# Patient Record
Sex: FEMALE | Race: WHITE | NOT HISPANIC OR LATINO | Employment: OTHER | URBAN - METROPOLITAN AREA
[De-identification: names, ages, dates, MRNs, and addresses within clinical notes are randomized per-mention and may not be internally consistent; named-entity substitution may affect disease eponyms.]

---

## 2017-04-10 ENCOUNTER — ALLSCRIPTS OFFICE VISIT (OUTPATIENT)
Dept: OTHER | Facility: OTHER | Age: 67
End: 2017-04-10

## 2017-04-10 DIAGNOSIS — Z00.00 ENCOUNTER FOR GENERAL ADULT MEDICAL EXAMINATION WITHOUT ABNORMAL FINDINGS: ICD-10-CM

## 2017-04-10 DIAGNOSIS — E66.3 OVERWEIGHT(278.02): ICD-10-CM

## 2017-04-10 DIAGNOSIS — Z12.11 ENCOUNTER FOR SCREENING FOR MALIGNANT NEOPLASM OF COLON: ICD-10-CM

## 2017-04-10 DIAGNOSIS — E55.9 VITAMIN D DEFICIENCY: ICD-10-CM

## 2017-04-10 DIAGNOSIS — E78.5 HYPERLIPIDEMIA: ICD-10-CM

## 2017-04-12 ENCOUNTER — APPOINTMENT (OUTPATIENT)
Dept: LAB | Facility: CLINIC | Age: 67
End: 2017-04-12
Payer: MEDICARE

## 2017-04-12 DIAGNOSIS — Z12.11 ENCOUNTER FOR SCREENING FOR MALIGNANT NEOPLASM OF COLON: ICD-10-CM

## 2017-04-12 DIAGNOSIS — E55.9 VITAMIN D DEFICIENCY: ICD-10-CM

## 2017-04-12 DIAGNOSIS — E66.3 OVERWEIGHT(278.02): ICD-10-CM

## 2017-04-12 DIAGNOSIS — E78.5 HYPERLIPIDEMIA: ICD-10-CM

## 2017-04-12 DIAGNOSIS — Z00.00 ENCOUNTER FOR GENERAL ADULT MEDICAL EXAMINATION WITHOUT ABNORMAL FINDINGS: ICD-10-CM

## 2017-04-12 LAB
25(OH)D3 SERPL-MCNC: 30.5 NG/ML (ref 30–100)
ALBUMIN SERPL BCP-MCNC: 3.7 G/DL (ref 3.5–5)
ALP SERPL-CCNC: 55 U/L (ref 46–116)
ALT SERPL W P-5'-P-CCNC: 23 U/L (ref 12–78)
ANION GAP SERPL CALCULATED.3IONS-SCNC: 6 MMOL/L (ref 4–13)
AST SERPL W P-5'-P-CCNC: 15 U/L (ref 5–45)
BASOPHILS # BLD AUTO: 0.04 THOUSANDS/ΜL (ref 0–0.1)
BASOPHILS NFR BLD AUTO: 1 % (ref 0–1)
BILIRUB SERPL-MCNC: 1.06 MG/DL (ref 0.2–1)
BILIRUB UR QL STRIP: NEGATIVE
BUN SERPL-MCNC: 13 MG/DL (ref 5–25)
CALCIUM SERPL-MCNC: 9 MG/DL (ref 8.3–10.1)
CHLORIDE SERPL-SCNC: 108 MMOL/L (ref 100–108)
CHOLEST SERPL-MCNC: 164 MG/DL (ref 50–200)
CLARITY UR: CLEAR
CO2 SERPL-SCNC: 28 MMOL/L (ref 21–32)
COLOR UR: YELLOW
CREAT SERPL-MCNC: 0.8 MG/DL (ref 0.6–1.3)
EOSINOPHIL # BLD AUTO: 0.09 THOUSAND/ΜL (ref 0–0.61)
EOSINOPHIL NFR BLD AUTO: 3 % (ref 0–6)
ERYTHROCYTE [DISTWIDTH] IN BLOOD BY AUTOMATED COUNT: 11.9 % (ref 11.6–15.1)
GFR SERPL CREATININE-BSD FRML MDRD: >60 ML/MIN/1.73SQ M
GLUCOSE P FAST SERPL-MCNC: 91 MG/DL (ref 65–99)
GLUCOSE UR STRIP-MCNC: NEGATIVE MG/DL
HCT VFR BLD AUTO: 43.4 % (ref 34.8–46.1)
HDLC SERPL-MCNC: 51 MG/DL (ref 40–60)
HGB BLD-MCNC: 14.6 G/DL (ref 11.5–15.4)
HGB UR QL STRIP.AUTO: NEGATIVE
KETONES UR STRIP-MCNC: NEGATIVE MG/DL
LDLC SERPL CALC-MCNC: 89 MG/DL (ref 0–100)
LEUKOCYTE ESTERASE UR QL STRIP: NEGATIVE
LYMPHOCYTES # BLD AUTO: 1.45 THOUSANDS/ΜL (ref 0.6–4.47)
LYMPHOCYTES NFR BLD AUTO: 48 % (ref 14–44)
MCH RBC QN AUTO: 31.1 PG (ref 26.8–34.3)
MCHC RBC AUTO-ENTMCNC: 33.6 G/DL (ref 31.4–37.4)
MCV RBC AUTO: 93 FL (ref 82–98)
MONOCYTES # BLD AUTO: 0.37 THOUSAND/ΜL (ref 0.17–1.22)
MONOCYTES NFR BLD AUTO: 12 % (ref 4–12)
NEUTROPHILS # BLD AUTO: 1.08 THOUSANDS/ΜL (ref 1.85–7.62)
NEUTS SEG NFR BLD AUTO: 36 % (ref 43–75)
NITRITE UR QL STRIP: NEGATIVE
NRBC BLD AUTO-RTO: 0 /100 WBCS
PH UR STRIP.AUTO: 6.5 [PH] (ref 4.5–8)
PLATELET # BLD AUTO: 179 THOUSANDS/UL (ref 149–390)
PMV BLD AUTO: 11 FL (ref 8.9–12.7)
POTASSIUM SERPL-SCNC: 4.6 MMOL/L (ref 3.5–5.3)
PROT SERPL-MCNC: 7.2 G/DL (ref 6.4–8.2)
PROT UR STRIP-MCNC: NEGATIVE MG/DL
RBC # BLD AUTO: 4.69 MILLION/UL (ref 3.81–5.12)
SODIUM SERPL-SCNC: 142 MMOL/L (ref 136–145)
SP GR UR STRIP.AUTO: 1.01 (ref 1–1.03)
TRIGL SERPL-MCNC: 118 MG/DL
UROBILINOGEN UR QL STRIP.AUTO: 0.2 E.U./DL
WBC # BLD AUTO: 3.04 THOUSAND/UL (ref 4.31–10.16)

## 2017-04-12 PROCEDURE — 82306 VITAMIN D 25 HYDROXY: CPT

## 2017-04-12 PROCEDURE — 36415 COLL VENOUS BLD VENIPUNCTURE: CPT

## 2017-04-12 PROCEDURE — 80061 LIPID PANEL: CPT

## 2017-04-12 PROCEDURE — 80053 COMPREHEN METABOLIC PANEL: CPT

## 2017-04-12 PROCEDURE — 81003 URINALYSIS AUTO W/O SCOPE: CPT

## 2017-04-12 PROCEDURE — 85025 COMPLETE CBC W/AUTO DIFF WBC: CPT

## 2017-04-13 ENCOUNTER — APPOINTMENT (OUTPATIENT)
Dept: LAB | Facility: CLINIC | Age: 67
End: 2017-04-13
Payer: MEDICARE

## 2017-04-13 DIAGNOSIS — E55.9 VITAMIN D DEFICIENCY: ICD-10-CM

## 2017-04-13 DIAGNOSIS — E78.5 HYPERLIPIDEMIA: ICD-10-CM

## 2017-04-13 DIAGNOSIS — Z00.00 ENCOUNTER FOR GENERAL ADULT MEDICAL EXAMINATION WITHOUT ABNORMAL FINDINGS: ICD-10-CM

## 2017-04-13 DIAGNOSIS — E66.3 OVERWEIGHT(278.02): ICD-10-CM

## 2017-04-13 DIAGNOSIS — Z12.11 ENCOUNTER FOR SCREENING FOR MALIGNANT NEOPLASM OF COLON: ICD-10-CM

## 2017-04-13 LAB — HEMOCCULT STL QL IA: NEGATIVE

## 2017-04-13 PROCEDURE — G0328 FECAL BLOOD SCRN IMMUNOASSAY: HCPCS

## 2017-05-08 ENCOUNTER — ALLSCRIPTS OFFICE VISIT (OUTPATIENT)
Dept: OTHER | Facility: OTHER | Age: 67
End: 2017-05-08

## 2017-07-06 ENCOUNTER — ALLSCRIPTS OFFICE VISIT (OUTPATIENT)
Dept: OTHER | Facility: OTHER | Age: 67
End: 2017-07-06

## 2017-07-19 ENCOUNTER — ALLSCRIPTS OFFICE VISIT (OUTPATIENT)
Dept: OTHER | Facility: OTHER | Age: 67
End: 2017-07-19

## 2017-08-03 ENCOUNTER — ALLSCRIPTS OFFICE VISIT (OUTPATIENT)
Dept: OTHER | Facility: OTHER | Age: 67
End: 2017-08-03

## 2017-08-31 ENCOUNTER — ALLSCRIPTS OFFICE VISIT (OUTPATIENT)
Dept: OTHER | Facility: OTHER | Age: 67
End: 2017-08-31

## 2017-10-17 ENCOUNTER — ALLSCRIPTS OFFICE VISIT (OUTPATIENT)
Dept: OTHER | Facility: OTHER | Age: 67
End: 2017-10-17

## 2017-10-19 NOTE — PROGRESS NOTES
Assessment   1  Anxiety (300 00) (F41 9)  2  Depression (311) (F32 9)  3  Hair loss (704 00) (L65 9)    Plan   Need for immunization against influenza    · Administered: Fluzone High-Dose 0 5 ML Intramuscular Suspension Prefilled Syringe      1  Anxiety and depression symptoms appear to be stable at the present time she is continue on buspirone 10 mg 3 times a day and escitalopram 20 mg daily  No suicidal ideations are evident on today's visit  I suggested she continue on present medications with the followup review in 3 months  2   Abnormal hair loss previous testing indicated no abnormalities and testosterone level or thyroid level  I suspect that her hair loss or thinning of hair is most likely due to her recent stressful time experiences  I reassured the patient that I think most likely her hair loss will stop in the near future and suggested that she start Rogaine topical solution  Discussion/Summary   Counseling Documentation With Imm: Total time of encounter was 35 minutes1 -- and-- 30 minutes was spent counseling1         1 Amended By: Ramiro Hensley; Oct 18 2017 6:58 PM EST      Chief Complaint   Chief Complaint Free Text Note Form: patient is here for a 7 week follow up  History of Present Illness   HPI: This is a routine follow-up visit for this 70-year-old female patient  She continues to do well on and her symptoms of anxiety and depression appear to be under good control  Her daughter who is an alcoholic who has moved on from inpatient rehab to a residential program with a 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  She is living in [place] and doing well  This was a major source of stress for the patient  She is very happy that her daughter is doing well with her recovery from alcohol addiction  She is showing improved signs of interest in her normal activities sleeping well and continues to go to [facility]   Patient does have some symptoms of mild hair loss which I suspect are related to the stressful period of time that she is just on negotiated  She has no apparent symptoms of suicide tendencies  Review of Systems   Complete-Female:      Constitutional: No fever, no chills, feels well, no tiredness, no recent weight gain or weight loss  Eyes: No complaints of eye pain, no red eyes, no eyesight problems, no discharge, no dry eyes, no itching of eyes  ENT: no complaints of earache, no loss of hearing, no nose bleeds, no nasal discharge, no sore throat, no hoarseness  Cardiovascular: No complaints of slow heart rate, no fast heart rate, no chest pain, no palpitations, no leg claudication, no lower extremity edema  Respiratory: No complaints of shortness of breath, no wheezing, no cough, no SOB on exertion, no orthopnea, no PND  Gastrointestinal: No complaints of abdominal pain, no constipation, no nausea or vomiting, no diarrhea, no bloody stools  Genitourinary: No complaints of dysuria, no incontinence, no pelvic pain, no dysmenorrhea, no vaginal discharge or bleeding  Musculoskeletal: No complaints of arthralgias, no myalgias, no joint swelling or stiffness, no limb pain or swelling  Integumentary: No complaints of skin rash or lesions, no itching, no skin wounds, no breast pain or lump  Neurological: No complaints of headache, no confusion, no convulsions, no numbness, no dizziness or fainting, no tingling, no limb weakness, no difficulty walking  Psychiatric: Not suicidal, no sleep disturbance, no anxiety or depression, no change in personality, no emotional problems  Endocrine: No complaints of proptosis, no hot flashes, no muscle weakness, no deepening of the voice, no feelings of weakness  Hematologic/Lymphatic: No complaints of swollen glands, no swollen glands in the neck, does not bleed easily, does not bruise easily  Active Problems   1  Anxiety (300 00) (F41 9)  2  Anxiety state (300 00) (F41 1)  3   Arthritis (555 90) (M19 90)  4  Depression (311) (F32 9)  5  Hyperbilirubinemia (782 4) (E80 6)  6  Hyperlipidemia, mild (272 4) (E78 5)  7  Leukopenia (288 50) (D72 819)  8  Need for immunization against influenza (V04 81) (Z23)  9  Over weight (278 02) (E66 3)  10  Sleep disturbance (780 50) (G47 9)  11  Vitamin D deficiency (268 9) (E55 9)    Past Medical History   1  History of Fracture of radius, distal, closed (813 42) (S52 509A)  2  History of Hair loss (704 00) (L65 9)  Active Problems And Past Medical History Reviewed: The active problems and past medical history were reviewed and updated today  Surgical History   1  History of Ankle Surgery  Surgical History Reviewed: The surgical history was reviewed and updated today  Family History   Mother   1  Family history of Ovarian cancer (183 0) (C56 9)  Father   2  Family history of Heart disease (429 9) (I51 9)  Brother   3  Family history of Leukemia (208 90) (C95 90)  Family History Reviewed: The family history was reviewed and updated today  Social History    · Never A Smoker  Social History Reviewed: The social history was reviewed and updated today  The social history was reviewed and is unchanged  Current Meds   1  ALPRAZolam 0 25 MG Oral Tablet; take one oill every 6-8 hours as needed for anxiety; Therapy: 50Dew8278 to (Evaluate:86Ixt0333); Last Rx:48Bop0833 Ordered  2  Aspirin EC 81 MG Oral Tablet Delayed Release Recorded  3  BusPIRone HCl - 10 MG Oral Tablet; TAKE 1 TABLET 3 TIMES DAILY; Therapy: 31ZES1283 to (Evaluate:17Oct2017)  Requested for: 52WKV2229; Last Rx:00Evr0924 Ordered  4  Calcium 250 MG Oral Capsule Recorded  5  Escitalopram Oxalate 20 MG Oral Tablet; Take 1 tablet daily  Requested for: 23CJZ3659; Last     MQ:[id number] Ordered  6  2016 [employer] CAPS Recorded  7  Vitamin D (Ergocalciferol) 91819 UNIT Oral Capsule; TAKE 1 CAPSULE Weekly Recorded  Medication List Reviewed:     The medication list was reviewed and updated today  Allergies   1  No Known Drug Allergies    Vitals   Vital Signs    Recorded: 66TCE7498 02:37PM   Temperature 98 5 F   Heart Rate 70   Respiration 14   Systolic 869   Diastolic 74   Height 5 ft 9 in   Weight 189 lb 0 2 oz   BMI Calculated 27 91   BSA Calculated 2 02   O2 Saturation 98     Physical Exam        Constitutional      General appearance: No acute distress, well appearing and well nourished  Eyes      Conjunctiva and lids: No swelling, erythema or discharge  Ears, Nose, Mouth, and Throat      External inspection of ears and nose: Normal        Pulmonary      Respiratory effort: No increased work of breathing or signs of respiratory distress  Auscultation of lungs: Clear to auscultation  Cardiovascular      Auscultation of heart: Normal rate and rhythm, normal S1 and S2, without murmurs  Future Appointments      Date/Time Provider Specialty Site   01/18/2018 09:15 AM BALAJI Tabor  Internal Medicine [facility] INTERNAL MED   04/17/2018 09:00 AM BALAJI Tabor   Internal Medicine [facility]53 MED     Signatures    Electronically signed by : BALAJI Lugo ; Oct 18 2017  6:57PM EST                       (Author)     Electronically signed by : BALAJI Lugo ; Oct 18 2017  6:58PM EST                       (Author)

## 2018-01-10 NOTE — PROGRESS NOTES
Assessment    1  Hair loss (704 00) (L65 9)   2  Arthritis (716 90) (M19 90)   3  Over weight (278 02) (E66 3)   4  Anxiety (300 00) (F41 9)   5  Vitamin D deficiency (268 9) (E55 9)   6  Hyperlipidemia, mild (272 4) (E78 5)    Plan  Hair loss    · (1) IRON; Status:Active; Requested for:08Apr2016;    · (1) T4, FREE; Status:Active; Requested for:08Apr2016;    · (1) TESTOSTERONE; Status:Active; Requested for:08Apr2016;    · (1) TSH; Status:Active; Requested for:08Apr2016; Health Maintenance    · EKG/ECG- POC; Status:Complete;   Done: 08Apr2016 09:30AM  Health Maintenance, Hair loss, Hyperlipidemia, mild, Over weight, Vitamin D deficiency    · (1) CBC/PLT/DIFF; Status:Active; Requested for:08Apr2016;    · (1) COMPREHENSIVE METABOLIC PANEL; Status:Active; Requested for:08Apr2016;    · (1) LIPID PANEL FASTING W DIRECT LDL REFLEX; Status:Active; Requested  for:08Apr2016;    · (1) OCCULT BLOOD, FECAL IMMUNOCHEMICAL TEST; Status:Active; Requested  for:08Apr2016;    · (1) URINALYSIS (will reflex a microscopy if leukocytes, occult blood, protein or nitrites  are not within normal limits); Status:Active; Requested for:08Apr2016;   Vitamin D deficiency    · *(Q) VITAMIN D, 25-HYDROXY, LC/MS/MS; Status:Active; Requested XJ:65EPV1504;     Discussion/Summary   Summary this 79-year-old woman appears to be in good general health  She has lost 3 pounds over the past year and one quarter on purpose  She is now approaching her ideal body mass index  She has some new arthritic changes in her left knee  These changes appear to be early stages of arthritis  I've encouraged her to continue to lose weight and embark on a Glucosamine-chondroitin supplementation  Should her pain worsen we will obtain x-ray imaging of the left knee  Patient has thinning of her eyebrows and hair loss past 6 months  We will obtain free T4 and TSH iron level and testosterone levels on this patient to evaluate      History of anxiety disorder which continues to be well controlled on Lexapro 10 mg daily we will continue the same  Vitamin D deficiency patient is completing a 6 week loading dose of vitamin D at 50,000 units per week he was given a lab slip to follow up in June a vitamin D level  Chief Complaint  This is an annual complete physical and laboratory results review for this 70-year-old woman  History of Present Illness  HPI: This 70-year-old woman presents for her annual physical today as a history of anxiety disorder overweight mild hyperlipidemia and vitamin D deficiency  Today she has 2 new issues  First is increased hair loss over the past several months  Had thinning of her eyebrows and decreased city appear uniformly across the scalp  The second is medial left knee discomfort  Pain is worse when she is ambulating and weightbearing  Review of Systems    Constitutional: No fever, no chills, feels well, no tiredness, no recent weight gain or weight loss  Eyes: No complaints of eye pain, no red eyes, no eyesight problems, no discharge, no dry eyes, no itching of eyes  ENT: no complaints of earache, no loss of hearing, no nose bleeds, no nasal discharge, no sore throat, no hoarseness  Cardiovascular: No complaints of slow heart rate, no fast heart rate, no chest pain, no palpitations, no leg claudication, no lower extremity edema  Respiratory: No complaints of shortness of breath, no wheezing, no cough, no SOB on exertion, no orthopnea, no PND  Gastrointestinal: No complaints of abdominal pain, no constipation, no nausea or vomiting, no diarrhea, no bloody stools  Genitourinary: No complaints of dysuria, no incontinence, no pelvic pain, no dysmenorrhea, no vaginal discharge or bleeding  Musculoskeletal: arthralgias, joint swelling, joint stiffness and Left knee pain medial aspect of the joint mild swelling in the joint as well     Integumentary: No complaints of skin rash or lesions, no itching, no skin wounds, no breast pain or lump  Neurological: No complaints of headache, no confusion, no convulsions, no numbness, no dizziness or fainting, no tingling, no limb weakness, no difficulty walking  Psychiatric: anxiety  Endocrine: Increased hair loss over the past approximate 6 months  Hematologic/Lymphatic: No complaints of swollen glands, no swollen glands in the neck, does not bleed easily, does not bruise easily  Active Problems    1  Fracture of radius, distal, closed (813 42) (S52 509A)   2  Influenza vaccine needed (V04 81) (Z23)    Surgical History    · History of Ankle Surgery    Family History    · Family history of Ovarian cancer (183 0) (C56 9)    · Family history of Heart disease (429 9) (I51 9)    · Family history of Leukemia (208 90) (C95 90)    Social History    · Never A Smoker    Current Meds   1  Aspirin EC 81 MG Oral Tablet Delayed Release Recorded   2  Calcium 250 MG Oral Capsule Recorded   3  Lexapro 10 MG Oral Tablet Recorded   4  Vitamin D (Ergocalciferol) 03096 UNIT Oral Capsule; TAKE 1 CAPSULE Weekly   Recorded    Allergies    1  No Known Drug Allergies    Vitals   Recorded: 08Apr2016 10:16AM Recorded: 08Apr2016 09:25AM   Temperature  97 4 F   Heart Rate  74   Respiration  16   Systolic 891 896   Diastolic 76 84   Height  5 ft 9 in   Weight  177 lb 0 64 oz   BMI Calculated  26 14   BSA Calculated  1 96   O2 Saturation  98     Physical Exam    Constitutional   General appearance: No acute distress, well appearing and well nourished  Head and Face   Head and face: Normal     Eyes   Conjunctiva and lids: No swelling, erythema or discharge  Pupils and irises: Equal, round, reactive to light  Ophthalmoscopic examination: Normal fundi and optic discs  Ears, Nose, Mouth, and Throat   External inspection of ears and nose: Normal     Otoscopic examination: Tympanic membranes translucent with normal light reflex  Canals patent without erythema      Nasal mucosa, septum, and turbinates: Normal without edema or erythema  Lips, teeth, and gums: Normal, good dentition  Oropharynx: Normal with no erythema, edema, exudate or lesions  Neck   Neck: Supple, symmetric, trachea midline, no masses  Thyroid: Normal, no thyromegaly  Pulmonary   Respiratory effort: No increased work of breathing or signs of respiratory distress  Auscultation of lungs: Clear to auscultation  Cardiovascular   Auscultation of heart: Normal rate and rhythm, normal S1 and S2, no murmurs  Carotid pulses: 2+ bilaterally  Abdominal aorta: Normal     Pedal pulses: 2+ bilaterally  Abdomen   Abdomen: Non-tender, no masses  Liver and spleen: No hepatomegaly or splenomegaly  Lymphatic   Palpation of lymph nodes in neck: No lymphadenopathy  Musculoskeletal   Joints, bones, and muscles: Abnormal   tenderness over the medial aspect of the left no significant  Skin   Skin and subcutaneous tissue: Normal without rashes or lesions  Neurologic   Cranial nerves: Cranial nerves II-XII intact  Cortical function: Normal mental status  Reflexes: 2+ and symmetric  Psychiatric   Judgment and insight: Normal     Orientation to person, place, and time: Normal     Recent and remote memory: Intact      Mood and affect: Normal        Results/Data  EKG/ECG- POC 08Apr2016 09:30AM Ilean Route     Test Name Result Flag Reference   EKG/ECG 04/07/2016         Signatures   Electronically signed by : BALAJI Starr ; Apr 8 2016 10:37AM EST                       (Author)

## 2018-01-12 VITALS
WEIGHT: 183.04 LBS | BODY MASS INDEX: 27.11 KG/M2 | TEMPERATURE: 96.9 F | OXYGEN SATURATION: 98 % | HEIGHT: 69 IN | DIASTOLIC BLOOD PRESSURE: 70 MMHG | HEART RATE: 59 BPM | RESPIRATION RATE: 16 BRPM | SYSTOLIC BLOOD PRESSURE: 130 MMHG

## 2018-01-12 VITALS
RESPIRATION RATE: 14 BRPM | HEIGHT: 69 IN | SYSTOLIC BLOOD PRESSURE: 114 MMHG | TEMPERATURE: 97.9 F | WEIGHT: 171.03 LBS | DIASTOLIC BLOOD PRESSURE: 62 MMHG | BODY MASS INDEX: 25.33 KG/M2 | HEART RATE: 77 BPM | OXYGEN SATURATION: 98 %

## 2018-01-12 NOTE — PROGRESS NOTES
Assessment    1  Anxiety (300 00) (F41 9)   2  Over weight (278 02) (E66 3)   3  Sleep disturbance (780 50) (G47 9)   4  Anxiety state (300 00) (F41 1)   5  Depression (311) (F32 9)    Plan  Anxiety    · ALPRAZolam 0 25 MG Oral Tablet; take one oill every 6-8 hours as needed for  anxiety  Colon cancer screening    · (1) URINALYSIS (will reflex a microscopy if leukocytes, occult blood, protein or nitrites  are not within normal limits); Status:Active; Requested for:63Npb0097;   Colon cancer screening, Health Maintenance, Hyperlipidemia, mild, Over weight, Vitamin  D deficiency    · (1) OCCULT BLOOD, FECAL IMMUNOCHEMICAL TEST; Status:Active; Requested  for:87Ibl6164; Health Maintenance    · EKG/ECG- POC; Status:Active - Perform Order; Requested for:88Qqn7384; Health Maintenance, Hyperlipidemia, mild, Over weight, Vitamin D deficiency    · (1) CBC/PLT/DIFF; Status:Active; Requested for:72Ger4368;    · (1) COMPREHENSIVE METABOLIC PANEL; Status:Active; Requested for:19Yot6455;    · (1) LIPID PANEL FASTING W DIRECT LDL REFLEX; Status:Active; Requested  for:19Mrk3882;   Vitamin D deficiency    · (1) VITAMIN D 25-HYDROXY; Status:Active; Requested for:42Yam6784; In view of the patient's recent stress level and her family we have in increased her Lexapro from 10 mg a day to 20 mg a day  I've also prescribe Xanax 0 25 mg to be taken every 6-8 hours as needed for anxiety breakthrough symptoms  I've asked her to have standard set of blood work performed to evaluate her metabolic profile also I'll meet with her in 3-4 weeks to reassess the benefit of increasing her Lexapro and adding the Xanax medication  Discussion/Summary  In summary the patient's physical examination today appears to be relatively stable  Her weight exceeds what his ideal  She has been experiencing increased emotional stresses and mild depression symptoms due to the recent diagnosis of her daughter with alcoholism   Her daughter is presently in rehabilitation  [facility] and also counseling with her   She feels under increased stress levels due to her daughter's condition  We've adjusted her medications try to help with the anxiety and depression symptoms  I'll see her in 3-4 weeks to reassess the effectiveness of this medication change  She'll have comprehensive set of blood work performed prior to that visit for review  Chief Complaint  patient is here for a physical       History of Present Illness  HM, Adult Female: The patient is being seen for a health maintenance evaluation  The last health maintenance visit was 1 year(s) ago  Social History: Household members include spouse  She is   Work status: Retired  The patient has never smoked cigarettes  She reports occasional alcohol use  She has never used illicit drugs  General Health: The patient's health since the last visit is described as good  She has regular dental visits  She denies vision problems  She denies hearing loss  Immunizations status: up to date  Lifestyle:  She consumes a diverse and healthy diet  She has weight concerns  She exercises regularly  She does not use tobacco  She denies alcohol use  She denies drug use  Reproductive health: the patient is postmenopausal    Screening: cancer screening reviewed and current  metabolic screening reviewed and current  risk screening reviewed and current  HPI: This 58-year-old female patient presents today for her annual health maintenance examination  She indicates that she has been under increased levels of stress lately due to family issues  The patient's daughter has been admitted to an alcohol rehabilitation facility  Her daughters 27years of age and lives with a boyfriend  The situation is caused increased levels of stress anxiety and depression and the patient  She has been having a difficult time coping with the stress and Route requests something to help her relax   He also indicates that she feels that she is feeling more depressed than normal  She has been using Benadryl at bedtime to help her fall asleep  She has been going to [employer] and also has been seeing a counselor along with her   Physically the patient has no specific complaints at this time  Review of Systems    Constitutional: No fever, no chills, feels well, no tiredness, no recent weight gain or weight loss  Eyes: No complaints of eye pain, no red eyes, no eyesight problems, no discharge, no dry eyes, no itching of eyes  ENT: no complaints of earache, no loss of hearing, no nose bleeds, no nasal discharge, no sore throat, no hoarseness  Cardiovascular: No complaints of slow heart rate, no fast heart rate, no chest pain, no palpitations, no leg claudication, no lower extremity edema  Respiratory: No complaints of shortness of breath, no wheezing, no cough, no SOB on exertion, no orthopnea, no PND  Gastrointestinal: No complaints of abdominal pain, no constipation, no nausea or vomiting, no diarrhea, no bloody stools  Genitourinary: No complaints of dysuria, no incontinence, no pelvic pain, no dysmenorrhea, no vaginal discharge or bleeding  Musculoskeletal: No complaints of arthralgias, no myalgias, no joint swelling or stiffness, no limb pain or swelling  Integumentary: No complaints of skin rash or lesions, no itching, no skin wounds, no breast pain or lump  Neurological: No complaints of headache, no confusion, no convulsions, no numbness, no dizziness or fainting, no tingling, no limb weakness, no difficulty walking  Psychiatric: anxiety, depression and Sleep disturbance  Endocrine: No complaints of proptosis, no hot flashes, no muscle weakness, no deepening of the voice, no feelings of weakness  Hematologic/Lymphatic: No complaints of swollen glands, no swollen glands in the neck, does not bleed easily, does not bruise easily  Active Problems    1   Anxiety (300 00) (F41 9) 2  Arthritis (716 90) (M19 90)   3  Hyperlipidemia, mild (272 4) (E78 5)   4  Influenza vaccine needed (V04 81) (Z23)   5  Over weight (278 02) (E66 3)   6  Vitamin D deficiency (268 9) (E55 9)    Past Medical History    · History of Fracture of radius, distal, closed (813 42) (S52 509A)   · History of Hair loss (704 00) (L65 9)    Surgical History    · History of Ankle Surgery    Family History  Mother    · Family history of Ovarian cancer (183 0) (C56 9)  Father    · Family history of Heart disease (429 9) (I51 9)  Brother    · Family history of Leukemia (208 90) (C95 90)    Social History    · Never A Smoker    Current Meds   1  Aspirin EC 81 MG Oral Tablet Delayed Release Recorded   2  Calcium 250 MG Oral Capsule Recorded   3  Escitalopram Oxalate 10 MG Oral Tablet; TAKE 1 TABLET DAILY  Requested for:   67DOB1216; Last Rx:[id number] Ordered   4 2016 [facility] [employer] CAPS Recorded   5  Vitamin D (Ergocalciferol) 01284 UNIT Oral Capsule; TAKE 1 CAPSULE Weekly   Recorded    Allergies    1  No Known Drug Allergies    Vitals   Recorded: 43Wsh3994 09:17AM   Temperature 98 2 F   Heart Rate 99   Respiration 16   Systolic 871   Diastolic 74   Height 5 ft 9 in   Weight 179 lb    BMI Calculated 26 43   BSA Calculated 1 97   O2 Saturation 98     Physical Exam    Constitutional   General appearance: No acute distress, well appearing and well nourished  Head and Face   Head and face: Normal     Eyes   Conjunctiva and lids: No swelling, erythema or discharge  Ears, Nose, Mouth, and Throat   External inspection of ears and nose: Normal     Otoscopic examination: Tympanic membranes translucent with normal light reflex  Canals patent without erythema  Nasal mucosa, septum, and turbinates: Normal without edema or erythema  Lips, teeth, and gums: Normal, good dentition  Oropharynx: Normal with no erythema, edema, exudate or lesions  Neck   Neck: Supple, symmetric, trachea midline, no masses      Thyroid: Normal, no thyromegaly  Pulmonary   Respiratory effort: No increased work of breathing or signs of respiratory distress  Percussion of chest: Normal     Auscultation of lungs: Clear to auscultation  Cardiovascular   Auscultation of heart: Normal rate and rhythm, normal S1 and S2, no murmurs  Carotid pulses: 2+ bilaterally  Pedal pulses: 2+ bilaterally  Examination of extremities for edema and/or varicosities: Normal     Abdomen   Abdomen: Non-tender, no masses  Liver and spleen: No hepatomegaly or splenomegaly  Lymphatic   Palpation of lymph nodes in neck: No lymphadenopathy  Musculoskeletal   Gait and station: Normal     Digits and nails: Normal without clubbing or cyanosis  Joints, bones, and muscles: Normal     Range of motion: Normal     Stability: Normal     Muscle strength/tone: Normal     Skin   Skin and subcutaneous tissue: Normal without rashes or lesions  Palpation of skin and subcutaneous tissue: Normal turgor  Neurologic   Cranial nerves: Cranial nerves II-XII intact  Cortical function: Normal mental status  Reflexes: 2+ and symmetric  Coordination: Normal finger to nose and heel to shin  Psychiatric   Judgment and insight: Normal     Orientation to person, place, and time: Normal     Recent and remote memory: Intact  Mood and affect: Abnormal   Anxious and mildly depressed  Future Appointments    Date/Time Provider Specialty Site   05/08/2017 09:00 AM BALAJI Boland  Internal Medicine [facility]   04/17/2018 09:00 AM BALAJI Boland   Internal Medicine [facility]     Signatures   Electronically signed by : BALAJI Vieira ; Apr 10 2017  5:02PM EST                       (Author)

## 2018-01-13 VITALS
DIASTOLIC BLOOD PRESSURE: 74 MMHG | TEMPERATURE: 98.5 F | SYSTOLIC BLOOD PRESSURE: 130 MMHG | BODY MASS INDEX: 27.99 KG/M2 | OXYGEN SATURATION: 98 % | RESPIRATION RATE: 14 BRPM | HEART RATE: 70 BPM | HEIGHT: 69 IN | WEIGHT: 189.01 LBS

## 2018-01-13 VITALS
OXYGEN SATURATION: 98 % | HEIGHT: 69 IN | WEIGHT: 173.38 LBS | BODY MASS INDEX: 25.68 KG/M2 | SYSTOLIC BLOOD PRESSURE: 115 MMHG | DIASTOLIC BLOOD PRESSURE: 78 MMHG | RESPIRATION RATE: 16 BRPM | TEMPERATURE: 98.2 F | HEART RATE: 72 BPM

## 2018-01-13 VITALS
DIASTOLIC BLOOD PRESSURE: 74 MMHG | WEIGHT: 179 LBS | BODY MASS INDEX: 26.51 KG/M2 | OXYGEN SATURATION: 98 % | RESPIRATION RATE: 16 BRPM | HEIGHT: 69 IN | TEMPERATURE: 98.2 F | HEART RATE: 99 BPM | SYSTOLIC BLOOD PRESSURE: 132 MMHG

## 2018-01-14 VITALS
DIASTOLIC BLOOD PRESSURE: 70 MMHG | SYSTOLIC BLOOD PRESSURE: 124 MMHG | WEIGHT: 179.01 LBS | TEMPERATURE: 98.2 F | HEART RATE: 69 BPM | RESPIRATION RATE: 14 BRPM | BODY MASS INDEX: 26.51 KG/M2 | HEIGHT: 69 IN | OXYGEN SATURATION: 98 %

## 2018-01-14 VITALS
WEIGHT: 176.03 LBS | RESPIRATION RATE: 16 BRPM | SYSTOLIC BLOOD PRESSURE: 138 MMHG | TEMPERATURE: 98.9 F | HEART RATE: 71 BPM | HEIGHT: 69 IN | OXYGEN SATURATION: 99 % | DIASTOLIC BLOOD PRESSURE: 76 MMHG | BODY MASS INDEX: 26.07 KG/M2

## 2018-01-18 ENCOUNTER — ALLSCRIPTS OFFICE VISIT (OUTPATIENT)
Dept: OTHER | Facility: OTHER | Age: 68
End: 2018-01-18

## 2018-01-19 NOTE — PROGRESS NOTES
Assessment   1  Anxiety (300 00) (F41 9)   2  Depression (311) (F32 9)   3  Over weight (278 02) (E66 3)    Plan   Colon cancer screening    · (1) URINALYSIS (will reflex a microscopy if leukocytes, occult blood, protein or nitrites are not within    normal limits); Status:Active; Requested for:02Apr2018;   Colon cancer screening, Depression, Hyperbilirubinemia, Hyperlipidemia, mild, Leukopenia, Sleep    disturbance, Vitamin D deficiency    · (1) OCCULT BLOOD, FECAL IMMUNOCHEMICAL TEST; Status:Active; Requested for:02Apr2018;   Depression, Hyperbilirubinemia, Hyperlipidemia, mild, Leukopenia, Sleep disturbance, Vitamin D    deficiency    · (1) CBC/PLT/DIFF; Status:Active; Requested for:02Apr2018;    · (1) COMPREHENSIVE METABOLIC PANEL; Status:Active; Requested for:02Apr2018;    · (1) LIPID PANEL FASTING W DIRECT LDL REFLEX; Status:Active; Requested for:02Apr2018;       1  Anxiety depression disorder seems to be will stabilized at this time recommend that the patient continue on her present medications, buspirone 10 mg 3 times a day and as citalopram 20 mg daily  If the patient experiences any year relapse or increase in anxiety or depression symptoms she should notify me to return for follow-up visit  She does have a complete physical visit scheduled for April and she will have comprehensive set of blood work for that visit  2   Overweight condition which I relate to improving depression symptoms and increased interest in eating  It also may be somewhat of a side effect of the as citalopram medication  I have recommended that the patient decrease her calorie consumption by decreasing portions of everything on her plate by 40% also recommend the initiation of daily exercise and reduction of carbohydrate consumption in general        Discussion/Summary   Counseling Documentation With Imm: total time of encounter was 30 minutes-- and-- 25 minutes was spent counseling        Chief Complaint   Chief Complaint Free Text Note Form: Patient is here today for a 3 month follow up  History of Present Illness   HPI: This is a routine 3 month follow-up visit for this 50-year-old female patient  She returns today for follow-up assessment of her anxiety depression symptoms  The symptoms have been largely linked to her daughter's struggles with alcohol addiction  Fortunately her daughter has been doing well on remain sober  Her daughter lives in [place] but did come home for [place] holiday with the visit went well and the patient intends to visit her daughter in [place] in the next several weeks  She denies any panic attacks she is sleeping well most nights does not have any overwhelming feelings of depression or anxiety at this point  We did discuss her increasing body weight she has gained almost 30 lb over the past 4-5 months  Explained to the patient some of this may be because of the improved treatment of her depression increased interest in eating but I have suggested strongly that she start to decrease her calorie consumption and take and start a regular exercise program       Review of Systems   Complete-Female:      Constitutional: No fever, no chills, feels well, no tiredness, no recent weight gain or weight loss  Eyes: No complaints of eye pain, no red eyes, no eyesight problems, no discharge, no dry eyes, no itching of eyes  ENT: no complaints of earache, no loss of hearing, no nose bleeds, no nasal discharge, no sore throat, no hoarseness  Cardiovascular: No complaints of slow heart rate, no fast heart rate, no chest pain, no palpitations, no leg claudication, no lower extremity edema  Respiratory: No complaints of shortness of breath, no wheezing, no cough, no SOB on exertion, no orthopnea, no PND  Gastrointestinal: No complaints of abdominal pain, no constipation, no nausea or vomiting, no diarrhea, no bloody stools        Genitourinary: No complaints of dysuria, no incontinence, no pelvic pain, no dysmenorrhea, no vaginal discharge or bleeding  Musculoskeletal: No complaints of arthralgias, no myalgias, no joint swelling or stiffness, no limb pain or swelling  Integumentary: No complaints of skin rash or lesions, no itching, no skin wounds, no breast pain or lump  Neurological: No complaints of headache, no confusion, no convulsions, no numbness, no dizziness or fainting, no tingling, no limb weakness, no difficulty walking  Psychiatric: Not suicidal, no sleep disturbance, no anxiety or depression, no change in personality, no emotional problems  Endocrine: No complaints of proptosis, no hot flashes, no muscle weakness, no deepening of the voice, no feelings of weakness  Hematologic/Lymphatic: No complaints of swollen glands, no swollen glands in the neck, does not bleed easily, does not bruise easily  Active Problems   1  Anxiety (300 00) (F41 9)   2  Anxiety state (300 00) (F41 1)   3  Arthritis (716 90) (M19 90)   4  Depression (311) (F32 9)   5  Hair loss (704 00) (L65 9)   6  Hyperbilirubinemia (782 4) (E80 6)   7  Hyperlipidemia, mild (272 4) (E78 5)   8  Leukopenia (288 50) (D72 819)   9  Need for immunization against influenza (V04 81) (Z23)   10  Over weight (278 02) (E66 3)   11  Sleep disturbance (780 50) (G47 9)   12  Vitamin D deficiency (268 9) (E55 9)    Past Medical History   1  History of Fracture of radius, distal, closed (813 42) (S52 509A)   2  History of Hair loss (704 00) (L65 9)  Active Problems And Past Medical History Reviewed: The active problems and past medical history were reviewed and updated today  Surgical History   1  History of Ankle Surgery  Surgical History Reviewed: The surgical history was reviewed and updated today  Family History   Mother    1  Family history of Ovarian cancer (183 0) (C56 9)  Father    2  Family history of Heart disease (429 9) (I51 9)  Brother    3   Family history of Leukemia (208 90) (C95 90)  Family History Reviewed: The family history was reviewed and updated today  Social History    · Never A Smoker  Social History Reviewed: The social history was reviewed and updated today  The social history was reviewed and is unchanged  Current Meds    1  ALPRAZolam 0 25 MG Oral Tablet; take one oill every 6-8 hours as needed for anxiety; Therapy: [id number] to (Evaluate:53Aag2037); Last Rx:[id number] Ordered   2  Aspirin EC 81 MG Oral Tablet Delayed Release Recorded   3  BusPIRone HCl - 10 MG Oral Tablet; TAKE 1 TABLET 3 TIMES DAILY; Therapy: 86YEO4273 to (Evaluate:59Oep8447)  Requested for: 10KEC6687; Last Rx:[id number]     Ordered   4  Calcium 250 MG Oral Capsule Recorded   5  Escitalopram Oxalate 20 MG Oral Tablet; Take 1 tablet daily  Requested for: 27Nov2017; Last     Rx:[id number] Ordered   6 2016 [facility] [employer] CAPS Recorded   7  Vitamin D (Ergocalciferol) 62030 UNIT Oral Capsule; TAKE 1 CAPSULE Weekly Recorded  Medication List Reviewed: The medication list was reviewed and updated today  Allergies   1  No Known Drug Allergies    Vitals   Vital Signs    Recorded: 82AID2914 09:12AM   Temperature 97 1 F   Heart Rate 69   Systolic 083   Diastolic 80   Height 5 ft 9 in   Weight 200 lb    BMI Calculated 29 54   BSA Calculated 2 07   O2 Saturation 99     Physical Exam        Constitutional      General appearance: No acute distress, well appearing and well nourished  Eyes      Conjunctiva and lids: No swelling, erythema or discharge  Ears, Nose, Mouth, and Throat      External inspection of ears and nose: Normal        Pulmonary      Respiratory effort: No increased work of breathing or signs of respiratory distress         Psychiatric      Orientation to person, place, and time: Normal        Mood and affect: Normal           Results/Data   Falls Risk Assessment (Dx Z13 89 Screen for Neurologic Disorder) [id number] 09:16AM User, Ahs      Test Name Result Flag Reference   Falls Risk      No falls in the past year        Future Appointments      Date/Time Provider Specialty Site   04/17/2018 09:00 AM BALAJI Caballero   Internal Medicine [facility] INTERNAL MED     Signatures    Electronically signed by : BALAJI Dick ; Jan 18 2018  9:42AM EST                       (Author)

## 2018-01-23 VITALS
OXYGEN SATURATION: 99 % | HEIGHT: 69 IN | BODY MASS INDEX: 29.62 KG/M2 | WEIGHT: 200 LBS | SYSTOLIC BLOOD PRESSURE: 132 MMHG | TEMPERATURE: 97.1 F | DIASTOLIC BLOOD PRESSURE: 80 MMHG | HEART RATE: 69 BPM

## 2018-01-24 ENCOUNTER — TELEPHONE (OUTPATIENT)
Dept: INTERNAL MEDICINE CLINIC | Facility: CLINIC | Age: 68
End: 2018-01-24

## 2018-01-24 DIAGNOSIS — I80.00 SUPERFICIAL THROMBOPHLEBITIS OF LOWER EXTREMITY, UNSPECIFIED LATERALITY: Primary | ICD-10-CM

## 2018-01-24 RX ORDER — NAPROXEN 500 MG/1
500 TABLET ORAL 2 TIMES DAILY WITH MEALS
Qty: 20 TABLET | Refills: 1 | Status: SHIPPED | OUTPATIENT
Start: 2018-01-24 | End: 2019-02-11 | Stop reason: ALTCHOICE

## 2018-01-24 NOTE — TELEPHONE ENCOUNTER
Pt called earlier to ask for some advice  She self-diagnosed herself for having superficial phlebitis on her thigh  She has had this before although this time it has 5+ spots on her thigh versus 1 or 2  They are hard and red  She has varicose veins where these spots have formed  She has been applying moist heat on these spots, but wants to know if there is anything else she can do  Please call back at [phone], thank you!

## 2018-01-24 NOTE — TELEPHONE ENCOUNTER
Patient called today to report what appears to be superficial thrombophlebitis of her thigh  She has had this before and there are several bumps in her varicose vein along with some localized irritation  I have advised her to minimize ambulation and use warm moist heat for 20-30 minutes 3 times a day in addition we started her on Naprosyn 500 mg twice a day for 10 days

## 2018-03-27 ENCOUNTER — CONSULT (OUTPATIENT)
Dept: VASCULAR SURGERY | Facility: CLINIC | Age: 68
End: 2018-03-27
Payer: MEDICARE

## 2018-03-27 ENCOUNTER — TRANSCRIBE ORDERS (OUTPATIENT)
Dept: ADMINISTRATIVE | Facility: HOSPITAL | Age: 68
End: 2018-03-27

## 2018-03-27 VITALS
SYSTOLIC BLOOD PRESSURE: 140 MMHG | BODY MASS INDEX: 30.21 KG/M2 | WEIGHT: 204 LBS | DIASTOLIC BLOOD PRESSURE: 80 MMHG | TEMPERATURE: 97.8 F | HEIGHT: 69 IN

## 2018-03-27 DIAGNOSIS — I83.90 VARICOSE VEIN OF LEG: ICD-10-CM

## 2018-03-27 DIAGNOSIS — I83.90 ASYMPTOMATIC VARICOSE VEINS: Primary | ICD-10-CM

## 2018-03-27 DIAGNOSIS — I80.01 THROMBOPHLEBITIS OF SUPERFICIAL VEINS OF RIGHT LOWER EXTREMITY: Primary | ICD-10-CM

## 2018-03-27 PROCEDURE — 99203 OFFICE O/P NEW LOW 30 MIN: CPT | Performed by: NURSE PRACTITIONER

## 2018-03-27 RX ORDER — BUSPIRONE HYDROCHLORIDE 10 MG/1
10 TABLET ORAL 3 TIMES DAILY
Refills: 2 | COMMUNITY
Start: 2018-02-01 | End: 2018-05-08 | Stop reason: SDUPTHER

## 2018-03-27 RX ORDER — ESCITALOPRAM OXALATE 20 MG/1
20 TABLET ORAL DAILY
Refills: 1 | COMMUNITY
Start: 2018-02-25 | End: 2018-05-23 | Stop reason: SDUPTHER

## 2018-03-27 NOTE — PATIENT INSTRUCTIONS
Varicose Veins   AMBULATORY CARE:   Varicose veins  are veins that become large, twisted, and swollen  They are common on the back of the calves, knees, and thighs  Varicose veins are caused by valves in your veins that do not work properly  This causes blood to collect and increase pressure in the veins of your legs  The increased pressure causes your veins to stretch, get larger, swell, and twist        Common symptoms include the following: Your symptoms may be worse after you stand or sit for long periods of time  You may have any of the following:  · Blue, purple, or bulging veins in your legs     · Pain, swelling, or muscle cramps in your legs    · Feeling of fatigue or heaviness in your legs    · Cramping in your legs  Seek care immediately if:   · You have a wound that does not heal or is infected  · You have an injury that has broken your skin and caused your varicose veins to bleed  · Your leg is swollen and hard  · You notice that your legs or feet are turning blue or black  · Your leg feels warm, tender, and painful  It may look swollen and red  Contact your healthcare provider if:   · You have pain in your leg that does not go away or gets worse  · You notice sudden large bruising on your legs  · You have a rash on your leg  · Your symptoms keep you from doing your daily activities  · You have questions or concerns about your condition or care  Treatment of varicose veins  aims to decrease symptoms, improve appearance, and prevent further problems  Treatment will depend on which veins are affected and how severe your condition is  You may need procedures to treat or remove your varicose veins  For example, your healthcare provider may inject a solution or use a laser to close the varicose veins  Surgery to remove long veins may also be done  Ask your healthcare provider for more information about procedures used to treat varicose veins    Manage varicose veins:   · Do not sit or stand for long periods of time  This can cause the blood to collect in your legs and make your symptoms worse  Bend or rotate your ankles several times every hour  Walk around for a few minutes every hour to get blood moving in your legs  · Do not cross your legs when you sit  This decreases blood flow to your feet and can make your symptoms worse  · Do not wear tight clothing or shoes  Do not wear high-heeled shoes  Do not wear clothes that are tight around the waist or knees  · Maintain a healthy weight  Being overweight or obese can make your varicose veins worse  Ask your healthcare provider how much you should weigh  Ask him or her to help you create a weight loss plan if you are overweight  · Wear pressure stockings as directed  The stockings are tight and put pressure on your legs  They improve blood flow and help prevent clots  · Elevate your legs  Keep them above the level of your heart for 15 to 30 minutes several times a day  You can also prop the end of your bed up slightly to elevate your legs while you sleep  This will help blood to flow back to your heart  · Get regular exercise  Talk to your healthcare provider about the best exercise plan for you  Exercise can improve blood flow to your legs and feet  Follow up with your healthcare provider as directed:  Write down your questions so you remember to ask them during your visits  © 2017 [employer] Information is for End User's use only and may not be sold, redistributed or otherwise used for commercial purposes  All illustrations and images included in CareNotes® are the copyrighted property of [employer]  or [employer]  The above information is an  only  It is not intended as medical advice for individual conditions or treatments  Talk to your doctor, nurse or pharmacist before following any medical regimen to see if it is safe and effective for you

## 2018-03-27 NOTE — PROGRESS NOTES
Assessment/Plan:  70year old female with venous insufficiency, recurrent superficial phlebitis, history of Left GSV ligation, stripping and stab phlebectomies '09 by Dr Nasir Watkins who presents for evaluation of her veins  1  RLE superficial thrombophlebitis - medial/anterior thigh which is resolving at this time  She's had recurrent superficial thrombophlebitis all below the calf and this is the first occurrence above the knee  She is concerned about recurrent phlebitis  Rx compression given  Will evaluate with reflux study in 2 months and return to the office after study to discuss possible treatment options  Problem List Items Addressed This Visit        Cardiovascular and Mediastinum    Thrombophlebitis of superficial veins of right lower extremity - Primary    Relevant Orders    Compression Stocking    Compression Stocking    Varicose vein of leg    Relevant Orders    Compression Stocking    Compression Stocking                 Patient ID: Chanelle North is a 70 y o  female  Chief Complaint: Pt is here to varicose veins to right leg  Pt states she has had them since [age] years old  Pt states she has had them before pregnancy and after  Pt states veins are painful, cramping,burning, bulging and impairing her mobility  Pt states she has recurrent phlebitis  Pt states she has worn compression stockings and elevates  Pt states she has had surgery back in 8/14/09 by Dr Nasir Watkins  Pt c/o varicose veins interfering with her daily activities when she develops phlebitis Pt has family hx of Vv/ Phlebitis  Pt is taking aspirin daily  HPI  Patient presents to the office for evaluation of her veins  She's had varicose veins since age 26  History of left leg venous ligation/stripping/phlebectomies by Dr Luis F Villagomez in 2009  She's had right thigh/calf varicosities with associated heaviness/occasional throbbing discomfort with multiple episodes of superficial thrombophlebitis   Outside of the phlebitis her venous symptoms are well controlled with compression, leg elevation and rest  She's a retired teacher  She denies history of DVT though was on Coumadin x3 months prophylactically per patient after right ankle tendon repair and bone repair 4 years ago as she was in an aircast and non weightbearing for 3 months  About 2 months ago she developed pain and redness to the right anterior thigh varicose vein  She called Dr Giancarlo Maynard and was instructed on warm compresses and Naprosyn  These symptoms have mostly resolved  She continues to have a palpable cord of the right thigh  In the past her phlebitis have typically been below the knee  She's concerned with the phlebitis extending to the thigh and concerned about additional episodes   The following portions of the patient's history were reviewed and updated as appropriate: allergies, current medications, past family history, past medical history, past social history, past surgical history and problem list     Review of Systems   Constitutional: Positive for diaphoresis  HENT: Positive for rhinorrhea  Eyes: Positive for photophobia and visual disturbance  Respiratory: Negative  Cardiovascular: Negative  Gastrointestinal: Negative  Endocrine: Negative  Genitourinary: Negative  Musculoskeletal: Negative  Leg pain  Leg pain when walking   Allergic/Immunologic: Negative  Neurological: Negative  Hematological: Negative  Psychiatric/Behavioral: The patient is nervous/anxious            Objective:  Vitals:    03/27/18 1429   BP: 140/80   BP Location: Right arm   Patient Position: Sitting   Cuff Size: Adult   Temp: 97 8 °F (36 6 °C)   TempSrc: Tympanic   Weight: 92 5 kg (204 lb)   Height: 5' 9" (1 753 m)       Patient Active Problem List   Diagnosis    Thrombophlebitis of superficial veins of right lower extremity    Varicose vein of leg       Past Surgical History:   Procedure Laterality Date    ANKLE SURGERY         Family History   Problem Relation Age of Onset    Ovarian cancer Mother     Heart disease Father     Leukemia Brother        Social History     Social History    Marital status: /Civil Union     Spouse name: N/A    Number of children: N/A    Years of education: N/A     Occupational History    Not on file  Social History Main Topics    Smoking status: Never Smoker    Smokeless tobacco: Never Used    Alcohol use Not on file    Drug use: Unknown    Sexual activity: Not on file     Other Topics Concern    Not on file     Social History Narrative    No narrative on file       No Known Allergies      Current Outpatient Prescriptions:     aspirin 81 MG tablet, Take by mouth, Disp: , Rfl:     busPIRone (BUSPAR) 10 mg tablet, 10 mg 3 (three) times a day, Disp: , Rfl: 2    Calcium 250 MG CAPS, Take by mouth, Disp: , Rfl:     cholecalciferol (VITAMIN D3) 1,000 units tablet, Take 1 capsule by mouth once a week, Disp: , Rfl:     escitalopram (LEXAPRO) 20 mg tablet, Take 20 mg by mouth daily, Disp: , Rfl: 1    naproxen (NAPROSYN) 500 mg tablet, Take 1 tablet by mouth 2 (two) times a day with meals for 10 days, Disp: 20 tablet, Rfl: 1         Physical Exam   Constitutional: She is oriented to person, place, and time  She appears well-developed and well-nourished  HENT:   Head: Normocephalic  Eyes: EOM are normal    Neck: Normal range of motion  Neck supple  Cardiovascular: Normal heart sounds  Pulses:       Femoral pulses are 2+ on the right side, and 2+ on the left side  Dorsalis pedis pulses are 2+ on the right side, and 2+ on the left side  Posterior tibial pulses are 2+ on the right side, and 2+ on the left side  Palpable cord right medial/anterior thigh to medial knee with overlying hyperpigmentation consistent with phlebitis  Minimal tenderness on palpation  Scatter bilateral lower extremity spider telangiectasias   Pulmonary/Chest: Effort normal and breath sounds normal    Abdominal: Soft  Bowel sounds are normal    Musculoskeletal: Normal range of motion  Neurological: She is alert and oriented to person, place, and time  Skin: Skin is warm  Psychiatric: She has a normal mood and affect

## 2018-03-30 DIAGNOSIS — I80.9 RECURRENT PHLEBITIS OF SUPERFICIAL VEIN: Primary | ICD-10-CM

## 2018-03-30 DIAGNOSIS — I87.2 CHRONIC VENOUS INSUFFICIENCY: ICD-10-CM

## 2018-04-02 DIAGNOSIS — E55.9 VITAMIN D DEFICIENCY: ICD-10-CM

## 2018-04-02 DIAGNOSIS — E80.6 OTHER DISORDERS OF BILIRUBIN METABOLISM: ICD-10-CM

## 2018-04-02 DIAGNOSIS — E78.5 HYPERLIPIDEMIA: ICD-10-CM

## 2018-04-02 DIAGNOSIS — D72.819 DECREASED WHITE BLOOD CELL COUNT: ICD-10-CM

## 2018-04-02 DIAGNOSIS — Z12.11 ENCOUNTER FOR SCREENING FOR MALIGNANT NEOPLASM OF COLON: ICD-10-CM

## 2018-04-02 DIAGNOSIS — F32.9 MAJOR DEPRESSIVE DISORDER, SINGLE EPISODE: ICD-10-CM

## 2018-04-02 DIAGNOSIS — G47.9 SLEEP DISORDER: ICD-10-CM

## 2018-04-17 ENCOUNTER — LAB (OUTPATIENT)
Dept: LAB | Facility: CLINIC | Age: 68
End: 2018-04-17
Payer: MEDICARE

## 2018-04-17 DIAGNOSIS — D72.819 DECREASED WHITE BLOOD CELL COUNT: ICD-10-CM

## 2018-04-17 DIAGNOSIS — E55.9 VITAMIN D DEFICIENCY: ICD-10-CM

## 2018-04-17 DIAGNOSIS — G47.9 SLEEP DISORDER: ICD-10-CM

## 2018-04-17 DIAGNOSIS — Z12.11 ENCOUNTER FOR SCREENING FOR MALIGNANT NEOPLASM OF COLON: ICD-10-CM

## 2018-04-17 DIAGNOSIS — E78.5 HYPERLIPIDEMIA: ICD-10-CM

## 2018-04-17 DIAGNOSIS — E80.6 OTHER DISORDERS OF BILIRUBIN METABOLISM: ICD-10-CM

## 2018-04-17 DIAGNOSIS — F32.9 MAJOR DEPRESSIVE DISORDER, SINGLE EPISODE: ICD-10-CM

## 2018-04-17 LAB — HEMOCCULT STL QL IA: NEGATIVE

## 2018-04-17 PROCEDURE — G0328 FECAL BLOOD SCRN IMMUNOASSAY: HCPCS

## 2018-04-30 DIAGNOSIS — I87.2 VENOUS INSUFFICIENCY: ICD-10-CM

## 2018-04-30 DIAGNOSIS — I80.9 PHLEBITIS AND THROMBOPHLEBITIS OF UNSPECIFIED SITE: Primary | ICD-10-CM

## 2018-05-08 ENCOUNTER — OFFICE VISIT (OUTPATIENT)
Dept: INTERNAL MEDICINE CLINIC | Facility: CLINIC | Age: 68
End: 2018-05-08
Payer: MEDICARE

## 2018-05-08 VITALS
BODY MASS INDEX: 29.62 KG/M2 | RESPIRATION RATE: 16 BRPM | OXYGEN SATURATION: 98 % | WEIGHT: 200 LBS | HEART RATE: 96 BPM | TEMPERATURE: 100.1 F | DIASTOLIC BLOOD PRESSURE: 76 MMHG | SYSTOLIC BLOOD PRESSURE: 140 MMHG | HEIGHT: 69 IN

## 2018-05-08 DIAGNOSIS — E78.5 HYPERLIPIDEMIA, UNSPECIFIED HYPERLIPIDEMIA TYPE: Primary | ICD-10-CM

## 2018-05-08 DIAGNOSIS — I83.90 VARICOSE VEIN OF LEG: ICD-10-CM

## 2018-05-08 DIAGNOSIS — F32.A DEPRESSION, UNSPECIFIED DEPRESSION TYPE: ICD-10-CM

## 2018-05-08 DIAGNOSIS — J01.00 ACUTE NON-RECURRENT MAXILLARY SINUSITIS: ICD-10-CM

## 2018-05-08 DIAGNOSIS — F41.9 ANXIETY: ICD-10-CM

## 2018-05-08 PROBLEM — E80.6 HYPERBILIRUBINEMIA: Status: ACTIVE | Noted: 2017-05-08

## 2018-05-08 PROBLEM — I80.01 THROMBOPHLEBITIS OF SUPERFICIAL VEINS OF RIGHT LOWER EXTREMITY: Status: RESOLVED | Noted: 2018-03-27 | Resolved: 2018-05-08

## 2018-05-08 PROBLEM — G47.9 SLEEP DISTURBANCE: Status: ACTIVE | Noted: 2017-04-10

## 2018-05-08 PROCEDURE — 93000 ELECTROCARDIOGRAM COMPLETE: CPT | Performed by: INTERNAL MEDICINE

## 2018-05-08 PROCEDURE — 99215 OFFICE O/P EST HI 40 MIN: CPT | Performed by: INTERNAL MEDICINE

## 2018-05-08 PROCEDURE — G0439 PPPS, SUBSEQ VISIT: HCPCS | Performed by: INTERNAL MEDICINE

## 2018-05-08 RX ORDER — AZITHROMYCIN 250 MG/1
TABLET, FILM COATED ORAL
Qty: 6 TABLET | Refills: 0 | Status: SHIPPED | OUTPATIENT
Start: 2018-05-08 | End: 2018-05-12

## 2018-05-08 RX ORDER — BUSPIRONE HYDROCHLORIDE 10 MG/1
5 TABLET ORAL 3 TIMES DAILY
Qty: 45 TABLET | Refills: 0 | Status: SHIPPED | OUTPATIENT
Start: 2018-05-08 | End: 2018-07-25 | Stop reason: SDUPTHER

## 2018-05-08 NOTE — ASSESSMENT & PLAN NOTE
Patient has stable depression symptoms present time she continues on Lexapro at 20 mg daily will re-evaluate the medication in 1 month when she returns  She does have a trip out to visit her daughter in [place] which can be somewhat stressful will continue the Lexapro at the present dose through that visit

## 2018-05-08 NOTE — ASSESSMENT & PLAN NOTE
Varicose veins of the legs with no evidence of venous thrombosis recommend support stockings at the patient is going to be on her feet for an extended period

## 2018-05-08 NOTE — ASSESSMENT & PLAN NOTE
Patient is an overweight situation with a body mass index of 29 53 it is advisable for her to continue to work on losing weight by regular exercise and decreasing calorie consumption at mealtimes  Between meal snacks should be eliminated completely

## 2018-05-08 NOTE — ASSESSMENT & PLAN NOTE
Symptoms of sinus congestion especially on left side of the head with a cough headache sore throat and low-grade fever  She occasionally coughs up a greenish yellow mucus  Symptoms have been present for several days will start her on a 5 day Zithromax pack she is encouraged to get extra rest and also maintain good hydration if her symptoms do not improve or in fact worsen she should return for follow-up assessment

## 2018-05-08 NOTE — PROGRESS NOTES
Assessment/Plan:    Varicose vein of leg  Varicose veins of the legs with no evidence of venous thrombosis recommend support stockings at the patient is going to be on her feet for an extended period    Anxiety  Anxiety symptoms appear to be well controlled  She is presently on of buspirone 10 mg 3 times a day she indicates she feels that she may be slightly sluggish over sedated at the present time ir anxiety is well controlled so were going to decrease her buspirone to 5 mg 3 times a day  Will follow up in about a month to see how she is doing on the lower dose of this medication  Will continue the Lexapro 20 mg daily for the present time would consider decreasing that to 10 mg on her next visit if she is doing well  Depression  Patient has stable depression symptoms present time she continues on Lexapro at 20 mg daily will re-evaluate the medication in 1 month when she returns  She does have a trip out to visit her daughter in [place] which can be somewhat stressful will continue the Lexapro at the present dose through that visit  Acute non-recurrent maxillary sinusitis  Symptoms of sinus congestion especially on left side of the head with a cough headache sore throat and low-grade fever  She occasionally coughs up a greenish yellow mucus  Symptoms have been present for several days will start her on a 5 day Zithromax pack she is encouraged to get extra rest and also maintain good hydration if her symptoms do not improve or in fact worsen she should return for follow-up assessment  Over weight  Patient is an overweight situation with a body mass index of 29 53 it is advisable for her to continue to work on losing weight by regular exercise and decreasing calorie consumption at mealtimes  Between meal snacks should be eliminated completely         Diagnoses and all orders for this visit:    Hyperlipidemia, unspecified hyperlipidemia type  -     POCT ECG    Acute non-recurrent maxillary sinusitis  -     azithromycin (ZITHROMAX) 250 mg tablet; Take 2 tablets today then 1 tablet daily x 4 days    Anxiety  -     busPIRone (BUSPAR) 10 mg tablet; Take 0 5 tablets (5 mg total) by mouth 3 (three) times a day        Subjective:      Patient ID: Petros Alvarez is a 70 y o  female  This 77-year-old female patient presents today for an annual physical examination review of blood work and electrocardiogram   She has not had the blood work performed yet but we have provided her with a request for the annual blood work including metabolic profile CBC lipid profile fecal testing and urine testing  She presently has a history of anxiety and depression disorder and is on medication for treatment of the symptoms  She feels that she may be slightly sluggish because of the medication and will discuss this with her and adjust medications prior to leaving today  In addition she has a history of hyperlipidemia overweight condition varicose veins of the legs  The following portions of the patient's history were reviewed and updated as appropriate:   She  has a past medical history of Fracture of radius, distal, closed  She   Patient Active Problem List    Diagnosis Date Noted    Acute non-recurrent maxillary sinusitis 05/08/2018    Varicose vein of leg 03/27/2018    Hyperbilirubinemia 05/08/2017    Depression 04/10/2017    Sleep disturbance 04/10/2017    Anxiety 04/08/2016    Arthritis 04/08/2016    Hyperlipidemia, mild 04/08/2016    Over weight 04/08/2016    Vitamin D deficiency 04/08/2016     She  has a past surgical history that includes Ankle surgery  Her family history includes Heart disease in her father; Leukemia in her brother; Ovarian cancer in her mother  She  reports that she has never smoked  She has never used smokeless tobacco  Her alcohol and drug histories are not on file       Review of Systems   Constitutional: Positive for fatigue     Psychiatric/Behavioral: Positive for dysphoric mood  The patient is nervous/anxious  All other systems reviewed and are negative  Objective:      /76   Pulse 96   Temp 100 1 °F (37 8 °C)   Resp 16   Ht 5' 9" (1 753 m)   Wt 90 7 kg (200 lb)   SpO2 98%   BMI 29 53 kg/m²          Physical Exam   Constitutional: She is oriented to person, place, and time  She appears well-developed and well-nourished  No distress  HENT:   Head: Normocephalic and atraumatic  Right Ear: Tympanic membrane and external ear normal    Left Ear: Tympanic membrane and external ear normal    Nose: Nose normal    Mouth/Throat: Uvula is midline, oropharynx is clear and moist and mucous membranes are normal    Eyes: Conjunctivae are normal  Pupils are equal, round, and reactive to light  Right eye exhibits no discharge  Left eye exhibits no discharge  Neck: No thyromegaly present  Cardiovascular: Normal rate, regular rhythm, normal heart sounds and intact distal pulses  No murmur heard  Pulmonary/Chest: Effort normal and breath sounds normal  No respiratory distress  She has no wheezes  She has no rales  She exhibits no tenderness  Abdominal: Soft  Bowel sounds are normal  She exhibits no distension and no mass  There is no tenderness  There is no rebound and no guarding  Musculoskeletal: Normal range of motion  She exhibits no edema, tenderness or deformity  Lymphadenopathy:     She has no cervical adenopathy  Neurological: She is alert and oriented to person, place, and time  She has normal reflexes  No cranial nerve deficit  Skin: Skin is warm, dry and intact  No rash noted  She is not diaphoretic  No erythema  No pallor  Psychiatric: She has a normal mood and affect   Her speech is normal and behavior is normal  Judgment and thought content normal

## 2018-05-08 NOTE — PROGRESS NOTES
Assessment/Plan:    No problem-specific Assessment & Plan notes found for this encounter  Diagnoses and all orders for this visit:    Hyperlipidemia, unspecified hyperlipidemia type  -     POCT ECG    Acute non-recurrent maxillary sinusitis  -     azithromycin (ZITHROMAX) 250 mg tablet; Take 2 tablets today then 1 tablet daily x 4 days    Anxiety  -     busPIRone (BUSPAR) 10 mg tablet; Take 0 5 tablets (5 mg total) by mouth 3 (three) times a day        Subjective:      Patient ID: Felice Bean is a 70 y o  female  This 58-year-old female patient presents today for annual wellness visit  Since her last visit with us she has been treated for anxiety and depression symptoms related to family issues  Presently she is doing very nicely on her medication  Reviewed today her existing medical conditions as well as it risk factors for future conditions  We have recommended a visit with her gynecologist this year as well as a mammogram as she is due for both of these examinations  Recommend colonoscopy per be performed every 10 years for colon cancer screening as well as fecal testing on an annual basis  Healthy lifestyle of healthy diet regular exercise and weight control were reviewed in detail with the patient today  The following portions of the patient's history were reviewed and updated as appropriate:   She  has a past medical history of Fracture of radius, distal, closed  She   Patient Active Problem List    Diagnosis Date Noted    Varicose vein of leg 03/27/2018    Hyperbilirubinemia 05/08/2017    Depression 04/10/2017    Sleep disturbance 04/10/2017    Anxiety 04/08/2016    Arthritis 04/08/2016    Hyperlipidemia, mild 04/08/2016    Over weight 04/08/2016    Vitamin D deficiency 04/08/2016     She  has a past surgical history that includes Ankle surgery  Her family history includes Heart disease in her father; Leukemia in her brother; Ovarian cancer in her mother    She  reports that she has never smoked  She has never used smokeless tobacco  Her alcohol and drug histories are not on file       Review of Systems      Objective:      /76   Pulse 96   Temp 100 1 °F (37 8 °C)   Resp 16   Ht 5' 9" (1 753 m)   Wt 90 7 kg (200 lb)   SpO2 98%   BMI 29 53 kg/m²          Physical Exam

## 2018-05-23 DIAGNOSIS — F32.A DEPRESSION, UNSPECIFIED DEPRESSION TYPE: Primary | ICD-10-CM

## 2018-05-23 RX ORDER — ESCITALOPRAM OXALATE 20 MG/1
TABLET ORAL
Qty: 90 TABLET | Refills: 1 | Status: SHIPPED | OUTPATIENT
Start: 2018-05-23 | End: 2018-06-08 | Stop reason: SDUPTHER

## 2018-05-30 ENCOUNTER — HOSPITAL ENCOUNTER (OUTPATIENT)
Dept: NON INVASIVE DIAGNOSTICS | Facility: CLINIC | Age: 68
Discharge: HOME | End: 2018-05-30
Payer: MEDICARE

## 2018-05-30 DIAGNOSIS — I83.90 ASYMPTOMATIC VARICOSE VEINS: ICD-10-CM

## 2018-05-30 PROCEDURE — 93971 EXTREMITY STUDY: CPT

## 2018-05-31 PROCEDURE — 93971 EXTREMITY STUDY: CPT | Performed by: SURGERY

## 2018-06-04 ENCOUNTER — OFFICE VISIT (OUTPATIENT)
Dept: VASCULAR SURGERY | Facility: CLINIC | Age: 68
End: 2018-06-04
Payer: MEDICARE

## 2018-06-04 VITALS
SYSTOLIC BLOOD PRESSURE: 120 MMHG | HEART RATE: 74 BPM | BODY MASS INDEX: 29.2 KG/M2 | HEIGHT: 70 IN | WEIGHT: 204 LBS | DIASTOLIC BLOOD PRESSURE: 70 MMHG | RESPIRATION RATE: 16 BRPM | TEMPERATURE: 98.5 F

## 2018-06-04 DIAGNOSIS — I83.90 VARICOSE VEIN OF LEG: ICD-10-CM

## 2018-06-04 DIAGNOSIS — I80.01 THROMBOPHLEBITIS OF SUPERFICIAL VEINS OF RIGHT LOWER EXTREMITY: ICD-10-CM

## 2018-06-04 DIAGNOSIS — I87.2 VENOUS INSUFFICIENCY OF BOTH LOWER EXTREMITIES: Primary | ICD-10-CM

## 2018-06-04 PROBLEM — I80.9 SUPERFICIAL THROMBOPHLEBITIS: Status: ACTIVE | Noted: 2018-06-04

## 2018-06-04 PROCEDURE — 99203 OFFICE O/P NEW LOW 30 MIN: CPT | Performed by: SURGERY

## 2018-06-04 NOTE — PATIENT INSTRUCTIONS
1) SVT (superficial thrombophlebitis)  -you have clot in a superficial vein in the right thigh  -treatment for this if it should happen again is warm compresses, naproxen, time    2) Venous insufficiency  -you have some leaky valves inside your veins in the deep and superficial system  -if your symptoms should worsen (swelling, pain, aching), you do have surgical options to fix this  -you would have to complete a trial first of medical management including compression daily for 3 months, elevation, exercise, healthy weight  -please call and make an appointment if your symptoms worsen

## 2018-06-04 NOTE — PROGRESS NOTES
Assessment/Plan:    Pt is a 69 yo F w/ HLD, MDD, anxiety, arthritis, presents to discuss venous disease  Venous insufficiency of both lower extremities  Varicose vein of leg  -hx of L GSV stripping, stab phlebectomies in '09 by Lavinia gil improved  -reviewed reflux study which shows R CFV/pop reflux as well as significant R GSV reflux from the inguinal through knee, >4s  -although she does have significant, treatable reflux, her symptoms are very mild, with only mild edema and some heaviness at the end of the day  -reviewed conservative management measures including daily compression use (not using these in summer currently), leg elevation (some at night), exercise (is active) and healthy eating  -instructed patient that she should return to care if symptoms worsen or become life-limiting and that she would need to complete 3mo trial of conservative management before being considered for surgical options  -f/u PRN    Thrombophlebitis of superficial veins of right lower extremity  -SVT noted on reflux study in 5/18 but sxs began in 2/18; has had episodes in the past; sxs resolved at this point besides some lumpiness at the site of the vein  -reviewed tx for SVT including warm compresses and NSIADs    Subjective:  " I am here for my results"      Patient ID: Anita Brown is a 70 y o  female  Patient is here to review LEVDR done on 5/30/18  Pt states that Right inner thigh is achy  No rest pain  She elevates at night which seems to help  She states that it started in Feb 2018  She wears compression  HPI:    Pt presents to discuss venous disease  She has a hx of significant LLE symptoms s/p ligation/stripping/stabs by Marny Jeans in '09 with relief of symptoms  Didn't have any significant RLE symptoms until Feb when she has a severe SVT with pain, swelling, ropy vein, pain with ambulation    These symptoms are resolved now other than some lumpiness of the vein on the anterior thigh R   Has minimal edema of legs  Does have some heaviness at the end of the day  This is not limiting  She has had multiple LLE SVTs in past   Denies hx of DVT  She wears compression in the winter but not lately  She elevates at night  She is active and likes to walk and work in the garden  She is overweight  Never smoker  No fam hx of aneurysm        The following portions of the patient's history were reviewed and updated as appropriate: allergies, current medications, past family history, past medical history, past social history, past surgical history and problem list     Review of Systems   Constitutional: Negative  HENT: Negative  Eyes: Positive for photophobia and visual disturbance  Respiratory: Negative  Cardiovascular: Positive for leg swelling (mild)  Gastrointestinal: Negative  Endocrine: Positive for heat intolerance  Genitourinary: Negative  Musculoskeletal: Positive for back pain  Skin: Negative  Negative for wound  Allergic/Immunologic: Negative  Neurological: Negative  Hematological: Negative  Psychiatric/Behavioral: The patient is nervous/anxious  Objective:      /70 (BP Location: Right arm, Patient Position: Sitting)   Pulse 74   Temp 98 5 °F (36 9 °C) (Tympanic)   Resp 16   Ht 5' 10" (1 778 m)   Wt 92 5 kg (204 lb)   BMI 29 27 kg/m²          Physical Exam   Constitutional: She is oriented to person, place, and time  She appears well-developed and well-nourished  HENT:   Head: Normocephalic and atraumatic  Eyes: Conjunctivae are normal    Neck: Normal range of motion  Neck supple  Cardiovascular: Normal rate, regular rhythm and normal heart sounds  No murmur heard  Pulses:       Dorsalis pedis pulses are 2+ on the right side, and 2+ on the left side  Posterior tibial pulses are 2+ on the right side, and 2+ on the left side  No carotid bruits B   Pulmonary/Chest: Effort normal and breath sounds normal    Abdominal: Soft   She exhibits no distension  There is no tenderness  There is no rebound  Musculoskeletal: Normal range of motion  She exhibits edema (mild B ankle edema)  Neurological: She is alert and oriented to person, place, and time  Skin: Skin is warm and dry  R anterior thigh with area of firm/ropy vein and some skin darkening/discoloration; no erythema, nontender; remainder of skin of legs is wnl    R ankle with well healed surgical incision (ankle surgery several years ago)    Diffuse spiders, mostly lower legs; no large varicosities or retics   Psychiatric: She has a normal mood and affect  Her behavior is normal    Nursing note and vitals reviewed  Vitals:    06/04/18 0935   BP: 120/70   BP Location: Right arm   Patient Position: Sitting   Pulse: 74   Resp: 16   Temp: 98 5 °F (36 9 °C)   TempSrc: Tympanic   Weight: 92 5 kg (204 lb)   Height: 5' 10" (1 778 m)       Patient Active Problem List   Diagnosis    Varicose vein of leg    Anxiety    Arthritis    Depression    Hyperbilirubinemia    Hyperlipidemia, mild    Over weight    Sleep disturbance    Vitamin D deficiency    Acute non-recurrent maxillary sinusitis    Venous insufficiency of both lower extremities    Superficial thrombophlebitis       Past Surgical History:   Procedure Laterality Date    ANKLE SURGERY         Family History   Problem Relation Age of Onset    Ovarian cancer Mother     Heart disease Father     Leukemia Brother        Social History     Social History    Marital status: /Civil Union     Spouse name: N/A    Number of children: N/A    Years of education: N/A     Occupational History    Not on file       Social History Main Topics    Smoking status: Never Smoker    Smokeless tobacco: Never Used    Alcohol use No    Drug use: No    Sexual activity: Not on file     Other Topics Concern    Not on file     Social History Narrative    No narrative on file       No Known Allergies      Current Outpatient Prescriptions:     aspirin 81 MG tablet, Take by mouth, Disp: , Rfl:     busPIRone (BUSPAR) 10 mg tablet, Take 0 5 tablets (5 mg total) by mouth 3 (three) times a day, Disp: 45 tablet, Rfl: 0    Calcium 250 MG CAPS, Take by mouth, Disp: , Rfl:     cholecalciferol (VITAMIN D3) 1,000 units tablet, Take 1 capsule by mouth once a week, Disp: , Rfl:     escitalopram (LEXAPRO) 20 mg tablet, TAKE 1 TABLET DAILY, Disp: 90 tablet, Rfl: 1    naproxen (NAPROSYN) 500 mg tablet, Take 1 tablet by mouth 2 (two) times a day with meals for 10 days, Disp: 20 tablet, Rfl: 1

## 2018-06-08 ENCOUNTER — OFFICE VISIT (OUTPATIENT)
Dept: INTERNAL MEDICINE CLINIC | Facility: CLINIC | Age: 68
End: 2018-06-08
Payer: MEDICARE

## 2018-06-08 ENCOUNTER — APPOINTMENT (OUTPATIENT)
Dept: LAB | Facility: CLINIC | Age: 68
End: 2018-06-08
Payer: MEDICARE

## 2018-06-08 VITALS
BODY MASS INDEX: 29.06 KG/M2 | TEMPERATURE: 97.7 F | HEIGHT: 70 IN | DIASTOLIC BLOOD PRESSURE: 78 MMHG | HEART RATE: 64 BPM | SYSTOLIC BLOOD PRESSURE: 138 MMHG | OXYGEN SATURATION: 98 % | WEIGHT: 203 LBS

## 2018-06-08 DIAGNOSIS — E80.6 OTHER DISORDERS OF BILIRUBIN METABOLISM: ICD-10-CM

## 2018-06-08 DIAGNOSIS — E78.5 HYPERLIPIDEMIA: ICD-10-CM

## 2018-06-08 DIAGNOSIS — D72.819 DECREASED WHITE BLOOD CELL COUNT: ICD-10-CM

## 2018-06-08 DIAGNOSIS — E55.9 VITAMIN D DEFICIENCY: ICD-10-CM

## 2018-06-08 DIAGNOSIS — F41.9 ANXIETY: Primary | ICD-10-CM

## 2018-06-08 DIAGNOSIS — F32.A DEPRESSION, UNSPECIFIED DEPRESSION TYPE: ICD-10-CM

## 2018-06-08 DIAGNOSIS — F32.9 MAJOR DEPRESSIVE DISORDER, SINGLE EPISODE: ICD-10-CM

## 2018-06-08 DIAGNOSIS — Z12.11 ENCOUNTER FOR SCREENING FOR MALIGNANT NEOPLASM OF COLON: ICD-10-CM

## 2018-06-08 DIAGNOSIS — G47.9 SLEEP DISORDER: ICD-10-CM

## 2018-06-08 LAB
ALBUMIN SERPL BCP-MCNC: 3.7 G/DL (ref 3.5–5)
ALP SERPL-CCNC: 70 U/L (ref 46–116)
ALT SERPL W P-5'-P-CCNC: 27 U/L (ref 12–78)
ANION GAP SERPL CALCULATED.3IONS-SCNC: 4 MMOL/L (ref 4–13)
AST SERPL W P-5'-P-CCNC: 20 U/L (ref 5–45)
BACTERIA UR QL AUTO: ABNORMAL /HPF
BASOPHILS # BLD AUTO: 0.05 THOUSANDS/ΜL (ref 0–0.1)
BASOPHILS NFR BLD AUTO: 1 % (ref 0–1)
BILIRUB SERPL-MCNC: 1.04 MG/DL (ref 0.2–1)
BILIRUB UR QL STRIP: NEGATIVE
BUN SERPL-MCNC: 11 MG/DL (ref 5–25)
CALCIUM SERPL-MCNC: 8.8 MG/DL (ref 8.3–10.1)
CHLORIDE SERPL-SCNC: 106 MMOL/L (ref 100–108)
CHOLEST SERPL-MCNC: 182 MG/DL (ref 50–200)
CLARITY UR: CLEAR
CO2 SERPL-SCNC: 28 MMOL/L (ref 21–32)
COLOR UR: ABNORMAL
CREAT SERPL-MCNC: 0.87 MG/DL (ref 0.6–1.3)
EOSINOPHIL # BLD AUTO: 0.18 THOUSAND/ΜL (ref 0–0.61)
EOSINOPHIL NFR BLD AUTO: 5 % (ref 0–6)
ERYTHROCYTE [DISTWIDTH] IN BLOOD BY AUTOMATED COUNT: 11.7 % (ref 11.6–15.1)
GFR SERPL CREATININE-BSD FRML MDRD: 68 ML/MIN/1.73SQ M
GLUCOSE P FAST SERPL-MCNC: 98 MG/DL (ref 65–99)
GLUCOSE UR STRIP-MCNC: NEGATIVE MG/DL
HCT VFR BLD AUTO: 44.5 % (ref 34.8–46.1)
HDLC SERPL-MCNC: 43 MG/DL (ref 40–60)
HGB BLD-MCNC: 14.8 G/DL (ref 11.5–15.4)
HGB UR QL STRIP.AUTO: NEGATIVE
HYALINE CASTS #/AREA URNS LPF: ABNORMAL /LPF
IMM GRANULOCYTES # BLD AUTO: 0.01 THOUSAND/UL (ref 0–0.2)
IMM GRANULOCYTES NFR BLD AUTO: 0 % (ref 0–2)
KETONES UR STRIP-MCNC: NEGATIVE MG/DL
LDLC SERPL CALC-MCNC: 106 MG/DL (ref 0–100)
LEUKOCYTE ESTERASE UR QL STRIP: ABNORMAL
LYMPHOCYTES # BLD AUTO: 1.77 THOUSANDS/ΜL (ref 0.6–4.47)
LYMPHOCYTES NFR BLD AUTO: 49 % (ref 14–44)
MCH RBC QN AUTO: 31.4 PG (ref 26.8–34.3)
MCHC RBC AUTO-ENTMCNC: 33.3 G/DL (ref 31.4–37.4)
MCV RBC AUTO: 95 FL (ref 82–98)
MONOCYTES # BLD AUTO: 0.41 THOUSAND/ΜL (ref 0.17–1.22)
MONOCYTES NFR BLD AUTO: 11 % (ref 4–12)
NEUTROPHILS # BLD AUTO: 1.27 THOUSANDS/ΜL (ref 1.85–7.62)
NEUTS SEG NFR BLD AUTO: 34 % (ref 43–75)
NITRITE UR QL STRIP: NEGATIVE
NON-SQ EPI CELLS URNS QL MICRO: ABNORMAL /HPF
NRBC BLD AUTO-RTO: 0 /100 WBCS
PH UR STRIP.AUTO: 6.5 [PH] (ref 4.5–8)
PLATELET # BLD AUTO: 192 THOUSANDS/UL (ref 149–390)
PMV BLD AUTO: 10.5 FL (ref 8.9–12.7)
POTASSIUM SERPL-SCNC: 3.9 MMOL/L (ref 3.5–5.3)
PROT SERPL-MCNC: 7.4 G/DL (ref 6.4–8.2)
PROT UR STRIP-MCNC: NEGATIVE MG/DL
RBC # BLD AUTO: 4.71 MILLION/UL (ref 3.81–5.12)
RBC #/AREA URNS AUTO: ABNORMAL /HPF
SODIUM SERPL-SCNC: 138 MMOL/L (ref 136–145)
SP GR UR STRIP.AUTO: 1.02 (ref 1–1.03)
TRIGL SERPL-MCNC: 166 MG/DL
UROBILINOGEN UR QL STRIP.AUTO: 1 E.U./DL
WBC # BLD AUTO: 3.69 THOUSAND/UL (ref 4.31–10.16)
WBC #/AREA URNS AUTO: ABNORMAL /HPF

## 2018-06-08 PROCEDURE — 85025 COMPLETE CBC W/AUTO DIFF WBC: CPT

## 2018-06-08 PROCEDURE — 80061 LIPID PANEL: CPT

## 2018-06-08 PROCEDURE — 80053 COMPREHEN METABOLIC PANEL: CPT

## 2018-06-08 PROCEDURE — 36415 COLL VENOUS BLD VENIPUNCTURE: CPT

## 2018-06-08 PROCEDURE — 81001 URINALYSIS AUTO W/SCOPE: CPT

## 2018-06-08 PROCEDURE — 99213 OFFICE O/P EST LOW 20 MIN: CPT | Performed by: INTERNAL MEDICINE

## 2018-06-08 RX ORDER — ESCITALOPRAM OXALATE 20 MG/1
10 TABLET ORAL DAILY
Qty: 90 TABLET | Refills: 0 | Status: SHIPPED | OUTPATIENT
Start: 2018-06-08 | End: 2018-11-16 | Stop reason: SDUPTHER

## 2018-06-08 NOTE — PROGRESS NOTES
Assessment/Plan:    Anxiety  Anxiety symptoms remain stable at the present time she is taking 5 mg of buspirone 3 times a day she feels no significant change since the decreased from 10 mg 3 times a day  Continue at 5 mg t i d  reassess in 4 weeks  Depression  Depression symptoms are very stable at the present time the stresses in strains in her life are her at a low level at the present time discussed changing the Lexapro from 20 mg a day down to 10 mg with the patient she is in agreement with this change will have her to start this tomorrow with a follow-up visit in approximately 4 weeks  She knows to call sooner if she has any increase in symptoms on the lower dose of the Lexapro  Diagnoses and all orders for this visit:    Depression, unspecified depression type  -     escitalopram (LEXAPRO) 20 mg tablet; Take 0 5 tablets (10 mg total) by mouth daily        Subjective:      Patient ID: Cassidy Dykes is a 70 y o  female  This pleasant 50-year-old female patient returns today for follow-up assessment  Approximately 1 month ago when she was here for routine visit she indicated she had been doing well with decreased stress and depression symptoms  At that time we decreased her buspirone from 10 mg 3 times a day to 5 mg 3 times a day  She was maintained on her Lexapro at 20 mg daily  She returns today indicating that she feels no significant change on the lower dose of the buspirone and is happy with the reduction in the medication  She continues to have low levels of stress in her life at the present time we discussed the potential to decrease the Lexapro from 20 mg to 10 mg which she is in agreement with  Will have her do that and follow up in 1 month  She knows if she has any increase in symptoms of anxiety or depression to call me for visit sooner          The following portions of the patient's history were reviewed and updated as appropriate:   She  has a past medical history of Fracture of radius, distal, closed  She   Patient Active Problem List    Diagnosis Date Noted    Venous insufficiency of both lower extremities 06/04/2018    Superficial thrombophlebitis 06/04/2018    Acute non-recurrent maxillary sinusitis 05/08/2018    Varicose vein of leg 03/27/2018    Hyperbilirubinemia 05/08/2017    Depression 04/10/2017    Sleep disturbance 04/10/2017    Anxiety 04/08/2016    Arthritis 04/08/2016    Hyperlipidemia, mild 04/08/2016    Over weight 04/08/2016    Vitamin D deficiency 04/08/2016     She  has a past surgical history that includes Ankle surgery  Her family history includes Heart disease in her father; Leukemia in her brother; Ovarian cancer in her mother  She  reports that she has never smoked  She has never used smokeless tobacco  She reports that she does not drink alcohol or use drugs       Review of Systems   All other systems reviewed and are negative  Objective:      /78 (BP Location: Left arm, Patient Position: Sitting, Cuff Size: Standard)   Pulse 64   Temp 97 7 °F (36 5 °C) (Tympanic)   Ht 5' 10" (1 778 m)   Wt 92 1 kg (203 lb)   SpO2 98%   BMI 29 13 kg/m²          Physical Exam   Constitutional: She is oriented to person, place, and time  Vital signs are normal  She appears well-developed and well-nourished  She is cooperative  HENT:   Right Ear: Hearing, tympanic membrane, external ear and ear canal normal    Left Ear: Hearing, tympanic membrane, external ear and ear canal normal    Nose: Nose normal  No mucosal edema  Mouth/Throat: Uvula is midline, oropharynx is clear and moist and mucous membranes are normal    Eyes: Conjunctivae and lids are normal  Pupils are equal, round, and reactive to light  Neck: No JVD present  Carotid bruit is not present  No thyromegaly present  Cardiovascular: Normal rate, regular rhythm, normal heart sounds and intact distal pulses  No murmur heard    Pulmonary/Chest: Effort normal and breath sounds normal  No respiratory distress  Abdominal: Normal appearance  Musculoskeletal: Normal range of motion  She exhibits no edema  Lymphadenopathy:     She has no cervical adenopathy  Neurological: She is alert and oriented to person, place, and time  She has normal reflexes  Skin: Skin is warm, dry and intact  Psychiatric: She has a normal mood and affect  Her speech is normal and behavior is normal  Judgment and thought content normal  Cognition and memory are normal    Vitals reviewed

## 2018-06-08 NOTE — ASSESSMENT & PLAN NOTE
Anxiety symptoms remain stable at the present time she is taking 5 mg of buspirone 3 times a day she feels no significant change since the decreased from 10 mg 3 times a day  Continue at 5 mg t i d  reassess in 4 weeks

## 2018-06-08 NOTE — ASSESSMENT & PLAN NOTE
Depression symptoms are very stable at the present time the stresses in strains in her life are her at a low level at the present time discussed changing the Lexapro from 20 mg a day down to 10 mg with the patient she is in agreement with this change will have her to start this tomorrow with a follow-up visit in approximately 4 weeks  She knows to call sooner if she has any increase in symptoms on the lower dose of the Lexapro

## 2018-07-25 DIAGNOSIS — F41.9 ANXIETY: ICD-10-CM

## 2018-07-25 RX ORDER — BUSPIRONE HYDROCHLORIDE 10 MG/1
TABLET ORAL
Qty: 270 TABLET | Refills: 2 | Status: SHIPPED | OUTPATIENT
Start: 2018-07-25 | End: 2018-08-17 | Stop reason: SDUPTHER

## 2018-08-17 ENCOUNTER — OFFICE VISIT (OUTPATIENT)
Dept: INTERNAL MEDICINE CLINIC | Facility: CLINIC | Age: 68
End: 2018-08-17
Payer: MEDICARE

## 2018-08-17 VITALS
WEIGHT: 206.6 LBS | HEIGHT: 70 IN | SYSTOLIC BLOOD PRESSURE: 138 MMHG | RESPIRATION RATE: 16 BRPM | HEART RATE: 73 BPM | DIASTOLIC BLOOD PRESSURE: 82 MMHG | BODY MASS INDEX: 29.58 KG/M2 | TEMPERATURE: 98.2 F | OXYGEN SATURATION: 98 %

## 2018-08-17 DIAGNOSIS — M19.90 ARTHRITIS: ICD-10-CM

## 2018-08-17 DIAGNOSIS — F41.9 ANXIETY: ICD-10-CM

## 2018-08-17 DIAGNOSIS — E66.3 OVER WEIGHT: ICD-10-CM

## 2018-08-17 DIAGNOSIS — F32.A DEPRESSION, UNSPECIFIED DEPRESSION TYPE: Primary | ICD-10-CM

## 2018-08-17 PROBLEM — J01.00 ACUTE NON-RECURRENT MAXILLARY SINUSITIS: Status: RESOLVED | Noted: 2018-05-08 | Resolved: 2018-08-17

## 2018-08-17 PROBLEM — I80.9 SUPERFICIAL THROMBOPHLEBITIS: Status: RESOLVED | Noted: 2018-06-04 | Resolved: 2018-08-17

## 2018-08-17 PROCEDURE — 99214 OFFICE O/P EST MOD 30 MIN: CPT | Performed by: INTERNAL MEDICINE

## 2018-08-17 RX ORDER — BUSPIRONE HYDROCHLORIDE 5 MG/1
5 TABLET ORAL 2 TIMES DAILY
Qty: 60 TABLET | Refills: 1 | Status: SHIPPED | OUTPATIENT
Start: 2018-08-17 | End: 2019-02-11 | Stop reason: ALTCHOICE

## 2018-08-17 NOTE — ASSESSMENT & PLAN NOTE
Symptoms of degenerative/osteoarthritis symptoms in the knees bilaterally we have recommended conservative care including a glucosamine chondroitin supplementation and tumor ache for its anti inflammatory properties  We will see how she does over the next several weeks if the pain intensifies or fails to improve with our supplements that I would consider x-ray evaluation of the joints  Weight loss would be beneficial to the arthritis process in her knees

## 2018-08-17 NOTE — ASSESSMENT & PLAN NOTE
Overweight condition with a body mass index of 29 64  Clearly the patient has been physically inactive over the past several months which adds to her tendency to gain weight  We recommended trying to start regular exercise in the form of bicycling walking or swimming in addition recommend decrease in total calorie consumption  The benefits of weight loss to her general health were reviewed as well as improvement in the arthritic symptoms that she has in her knees

## 2018-08-17 NOTE — PROGRESS NOTES
Assessment/Plan:    Arthritis  Symptoms of degenerative/osteoarthritis symptoms in the knees bilaterally we have recommended conservative care including a glucosamine chondroitin supplementation and tumor ache for its anti inflammatory properties  We will see how she does over the next several weeks if the pain intensifies or fails to improve with our supplements that I would consider x-ray evaluation of the joints  Weight loss would be beneficial to the arthritis process in her knees  Anxiety  Anxiety remains under excellent control  The patient's life is calm unstable at this point her daughters previous issues have stabilized  This is reduce the amount of stress in the patient's life  I have recommended that we try decreasing the buspirone of from 3 times a day to 2 times a day with an option of decreasing it to once a day if she feels good  I would do a minimum of 1 week it each dose level  Will continue the Lexapro as it has both anxiety and depression benefits  Depression  Previous depression symptoms appear to be stable at this time of patient's life is going well she has very little external stresses in her life  Will continue Lexapro for now as a baseline dose of this medication most likely be beneficial for the patient  No apparent side effects of the medication at this time    Over weight  Overweight condition with a body mass index of 29 64  Clearly the patient has been physically inactive over the past several months which adds to her tendency to gain weight  We recommended trying to start regular exercise in the form of bicycling walking or swimming in addition recommend decrease in total calorie consumption  The benefits of weight loss to her general health were reviewed as well as improvement in the arthritic symptoms that she has in her knees         Diagnoses and all orders for this visit:    Depression, unspecified depression type    Anxiety  -     busPIRone (BUSPAR) 5 mg tablet; Take 1 tablet (5 mg total) by mouth 2 (two) times a day    Over weight    Arthritis        Subjective:      Patient ID: Haja Sanchez is a 70 y o  female  This 54-year-old female patient presents today for follow-up visit  She has a history of anxiety and depression symptoms on her last visit we decreased her Lexapro from 20 milligrams a day to 10 milligrams daily  She has previously decreased her buspirone from 10 milligrams t i d  to 5 milligrams t i d  She reports no significant change in her anxiety and depression symptoms  There underway excellent control with no overt symptoms at this point  We are concerned as is the patient about her gradual increase in weight over the past several months  She does admit to being rather inactive over this period of time  We stressed the importance of regular exercise walking swimming bicycling or all favorable forms of exercise  The patient does have some discomfort in both of her knees and on examination does not appear to be any swelling or increased warmth in the knees  Will start her on a combination of glucosamine chondroitin as well as tumor ache supplementation to see if it helps with what appears to be some mild arthritis in her knees  If symptoms fail to improve or worsen then I would consider x-ray evaluation of the knees  The following portions of the patient's history were reviewed and updated as appropriate:   She  has a past medical history of Fracture of radius, distal, closed  She  has a past surgical history that includes Ankle surgery  Her family history includes Heart disease in her father; Leukemia in her brother; Ovarian cancer in her mother  She  reports that she has never smoked  She has never used smokeless tobacco  She reports that she does not drink alcohol or use drugs    Current Outpatient Prescriptions   Medication Sig Dispense Refill    aspirin 81 MG tablet Take by mouth      busPIRone (BUSPAR) 5 mg tablet Take 1 tablet (5 mg total) by mouth 2 (two) times a day 60 tablet 1    Calcium 250 MG CAPS Take by mouth      cholecalciferol (VITAMIN D3) 1,000 units tablet Take 1 capsule by mouth once a week      escitalopram (LEXAPRO) 20 mg tablet Take 0 5 tablets (10 mg total) by mouth daily 90 tablet 0    naproxen (NAPROSYN) 500 mg tablet Take 1 tablet by mouth 2 (two) times a day with meals for 10 days 20 tablet 1     No current facility-administered medications for this visit       Review of Systems   Constitutional: Positive for unexpected weight change  Musculoskeletal: Positive for arthralgias  Psychiatric/Behavioral: Positive for dysphoric mood  The patient is nervous/anxious  All other systems reviewed and are negative  Objective:      /82   Pulse 73   Temp 98 2 °F (36 8 °C)   Resp 16   Ht 5' 10" (1 778 m)   Wt 93 7 kg (206 lb 9 6 oz)   SpO2 98%   BMI 29 64 kg/m²          Physical Exam   Constitutional: She is oriented to person, place, and time  Vital signs are normal  She appears well-developed and well-nourished  She is cooperative  No distress  HENT:   Head: Normocephalic and atraumatic  Right Ear: Hearing, tympanic membrane, external ear and ear canal normal    Left Ear: Hearing, tympanic membrane, external ear and ear canal normal    Nose: Nose normal  No mucosal edema  Mouth/Throat: Uvula is midline, oropharynx is clear and moist and mucous membranes are normal  No oropharyngeal exudate  Eyes: Conjunctivae and lids are normal  Pupils are equal, round, and reactive to light  Right eye exhibits no discharge  Left eye exhibits no discharge  Neck: No JVD present  Carotid bruit is not present  No thyromegaly present  Cardiovascular: Normal rate, regular rhythm, normal heart sounds and intact distal pulses  No murmur heard  Pulmonary/Chest: Effort normal and breath sounds normal  No respiratory distress  She has no wheezes  She has no rales  She exhibits no tenderness  Abdominal: Soft  Normal appearance and bowel sounds are normal    Musculoskeletal: Normal range of motion  She exhibits no edema  Examination of both knees indicates no evidence of effusion or any increased warmth emanating from the knee joints they appear to be stable with no medial or lateral laxity  Lymphadenopathy:     She has no cervical adenopathy  Neurological: She is alert and oriented to person, place, and time  She has normal reflexes  Skin: Skin is warm, dry and intact  She is not diaphoretic  Psychiatric: She has a normal mood and affect  Her speech is normal and behavior is normal  Judgment and thought content normal  Cognition and memory are normal    Vitals reviewed

## 2018-08-17 NOTE — ASSESSMENT & PLAN NOTE
Previous depression symptoms appear to be stable at this time of patient's life is going well she has very little external stresses in her life  Will continue Lexapro for now as a baseline dose of this medication most likely be beneficial for the patient    No apparent side effects of the medication at this time

## 2018-08-17 NOTE — ASSESSMENT & PLAN NOTE
Anxiety remains under excellent control  The patient's life is calm unstable at this point her daughters previous issues have stabilized  This is reduce the amount of stress in the patient's life  I have recommended that we try decreasing the buspirone of from 3 times a day to 2 times a day with an option of decreasing it to once a day if she feels good  I would do a minimum of 1 week it each dose level  Will continue the Lexapro as it has both anxiety and depression benefits

## 2018-11-12 ENCOUNTER — OFFICE VISIT (OUTPATIENT)
Dept: INTERNAL MEDICINE CLINIC | Facility: CLINIC | Age: 68
End: 2018-11-12
Payer: MEDICARE

## 2018-11-12 VITALS
SYSTOLIC BLOOD PRESSURE: 140 MMHG | BODY MASS INDEX: 29.06 KG/M2 | WEIGHT: 203 LBS | TEMPERATURE: 98.5 F | DIASTOLIC BLOOD PRESSURE: 80 MMHG | HEIGHT: 70 IN | HEART RATE: 72 BPM | OXYGEN SATURATION: 98 %

## 2018-11-12 DIAGNOSIS — J06.9 VIRAL URI WITH COUGH: Primary | ICD-10-CM

## 2018-11-12 PROCEDURE — 99213 OFFICE O/P EST LOW 20 MIN: CPT | Performed by: INTERNAL MEDICINE

## 2018-11-12 RX ORDER — FLUTICASONE PROPIONATE 50 MCG
2 SPRAY, SUSPENSION (ML) NASAL DAILY
Qty: 16 G | Refills: 0 | Status: SHIPPED | OUTPATIENT
Start: 2018-11-12 | End: 2018-12-09 | Stop reason: SDUPTHER

## 2018-11-12 RX ORDER — BROMPHENIRAMINE MALEATE, PSEUDOEPHEDRINE HYDROCHLORIDE, AND DEXTROMETHORPHAN HYDROBROMIDE 2; 30; 10 MG/5ML; MG/5ML; MG/5ML
5 SYRUP ORAL 4 TIMES DAILY PRN
Qty: 120 ML | Refills: 0 | Status: SHIPPED | OUTPATIENT
Start: 2018-11-12 | End: 2018-11-22

## 2018-11-12 NOTE — PROGRESS NOTES
Assessment/Plan:    #Viral URI  -reports symptoms for 2 weeks  -coughing with yellow mucus, stuffy nose, sore throat that is improved  -denies fevers, abdominal discomfort, chest pain, SOB, headache, back pain  -states that daughter prescribed her a zpak which she took with mild relief  -states that symptoms persist  -we will obtain CXR and start on bromfed and flonase  -denies smoking and reports she received flu vaccine already    Addendum 11/16/2018 patient completed chest x-ray which was unremarkable  Patient was informed of findings and states that she will continue with Bromfed and Flonase  No problem-specific Assessment & Plan notes found for this encounter  Diagnoses and all orders for this visit:    Viral URI with cough  -     XR chest pa & lateral; Future  -     fluticasone (FLONASE) 50 mcg/act nasal spray; 2 sprays into each nostril daily for 10 days  -     brompheniramine-pseudoephedrine-DM 30-2-10 MG/5ML syrup;  Take 5 mL by mouth 4 (four) times a day as needed for cough for up to 10 days            Current Outpatient Prescriptions:     aspirin 81 MG tablet, Take by mouth, Disp: , Rfl:     brompheniramine-pseudoephedrine-DM 30-2-10 MG/5ML syrup, Take 5 mL by mouth 4 (four) times a day as needed for cough for up to 10 days, Disp: 120 mL, Rfl: 0    busPIRone (BUSPAR) 5 mg tablet, Take 1 tablet (5 mg total) by mouth 2 (two) times a day, Disp: 60 tablet, Rfl: 1    Calcium 250 MG CAPS, Take by mouth, Disp: , Rfl:     cholecalciferol (VITAMIN D3) 1,000 units tablet, Take 1 capsule by mouth once a week, Disp: , Rfl:     escitalopram (LEXAPRO) 20 mg tablet, Take 0 5 tablets (10 mg total) by mouth daily, Disp: 90 tablet, Rfl: 0    fluticasone (FLONASE) 50 mcg/act nasal spray, 2 sprays into each nostril daily for 10 days, Disp: 16 g, Rfl: 0    naproxen (NAPROSYN) 500 mg tablet, Take 1 tablet by mouth 2 (two) times a day with meals for 10 days, Disp: 20 tablet, Rfl: 1    Subjective:      Patient ID: Aspen Fishman is a 70 y o  female  HPI     Presents as an acute visit  Reports that she has stuffy nose, cough with yellow sputum production for 2 weeks  States that initially she had a sore throat however it has since resolved  Denies any fevers, smoking history, sick contacts at home, recent travel  States she has tried mucinex without much relief  We advised her to stop the mucinex at this time  Due to her symptoms, she was prescribed a zpak by her daughter and completed the 3 day course without improvement  Lung exam was benign today and rhinitis was noted in turbinates  We will obtain CXR at this time  Will start on flonase and bromfed for symptomatic relief  Patient to call back within 1 week if symptoms persist     The following portions of the patient's history were reviewed and updated as appropriate: allergies, current medications, past family history, past medical history, past social history, past surgical history and problem list     Review of Systems   Constitutional: Positive for fatigue  Negative for activity change, appetite change, chills and fever  HENT: Positive for postnasal drip and rhinorrhea  Negative for congestion, ear pain, facial swelling, hearing loss, sinus pain, sinus pressure, sneezing, sore throat, tinnitus and trouble swallowing  Eyes: Negative for photophobia and visual disturbance  Respiratory: Positive for cough  Negative for shortness of breath and wheezing  Cardiovascular: Negative for chest pain and leg swelling  Gastrointestinal: Negative for abdominal distention, abdominal pain, blood in stool, nausea and vomiting  Genitourinary: Negative for difficulty urinating, dysuria and pelvic pain  Musculoskeletal: Negative for arthralgias and joint swelling  Skin: Negative for rash and wound  Neurological: Negative for dizziness, tremors, light-headedness and headaches           Objective:      /80 (BP Location: Left arm, Patient Position: Sitting, Cuff Size: Large)   Pulse 72   Temp 98 5 °F (36 9 °C) (Tympanic)   Ht 5' 10" (1 778 m)   Wt 92 1 kg (203 lb)   SpO2 98%   BMI 29 13 kg/m²          Physical Exam   Constitutional: She is oriented to person, place, and time  She appears well-developed and well-nourished  HENT:   Head: Normocephalic and atraumatic  Right Ear: External ear normal    Left Ear: External ear normal    Nose: Nose normal    Mouth/Throat: Oropharynx is clear and moist    Rhinitis noted   Eyes: Pupils are equal, round, and reactive to light  Conjunctivae and EOM are normal    Neck: Normal range of motion  Neck supple  No JVD present  No thyromegaly present  Cardiovascular: Normal rate, regular rhythm, normal heart sounds and intact distal pulses  No murmur heard  Pulmonary/Chest: Effort normal and breath sounds normal  No stridor  No respiratory distress  She has no wheezes  Abdominal: Soft  Bowel sounds are normal  She exhibits no distension  There is no tenderness  There is no rebound and no guarding  Musculoskeletal: Normal range of motion  She exhibits no edema or tenderness  Lymphadenopathy:     She has no cervical adenopathy  Neurological: She is alert and oriented to person, place, and time  She has normal reflexes  Skin: Skin is warm  No rash noted  No erythema  Vitals reviewed

## 2018-11-13 ENCOUNTER — APPOINTMENT (OUTPATIENT)
Dept: RADIOLOGY | Age: 68
End: 2018-11-13
Payer: MEDICARE

## 2018-11-13 ENCOUNTER — TRANSCRIBE ORDERS (OUTPATIENT)
Dept: ADMINISTRATIVE | Age: 68
End: 2018-11-13

## 2018-11-13 DIAGNOSIS — J06.9 VIRAL URI WITH COUGH: ICD-10-CM

## 2018-11-13 PROCEDURE — 71046 X-RAY EXAM CHEST 2 VIEWS: CPT

## 2018-11-16 DIAGNOSIS — F32.A DEPRESSION, UNSPECIFIED DEPRESSION TYPE: ICD-10-CM

## 2018-11-19 RX ORDER — ESCITALOPRAM OXALATE 20 MG/1
10 TABLET ORAL DAILY
Qty: 90 TABLET | Refills: 0 | Status: SHIPPED | OUTPATIENT
Start: 2018-11-19 | End: 2019-02-11 | Stop reason: SDUPTHER

## 2018-11-19 NOTE — TELEPHONE ENCOUNTER
From: Ligia Tom  Sent: 11/16/2018 5:37 PM EST  Subject: Medication Renewal Request    Matipankaj Vaishali would like a refill of the following medications:     escitalopram (LEXAPRO) 20 mg tablet Adonay Mancilla MD]   Patient Comment: My dose was lowered to 10 mg  I need a 90 day refill for this drug      Preferred pharmacy: [facility] #[id number]

## 2018-12-09 DIAGNOSIS — J06.9 VIRAL URI WITH COUGH: ICD-10-CM

## 2018-12-10 RX ORDER — FLUTICASONE PROPIONATE 50 MCG
2 SPRAY, SUSPENSION (ML) NASAL DAILY
Qty: 1 BOTTLE | Refills: 0 | Status: SHIPPED | OUTPATIENT
Start: 2018-12-10 | End: 2019-02-11 | Stop reason: ALTCHOICE

## 2019-02-11 ENCOUNTER — OFFICE VISIT (OUTPATIENT)
Dept: INTERNAL MEDICINE CLINIC | Facility: CLINIC | Age: 69
End: 2019-02-11
Payer: MEDICARE

## 2019-02-11 VITALS
SYSTOLIC BLOOD PRESSURE: 130 MMHG | HEIGHT: 70 IN | WEIGHT: 202.2 LBS | OXYGEN SATURATION: 98 % | RESPIRATION RATE: 14 BRPM | HEART RATE: 79 BPM | DIASTOLIC BLOOD PRESSURE: 74 MMHG | TEMPERATURE: 97.8 F | BODY MASS INDEX: 28.95 KG/M2

## 2019-02-11 DIAGNOSIS — Z12.11 COLON CANCER SCREENING: ICD-10-CM

## 2019-02-11 DIAGNOSIS — R53.83 OTHER FATIGUE: ICD-10-CM

## 2019-02-11 DIAGNOSIS — F32.A DEPRESSION, UNSPECIFIED DEPRESSION TYPE: ICD-10-CM

## 2019-02-11 DIAGNOSIS — F41.9 ANXIETY: ICD-10-CM

## 2019-02-11 DIAGNOSIS — Z23 ENCOUNTER FOR IMMUNIZATION: ICD-10-CM

## 2019-02-11 DIAGNOSIS — E78.5 HYPERLIPIDEMIA, UNSPECIFIED HYPERLIPIDEMIA TYPE: ICD-10-CM

## 2019-02-11 DIAGNOSIS — E55.9 VITAMIN D DEFICIENCY: ICD-10-CM

## 2019-02-11 DIAGNOSIS — H25.9 AGE-RELATED CATARACT OF BOTH EYES, UNSPECIFIED AGE-RELATED CATARACT TYPE: Primary | ICD-10-CM

## 2019-02-11 PROBLEM — I82.411: Status: ACTIVE | Noted: 2019-02-11

## 2019-02-11 PROBLEM — I82.411: Status: RESOLVED | Noted: 2019-02-11 | Resolved: 2019-02-11

## 2019-02-11 PROCEDURE — 90670 PCV13 VACCINE IM: CPT | Performed by: INTERNAL MEDICINE

## 2019-02-11 PROCEDURE — 99213 OFFICE O/P EST LOW 20 MIN: CPT | Performed by: INTERNAL MEDICINE

## 2019-02-11 PROCEDURE — G0009 ADMIN PNEUMOCOCCAL VACCINE: HCPCS | Performed by: INTERNAL MEDICINE

## 2019-02-11 RX ORDER — INFLUENZA A VIRUSA/MICHIGAN/45/2015 X-275 (H1N1) ANTIGEN (FORMALDEHYDE INACTIVATED), INFLUENZA A VIRUS A/HONG KONG/4801/2014 X-263B (H3N2) ANTIGEN (FORMALDEHYDE INACTIVATED), AND INFLUENZA B VIRUS B/BRISBANE/60/2008 ANTIGEN (FORMALDEHYDE INACTIVATED) 60; 60; 60 UG/.5ML; UG/.5ML; UG/.5ML
INJECTION, SUSPENSION INTRAMUSCULAR
Refills: 0 | COMMUNITY
Start: 2018-11-10 | End: 2019-05-14 | Stop reason: ALTCHOICE

## 2019-02-11 RX ORDER — ESCITALOPRAM OXALATE 10 MG/1
TABLET ORAL
COMMUNITY
End: 2019-02-11 | Stop reason: SDUPTHER

## 2019-02-11 RX ORDER — ESCITALOPRAM OXALATE 10 MG/1
10 TABLET ORAL DAILY
Qty: 90 TABLET | Refills: 3 | Status: SHIPPED | OUTPATIENT
Start: 2019-02-11 | End: 2020-04-04 | Stop reason: SDUPTHER

## 2019-02-11 NOTE — ASSESSMENT & PLAN NOTE
Bilateral cataracts with impaired nighttime vision  The patient is medically stable for the proposed cataract surgery
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
## 2019-02-11 NOTE — ASSESSMENT & PLAN NOTE
Anxiety symptoms remain well controlled on Lexapro 10 mg daily no indication of breakthrough symptoms    Continue 10 mg Lexapro daily

## 2019-05-13 ENCOUNTER — APPOINTMENT (OUTPATIENT)
Dept: LAB | Facility: CLINIC | Age: 69
End: 2019-05-13
Payer: MEDICARE

## 2019-05-13 DIAGNOSIS — E55.9 VITAMIN D DEFICIENCY: ICD-10-CM

## 2019-05-13 DIAGNOSIS — R53.83 OTHER FATIGUE: ICD-10-CM

## 2019-05-13 DIAGNOSIS — E78.5 HYPERLIPIDEMIA, UNSPECIFIED HYPERLIPIDEMIA TYPE: ICD-10-CM

## 2019-05-13 LAB
25(OH)D3 SERPL-MCNC: 26.1 NG/ML (ref 30–100)
ALBUMIN SERPL BCP-MCNC: 3.8 G/DL (ref 3.5–5)
ALP SERPL-CCNC: 75 U/L (ref 46–116)
ALT SERPL W P-5'-P-CCNC: 23 U/L (ref 12–78)
ANION GAP SERPL CALCULATED.3IONS-SCNC: 4 MMOL/L (ref 4–13)
AST SERPL W P-5'-P-CCNC: 19 U/L (ref 5–45)
BASOPHILS # BLD AUTO: 0.05 THOUSANDS/ΜL (ref 0–0.1)
BASOPHILS NFR BLD AUTO: 2 % (ref 0–1)
BILIRUB SERPL-MCNC: 0.96 MG/DL (ref 0.2–1)
BUN SERPL-MCNC: 13 MG/DL (ref 5–25)
CALCIUM SERPL-MCNC: 9.1 MG/DL (ref 8.3–10.1)
CHLORIDE SERPL-SCNC: 108 MMOL/L (ref 100–108)
CHOLEST SERPL-MCNC: 182 MG/DL (ref 50–200)
CO2 SERPL-SCNC: 29 MMOL/L (ref 21–32)
CREAT SERPL-MCNC: 0.81 MG/DL (ref 0.6–1.3)
EOSINOPHIL # BLD AUTO: 0.18 THOUSAND/ΜL (ref 0–0.61)
EOSINOPHIL NFR BLD AUTO: 5 % (ref 0–6)
ERYTHROCYTE [DISTWIDTH] IN BLOOD BY AUTOMATED COUNT: 11.7 % (ref 11.6–15.1)
GFR SERPL CREATININE-BSD FRML MDRD: 74 ML/MIN/1.73SQ M
GLUCOSE P FAST SERPL-MCNC: 99 MG/DL (ref 65–99)
HCT VFR BLD AUTO: 45.4 % (ref 34.8–46.1)
HDLC SERPL-MCNC: 44 MG/DL (ref 40–60)
HGB BLD-MCNC: 14.9 G/DL (ref 11.5–15.4)
IMM GRANULOCYTES # BLD AUTO: 0 THOUSAND/UL (ref 0–0.2)
IMM GRANULOCYTES NFR BLD AUTO: 0 % (ref 0–2)
LDLC SERPL CALC-MCNC: 109 MG/DL (ref 0–100)
LYMPHOCYTES # BLD AUTO: 1.41 THOUSANDS/ΜL (ref 0.6–4.47)
LYMPHOCYTES NFR BLD AUTO: 41 % (ref 14–44)
MCH RBC QN AUTO: 31 PG (ref 26.8–34.3)
MCHC RBC AUTO-ENTMCNC: 32.8 G/DL (ref 31.4–37.4)
MCV RBC AUTO: 94 FL (ref 82–98)
MONOCYTES # BLD AUTO: 0.38 THOUSAND/ΜL (ref 0.17–1.22)
MONOCYTES NFR BLD AUTO: 11 % (ref 4–12)
NEUTROPHILS # BLD AUTO: 1.4 THOUSANDS/ΜL (ref 1.85–7.62)
NEUTS SEG NFR BLD AUTO: 41 % (ref 43–75)
NONHDLC SERPL-MCNC: 138 MG/DL
NRBC BLD AUTO-RTO: 0 /100 WBCS
PLATELET # BLD AUTO: 199 THOUSANDS/UL (ref 149–390)
PMV BLD AUTO: 10.4 FL (ref 8.9–12.7)
POTASSIUM SERPL-SCNC: 4.6 MMOL/L (ref 3.5–5.3)
PROT SERPL-MCNC: 7.8 G/DL (ref 6.4–8.2)
RBC # BLD AUTO: 4.81 MILLION/UL (ref 3.81–5.12)
SODIUM SERPL-SCNC: 141 MMOL/L (ref 136–145)
TRIGL SERPL-MCNC: 145 MG/DL
WBC # BLD AUTO: 3.42 THOUSAND/UL (ref 4.31–10.16)

## 2019-05-13 PROCEDURE — 85025 COMPLETE CBC W/AUTO DIFF WBC: CPT

## 2019-05-13 PROCEDURE — 80053 COMPREHEN METABOLIC PANEL: CPT

## 2019-05-13 PROCEDURE — 36415 COLL VENOUS BLD VENIPUNCTURE: CPT

## 2019-05-13 PROCEDURE — 82306 VITAMIN D 25 HYDROXY: CPT

## 2019-05-13 PROCEDURE — 80061 LIPID PANEL: CPT

## 2019-05-14 ENCOUNTER — OFFICE VISIT (OUTPATIENT)
Dept: INTERNAL MEDICINE CLINIC | Facility: CLINIC | Age: 69
End: 2019-05-14
Payer: MEDICARE

## 2019-05-14 VITALS
BODY MASS INDEX: 28.98 KG/M2 | RESPIRATION RATE: 14 BRPM | SYSTOLIC BLOOD PRESSURE: 122 MMHG | OXYGEN SATURATION: 98 % | DIASTOLIC BLOOD PRESSURE: 74 MMHG | HEART RATE: 77 BPM | TEMPERATURE: 98.2 F | WEIGHT: 202.4 LBS | HEIGHT: 70 IN

## 2019-05-14 DIAGNOSIS — E66.3 OVER WEIGHT: ICD-10-CM

## 2019-05-14 DIAGNOSIS — E55.9 VITAMIN D DEFICIENCY: ICD-10-CM

## 2019-05-14 DIAGNOSIS — I83.93 VARICOSE VEINS OF BOTH LOWER EXTREMITIES, UNSPECIFIED WHETHER COMPLICATED: ICD-10-CM

## 2019-05-14 DIAGNOSIS — F41.9 ANXIETY: ICD-10-CM

## 2019-05-14 DIAGNOSIS — Z00.00 HEALTHCARE MAINTENANCE: Primary | ICD-10-CM

## 2019-05-14 DIAGNOSIS — F32.A DEPRESSION, UNSPECIFIED DEPRESSION TYPE: ICD-10-CM

## 2019-05-14 DIAGNOSIS — N30.00 ACUTE CYSTITIS WITHOUT HEMATURIA: ICD-10-CM

## 2019-05-14 DIAGNOSIS — E78.5 HYPERLIPIDEMIA, MILD: ICD-10-CM

## 2019-05-14 DIAGNOSIS — M19.90 ARTHRITIS: ICD-10-CM

## 2019-05-14 PROBLEM — H25.9 AGE-RELATED CATARACT OF BOTH EYES: Status: RESOLVED | Noted: 2019-02-11 | Resolved: 2019-05-14

## 2019-05-14 PROCEDURE — 99215 OFFICE O/P EST HI 40 MIN: CPT | Performed by: INTERNAL MEDICINE

## 2019-05-14 PROCEDURE — G0439 PPPS, SUBSEQ VISIT: HCPCS | Performed by: INTERNAL MEDICINE

## 2019-05-14 PROCEDURE — 93000 ELECTROCARDIOGRAM COMPLETE: CPT | Performed by: INTERNAL MEDICINE

## 2019-05-14 RX ORDER — KETOROLAC TROMETHAMINE 5 MG/ML
SOLUTION OPHTHALMIC
Refills: 1 | COMMUNITY
Start: 2019-02-13 | End: 2019-05-14 | Stop reason: ALTCHOICE

## 2019-05-14 RX ORDER — GENTAMICIN SULFATE 3 MG/ML
SOLUTION/ DROPS OPHTHALMIC
Refills: 1 | COMMUNITY
Start: 2019-03-12 | End: 2019-05-14 | Stop reason: ALTCHOICE

## 2019-05-14 RX ORDER — PREDNISOLONE ACETATE 10 MG/ML
SUSPENSION/ DROPS OPHTHALMIC
Refills: 1 | COMMUNITY
Start: 2019-03-12 | End: 2019-05-14 | Stop reason: ALTCHOICE

## 2019-07-11 ENCOUNTER — TELEPHONE (OUTPATIENT)
Dept: INTERNAL MEDICINE CLINIC | Facility: CLINIC | Age: 69
End: 2019-07-11

## 2019-07-11 ENCOUNTER — APPOINTMENT (OUTPATIENT)
Dept: LAB | Age: 69
End: 2019-07-11
Payer: MEDICARE

## 2019-07-11 DIAGNOSIS — B34.9 VIRAL INFECTION: ICD-10-CM

## 2019-07-11 DIAGNOSIS — B34.9 VIRAL INFECTION: Primary | ICD-10-CM

## 2019-07-11 PROCEDURE — 86663 EPSTEIN-BARR ANTIBODY: CPT

## 2019-07-11 PROCEDURE — 86665 EPSTEIN-BARR CAPSID VCA: CPT

## 2019-07-11 PROCEDURE — 86664 EPSTEIN-BARR NUCLEAR ANTIGEN: CPT

## 2019-07-11 PROCEDURE — 36415 COLL VENOUS BLD VENIPUNCTURE: CPT

## 2019-07-11 NOTE — TELEPHONE ENCOUNTER
The patient know that I placed an order in the [facility] laboratory system to have the blood testing done she does not need any fasting prior to the blood test thank you

## 2019-07-11 NOTE — TELEPHONE ENCOUNTER
Patient called with concerned being exposed to mono from her   We discussed having a blood test to see if she may have it   Patient has agreed to the blood test     Will call the patient when order is placed

## 2019-07-15 LAB
EBV EA IGG SER-ACNC: 12.7 U/ML (ref 0–8.9)
EBV NA IGG SER IA-ACNC: >600 U/ML (ref 0–17.9)
EBV PATRN SPEC IB-IMP: ABNORMAL
EBV VCA IGG SER IA-ACNC: 159 U/ML (ref 0–17.9)
EBV VCA IGM SER IA-ACNC: <36 U/ML (ref 0–35.9)

## 2019-07-16 ENCOUNTER — TELEPHONE (OUTPATIENT)
Dept: INTERNAL MEDICINE CLINIC | Facility: CLINIC | Age: 69
End: 2019-07-16

## 2019-07-16 NOTE — TELEPHONE ENCOUNTER
Call  returned to the patient I do not see evidence of an active infection in her case she has been exposed to this virus in the past but at present time is not infected

## 2019-10-07 ENCOUNTER — APPOINTMENT (OUTPATIENT)
Dept: RADIOLOGY | Age: 69
End: 2019-10-07
Payer: MEDICARE

## 2019-10-07 ENCOUNTER — APPOINTMENT (OUTPATIENT)
Dept: LAB | Age: 69
End: 2019-10-07
Payer: MEDICARE

## 2019-10-07 DIAGNOSIS — Z12.11 COLON CANCER SCREENING: ICD-10-CM

## 2019-10-07 DIAGNOSIS — M19.90 ARTHRITIS: ICD-10-CM

## 2019-10-07 LAB
BACTERIA UR QL AUTO: ABNORMAL /HPF
BILIRUB UR QL STRIP: NEGATIVE
CLARITY UR: CLEAR
COLOR UR: YELLOW
GLUCOSE UR STRIP-MCNC: NEGATIVE MG/DL
HEMOCCULT STL QL IA: NEGATIVE
HGB UR QL STRIP.AUTO: NEGATIVE
HYALINE CASTS #/AREA URNS LPF: ABNORMAL /LPF
KETONES UR STRIP-MCNC: NEGATIVE MG/DL
LEUKOCYTE ESTERASE UR QL STRIP: ABNORMAL
NITRITE UR QL STRIP: NEGATIVE
NON-SQ EPI CELLS URNS QL MICRO: ABNORMAL /HPF
PH UR STRIP.AUTO: 6 [PH]
PROT UR STRIP-MCNC: NEGATIVE MG/DL
RBC #/AREA URNS AUTO: ABNORMAL /HPF
SP GR UR STRIP.AUTO: 1.01 (ref 1–1.03)
UROBILINOGEN UR QL STRIP.AUTO: 0.2 E.U./DL
WBC #/AREA URNS AUTO: ABNORMAL /HPF

## 2019-10-07 PROCEDURE — G0328 FECAL BLOOD SCRN IMMUNOASSAY: HCPCS

## 2019-10-07 PROCEDURE — 73562 X-RAY EXAM OF KNEE 3: CPT

## 2019-10-07 PROCEDURE — 81001 URINALYSIS AUTO W/SCOPE: CPT | Performed by: INTERNAL MEDICINE

## 2019-11-11 ENCOUNTER — TELEPHONE (OUTPATIENT)
Dept: INTERNAL MEDICINE CLINIC | Facility: CLINIC | Age: 69
End: 2019-11-11

## 2019-11-11 ENCOUNTER — OFFICE VISIT (OUTPATIENT)
Dept: INTERNAL MEDICINE CLINIC | Facility: CLINIC | Age: 69
End: 2019-11-11
Payer: MEDICARE

## 2019-11-11 VITALS
OXYGEN SATURATION: 98 % | WEIGHT: 205.6 LBS | RESPIRATION RATE: 16 BRPM | BODY MASS INDEX: 29.43 KG/M2 | TEMPERATURE: 98.5 F | DIASTOLIC BLOOD PRESSURE: 88 MMHG | HEART RATE: 72 BPM | HEIGHT: 70 IN | SYSTOLIC BLOOD PRESSURE: 144 MMHG

## 2019-11-11 DIAGNOSIS — H10.33 ACUTE BACTERIAL CONJUNCTIVITIS OF BOTH EYES: Primary | ICD-10-CM

## 2019-11-11 PROCEDURE — 99213 OFFICE O/P EST LOW 20 MIN: CPT | Performed by: INTERNAL MEDICINE

## 2019-11-11 RX ORDER — TOBRAMYCIN AND DEXAMETHASONE 3; 1 MG/ML; MG/ML
1 SUSPENSION/ DROPS OPHTHALMIC 4 TIMES DAILY
Qty: 5 ML | Refills: 0 | Status: SHIPPED | OUTPATIENT
Start: 2019-11-11 | End: 2020-07-30 | Stop reason: ALTCHOICE

## 2019-11-11 RX ORDER — FLUOCINONIDE TOPICAL SOLUTION USP, 0.05% 0.5 MG/ML
SOLUTION TOPICAL
Refills: 2 | COMMUNITY
Start: 2019-11-06

## 2019-11-11 RX ORDER — KETOCONAZOLE 20 MG/ML
1 SHAMPOO TOPICAL AS NEEDED
Refills: 5 | COMMUNITY
Start: 2019-11-06

## 2019-11-11 RX ORDER — NYSTATIN 100000 [USP'U]/G
POWDER TOPICAL
Refills: 3 | COMMUNITY
Start: 2019-09-26 | End: 2021-11-30

## 2019-11-11 NOTE — ASSESSMENT & PLAN NOTE
Acute bacterial type a conjunctivitis in both eyes worse in the left eye recommend the initiation of TobraDex 1 drop in both eyes 4 times a day for period of 5 days  We discussed precautions to reduce the risk of transmission of this infection  If the infection worsens or fails to improve she is encouraged to return for follow-up assessment

## 2019-11-11 NOTE — PROGRESS NOTES
Assessment/Plan:    Acute bacterial conjunctivitis of both eyes  Acute bacterial type a conjunctivitis in both eyes worse in the left eye recommend the initiation of TobraDex 1 drop in both eyes 4 times a day for period of 5 days  We discussed precautions to reduce the risk of transmission of this infection  If the infection worsens or fails to improve she is encouraged to return for follow-up assessment  Diagnoses and all orders for this visit:    Acute bacterial conjunctivitis of both eyes  -     tobramycin-dexamethasone (TOBRADEX) ophthalmic suspension; Administer 1 drop to both eyes 4 (four) times a day    Other orders  -     fluocinonide (LIDEX) 0 05 % external solution; APPLY TO SCALP ONCE DAILY AS NEEDED FOR ITCH  -     ketoconazole (NIZORAL) 2 % shampoo; PLEASE SEE ATTACHED FOR DETAILED DIRECTIONS  -     nystatin (MYCOSTATIN) powder; APPLY TO THE AFFECTED AREA 3 TIMES A DAY AS NEEDED        Subjective:      Patient ID: Darrel Watts is a 78 y o  female  This is an acute visit for this 76-year-old female patient  She presents today with several days of eye irritation  She indicates that she has been taking care of her granddaughter who has been diagnosed with conjunctivitis  The patient has had a irritated gritty feeling in her left eye and to a lesser extent her right eye over the past several days  She did have gentamicin drops left over from her previous cataract surgery she has been using knees but they have not cleared up the infection completely  The drops her approximately 10month-old  She does have a slight he has or film in her vision on the left eye  She has not had any fevers or chills  The following portions of the patient's history were reviewed and updated as appropriate:   She  has a past medical history of Fracture of radius, distal, closed    She   Patient Active Problem List    Diagnosis Date Noted    Acute bacterial conjunctivitis of both eyes 11/11/2019    Venous insufficiency of both lower extremities 06/04/2018    Varicose vein of leg 03/27/2018    Hyperbilirubinemia 05/08/2017    Depression 04/10/2017    Sleep disturbance 04/10/2017    Anxiety 04/08/2016    Arthritis 04/08/2016    Hyperlipidemia, mild 04/08/2016    Over weight 04/08/2016    Vitamin D deficiency 04/08/2016     She  has a past surgical history that includes Ankle surgery  Her family history includes Heart disease in her father; Leukemia in her brother; Ovarian cancer in her mother  She  reports that she has never smoked  She has never used smokeless tobacco  She reports that she does not drink alcohol or use drugs  Current Outpatient Medications   Medication Sig Dispense Refill    Calcium 250 MG CAPS Take by mouth      cholecalciferol (VITAMIN D3) 1,000 units tablet Take 2 capsules by mouth daily      escitalopram (LEXAPRO) 10 mg tablet Take 1 tablet (10 mg total) by mouth daily 90 tablet 3    Fish Oil-Krill Oil (KRILL OIL PLUS) CAPS Take by mouth      fluocinonide (LIDEX) 0 05 % external solution APPLY TO SCALP ONCE DAILY AS NEEDED FOR ITCH  2    ketoconazole (NIZORAL) 2 % shampoo PLEASE SEE ATTACHED FOR DETAILED DIRECTIONS  5    nystatin (MYCOSTATIN) powder APPLY TO THE AFFECTED AREA 3 TIMES A DAY AS NEEDED  3    tobramycin-dexamethasone (TOBRADEX) ophthalmic suspension Administer 1 drop to both eyes 4 (four) times a day 5 mL 0     No current facility-administered medications for this visit       Review of Systems   Eyes: Positive for discharge and redness  All other systems reviewed and are negative  Objective:      /88   Pulse 72   Temp 98 5 °F (36 9 °C)   Resp 16   Ht 5' 10" (1 778 m)   Wt 93 3 kg (205 lb 9 6 oz)   SpO2 98%   BMI 29 50 kg/m²          Physical Exam   Eyes: Pupils are equal, round, and reactive to light  EOM are normal  Right eye exhibits discharge  Left eye exhibits discharge  No scleral icterus     Conjunctivitis signs and symptoms in both eyes greater in the left eye than the right

## 2019-11-11 NOTE — TELEPHONE ENCOUNTER
Patient called stating that she is pretty sure she contracted pink eye from her grandchild  She would like to know if you need to see her in the office or if there is something that can be prescribed  She can be reached at [phone]   Thank you

## 2019-11-11 NOTE — TELEPHONE ENCOUNTER
I would prefer to see the patient in the office before prescribing antibiotics  I think I can see her tomorrow afternoon there to same day visits 15 minutes available that should be fine to take a look at her eye  Thank you    please call our to make the appointment

## 2019-11-18 ENCOUNTER — OFFICE VISIT (OUTPATIENT)
Dept: INTERNAL MEDICINE CLINIC | Facility: CLINIC | Age: 69
End: 2019-11-18
Payer: MEDICARE

## 2019-11-18 VITALS
SYSTOLIC BLOOD PRESSURE: 128 MMHG | OXYGEN SATURATION: 98 % | WEIGHT: 204.4 LBS | RESPIRATION RATE: 16 BRPM | BODY MASS INDEX: 29.26 KG/M2 | HEART RATE: 78 BPM | TEMPERATURE: 98.4 F | HEIGHT: 70 IN | DIASTOLIC BLOOD PRESSURE: 76 MMHG

## 2019-11-18 DIAGNOSIS — E55.9 VITAMIN D DEFICIENCY: ICD-10-CM

## 2019-11-18 DIAGNOSIS — F32.A DEPRESSION, UNSPECIFIED DEPRESSION TYPE: ICD-10-CM

## 2019-11-18 DIAGNOSIS — R39.9 UTI SYMPTOMS: ICD-10-CM

## 2019-11-18 DIAGNOSIS — Z23 ENCOUNTER FOR IMMUNIZATION: Primary | ICD-10-CM

## 2019-11-18 DIAGNOSIS — E80.6 HYPERBILIRUBINEMIA: ICD-10-CM

## 2019-11-18 DIAGNOSIS — F41.9 ANXIETY: ICD-10-CM

## 2019-11-18 DIAGNOSIS — E78.5 HYPERLIPIDEMIA, MILD: ICD-10-CM

## 2019-11-18 DIAGNOSIS — Z13.0 SCREENING FOR DEFICIENCY ANEMIA: ICD-10-CM

## 2019-11-18 DIAGNOSIS — H10.33 ACUTE BACTERIAL CONJUNCTIVITIS OF BOTH EYES: ICD-10-CM

## 2019-11-18 PROCEDURE — G0008 ADMIN INFLUENZA VIRUS VAC: HCPCS

## 2019-11-18 PROCEDURE — 90662 IIV NO PRSV INCREASED AG IM: CPT

## 2019-11-18 PROCEDURE — 99213 OFFICE O/P EST LOW 20 MIN: CPT | Performed by: INTERNAL MEDICINE

## 2019-11-19 NOTE — PROGRESS NOTES
Assessment/Plan:    Depression  Previous symptoms of depression seem to be well controlled the patient continues on Lexapro 10 mg daily with no apparent side effects of the drug recommend the continuation of her present therapy  Anxiety  Anxiety condition remains well controlled on continued therapy with Lexapro 10 mg daily no apparent side effects continue present therapy recommended    Acute bacterial conjunctivitis of both eyes  Previous conjunctivitis has resolved of the patient has completed her course of topical antibiotics with no complications no further treatment is advised at this time       Diagnoses and all orders for this visit:    Encounter for immunization  -     influenza vaccine, [id number], high-dose, PF 0 5 mL (FLUZONE HIGH-DOSE)    Hyperlipidemia, mild  -     Lipid panel; Future    Hyperbilirubinemia  -     Comprehensive metabolic panel; Future    Vitamin D deficiency  -     Vitamin D 25 hydroxy; Future    UTI symptoms  -     UA w Reflex to Microscopic w Reflex to Culture -Lab Collect    Screening for deficiency anemia  -     CBC and differential; Future        Subjective:      Patient ID: Parvin Torres is a 78 y o  female  This is a routine follow-up visit for this 30-year-old female patient  She has a history in the past of anxiety and depression symptoms she continues under treatment and is here today for review of these conditions  She has no new complaints or issues on today's visit    She was recently seen by this practice for treatment of bilateral conjunctivitis this infection has cleared up nicely  The following portions of the patient's history were reviewed and updated as appropriate:   She  has a past medical history of Fracture of radius, distal, closed    She   Patient Active Problem List    Diagnosis Date Noted    Acute bacterial conjunctivitis of both eyes 11/11/2019    Venous insufficiency of both lower extremities 06/04/2018    Varicose vein of leg 03/27/2018  Hyperbilirubinemia 05/08/2017    Depression 04/10/2017    Sleep disturbance 04/10/2017    Anxiety 04/08/2016    Arthritis 04/08/2016    Hyperlipidemia, mild 04/08/2016    Over weight 04/08/2016    Vitamin D deficiency 04/08/2016     She  has a past surgical history that includes Ankle surgery  Her family history includes Heart disease in her father; Leukemia in her brother; Ovarian cancer in her mother  She  reports that she has never smoked  She has never used smokeless tobacco  She reports that she does not drink alcohol or use drugs  Current Outpatient Medications   Medication Sig Dispense Refill    Calcium 250 MG CAPS Take by mouth      cholecalciferol (VITAMIN D3) 1,000 units tablet Take 2 capsules by mouth daily      escitalopram (LEXAPRO) 10 mg tablet Take 1 tablet (10 mg total) by mouth daily 90 tablet 3    Fish Oil-Krill Oil (KRILL OIL PLUS) CAPS Take by mouth      fluocinonide (LIDEX) 0 05 % external solution APPLY TO SCALP ONCE DAILY AS NEEDED FOR ITCH  2    ketoconazole (NIZORAL) 2 % shampoo PLEASE SEE ATTACHED FOR DETAILED DIRECTIONS  5    nystatin (MYCOSTATIN) powder APPLY TO THE AFFECTED AREA 3 TIMES A DAY AS NEEDED  3    tobramycin-dexamethasone (TOBRADEX) ophthalmic suspension Administer 1 drop to both eyes 4 (four) times a day 5 mL 0     No current facility-administered medications for this visit       Review of Systems   All other systems reviewed and are negative  Objective:      /76   Pulse 78   Temp 98 4 °F (36 9 °C)   Resp 16   Ht 5' 10" (1 778 m)   Wt 92 7 kg (204 lb 6 4 oz)   SpO2 98%   BMI 29 33 kg/m²          Physical Exam   Constitutional: She is oriented to person, place, and time  Vital signs are normal  She appears well-developed and well-nourished  She is cooperative     HENT:   Right Ear: Hearing, tympanic membrane, external ear and ear canal normal    Left Ear: Hearing, tympanic membrane, external ear and ear canal normal    Nose: Nose normal  No mucosal edema  Mouth/Throat: Uvula is midline, oropharynx is clear and moist and mucous membranes are normal    Eyes: Pupils are equal, round, and reactive to light  Conjunctivae and lids are normal    Neck: No JVD present  Carotid bruit is not present  No thyromegaly present  Cardiovascular: Normal rate, regular rhythm, normal heart sounds and intact distal pulses  No murmur heard  Pulmonary/Chest: Effort normal and breath sounds normal  No respiratory distress  Abdominal: Soft  Normal appearance and bowel sounds are normal    Musculoskeletal: Normal range of motion  She exhibits no edema  Lymphadenopathy:     She has no cervical adenopathy  Neurological: She is alert and oriented to person, place, and time  She has normal reflexes  She displays normal reflexes  Skin: Skin is warm, dry and intact  Psychiatric: She has a normal mood and affect  Her speech is normal and behavior is normal  Judgment and thought content normal  Cognition and memory are normal    Vitals reviewed

## 2019-11-19 NOTE — ASSESSMENT & PLAN NOTE
Previous conjunctivitis has resolved of the patient has completed her course of topical antibiotics with no complications no further treatment is advised at this time

## 2019-11-19 NOTE — ASSESSMENT & PLAN NOTE
Previous symptoms of depression seem to be well controlled the patient continues on Lexapro 10 mg daily with no apparent side effects of the drug recommend the continuation of her present therapy

## 2019-11-19 NOTE — ASSESSMENT & PLAN NOTE
Anxiety condition remains well controlled on continued therapy with Lexapro 10 mg daily no apparent side effects continue present therapy recommended

## 2020-01-11 ENCOUNTER — OFFICE VISIT (OUTPATIENT)
Dept: URGENT CARE | Age: 70
End: 2020-01-11
Payer: MEDICARE

## 2020-01-11 VITALS
BODY MASS INDEX: 28.92 KG/M2 | OXYGEN SATURATION: 97 % | SYSTOLIC BLOOD PRESSURE: 161 MMHG | HEIGHT: 70 IN | WEIGHT: 202 LBS | RESPIRATION RATE: 17 BRPM | HEART RATE: 70 BPM | TEMPERATURE: 97.8 F | DIASTOLIC BLOOD PRESSURE: 79 MMHG

## 2020-01-11 DIAGNOSIS — J02.9 SORE THROAT: Primary | ICD-10-CM

## 2020-01-11 PROCEDURE — G0463 HOSPITAL OUTPT CLINIC VISIT: HCPCS | Performed by: PHYSICIAN ASSISTANT

## 2020-01-11 PROCEDURE — 87880 STREP A ASSAY W/OPTIC: CPT | Performed by: PHYSICIAN ASSISTANT

## 2020-01-11 PROCEDURE — 87070 CULTURE OTHR SPECIMN AEROBIC: CPT | Performed by: PHYSICIAN ASSISTANT

## 2020-01-11 PROCEDURE — 99213 OFFICE O/P EST LOW 20 MIN: CPT | Performed by: PHYSICIAN ASSISTANT

## 2020-01-11 NOTE — PROGRESS NOTES
[facility]        NAME: Josh Carey is a 78 y o  female  : 1950    MRN: [id number]  DATE: 2020  TIME: 12:05 PM    Assessment and Plan   Sore throat [J02 9]  1  Sore throat  POCT rapid strepA         Patient Instructions       Follow up with PCP in 3-5 days  Proceed to  ER if symptoms worsen  Chief Complaint     Chief Complaint   Patient presents with    Sore Throat     at least 10 days    Nasal Congestion         History of Present Illness       Patient here for evaluation of sore throat on the left  Patient does have some occasional ear discomfort and runny nose and mild congestion  Review of Systems   Review of Systems   Constitutional: Negative  HENT: Positive for postnasal drip, rhinorrhea and sore throat  Negative for congestion, ear pain, sinus pressure, sinus pain, trouble swallowing and voice change  Eyes: Negative  Respiratory: Negative  Cardiovascular: Negative            Current Medications       Current Outpatient Medications:     Calcium 250 MG CAPS, Take by mouth, Disp: , Rfl:     cholecalciferol (VITAMIN D3) 1,000 units tablet, Take 2 capsules by mouth daily, Disp: , Rfl:     escitalopram (LEXAPRO) 10 mg tablet, Take 1 tablet (10 mg total) by mouth daily, Disp: 90 tablet, Rfl: 3    Fish Oil-Krill Oil (KRILL OIL PLUS) CAPS, Take by mouth, Disp: , Rfl:     fluocinonide (LIDEX) 0 05 % external solution, APPLY TO SCALP ONCE DAILY AS NEEDED FOR ITCH, Disp: , Rfl: 2    ketoconazole (NIZORAL) 2 % shampoo, PLEASE SEE ATTACHED FOR DETAILED DIRECTIONS, Disp: , Rfl: 5    nystatin (MYCOSTATIN) powder, APPLY TO THE AFFECTED AREA 3 TIMES A DAY AS NEEDED, Disp: , Rfl: 3    tobramycin-dexamethasone (TOBRADEX) ophthalmic suspension, Administer 1 drop to both eyes 4 (four) times a day (Patient not taking: Reported on 2020), Disp: 5 mL, Rfl: 0    Current Allergies     Allergies as of 2020    (No Known Allergies)            The following portions of the patient's history were reviewed and updated as appropriate: allergies, current medications, past family history, past medical history, past social history, past surgical history and problem list      Past Medical History:   Diagnosis Date    Fracture of radius, distal, closed     Last Assessed:  6/10/14       Past Surgical History:   Procedure Laterality Date    ANKLE SURGERY         Family History   Problem Relation Age of Onset    Ovarian cancer Mother     Heart disease Father     Leukemia Brother          Medications have been verified  Objective   /79 (BP Location: Left arm, Patient Position: Sitting, Cuff Size: Standard)   Pulse 70   Temp 97 8 °F (36 6 °C)   Resp 17   Ht 5' 10" (1 778 m)   Wt 91 6 kg (202 lb)   SpO2 97%   BMI 28 98 kg/m²        Physical Exam     Physical Exam   Constitutional: She is oriented to person, place, and time  She appears well-developed and well-nourished  No distress  HENT:   Head: Normocephalic and atraumatic  Right Ear: External ear normal    Left Ear: External ear normal    Nose: Nose normal    Mouth/Throat: No oropharyngeal exudate  Bilateral tonsillar with mild erythema with no soft tissue swelling  No exudate  Eyes: Pupils are equal, round, and reactive to light  Conjunctivae and EOM are normal  Right eye exhibits no discharge  Left eye exhibits no discharge  Cardiovascular: Normal rate, regular rhythm and normal heart sounds  No murmur heard  Pulmonary/Chest: Effort normal and breath sounds normal  No stridor  No respiratory distress  She has no wheezes  She has no rales  Lymphadenopathy:     She has no cervical adenopathy  Neurological: She is alert and oriented to person, place, and time  Skin: Skin is warm and dry  No rash noted  She is not diaphoretic  Psychiatric: She has a normal mood and affect  Her behavior is normal  Judgment and thought content normal    Nursing note and vitals reviewed

## 2020-01-13 LAB — BACTERIA THROAT CULT: NORMAL

## 2020-04-04 DIAGNOSIS — F32.A DEPRESSION, UNSPECIFIED DEPRESSION TYPE: ICD-10-CM

## 2020-04-06 RX ORDER — ESCITALOPRAM OXALATE 10 MG/1
10 TABLET ORAL DAILY
Qty: 90 TABLET | Refills: 2 | Status: SHIPPED | OUTPATIENT
Start: 2020-04-06 | End: 2021-02-05

## 2020-07-30 ENCOUNTER — TELEPHONE (OUTPATIENT)
Dept: INTERNAL MEDICINE CLINIC | Facility: CLINIC | Age: 70
End: 2020-07-30

## 2020-07-30 ENCOUNTER — OFFICE VISIT (OUTPATIENT)
Dept: INTERNAL MEDICINE CLINIC | Facility: CLINIC | Age: 70
End: 2020-07-30
Payer: MEDICARE

## 2020-07-30 VITALS
WEIGHT: 200.8 LBS | SYSTOLIC BLOOD PRESSURE: 128 MMHG | HEIGHT: 70 IN | TEMPERATURE: 98.1 F | BODY MASS INDEX: 28.75 KG/M2 | DIASTOLIC BLOOD PRESSURE: 74 MMHG | RESPIRATION RATE: 14 BRPM | OXYGEN SATURATION: 99 % | HEART RATE: 75 BPM

## 2020-07-30 DIAGNOSIS — Z23 NEED FOR VACCINATION: Primary | ICD-10-CM

## 2020-07-30 DIAGNOSIS — F41.9 ANXIETY: ICD-10-CM

## 2020-07-30 DIAGNOSIS — F32.A DEPRESSION, UNSPECIFIED DEPRESSION TYPE: ICD-10-CM

## 2020-07-30 DIAGNOSIS — Z12.11 COLON CANCER SCREENING: ICD-10-CM

## 2020-07-30 DIAGNOSIS — Z00.00 HEALTHCARE MAINTENANCE: ICD-10-CM

## 2020-07-30 DIAGNOSIS — I83.93 VARICOSE VEINS OF BOTH LOWER EXTREMITIES, UNSPECIFIED WHETHER COMPLICATED: ICD-10-CM

## 2020-07-30 DIAGNOSIS — E80.6 HYPERBILIRUBINEMIA: ICD-10-CM

## 2020-07-30 DIAGNOSIS — I87.2 VENOUS INSUFFICIENCY OF BOTH LOWER EXTREMITIES: ICD-10-CM

## 2020-07-30 DIAGNOSIS — R39.9 UTI SYMPTOMS: ICD-10-CM

## 2020-07-30 DIAGNOSIS — Z13.0 SCREENING FOR DEFICIENCY ANEMIA: ICD-10-CM

## 2020-07-30 DIAGNOSIS — E78.5 HYPERLIPIDEMIA, MILD: ICD-10-CM

## 2020-07-30 PROBLEM — G47.9 SLEEP DISTURBANCE: Status: RESOLVED | Noted: 2017-04-10 | Resolved: 2020-07-30

## 2020-07-30 PROBLEM — H10.33 ACUTE BACTERIAL CONJUNCTIVITIS OF BOTH EYES: Status: RESOLVED | Noted: 2019-11-11 | Resolved: 2020-07-30

## 2020-07-30 PROBLEM — J02.9 SORE THROAT: Status: RESOLVED | Noted: 2020-01-11 | Resolved: 2020-07-30

## 2020-07-30 PROCEDURE — 1170F FXNL STATUS ASSESSED: CPT | Performed by: INTERNAL MEDICINE

## 2020-07-30 PROCEDURE — 3008F BODY MASS INDEX DOCD: CPT | Performed by: INTERNAL MEDICINE

## 2020-07-30 PROCEDURE — 1123F ACP DISCUSS/DSCN MKR DOCD: CPT

## 2020-07-30 PROCEDURE — 1036F TOBACCO NON-USER: CPT | Performed by: INTERNAL MEDICINE

## 2020-07-30 PROCEDURE — 90732 PPSV23 VACC 2 YRS+ SUBQ/IM: CPT

## 2020-07-30 PROCEDURE — G0439 PPPS, SUBSEQ VISIT: HCPCS | Performed by: INTERNAL MEDICINE

## 2020-07-30 PROCEDURE — 1160F RVW MEDS BY RX/DR IN RCRD: CPT | Performed by: INTERNAL MEDICINE

## 2020-07-30 PROCEDURE — 99215 OFFICE O/P EST HI 40 MIN: CPT | Performed by: INTERNAL MEDICINE

## 2020-07-30 PROCEDURE — 1125F AMNT PAIN NOTED PAIN PRSNT: CPT | Performed by: INTERNAL MEDICINE

## 2020-07-30 PROCEDURE — G0009 ADMIN PNEUMOCOCCAL VACCINE: HCPCS

## 2020-07-30 PROCEDURE — 4040F PNEUMOC VAC/ADMIN/RCVD: CPT | Performed by: INTERNAL MEDICINE

## 2020-07-30 NOTE — ASSESSMENT & PLAN NOTE
A history of hyper bilirubinemia as noted this is been asymptomatic to the patient I a comprehensive metabolic profile has been requested to follow this elevation up

## 2020-07-30 NOTE — PROGRESS NOTES
Assessment/Plan:    Varicose vein of leg  Varicosities of both lower extremities no evidence of any deep vein through or superficial vein thrombosis recommend the use of compression stockings for any prolonged periods of standing or ambulation    Hyperlipidemia, mild  I have recommended the patient obtain a updated lipid profile for review of her cholesterol levels  She should maintain a low-cholesterol diet and supplement with Omega 3 fatty acids  Hyperbilirubinemia  A history of hyper bilirubinemia as noted this is been asymptomatic to the patient I a comprehensive metabolic profile has been requested to follow this elevation up  Depression  Assessment of the patient's depression today indicates good control of her symptoms with no indication of breakthrough symptoms  I recommend that she continue her current dose of 10 mg of Lexapro daily  She has no apparent side effects of this drug and I recommend a follow-up in 6 months    Anxiety  Anxiety symptoms are well controlled on present dosing of Lexapro  No indication of any side effects continue Lexapro at 10 mg daily with follow-up assessment in 6 months       Diagnoses and all orders for this visit:    Need for vaccination  -     PNEUMOCOCCAL POLYSACCHARIDE VACCINE 23-VALENT =>1YO SQ IM    Hyperlipidemia, mild  -     Lipid panel; Future    Screening for deficiency anemia  -     CBC and differential; Future    Colon cancer screening  -     Occult Blood, Fecal Immunochemical; Future    Hyperbilirubinemia  -     Comprehensive metabolic panel; Future    UTI symptoms  -     UA w Reflex to Microscopic w Reflex to Culture -Lab Collect        Subjective:      Patient ID: Yuan Andrea is a 69 y o  female  This 69-year-old female patient presents today for an annual complete physical examination review of blood work  She has been doing well and has no complaints at this time    She has a past medical history of varicosities of both lower extremities anxiety and depression as well as hyperlipidemia  The following portions of the patient's history were reviewed and updated as appropriate:   She  has a past medical history of Fracture of radius, distal, closed  She   Patient Active Problem List    Diagnosis Date Noted    Varicose vein of leg 03/27/2018    Hyperbilirubinemia 05/08/2017    Depression 04/10/2017    Anxiety 04/08/2016    Arthritis 04/08/2016    Hyperlipidemia, mild 04/08/2016    Over weight 04/08/2016    Vitamin D deficiency 04/08/2016     She  has a past surgical history that includes Ankle surgery  Her family history includes Heart disease in her father; Leukemia in her brother; Ovarian cancer in her mother  She  reports that she has never smoked  She has never used smokeless tobacco  She reports that she does not drink alcohol or use drugs  Current Outpatient Medications   Medication Sig Dispense Refill    Calcium 250 MG CAPS Take by mouth      cholecalciferol (VITAMIN D3) 1,000 units tablet Take 2 capsules by mouth daily      escitalopram (LEXAPRO) 10 mg tablet Take 1 tablet (10 mg total) by mouth daily 90 tablet 2    Fish Oil-Krill Oil (KRILL OIL PLUS) CAPS Take by mouth      fluocinonide (LIDEX) 0 05 % external solution APPLY TO SCALP ONCE DAILY AS NEEDED FOR ITCH  2    ketoconazole (NIZORAL) 2 % shampoo PLEASE SEE ATTACHED FOR DETAILED DIRECTIONS  5    nystatin (MYCOSTATIN) powder APPLY TO THE AFFECTED AREA 3 TIMES A DAY AS NEEDED  3     No current facility-administered medications for this visit       Review of Systems   All other systems reviewed and are negative  Objective:      /74   Pulse 75   Temp 98 1 °F (36 7 °C)   Resp 14   Ht 5' 10" (1 778 m)   Wt 91 1 kg (200 lb 12 8 oz)   SpO2 99%   BMI 28 81 kg/m²          Physical Exam   Constitutional: She is oriented to person, place, and time  Vital signs are normal  She appears well-developed and well-nourished  She is cooperative  No distress  HENT:   Right Ear: Hearing, tympanic membrane, external ear and ear canal normal    Left Ear: Hearing, tympanic membrane, external ear and ear canal normal    Nose: Nose normal  No mucosal edema  Mouth/Throat: Uvula is midline, oropharynx is clear and moist and mucous membranes are normal    Eyes: Pupils are equal, round, and reactive to light  Conjunctivae and lids are normal  Right eye exhibits no discharge  Left eye exhibits no discharge  Neck: No JVD present  Carotid bruit is not present  No thyromegaly present  Cardiovascular: Normal rate, regular rhythm, normal heart sounds and intact distal pulses  No murmur heard  Pulmonary/Chest: Effort normal and breath sounds normal  No stridor  No respiratory distress  She has no wheezes  Abdominal: Soft  Normal appearance and bowel sounds are normal  She exhibits no distension and no mass  There is no tenderness  There is no guarding  Musculoskeletal: Normal range of motion  She exhibits no edema, tenderness or deformity  Lymphadenopathy:     She has no cervical adenopathy  Neurological: She is alert and oriented to person, place, and time  She has normal reflexes  She displays normal reflexes  No cranial nerve deficit  She exhibits normal muscle tone  Coordination normal    Skin: Skin is warm, dry and intact  She is not diaphoretic  Psychiatric: She has a normal mood and affect  Her speech is normal and behavior is normal  Judgment and thought content normal  Cognition and memory are normal    Vitals reviewed  BMI Counseling: Body mass index is 28 81 kg/m²  The BMI is above normal  Nutrition recommendations include reducing portion sizes, reducing fast food intake, consuming healthier snacks, decreasing soda and/or juice intake, reducing intake of saturated fat and trans fat and reducing intake of cholesterol

## 2020-07-30 NOTE — ASSESSMENT & PLAN NOTE
Anxiety symptoms are well controlled on present dosing of Lexapro    No indication of any side effects continue Lexapro at 10 mg daily with follow-up assessment in 6 months

## 2020-07-30 NOTE — ASSESSMENT & PLAN NOTE
Varicosities of both lower extremities no evidence of any deep vein through or superficial vein thrombosis recommend the use of compression stockings for any prolonged periods of standing or ambulation

## 2020-07-30 NOTE — PATIENT INSTRUCTIONS
Medicare Preventive Visit Patient Instructions  Thank you for completing your Welcome to Medicare Visit or Medicare Annual Wellness Visit today  Your next wellness visit will be due in one year (7/30/2021)  The screening/preventive services that you may require over the next 5-10 years are detailed below  Some tests may not apply to you based off risk factors and/or age  Screening tests ordered at today's visit but not completed yet may show as past due  Also, please note that scanned in results may not display below  Preventive Screenings:  Service Recommendations Previous Testing/Comments   Colorectal Cancer Screening  * Colonoscopy    * Fecal Occult Blood Test (FOBT)/Fecal Immunochemical Test (FIT)  * Fecal DNA/Cologuard Test  * Flexible Sigmoidoscopy Age: 54-65 years old   Colonoscopy: every 10 years (may be performed more frequently if at higher risk)  OR  FOBT/FIT: every 1 year  OR  Cologuard: every 3 years  OR  Sigmoidoscopy: every 5 years  Screening may be recommended earlier than age 48 if at higher risk for colorectal cancer  Also, an individualized decision between you and your healthcare provider will decide whether screening between the ages of 74-80 would be appropriate  Colonoscopy: 08/11/2016  FOBT/FIT: 10/07/2019  Cologuard: Not on file  Sigmoidoscopy: Not on file    Screening Current     Breast Cancer Screening Age: 36 years old  Frequency: every 1-2 years  Not required if history of left and right mastectomy Mammogram: 04/18/2019    Screening Current   Cervical Cancer Screening Between the ages of 21-29, pap smear recommended once every 3 years  Between the ages of 33-67, can perform pap smear with HPV co-testing every 5 years     Recommendations may differ for women with a history of total hysterectomy, cervical cancer, or abnormal pap smears in past  Pap Smear: Not on file    Screening Not Indicated   Hepatitis C Screening Once for adults born between 1945 and 1965  More frequently in patients at high risk for Hepatitis C Hep C Antibody: Not on file       Diabetes Screening 1-2 times per year if you're at risk for diabetes or have pre-diabetes Fasting glucose: 99 mg/dL   A1C: No results in last 5 years       Cholesterol Screening Once every 5 years if you don't have a lipid disorder  May order more often based on risk factors  Lipid panel: 05/13/2019    Screening Not Indicated  History Lipid Disorder     Other Preventive Screenings Covered by Medicare:  1  Abdominal Aortic Aneurysm (AAA) Screening: covered once if your at risk  You're considered to be at risk if you have a family history of AAA  2  Lung Cancer Screening: covers low dose CT scan once per year if you meet all of the following conditions: (1) Age 50-69; (2) No signs or symptoms of lung cancer; (3) Current smoker or have quit smoking within the last 15 years; (4) You have a tobacco smoking history of at least 30 pack years (packs per day multiplied by number of years you smoked); (5) You get a written order from a healthcare provider  3  Glaucoma Screening: covered annually if you're considered high risk: (1) You have diabetes OR (2) Family history of glaucoma OR (3)  aged 48 and older OR (3)  American aged 72 and older  3  Osteoporosis Screening: covered every 2 years if you meet one of the following conditions: (1) You're estrogen deficient and at risk for osteoporosis based off medical history and other findings; (2) Have a vertebral abnormality; (3) On glucocorticoid therapy for more than 3 months; (4) Have primary hyperparathyroidism; (5) On osteoporosis medications and need to assess response to drug therapy  · Last bone density test (DXA Scan): 04/15/2019   5  HIV Screening: covered annually if you're between the age of 15-65  Also covered annually if you are younger than 13 and older than 72 with risk factors for HIV infection   For pregnant patients, it is covered up to 3 times per pregnancy  Immunizations:  Immunization Recommendations   Influenza Vaccine Annual influenza vaccination during flu season is recommended for all persons aged >= 6 months who do not have contraindications   Pneumococcal Vaccine (Prevnar and Pneumovax)  * Prevnar = PCV13  * Pneumovax = PPSV23   Adults 25-60 years old: 1-3 doses may be recommended based on certain risk factors  Adults 72 years old: Prevnar (PCV13) vaccine recommended followed by Pneumovax (PPSV23) vaccine  If already received PPSV23 since turning 65, then PCV13 recommended at least one year after PPSV23 dose  Hepatitis B Vaccine 3 dose series if at intermediate or high risk (ex: diabetes, end stage renal disease, liver disease)   Tetanus (Td) Vaccine - COST NOT COVERED BY MEDICARE PART B Following completion of primary series, a booster dose should be given every 10 years to maintain immunity against tetanus  Td may also be given as tetanus wound prophylaxis  Tdap Vaccine - COST NOT COVERED BY MEDICARE PART B Recommended at least once for all adults  For pregnant patients, recommended with each pregnancy  Shingles Vaccine (Shingrix) - COST NOT COVERED BY MEDICARE PART B  2 shot series recommended in those aged 48 and above     Health Maintenance Due:      Topic Date Due    Hepatitis C Screening  1950    MAMMOGRAM  04/18/2020    CRC Screening: Colonoscopy  01/19/2021     Immunizations Due:      Topic Date Due    Pneumococcal Vaccine: 65+ Years (2 of 2 - PPSV23) 02/11/2020    Influenza Vaccine  07/01/2020     Advance Directives   What are advance directives? Advance directives are legal documents that state your wishes and plans for medical care  These plans are made ahead of time in case you lose your ability to make decisions for yourself  Advance directives can apply to any medical decision, such as the treatments you want, and if you want to donate organs  What are the types of advance directives?   There are many types of advance directives, and each state has rules about how to use them  You may choose a combination of any of the following:  · Living will: This is a written record of the treatment you want  You can also choose which treatments you do not want, which to limit, and which to stop at a certain time  This includes surgery, medicine, IV fluid, and tube feedings  · Durable power of  for healthcare [facility]): This is a written record that states who you want to make healthcare choices for you when you are unable to make them for yourself  This person, called a proxy, is usually a family member or a friend  You may choose more than 1 proxy  · Do not resuscitate (DNR) order:  A DNR order is used in case your heart stops beating or you stop breathing  It is a request not to have certain forms of treatment, such as CPR  A DNR order may be included in other types of advance directives  · Medical directive: This covers the care that you want if you are in a coma, near death, or unable to make decisions for yourself  You can list the treatments you want for each condition  Treatment may include pain medicine, surgery, blood transfusions, dialysis, IV or tube feedings, and a ventilator (breathing machine)  · Values history: This document has questions about your views, beliefs, and how you feel and think about life  This information can help others choose the care that you would choose  Why are advance directives important? An advance directive helps you control your care  Although spoken wishes may be used, it is better to have your wishes written down  Spoken wishes can be misunderstood, or not followed  Treatments may be given even if you do not want them  An advance directive may make it easier for your family to make difficult choices about your care  Urinary Incontinence   Urinary incontinence (UI)  is when you lose control of your bladder   UI develops because your bladder cannot store or empty urine properly  The 3 most common types of UI are stress incontinence, urge incontinence, or both  Medicines:   · May be given to help strengthen your bladder control  Report any side effects of medication to your healthcare provider  Do pelvic muscle exercises often:  Your pelvic muscles help you stop urinating  Squeeze these muscles tight for 5 seconds, then relax for 5 seconds  Gradually work up to squeezing for 10 seconds  Do 3 sets of 15 repetitions a day, or as directed  This will help strengthen your pelvic muscles and improve bladder control  Train your bladder:  Go to the bathroom at set times, such as every 2 hours, even if you do not feel the urge to go  You can also try to hold your urine when you feel the urge to go  For example, hold your urine for 5 minutes when you feel the urge to go  As that becomes easier, hold your urine for 10 minutes  Self-care:   · Keep a UI record  Write down how often you leak urine and how much you leak  Make a note of what you were doing when you leaked urine  · Drink liquids as directed  You may need to limit the amount of liquid you drink to help control your urine leakage  Do not drink any liquid right before you go to bed  Limit or do not have drinks that contain caffeine or alcohol  · Prevent constipation  Eat a variety of high-fiber foods  Good examples are high-fiber cereals, beans, vegetables, and whole-grain breads  Walking is the best way to trigger your intestines to have a bowel movement  · Exercise regularly and maintain a healthy weight  Weight loss and exercise will decrease pressure on your bladder and help you control your leakage  · Use a catheter as directed  to help empty your bladder  A catheter is a tiny, plastic tube that is put into your bladder to drain your urine  · Go to behavior therapy as directed  Behavior therapy may be used to help you learn to control your urge to urinate      Weight Management   Why it is important to manage your weight:  Being overweight increases your risk of health conditions such as heart disease, high blood pressure, type 2 diabetes, and certain types of cancer  It can also increase your risk for osteoarthritis, sleep apnea, and other respiratory problems  Aim for a slow, steady weight loss  Even a small amount of weight loss can lower your risk of health problems  How to lose weight safely:  A safe and healthy way to lose weight is to eat fewer calories and get regular exercise  You can lose up about 1 pound a week by decreasing the number of calories you eat by 500 calories each day  Healthy meal plan for weight management:  A healthy meal plan includes a variety of foods, contains fewer calories, and helps you stay healthy  A healthy meal plan includes the following:  · Eat whole-grain foods more often  A healthy meal plan should contain fiber  Fiber is the part of grains, fruits, and vegetables that is not broken down by your body  Whole-grain foods are healthy and provide extra fiber in your diet  Some examples of whole-grain foods are whole-wheat breads and pastas, oatmeal, brown rice, and bulgur  · Eat a variety of vegetables every day  Include dark, leafy greens such as spinach, kale, larisa greens, and mustard greens  Eat yellow and orange vegetables such as carrots, sweet potatoes, and winter squash  · Eat a variety of fruits every day  Choose fresh or canned fruit (canned in its own juice or light syrup) instead of juice  Fruit juice has very little or no fiber  · Eat low-fat dairy foods  Drink fat-free (skim) milk or 1% milk  Eat fat-free yogurt and low-fat cottage cheese  Try low-fat cheeses such as mozzarella and other reduced-fat cheeses  · Choose meat and other protein foods that are low in fat  Choose beans or other legumes such as split peas or lentils  Choose fish, skinless poultry (chicken or turkey), or lean cuts of red meat (beef or pork)   Before you cook meat or poultry, cut off any visible fat  · Use less fat and oil  Try baking foods instead of frying them  Add less fat, such as margarine, sour cream, regular salad dressing and mayonnaise to foods  Eat fewer high-fat foods  Some examples of high-fat foods include french fries, doughnuts, ice cream, and cakes  · Eat fewer sweets  Limit foods and drinks that are high in sugar  This includes candy, cookies, regular soda, and sweetened drinks  Exercise:  Exercise at least 30 minutes per day on most days of the week  Some examples of exercise include walking, biking, dancing, and swimming  You can also fit in more physical activity by taking the stairs instead of the elevator or parking farther away from stores  Ask your healthcare provider about the best exercise plan for you  © Copyright [employer] 2018 Information is for End User's use only and may not be sold, redistributed or otherwise used for commercial purposes   All illustrations and images included in CareNotes® are the copyrighted property of [employer]  or [address] [place] [address]

## 2020-07-30 NOTE — ASSESSMENT & PLAN NOTE
I have recommended the patient obtain a updated lipid profile for review of her cholesterol levels  She should maintain a low-cholesterol diet and supplement with Omega 3 fatty acids

## 2020-07-30 NOTE — PROGRESS NOTES
Assessment and Plan:     Problem List Items Addressed This Visit     None           Preventive health issues were discussed with patient, and age appropriate screening tests were ordered as noted in patient's After Visit Summary  Personalized health advice and appropriate referrals for health education or preventive services given if needed, as noted in patient's After Visit Summary       History of Present Illness:     Patient presents for Medicare Annual Wellness visit    Patient Care Team:  Angel Sadler MD as PCP - General  MD Fareed Andrade MD     Problem List:     Patient Active Problem List   Diagnosis    Varicose vein of leg    Anxiety    Arthritis    Depression    Hyperbilirubinemia    Hyperlipidemia, mild    Over weight    Sleep disturbance    Vitamin D deficiency    Venous insufficiency of both lower extremities    Acute bacterial conjunctivitis of both eyes    Sore throat      Past Medical and Surgical History:     Past Medical History:   Diagnosis Date    Fracture of radius, distal, closed     Last Assessed:  6/10/14     Past Surgical History:   Procedure Laterality Date    ANKLE SURGERY        Family History:     Family History   Problem Relation Age of Onset    Ovarian cancer Mother     Heart disease Father     Leukemia Brother       Social History:        Social History     Socioeconomic History    Marital status: /Civil Union     Spouse name: Not on file    Number of children: Not on file    Years of education: Not on file    Highest education level: Not on file   Occupational History    Not on file   Social Needs    Financial resource strain: Not on file    Food insecurity:     Worry: Not on file     Inability: Not on file    Transportation needs:     Medical: Not on file     Non-medical: Not on file   Tobacco Use    Smoking status: Never Smoker    Smokeless tobacco: Never Used   Substance and Sexual Activity    Alcohol use: No    Drug use: No    Sexual activity: Not on file   Lifestyle    Physical activity:     Days per week: Not on file     Minutes per session: Not on file    Stress: Not on file   Relationships    Social connections:     Talks on phone: Not on file     Gets together: Not on file     Attends [employer] service: Not on file     Active member of club or organization: Not on file     Attends meetings of clubs or organizations: Not on file     Relationship status: Not on file    Intimate partner violence:     Fear of current or ex partner: Not on file     Emotionally abused: Not on file     Physically abused: Not on file     Forced sexual activity: Not on file   Other Topics Concern    Not on file   Social History Narrative    Not on file      Medications and Allergies:     Current Outpatient Medications   Medication Sig Dispense Refill    Calcium 250 MG CAPS Take by mouth      cholecalciferol (VITAMIN D3) 1,000 units tablet Take 2 capsules by mouth daily      escitalopram (LEXAPRO) 10 mg tablet Take 1 tablet (10 mg total) by mouth daily 90 tablet 2    Fish Oil-Krill Oil (KRILL OIL PLUS) CAPS Take by mouth      fluocinonide (LIDEX) 0 05 % external solution APPLY TO SCALP ONCE DAILY AS NEEDED FOR ITCH  2    ketoconazole (NIZORAL) 2 % shampoo PLEASE SEE ATTACHED FOR DETAILED DIRECTIONS  5    nystatin (MYCOSTATIN) powder APPLY TO THE AFFECTED AREA 3 TIMES A DAY AS NEEDED  3    tobramycin-dexamethasone (TOBRADEX) ophthalmic suspension Administer 1 drop to both eyes 4 (four) times a day (Patient not taking: Reported on 1/11/2020) 5 mL 0     No current facility-administered medications for this visit        No Known Allergies   Immunizations:     Immunization History   Administered Date(s) Administered    INFLUENZA 10/22/2018    Influenza Split High Dose Preservative Free IM 12/18/2015, 10/17/2017, 11/10/2018    Influenza, high dose seasonal 0 5 mL 11/18/2019    Pneumococcal Conjugate 13-Valent 02/11/2019    Tdap 05/18/2008, 01/18/2018      Health Maintenance:         Topic Date Due    Hepatitis C Screening  1950    MAMMOGRAM  04/18/2020    CRC Screening: Colonoscopy  01/19/2021         Topic Date Due    Pneumococcal Vaccine: 65+ Years (2 of 2 - PPSV23) 02/11/2020    Influenza Vaccine  07/01/2020      Medicare Health Risk Assessment:     There were no vitals taken for this visit  Shira Medel is here for her Subsequent Wellness visit  Last Medicare Wellness visit information reviewed, patient interviewed and updates made to the record today  Health Risk Assessment:   Patient rates overall health as very good  Patient feels that their physical health rating is same  Eyesight was rated as same  Hearing was rated as same  Patient feels that their emotional and mental health rating is same  Pain experienced in the last 7 days has been none  Patient states that she has experienced no weight loss or gain in last 6 months  Depression Screening:   PHQ-2 Score: 0      Fall Risk Screening: In the past year, patient has experienced: no history of falling in past year      Urinary Incontinence Screening:   Patient has leaked urine accidently in the last six months  Home Safety:  Patient does not have trouble with stairs inside or outside of their home  Patient has working smoke alarms and has working carbon monoxide detector  Home safety hazards include: none  Nutrition:   Current diet is Regular and Limited junk food  Lactose intolerant    Medications:   Patient is currently taking over-the-counter supplements  OTC medications include: see medication list  Patient is able to manage medications  Activities of Daily Living (ADLs)/Instrumental Activities of Daily Living (IADLs):   Walk and transfer into and out of bed and chair?: Yes  Dress and groom yourself?: Yes    Bathe or shower yourself?: Yes    Feed yourself?  Yes  Do your laundry/housekeeping?: Yes  Manage your money, pay your bills and track your expenses?: Yes  Make your own meals?: Yes    Do your own shopping?: Yes    Previous Hospitalizations:   Any hospitalizations or ED visits within the last 12 months?: No      Advance Care Planning:   Living will: Yes    Durable POA for healthcare:  Yes    Advanced directive: Yes      Cognitive Screening:   Provider or family/friend/caregiver concerned regarding cognition?: No    PREVENTIVE SCREENINGS      Cardiovascular Screening:    General: Screening Not Indicated and History Lipid Disorder      Colorectal Cancer Screening:     General: Screening Current      Breast Cancer Screening:     General: Screening Current      Cervical Cancer Screening:    General: Screening Not Indicated      Abdominal Aortic Aneurysm (AAA) Screening:        General: Screening Not Indicated      Lung Cancer Screening:     General: Screening Not Indicated      Hepatitis C Screening:    General: Screening Not Indicated      Venecia Wilson MD

## 2020-07-30 NOTE — ASSESSMENT & PLAN NOTE
Assessment of the patient's depression today indicates good control of her symptoms with no indication of breakthrough symptoms  I recommend that she continue her current dose of 10 mg of Lexapro daily    She has no apparent side effects of this drug and I recommend a follow-up in 6 months

## 2020-10-07 ENCOUNTER — APPOINTMENT (OUTPATIENT)
Dept: LAB | Facility: CLINIC | Age: 70
End: 2020-10-07
Payer: MEDICARE

## 2020-10-07 DIAGNOSIS — E55.9 VITAMIN D DEFICIENCY: ICD-10-CM

## 2020-10-07 DIAGNOSIS — Z13.0 SCREENING FOR DEFICIENCY ANEMIA: ICD-10-CM

## 2020-10-07 DIAGNOSIS — E80.6 HYPERBILIRUBINEMIA: ICD-10-CM

## 2020-10-07 DIAGNOSIS — Z12.11 COLON CANCER SCREENING: ICD-10-CM

## 2020-10-07 DIAGNOSIS — E78.5 HYPERLIPIDEMIA, MILD: ICD-10-CM

## 2020-10-07 LAB
25(OH)D3 SERPL-MCNC: 26.7 NG/ML (ref 30–100)
ALBUMIN SERPL BCP-MCNC: 3.8 G/DL (ref 3.5–5)
ALP SERPL-CCNC: 72 U/L (ref 46–116)
ALT SERPL W P-5'-P-CCNC: 25 U/L (ref 12–78)
ANION GAP SERPL CALCULATED.3IONS-SCNC: 4 MMOL/L (ref 4–13)
AST SERPL W P-5'-P-CCNC: 18 U/L (ref 5–45)
BACTERIA UR QL AUTO: NORMAL /HPF
BASOPHILS # BLD AUTO: 0.05 THOUSANDS/ΜL (ref 0–0.1)
BASOPHILS NFR BLD AUTO: 1 % (ref 0–1)
BILIRUB SERPL-MCNC: 0.95 MG/DL (ref 0.2–1)
BILIRUB UR QL STRIP: NEGATIVE
BUN SERPL-MCNC: 16 MG/DL (ref 5–25)
CALCIUM SERPL-MCNC: 9.5 MG/DL (ref 8.3–10.1)
CHLORIDE SERPL-SCNC: 110 MMOL/L (ref 100–108)
CHOLEST SERPL-MCNC: 179 MG/DL (ref 50–200)
CLARITY UR: ABNORMAL
CO2 SERPL-SCNC: 27 MMOL/L (ref 21–32)
COLOR UR: ABNORMAL
CREAT SERPL-MCNC: 0.77 MG/DL (ref 0.6–1.3)
EOSINOPHIL # BLD AUTO: 0.13 THOUSAND/ΜL (ref 0–0.61)
EOSINOPHIL NFR BLD AUTO: 4 % (ref 0–6)
ERYTHROCYTE [DISTWIDTH] IN BLOOD BY AUTOMATED COUNT: 11.7 % (ref 11.6–15.1)
GFR SERPL CREATININE-BSD FRML MDRD: 78 ML/MIN/1.73SQ M
GLUCOSE P FAST SERPL-MCNC: 83 MG/DL (ref 65–99)
GLUCOSE UR STRIP-MCNC: NEGATIVE MG/DL
HCT VFR BLD AUTO: 41.8 % (ref 34.8–46.1)
HDLC SERPL-MCNC: 51 MG/DL
HGB BLD-MCNC: 14.1 G/DL (ref 11.5–15.4)
HGB UR QL STRIP.AUTO: NEGATIVE
HYALINE CASTS #/AREA URNS LPF: NORMAL /LPF
IMM GRANULOCYTES # BLD AUTO: 0.01 THOUSAND/UL (ref 0–0.2)
IMM GRANULOCYTES NFR BLD AUTO: 0 % (ref 0–2)
KETONES UR STRIP-MCNC: NEGATIVE MG/DL
LDLC SERPL CALC-MCNC: 110 MG/DL (ref 0–100)
LEUKOCYTE ESTERASE UR QL STRIP: ABNORMAL
LYMPHOCYTES # BLD AUTO: 1.43 THOUSANDS/ΜL (ref 0.6–4.47)
LYMPHOCYTES NFR BLD AUTO: 41 % (ref 14–44)
MCH RBC QN AUTO: 31.5 PG (ref 26.8–34.3)
MCHC RBC AUTO-ENTMCNC: 33.7 G/DL (ref 31.4–37.4)
MCV RBC AUTO: 94 FL (ref 82–98)
MONOCYTES # BLD AUTO: 0.43 THOUSAND/ΜL (ref 0.17–1.22)
MONOCYTES NFR BLD AUTO: 12 % (ref 4–12)
NEUTROPHILS # BLD AUTO: 1.43 THOUSANDS/ΜL (ref 1.85–7.62)
NEUTS SEG NFR BLD AUTO: 42 % (ref 43–75)
NITRITE UR QL STRIP: NEGATIVE
NON-SQ EPI CELLS URNS QL MICRO: NORMAL /HPF
NONHDLC SERPL-MCNC: 128 MG/DL
NRBC BLD AUTO-RTO: 0 /100 WBCS
PH UR STRIP.AUTO: 6 [PH]
PLATELET # BLD AUTO: 195 THOUSANDS/UL (ref 149–390)
PMV BLD AUTO: 10.6 FL (ref 8.9–12.7)
POTASSIUM SERPL-SCNC: 4.3 MMOL/L (ref 3.5–5.3)
PROT SERPL-MCNC: 7.5 G/DL (ref 6.4–8.2)
PROT UR STRIP-MCNC: NEGATIVE MG/DL
RBC # BLD AUTO: 4.47 MILLION/UL (ref 3.81–5.12)
RBC #/AREA URNS AUTO: NORMAL /HPF
SODIUM SERPL-SCNC: 141 MMOL/L (ref 136–145)
SP GR UR STRIP.AUTO: 1.02 (ref 1–1.03)
TRIGL SERPL-MCNC: 91 MG/DL
UROBILINOGEN UR QL STRIP.AUTO: 1 E.U./DL
WBC # BLD AUTO: 3.48 THOUSAND/UL (ref 4.31–10.16)
WBC #/AREA URNS AUTO: NORMAL /HPF

## 2020-10-07 PROCEDURE — 80061 LIPID PANEL: CPT

## 2020-10-07 PROCEDURE — 81001 URINALYSIS AUTO W/SCOPE: CPT | Performed by: INTERNAL MEDICINE

## 2020-10-07 PROCEDURE — 82306 VITAMIN D 25 HYDROXY: CPT

## 2020-10-07 PROCEDURE — 36415 COLL VENOUS BLD VENIPUNCTURE: CPT

## 2020-10-07 PROCEDURE — 80053 COMPREHEN METABOLIC PANEL: CPT

## 2020-10-07 PROCEDURE — 85025 COMPLETE CBC W/AUTO DIFF WBC: CPT

## 2020-11-12 NOTE — ASSESSMENT & PLAN NOTE
**ADVANCED PRACTICE PROVIDER, I HAVE EVALUATED THIS PATIENT**        [address]      Pt Name: Manisha Chavez  F:[id number]  [place] 1978  Date of evaluation: 11/11/2020  Provider: JEAN MARIE Adamson CNP      Chief Complaint:    Chief Complaint   Patient presents with    Fall     Pt fell about 3-4 ft trying to get her phone, pt fell on her face, per squad pt was unconscious upon arrival but pt is alert and oriented now, pt is intoxicated       Nursing Notes, Past Medical Hx, Past Surgical Hx, Social Hx, Allergies, and Family Hx were all reviewed and agreed with or any disagreements were addressed in the HPI.    HPI:  (Location, Duration, Timing, Severity, Quality, Assoc Sx, Context, Modifying factors)  This is a  36 y.o. female who presents to the emergency department today via EMS from home complaining of facial injuries. Onset was just prior to arrival.  The context was she was leaning over a forefoot rail trying to grab her phone and she flipped over it. Small laceration below chin that patient is refusing sutures. She denies loss of consciousness. No headache dizziness lightheadedness numbness or weakness. No neck or back pain. PastMedical/Surgical History:      Diagnosis Date    Asthma      History reviewed. No pertinent surgical history. Medications:  Discharge Medication List as of 11/11/2020 10:46 PM      CONTINUE these medications which have NOT CHANGED    Details   albuterol sulfate HFA (VENTOLIN HFA) 108 (90 BASE) MCG/ACT inhaler 2 puffs every 3-4 hours as needed for cough, Disp-1 Inhaler, R-0               Review of Systems:  Review of Systems   Constitutional: Negative for chills, diaphoresis and fever. HENT: Positive for facial swelling. Negative for nosebleeds. Eyes: Negative for visual disturbance. Respiratory: Negative for cough and shortness of breath. Cardiovascular: Negative for chest pain. Anxiety symptoms appear to be well controlled  She is presently on of buspirone 10 mg 3 times a day she indicates she feels that she may be slightly sluggish over sedated at the present time ir anxiety is well controlled so were going to decrease her buspirone to 5 mg 3 times a day  Will follow up in about a month to see how she is doing on the lower dose of this medication  Will continue the Lexapro 20 mg daily for the present time would consider decreasing that to 10 mg on her next visit if she is doing well  Gastrointestinal: Negative for abdominal pain, nausea and vomiting. Musculoskeletal: Negative for arthralgias, back pain, gait problem, joint swelling, neck pain and neck stiffness. Skin: Positive for color change and wound. Allergic/Immunologic: Negative for immunocompromised state. Neurological: Negative for dizziness, syncope, weakness, light-headedness, numbness and headaches. Psychiatric/Behavioral: Negative for confusion. All other systems reviewed and are negative. Positives and Pertinent negatives as per HPI. Except as noted above in the ROS, problem specific ROS was completed and is negative. Physical Exam:  Physical Exam  Vitals signs and nursing note reviewed. Constitutional:       General: She is not in acute distress. Appearance: Normal appearance. She is well-developed. She is not toxic-appearing. HENT:      Head: Normocephalic. Contusion and laceration present. No raccoon eyes or Castellano's sign. Comments: Superficial laceration to the left chin that patient is refusing to get suture     Right Ear: Tympanic membrane and ear canal normal. No hemotympanum. Left Ear: Tympanic membrane and ear canal normal. No hemotympanum. Nose: Nose normal. No nasal tenderness. Mouth/Throat:      Lips: Pink. Mouth: Mucous membranes are moist.      Pharynx: Oropharynx is clear. Eyes:      General: No scleral icterus. Extraocular Movements: Extraocular movements intact. Conjunctiva/sclera: Conjunctivae normal.      Pupils: Pupils are equal, round, and reactive to light. Neck:      Musculoskeletal: Full passive range of motion without pain and neck supple. No spinous process tenderness or muscular tenderness. Vascular: No JVD. Trachea: No tracheal deviation. Cardiovascular:      Rate and Rhythm: Regular rhythm. Tachycardia present. Heart sounds: Normal heart sounds.    Pulmonary:      Effort: Pulmonary effort is normal. No respiratory iterative reconstruction, and/or weight based adjustment of the mA/kV was utilized to reduce the radiation dose to as low as reasonably achievable. COMPARISON: None HISTORY: ORDERING SYSTEM PROVIDED HISTORY: fall. left face pain. chin laceration TECHNOLOGIST PROVIDED HISTORY: Reason for exam:->fall. left face pain. chin laceration Is the patient pregnant?->No Reason for Exam: fall Acuity: Acute Type of Exam: Initial FINDINGS: Near complete opacification of the right maxillary sinus with thickening of the sinus wall. Patchy opacification of ethmoid air cells. Frontal sinus is underdeveloped. Mastoid air cells are aerated. Sphenoid sinus is aerated. Globes are symmetric in size. No inflammatory changes posterior to the globes. Bandaging is seen of the soft tissues overlying the left anterior mandible. Small calcific density is seen within the soft tissues adjacent to the mandible without clear donor site. No fracture plane is evident. Temporomandibular joints are not dislocated. Zygomatic arches are intact. Cysts are seen at the base of right greater than left maxillary and left mandibular molars, suggestive of dental disease. Nonruptured tooth is seen at the posterior aspect of the inferior left maxillary sinus. No gross fracture. Paranasal sinus disease, greatest at the right maxillary sinus. Ct Cervical Spine Wo Contrast    Result Date: 11/11/2020  EXAMINATION: CT OF THE CERVICAL SPINE WITHOUT CONTRAST 11/11/2020 9:44 pm TECHNIQUE: CT of the cervical spine was performed without the administration of intravenous contrast. Multiplanar reformatted images are provided for review. Dose modulation, iterative reconstruction, and/or weight based adjustment of the mA/kV was utilized to reduce the radiation dose to as low as reasonably achievable. COMPARISON: None. HISTORY: ORDERING SYSTEM PROVIDED HISTORY: fall. pain TECHNOLOGIST PROVIDED HISTORY: Reason for exam:->fall.  pain Is the patient pregnant?->No Reason for Exam: fall Acuity: Acute Type of Exam: Initial FINDINGS: BONES/ALIGNMENT: There is no acute fracture or traumatic malalignment. DEGENERATIVE CHANGES: Mild spondylosis with disc bulges and endplate spurring. Mild asymmetric canal narrowing at C5-C6. Mild-to-moderate right foraminal stenosis at this level as well. SOFT TISSUES: No prevertebral soft tissue swelling. Biapical parenchymal scarring at the lung apices. No acute cervical spine abnormality. Mild spondylosis. MEDICAL DECISION MAKING / ED COURSE:      PROCEDURES:   Procedures    None    Patient was given:  Medications   lidocaine-EPINEPHrine 2 percent-1:[id number] injection 20 mL (20 mLs Intradermal Not Given 11/11/20 2227)   Tetanus-Diphth-Acell Pertussis (BOOSTRIX) injection 0.5 mL (0.5 mLs Intramuscular Given 11/11/20 2147)       Differential Diagnosis: epidural hematoma, subdural hematoma, parenchymal brain contusion or bleed, subarachnoid hemorrhage, skull fracture, neck fracture or dislocation, other. Patient seen and examined today for head injury. See HPI for patient presentation. Patient is hemodynamically stable, nontoxic, afebrile, and without tachycardia, tachypnea, and hypoxia. Physical exam as above. 79-year-old lying in bed in no acute distress. Swelling and contusion to the left face with a small laceration under chin. CTs are all normal as noted above. Tetanus was updated. Patient refusing to have wound sutured. Family at bedside to take patient home--discharged home in stable condition. At this time, the evidence for any other entities in the differential is insufficient to justify any further testing. This was explained to the patient. The patient was advised that persistent or worsening symptoms will require further evaluation. I discussed with Talisha Anaya and/or family the exam results, diagnosis, care, prognosis, reasons to return and the importance of follow up.  Patient and/or family is in full agreement with plan and all questions have been answered. Specific discharge instructions explained, including reasons to return to the emergency department. Mike Staples is well appearing, non-toxic, and afebrile at the time of discharge. Patient was instructed to follow up with primary care provider in 24-48 hours, and to instructed to return to ED immediately for any new or worsening concerns. Mike Staples verbalized understanding and discharged home. The patient tolerated their visit well. I evaluated the patient. The physician was available for consultation as needed. The patient and / or the family were informed of the results of any tests, a time was given to answer questions, a plan was proposed and they agreed with plan. CLINICAL IMPRESSION:  1. Fall, initial encounter    2.  Facial injury, initial encounter        DISPOSITION Decision To Discharge 11/11/2020 10:45:26 PM      PATIENT REFERRED TO:  Dania Carnes  [phone]  Call       [facility] Emergency Department  [address] [address]  [phone]  Go to   As needed      DISCHARGE MEDICATIONS:  Discharge Medication List as of 11/11/2020 10:46 PM          DISCONTINUED MEDICATIONS:  Discharge Medication List as of 11/11/2020 10:46 PM                 (Please note the MDM and HPI sections of this note were completed with a voice recognition program.  Efforts were made to edit the dictations but occasionally words are mis-transcribed.)    Electronically signed, JEAN MARIE Hoover CNP,           JEAN MARIE Hoover CNP  11/11/20 4761

## 2021-01-29 ENCOUNTER — OFFICE VISIT (OUTPATIENT)
Dept: INTERNAL MEDICINE CLINIC | Facility: CLINIC | Age: 71
End: 2021-01-29
Payer: MEDICARE

## 2021-01-29 VITALS
SYSTOLIC BLOOD PRESSURE: 126 MMHG | BODY MASS INDEX: 28.63 KG/M2 | HEART RATE: 68 BPM | WEIGHT: 200 LBS | TEMPERATURE: 97.3 F | DIASTOLIC BLOOD PRESSURE: 74 MMHG | HEIGHT: 70 IN | OXYGEN SATURATION: 99 %

## 2021-01-29 DIAGNOSIS — Z82.49 FAMILY HISTORY OF HEART DISEASE: ICD-10-CM

## 2021-01-29 DIAGNOSIS — F41.9 ANXIETY: ICD-10-CM

## 2021-01-29 DIAGNOSIS — E78.5 HYPERLIPIDEMIA, MILD: ICD-10-CM

## 2021-01-29 DIAGNOSIS — F32.A DEPRESSION, UNSPECIFIED DEPRESSION TYPE: Primary | ICD-10-CM

## 2021-01-29 PROCEDURE — 99213 OFFICE O/P EST LOW 20 MIN: CPT | Performed by: INTERNAL MEDICINE

## 2021-01-29 NOTE — PROGRESS NOTES
Assessment/Plan:    Depression    Assessment of the patient's condition of depression today indicates stability at the present time we see no signs or symptoms to indicate a failure of her Lexapro medication to control this condition  Recommend continuation of Lexapro at 10 mg daily with reassessment in 4-6 months  Anxiety    Assessment of the patient's anxiety condition indicates good control of her symptoms with Lexapro 10 mg daily  No evidence of breakthrough at this dose recommend continuation of current therapy with re-evaluation in 4-6 months  Diagnoses and all orders for this visit:    Depression, unspecified depression type    Anxiety    Family history of heart disease  -     CT coronary calcium score; Future    Hyperlipidemia, mild  -     Lipid panel; Future        Subjective:      Patient ID: Tony Jiang is a 69 y o  female  This very pleasant 69-year-old female patient presents today for a routine follow-up visit  She is currently on medication for history of anxiety and depression symptoms  She reports feeling well with no exacerbation of either condition over the past several months  During our discussion with the patient today she indicates that she has a significant family history of heart disease period I have recommended a CT scan of her heart for calcium assay as a screening test for underlying heart disease period she currently has no symptoms of chest pain palpitations or shortness of breath on exertion  I have also requested an updated lipid profile to assess her lipid risk  The following portions of the patient's history were reviewed and updated as appropriate:   She  has a past medical history of Fracture of radius, distal, closed    She   Patient Active Problem List    Diagnosis Date Noted    Varicose vein of leg 03/27/2018    Hyperbilirubinemia 05/08/2017    Depression 04/10/2017    Anxiety 04/08/2016    Arthritis 04/08/2016    Hyperlipidemia, mild 04/08/2016    Over weight 04/08/2016    Vitamin D deficiency 04/08/2016     She  has a past surgical history that includes Ankle surgery  Her family history includes Heart disease in her father; Leukemia in her brother; Ovarian cancer in her mother  She  reports that she has never smoked  She has never used smokeless tobacco  She reports that she does not drink alcohol or use drugs  Current Outpatient Medications   Medication Sig Dispense Refill    Calcium 250 MG CAPS Take by mouth      cholecalciferol (VITAMIN D3) 1,000 units tablet Take 2 capsules by mouth daily      escitalopram (LEXAPRO) 10 mg tablet Take 1 tablet (10 mg total) by mouth daily 90 tablet 2    Fish Oil-Krill Oil (KRILL OIL PLUS) CAPS Take by mouth      fluocinonide (LIDEX) 0 05 % external solution APPLY TO SCALP ONCE DAILY AS NEEDED FOR ITCH  2    ketoconazole (NIZORAL) 2 % shampoo PLEASE SEE ATTACHED FOR DETAILED DIRECTIONS  5    nystatin (MYCOSTATIN) powder APPLY TO THE AFFECTED AREA 3 TIMES A DAY AS NEEDED  3     No current facility-administered medications for this visit       Review of Systems   All other systems reviewed and are negative  Objective:      /74   Pulse 68   Temp (!) 97 3 °F (36 3 °C)   Ht 5' 10" (1 778 m)   Wt 90 7 kg (200 lb)   SpO2 99%   BMI 28 70 kg/m²          Physical Exam  Vitals signs reviewed  Constitutional:       Appearance: Normal appearance  She is well-developed  HENT:      Right Ear: Hearing and external ear normal       Left Ear: Hearing and external ear normal       Nose: Nose normal  No mucosal edema  Mouth/Throat:      Pharynx: Uvula midline  Eyes:      General: Lids are normal       Conjunctiva/sclera: Conjunctivae normal       Pupils: Pupils are equal, round, and reactive to light  Neck:      Thyroid: No thyromegaly  Vascular: No carotid bruit or JVD  Cardiovascular:      Rate and Rhythm: Normal rate and regular rhythm        Heart sounds: Normal heart sounds  No murmur  Pulmonary:      Effort: Pulmonary effort is normal  No respiratory distress  Breath sounds: Normal breath sounds  No wheezing, rhonchi or rales  Abdominal:      General: Bowel sounds are normal       Palpations: Abdomen is soft  Musculoskeletal: Normal range of motion  Lymphadenopathy:      Cervical: No cervical adenopathy  Skin:     General: Skin is warm and dry  Neurological:      Mental Status: She is alert and oriented to person, place, and time  Deep Tendon Reflexes: Reflexes are normal and symmetric  Reflexes normal    Psychiatric:         Mood and Affect: Mood normal          Speech: Speech normal          Behavior: Behavior normal  Behavior is cooperative  Thought Content:  Thought content normal          Judgment: Judgment normal

## 2021-01-29 NOTE — ASSESSMENT & PLAN NOTE
Assessment of the patient's condition of depression today indicates stability at the present time we see no signs or symptoms to indicate a failure of her Lexapro medication to control this condition  Recommend continuation of Lexapro at 10 mg daily with reassessment in 4-6 months

## 2021-01-29 NOTE — ASSESSMENT & PLAN NOTE
Assessment of the patient's anxiety condition indicates good control of her symptoms with Lexapro 10 mg daily  No evidence of breakthrough at this dose recommend continuation of current therapy with re-evaluation in 4-6 months

## 2021-02-05 DIAGNOSIS — F32.A DEPRESSION, UNSPECIFIED DEPRESSION TYPE: ICD-10-CM

## 2021-02-05 RX ORDER — ESCITALOPRAM OXALATE 10 MG/1
TABLET ORAL
Qty: 90 TABLET | Refills: 2 | Status: SHIPPED | OUTPATIENT
Start: 2021-02-05 | End: 2021-11-15

## 2021-02-21 ENCOUNTER — HOSPITAL ENCOUNTER (OUTPATIENT)
Dept: CT IMAGING | Facility: HOSPITAL | Age: 71
Discharge: HOME | End: 2021-02-21
Attending: INTERNAL MEDICINE
Payer: COMMERCIAL

## 2021-02-21 DIAGNOSIS — Z82.49 FAMILY HISTORY OF HEART DISEASE: ICD-10-CM

## 2021-02-21 PROCEDURE — G1004 CDSM NDSC: HCPCS

## 2021-02-21 PROCEDURE — 75571 CT HRT W/O DYE W/CA TEST: CPT

## 2021-06-10 ENCOUNTER — OFFICE VISIT (OUTPATIENT)
Dept: INTERNAL MEDICINE CLINIC | Facility: CLINIC | Age: 71
End: 2021-06-10
Payer: MEDICARE

## 2021-06-10 VITALS
HEART RATE: 71 BPM | DIASTOLIC BLOOD PRESSURE: 78 MMHG | OXYGEN SATURATION: 98 % | HEIGHT: 70 IN | BODY MASS INDEX: 28.92 KG/M2 | WEIGHT: 202 LBS | SYSTOLIC BLOOD PRESSURE: 142 MMHG

## 2021-06-10 DIAGNOSIS — E78.5 HYPERLIPIDEMIA, MILD: ICD-10-CM

## 2021-06-10 DIAGNOSIS — Z13.0 SCREENING FOR DEFICIENCY ANEMIA: ICD-10-CM

## 2021-06-10 DIAGNOSIS — R39.9 UTI SYMPTOMS: ICD-10-CM

## 2021-06-10 DIAGNOSIS — F32.A DEPRESSION, UNSPECIFIED DEPRESSION TYPE: ICD-10-CM

## 2021-06-10 DIAGNOSIS — Z12.11 COLON CANCER SCREENING: ICD-10-CM

## 2021-06-10 DIAGNOSIS — F41.9 ANXIETY: ICD-10-CM

## 2021-06-10 DIAGNOSIS — M67.442 DIGITAL MUCINOUS CYST OF FINGER OF LEFT HAND: Primary | ICD-10-CM

## 2021-06-10 DIAGNOSIS — Z13.1 SCREENING FOR DIABETES MELLITUS: ICD-10-CM

## 2021-06-10 PROCEDURE — 99214 OFFICE O/P EST MOD 30 MIN: CPT | Performed by: INTERNAL MEDICINE

## 2021-06-10 NOTE — ASSESSMENT & PLAN NOTE
The patient's  Depression symptoms remain well controlled on Lexapro at 10 mg daily no apparent side effects of the medication are noted recommend continuation of therapy

## 2021-06-10 NOTE — PROGRESS NOTES
Assessment/Plan:    Anxiety    The patient's anxiety symptoms appear to be well controlled on Lexapro 10 mg daily no apparent side effects of medication recommend continuation of current therapy  Depression    The patient's  Depression symptoms remain well controlled on Lexapro at 10 mg daily no apparent side effects of the medication are noted recommend continuation of therapy  Digital mucinous cyst of finger of left hand    A swelling of the finger of the left hand is noted today it appears to be a soft most likely cystic swelling I have referred her on to [address] [place] [address] for further evaluation and possible evacuation       Diagnoses and all orders for this visit:    Digital mucinous cyst of finger of left hand  -     Ambulatory referral to Orthopedic Surgery; Future    Anxiety    Depression, unspecified depression type    Hyperlipidemia, mild  -     Lipid panel; Future    Screening for deficiency anemia  -     CBC and differential; Future    Colon cancer screening  -     Occult Blood, Fecal Immunochemical; Future    UTI symptoms  -     UA w Reflex to Microscopic w Reflex to Culture -Lab Collect    Screening for diabetes mellitus  -     Comprehensive metabolic panel; Future        Subjective:      Patient ID: Don Sorto is a 66 y o  female  This pleasant 48-year-old female patient returns today for a routine follow-up visit  She reports feeling well with no complaints on today's visit  The following portions of the patient's history were reviewed and updated as appropriate:   She  has a past medical history of Fracture of radius, distal, closed    She   Patient Active Problem List    Diagnosis Date Noted    Digital mucinous cyst of finger of left hand 06/10/2021    Varicose vein of leg 03/27/2018    Hyperbilirubinemia 05/08/2017    Depression 04/10/2017    Anxiety 04/08/2016    Arthritis 04/08/2016    Hyperlipidemia, mild 04/08/2016    Over weight 04/08/2016    Vitamin D deficiency 04/08/2016     She  has a past surgical history that includes Ankle surgery  Her family history includes Heart disease in her father; Leukemia in her brother; Ovarian cancer in her mother  She  reports that she has never smoked  She has never used smokeless tobacco  She reports that she does not drink alcohol or use drugs  Current Outpatient Medications   Medication Sig Dispense Refill    Calcium 250 MG CAPS Take by mouth      cholecalciferol (VITAMIN D3) 1,000 units tablet Take 2 capsules by mouth daily      escitalopram (LEXAPRO) 10 mg tablet TAKE 1 TABLET BY MOUTH EVERY DAY 90 tablet 2    Fish Oil-Krill Oil (KRILL OIL PLUS) CAPS Take by mouth      fluocinonide (LIDEX) 0 05 % external solution APPLY TO SCALP ONCE DAILY AS NEEDED FOR ITCH  2    ketoconazole (NIZORAL) 2 % shampoo PLEASE SEE ATTACHED FOR DETAILED DIRECTIONS  5    nystatin (MYCOSTATIN) powder APPLY TO THE AFFECTED AREA 3 TIMES A DAY AS NEEDED  3     No current facility-administered medications for this visit       Review of Systems   All other systems reviewed and are negative  Objective:      /78   Pulse 71   Ht 5' 10" (1 778 m)   Wt 91 6 kg (202 lb)   SpO2 98%   BMI 28 98 kg/m²          Physical Exam  Vitals signs reviewed  Constitutional:       General: She is not in acute distress  Appearance: Normal appearance  She is well-developed  She is not ill-appearing  HENT:      Right Ear: Hearing and external ear normal       Left Ear: Hearing and external ear normal       Nose: Nose normal  No mucosal edema  Mouth/Throat:      Pharynx: Uvula midline  Eyes:      General: Lids are normal       Conjunctiva/sclera: Conjunctivae normal       Pupils: Pupils are equal, round, and reactive to light  Neck:      Thyroid: No thyromegaly  Vascular: No carotid bruit or JVD  Cardiovascular:      Rate and Rhythm: Normal rate and regular rhythm  Heart sounds: Normal heart sounds   No murmur  Pulmonary:      Effort: Pulmonary effort is normal  No respiratory distress  Breath sounds: Normal breath sounds  Abdominal:      General: Bowel sounds are normal       Palpations: Abdomen is soft  Musculoskeletal: Normal range of motion  Lymphadenopathy:      Cervical: No cervical adenopathy  Skin:     General: Skin is warm and dry  Neurological:      Mental Status: She is alert and oriented to person, place, and time  Mental status is at baseline  Deep Tendon Reflexes: Reflexes are normal and symmetric  Reflexes normal    Psychiatric:         Mood and Affect: Mood normal          Speech: Speech normal          Behavior: Behavior normal  Behavior is cooperative  Thought Content: Thought content normal          Judgment: Judgment normal        BMI Counseling: Body mass index is 28 98 kg/m²  The BMI is above normal  Nutrition recommendations include reducing portion sizes, decreasing overall calorie intake, 3-5 servings of fruits/vegetables daily, reducing fast food intake and moderation in carbohydrate intake  Exercise recommendations include moderate aerobic physical activity for 150 minutes/week

## 2021-06-10 NOTE — ASSESSMENT & PLAN NOTE
A swelling of the finger of the left hand is noted today it appears to be a soft most likely cystic swelling I have referred her on to [address]  for further evaluation and possible evacuation

## 2021-06-10 NOTE — ASSESSMENT & PLAN NOTE
The patient's anxiety symptoms appear to be well controlled on Lexapro 10 mg daily no apparent side effects of medication recommend continuation of current therapy

## 2021-07-23 ENCOUNTER — CONSULT (OUTPATIENT)
Dept: OBGYN CLINIC | Facility: HOSPITAL | Age: 71
End: 2021-07-23
Attending: INTERNAL MEDICINE
Payer: MEDICARE

## 2021-07-23 VITALS
DIASTOLIC BLOOD PRESSURE: 79 MMHG | BODY MASS INDEX: 29.98 KG/M2 | HEART RATE: 69 BPM | SYSTOLIC BLOOD PRESSURE: 130 MMHG | WEIGHT: 202.4 LBS | HEIGHT: 69 IN

## 2021-07-23 DIAGNOSIS — M67.442 DIGITAL MUCINOUS CYST OF FINGER OF LEFT HAND: Primary | ICD-10-CM

## 2021-07-23 PROCEDURE — 99204 OFFICE O/P NEW MOD 45 MIN: CPT | Performed by: ORTHOPAEDIC SURGERY

## 2021-07-23 NOTE — PROGRESS NOTES
ASSESSMENT/PLAN:    Assessment:   Left long mucoid cyst    Plan:   Patient declined aspiration and surgical intervention  If symptoms worsen she will call    Follow Up:  PRN    General Discussions:  Ganglion Cysts: The anatomy and physiology of the ganglion was discussed with the patient today in the office  Fluid leaking out of the joint surface typically creates a small sac, which can enlarge and cause pain or limitation of motion  Treatment options include observation, aspiration, or surgical incision were discussed with the patient today  Observation typically lead to resolution and approximately 10% of patients, aspiration least resolution approximately 50% of patients, and surgical excision lead to resolution in approximately 97% of patients  After discussion with the patient today, the patient voiced understanding of all treatment options  _____________________________________________________  CHIEF COMPLAINT:  Chief Complaint   Patient presents with    Left Middle Finger - Cyst         SUBJECTIVE:  Geneva Mullen is a 66 y o  female who presents with cyst to the left long finger  This started 6 month(s) ago as Insidious      Radiation: None  Previous Treatments: None without relief  Associated symptoms: None  Handedness: right  Work status: retired    PAST MEDICAL HISTORY:  Past Medical History:   Diagnosis Date    Fracture of radius, distal, closed     Last Assessed:  6/10/14       PAST SURGICAL HISTORY:  Past Surgical History:   Procedure Laterality Date    ANKLE SURGERY         FAMILY HISTORY:  Family History   Problem Relation Age of Onset    Ovarian cancer Mother     Heart disease Father     Leukemia Brother        SOCIAL HISTORY:  Social History     Tobacco Use    Smoking status: Never Smoker    Smokeless tobacco: Never Used   Substance Use Topics    Alcohol use: No    Drug use: No       MEDICATIONS:    Current Outpatient Medications:     Calcium 250 MG CAPS, Take by mouth, Disp: , Rfl:     cholecalciferol (VITAMIN D3) 1,000 units tablet, Take 2 capsules by mouth daily, Disp: , Rfl:     escitalopram (LEXAPRO) 10 mg tablet, TAKE 1 TABLET BY MOUTH EVERY DAY, Disp: 90 tablet, Rfl: 2    Fish Oil-Krill Oil (KRILL OIL PLUS) CAPS, Take by mouth, Disp: , Rfl:     fluocinonide (LIDEX) 0 05 % external solution, APPLY TO SCALP ONCE DAILY AS NEEDED FOR ITCH, Disp: , Rfl: 2    ketoconazole (NIZORAL) 2 % shampoo, PLEASE SEE ATTACHED FOR DETAILED DIRECTIONS, Disp: , Rfl: 5    nystatin (MYCOSTATIN) powder, APPLY TO THE AFFECTED AREA 3 TIMES A DAY AS NEEDED, Disp: , Rfl: 3    ALLERGIES:  No Known Allergies    REVIEW OF SYSTEMS:  Pertinent items are noted in HPI  A comprehensive review of systems was negative  LABS:  HgA1c: No results found for: HGBA1C  BMP:   Lab Results   Component Value Date    GLUCOSE 83 07/03/2015    CALCIUM 9 5 10/07/2020     07/03/2015    K 4 3 10/07/2020    CO2 27 10/07/2020     (H) 10/07/2020    BUN 16 10/07/2020    CREATININE 0 77 10/07/2020         _____________________________________________________  PHYSICAL EXAMINATION:  Vital signs: /79   Pulse 69   Ht 5' 9" (1 753 m)   Wt 91 8 kg (202 lb 6 4 oz)   BMI 29 89 kg/m²   General: well developed and well nourished, alert, oriented times 3 and appears comfortable  Psychiatric: Normal  HEENT: Trachea Midline, No torticollis  Cardiovascular: No discernable arrhythmia  Pulmonary: No wheezing or stridor  Abdomen: No rebound or guarding  Extremities: No peripheral edema  Skin: No masses, erythema, lacerations, fluctation, ulcerations  Neurovascular: Sensation Intact to the Median, Ulnar, Radial Nerve, Motor Intact to the Median, Ulnar, Radial Nerve and Pulses Intact    MUSCULOSKELETAL EXAMINATION:  Positive cyst like structure on the dorsal radial DIP left long finger measuring 8x6 mm  No overlying skin changes  Skin is intact  No signs of infection   Full ROM _____________________________________________________  STUDIES REVIEWED:  No Studies to review      PROCEDURES PERFORMED:  Procedures  No Procedures performed today   Scribe Attestation    I,:  Ida Mayfield am acting as a scribe while in the presence of the attending physician :       I,:  Dasia Zamorano MD personally performed the services described in this documentation    as scribed in my presence :

## 2021-07-23 NOTE — PATIENT INSTRUCTIONS
What is a Ganglion Cyst?  Ganglion cysts are very common lumps within the hand and wrist that occur adjacent to joints or tendons  The most common locations are the top of the wrist (see Figure 1), the palm side of the wrist, the base of the finger on the palm side, and the top of the far joint of the finger (see Figure 2)  The ganglion cyst often resembles a water balloon on a stalk (see Figure 3), and is filled with clear fluid or gel  These cysts may change in size or even disappear completely, and they may or may not be painful  They are not cancerous and will not spread to other areas, but some people form cysts at multiple locations  Causes  The cause of these cysts is unknown, although they may form in the presence of joint or tendon irritation or mechanical changes  They occur in patients of all ages  Diagnosis  The diagnosis is usually based on the location of the lump and its appearance  Ganglion cysts are usually oval or round and may be soft or firm  Cysts at the base of the finger on the palm side are typically very firm, pea-sized nodules that are tender to applied pressure, such as when gripping  Light will often pass through these lumps (transillumination), and this can assist in the diagnosis  Your physician may request x-rays in order to look for evidence of problems in adjacent joints  Cysts at the far joint of the finger frequently have an arthritic bone spur -which is a small bony bump or projection- associated with them, the overlying skin may become thin, and there may be a lengthwise groove in the fingernail just beyond the cyst     Treatment  Treatment can often be non-surgical  In many cases, the cysts can simply be observed, especially if they are painless, because they frequently disappear spontaneously  If the cyst becomes painful, limits activity, or is otherwise unacceptable, several treatment options are available   The use of splints and anti-inflammatory medication can be prescribed in order to decrease pain associated with activities  An aspiration can be performed to remove the fluid from the cyst and decompress it  This requires placing a needle into the cyst, which can be performed in most office settings  Aspiration is a very simple procedure, but recurrence of the cyst is common  If non-surgical options fail to provide relief or if the cyst recurs, surgical alternatives are available  Surgery involves removing the cyst along with a portion of the joint capsule or tendon sheath (see Figure 3)  In the case of wrist ganglion cysts, both traditional open and arthroscopic techniques usually yield good results  Surgical treatment is generally successful although cysts may recur  If there is any question about the diagnosis, excisional biopsy with a pathological examination will better define what the mass is  Your surgeon will discuss the best treatment options for you  © 2012 [employer]  www handcare  org

## 2021-08-20 ENCOUNTER — OFFICE VISIT (OUTPATIENT)
Dept: INTERNAL MEDICINE CLINIC | Facility: CLINIC | Age: 71
End: 2021-08-20
Payer: MEDICARE

## 2021-08-20 VITALS
BODY MASS INDEX: 30.66 KG/M2 | HEIGHT: 69 IN | DIASTOLIC BLOOD PRESSURE: 82 MMHG | OXYGEN SATURATION: 98 % | TEMPERATURE: 97.6 F | HEART RATE: 72 BPM | SYSTOLIC BLOOD PRESSURE: 130 MMHG | WEIGHT: 207 LBS

## 2021-08-20 DIAGNOSIS — M54.42 ACUTE MIDLINE LOW BACK PAIN WITH LEFT-SIDED SCIATICA: Primary | ICD-10-CM

## 2021-08-20 PROCEDURE — 99213 OFFICE O/P EST LOW 20 MIN: CPT | Performed by: INTERNAL MEDICINE

## 2021-08-20 RX ORDER — NAPROXEN 500 MG/1
500 TABLET ORAL 2 TIMES DAILY WITH MEALS
Qty: 20 TABLET | Refills: 0 | Status: SHIPPED | OUTPATIENT
Start: 2021-08-20 | End: 2021-11-30

## 2021-08-20 RX ORDER — CYCLOBENZAPRINE HCL 5 MG
5 TABLET ORAL 3 TIMES DAILY PRN
Qty: 30 TABLET | Refills: 0 | Status: SHIPPED | OUTPATIENT
Start: 2021-08-20 | End: 2021-11-30

## 2021-08-20 RX ORDER — PREDNISONE 1 MG/1
TABLET ORAL
Qty: 42 TABLET | Refills: 0 | Status: SHIPPED | OUTPATIENT
Start: 2021-08-20 | End: 2021-11-30

## 2021-08-20 NOTE — PROGRESS NOTES
Assessment/Plan:    Acute midline low back pain with left-sided sciatica    On physical examination of the patient today she has paravertebral muscular spasm specially on the left side but some is also present on the right side there is no tenderness over the vertebral bones  She has no localizing weakness nor any localizing absence of deep tendon reflexes  Impression is that of a muscle spasm in the lower back with secondary  Nerve irritation  Plan is to initiate tapering dose of steroids starting at 30 mg of prednisone decreased by 5 mg every 2 days medication should be taken with food  In addition naproxen 500 mg twice a day taken with food for 10 days in addition Flexeril 5 mg q 8 hours for muscle spasm  She may alternate the use of heat and ice  She can also supplement with Tylenol 1000 mg q 8 hours as necessary  I have advised her to minimize any lifting or caring and rest over the next several days  Should her pain  Worsen or develop any significant weakness in either leg or have issues with bowel or bladder control she knows to contact me immediately will see her in approximately 12 days for follow-up evaluation may consider some physical therapy at that time if symptoms persist        Diagnoses and all orders for this visit:    Acute midline low back pain with left-sided sciatica  -     cyclobenzaprine (FLEXERIL) 5 mg tablet; Take 1 tablet (5 mg total) by mouth 3 (three) times a day as needed for muscle spasms  -     naproxen (NAPROSYN) 500 mg tablet; Take 1 tablet (500 mg total) by mouth 2 (two) times a day with meals  -     predniSONE 5 mg tablet; Take 6 pills daily with food decrease by 1 pill every 2 days        Subjective:      Patient ID: Felice Bean is a 66 y o  female  This 71-year-old female patient presents today for an acute visit to evaluate a recent onset of low back pain    She was in a car for approximately 11-1/2 hours on this past Sunday and on Tuesday she bent over in the shower and felt a sudden sharp pain in her lower back  Since that time she has had persistent low back pain with some radiation of this pain into the left thigh  She went to a urgent care she was out of town at the time of the onset of her pain and was provided with a muscle relaxer  She has tried heat and ice and finds that they are beneficial   She has been taking Aleve also for relief of her pain  She denies any control issues with her bowels or bladder  She does feel like her legs are weak at times  The following portions of the patient's history were reviewed and updated as appropriate:   She  has a past medical history of Fracture of radius, distal, closed  She   Patient Active Problem List    Diagnosis Date Noted    Acute midline low back pain with left-sided sciatica 08/20/2021    Digital mucinous cyst of finger of left hand 06/10/2021    Varicose vein of leg 03/27/2018    Hyperbilirubinemia 05/08/2017    Depression 04/10/2017    Anxiety 04/08/2016    Arthritis 04/08/2016    Hyperlipidemia, mild 04/08/2016    Over weight 04/08/2016    Vitamin D deficiency 04/08/2016     She  has a past surgical history that includes Ankle surgery  Her family history includes Heart disease in her father; Leukemia in her brother; Ovarian cancer in her mother  She  reports that she has never smoked  She has never used smokeless tobacco  She reports that she does not drink alcohol and does not use drugs    Current Outpatient Medications   Medication Sig Dispense Refill    Calcium 250 MG CAPS Take by mouth      cholecalciferol (VITAMIN D3) 1,000 units tablet Take 2 capsules by mouth daily      escitalopram (LEXAPRO) 10 mg tablet TAKE 1 TABLET BY MOUTH EVERY DAY 90 tablet 2    Fish Oil-Krill Oil (KRILL OIL PLUS) CAPS Take by mouth      fluocinonide (LIDEX) 0 05 % external solution APPLY TO SCALP ONCE DAILY AS NEEDED FOR ITCH  2    ketoconazole (NIZORAL) 2 % shampoo PLEASE SEE ATTACHED FOR DETAILED DIRECTIONS  5    nystatin (MYCOSTATIN) powder APPLY TO THE AFFECTED AREA 3 TIMES A DAY AS NEEDED  3    cyclobenzaprine (FLEXERIL) 5 mg tablet Take 1 tablet (5 mg total) by mouth 3 (three) times a day as needed for muscle spasms 30 tablet 0    naproxen (NAPROSYN) 500 mg tablet Take 1 tablet (500 mg total) by mouth 2 (two) times a day with meals 20 tablet 0    predniSONE 5 mg tablet Take 6 pills daily with food decrease by 1 pill every 2 days 42 tablet 0     No current facility-administered medications for this visit       Review of Systems   Musculoskeletal: Positive for back pain  All other systems reviewed and are negative  Objective:      /82   Pulse 72   Temp 97 6 °F (36 4 °C) (Skin)   Ht 5' 9" (1 753 m)   Wt 93 9 kg (207 lb)   SpO2 98%   BMI 30 57 kg/m²          Physical Exam  Musculoskeletal:      Comments: Evaluation of muscular strength in both lower extremities indicates normal muscle strength  Neurological:      Comments: Deep tendon reflexes are intact in both lower extremities and bilaterally equal   Straight leg raise is positive at approximately 40° of flexion

## 2021-08-26 ENCOUNTER — RA CDI HCC (OUTPATIENT)
Dept: OTHER | Facility: HOSPITAL | Age: 71
End: 2021-08-26

## 2021-08-26 NOTE — PROGRESS NOTES
[place] [address]  coding opportunities             Chart Reviewed * (Number of) Inbasket suggestions sent to Provider: 1                  Patients insurance company: Medicare     Visit status: Patient canceled the appointment        [place] [address]  coding opportunities        Can the depression be further specified as:     F32 0 major depressive disorder, single episode, mild  OR   F32 1 major depressive disorder, single episode, moderate  OR   F32 2 major depressive disorder, single episode, severe without psychotic features OR   F32 4 major depressive disorder, single episode, in partial remission OR   F32 5 major depressive disorder, single episode, in full remission     Chart Reviewed * (Number of) Inbasket suggestions sent to Provider: 1                  Patients insurance company: [employer]

## 2021-11-14 DIAGNOSIS — F32.A DEPRESSION, UNSPECIFIED DEPRESSION TYPE: ICD-10-CM

## 2021-11-15 RX ORDER — ESCITALOPRAM OXALATE 10 MG/1
TABLET ORAL
Qty: 90 TABLET | Refills: 2 | Status: SHIPPED | OUTPATIENT
Start: 2021-11-15 | End: 2021-12-20 | Stop reason: SDUPTHER

## 2021-11-30 ENCOUNTER — ANNUAL EXAM (OUTPATIENT)
Dept: OBGYN CLINIC | Facility: CLINIC | Age: 71
End: 2021-11-30
Payer: MEDICARE

## 2021-11-30 VITALS
DIASTOLIC BLOOD PRESSURE: 72 MMHG | WEIGHT: 204 LBS | HEIGHT: 69 IN | BODY MASS INDEX: 30.21 KG/M2 | SYSTOLIC BLOOD PRESSURE: 130 MMHG

## 2021-11-30 DIAGNOSIS — Z12.31 ENCOUNTER FOR SCREENING MAMMOGRAM FOR BREAST CANCER: ICD-10-CM

## 2021-11-30 DIAGNOSIS — N64.59 OTHER SIGNS AND SYMPTOMS IN BREAST: ICD-10-CM

## 2021-11-30 DIAGNOSIS — Z87.81 HISTORY OF WRIST FRACTURE: ICD-10-CM

## 2021-11-30 DIAGNOSIS — Z01.419 WOMEN'S ANNUAL ROUTINE GYNECOLOGICAL EXAMINATION: Primary | ICD-10-CM

## 2021-11-30 DIAGNOSIS — R23.4 BREAST SKIN CHANGES: ICD-10-CM

## 2021-11-30 PROBLEM — Z23 NEED FOR IMMUNIZATION AGAINST INFLUENZA: Status: ACTIVE | Noted: 2021-11-30

## 2021-11-30 PROBLEM — Z12.11 ENCOUNTER FOR SCREENING FOR MALIGNANT NEOPLASM OF COLON: Status: ACTIVE | Noted: 2021-11-30

## 2021-11-30 PROCEDURE — G0101 CA SCREEN;PELVIC/BREAST EXAM: HCPCS | Performed by: NURSE PRACTITIONER

## 2021-12-02 ENCOUNTER — HOSPITAL ENCOUNTER (OUTPATIENT)
Dept: MAMMOGRAPHY | Facility: CLINIC | Age: 71
Discharge: HOME | End: 2021-12-02
Payer: MEDICARE

## 2021-12-02 ENCOUNTER — TELEPHONE (OUTPATIENT)
Dept: OBGYN CLINIC | Facility: CLINIC | Age: 71
End: 2021-12-02

## 2021-12-02 ENCOUNTER — HOSPITAL ENCOUNTER (OUTPATIENT)
Dept: ULTRASOUND IMAGING | Facility: CLINIC | Age: 71
Discharge: HOME | End: 2021-12-02
Payer: MEDICARE

## 2021-12-02 VITALS — BODY MASS INDEX: 30.21 KG/M2 | WEIGHT: 204 LBS | HEIGHT: 69 IN

## 2021-12-02 DIAGNOSIS — R23.4 BREAST SKIN CHANGES: ICD-10-CM

## 2021-12-02 DIAGNOSIS — R23.4 BREAST SKIN CHANGES: Primary | ICD-10-CM

## 2021-12-02 DIAGNOSIS — N64.59 OTHER SIGNS AND SYMPTOMS IN BREAST: ICD-10-CM

## 2021-12-02 PROCEDURE — 76642 ULTRASOUND BREAST LIMITED: CPT

## 2021-12-02 PROCEDURE — 77066 DX MAMMO INCL CAD BI: CPT

## 2021-12-02 PROCEDURE — G0279 TOMOSYNTHESIS, MAMMO: HCPCS

## 2021-12-03 ENCOUNTER — TELEPHONE (OUTPATIENT)
Dept: HEMATOLOGY ONCOLOGY | Facility: CLINIC | Age: 71
End: 2021-12-03

## 2021-12-06 ENCOUNTER — CONSULT (OUTPATIENT)
Dept: SURGICAL ONCOLOGY | Facility: CLINIC | Age: 71
End: 2021-12-06
Payer: MEDICARE

## 2021-12-06 VITALS
HEART RATE: 72 BPM | TEMPERATURE: 97.9 F | OXYGEN SATURATION: 98 % | BODY MASS INDEX: 29.92 KG/M2 | DIASTOLIC BLOOD PRESSURE: 80 MMHG | SYSTOLIC BLOOD PRESSURE: 132 MMHG | RESPIRATION RATE: 16 BRPM | WEIGHT: 202 LBS | HEIGHT: 69 IN

## 2021-12-06 DIAGNOSIS — R23.4 BREAST SKIN CHANGES: ICD-10-CM

## 2021-12-06 DIAGNOSIS — N64.59 THICKENING OF SKIN OF BREAST: Primary | ICD-10-CM

## 2021-12-06 DIAGNOSIS — Z80.41 FAMILY HISTORY OF OVARIAN CANCER: ICD-10-CM

## 2021-12-06 PROBLEM — Z12.11 ENCOUNTER FOR SCREENING FOR MALIGNANT NEOPLASM OF COLON: Status: RESOLVED | Noted: 2021-11-30 | Resolved: 2021-12-06

## 2021-12-06 PROBLEM — Z23 NEED FOR IMMUNIZATION AGAINST INFLUENZA: Status: RESOLVED | Noted: 2021-11-30 | Resolved: 2021-12-06

## 2021-12-06 PROCEDURE — 11104 PUNCH BX SKIN SINGLE LESION: CPT | Performed by: SURGERY

## 2021-12-06 PROCEDURE — 88363 XM ARCHIVE TISSUE MOLEC ANAL: CPT | Performed by: PATHOLOGY

## 2021-12-06 PROCEDURE — 99203 OFFICE O/P NEW LOW 30 MIN: CPT | Performed by: SURGERY

## 2021-12-06 PROCEDURE — 88305 TISSUE EXAM BY PATHOLOGIST: CPT | Performed by: PATHOLOGY

## 2021-12-06 PROCEDURE — 88342 IMHCHEM/IMCYTCHM 1ST ANTB: CPT | Performed by: PATHOLOGY

## 2021-12-06 PROCEDURE — 88361 TUMOR IMMUNOHISTOCHEM/COMPUT: CPT | Performed by: PATHOLOGY

## 2021-12-06 PROCEDURE — 88341 IMHCHEM/IMCYTCHM EA ADD ANTB: CPT | Performed by: PATHOLOGY

## 2021-12-06 PROCEDURE — 88374 M/PHMTRC ALYS ISHQUANT/SEMIQ: CPT | Performed by: PATHOLOGY

## 2021-12-07 ENCOUNTER — TELEPHONE (OUTPATIENT)
Dept: HEMATOLOGY ONCOLOGY | Facility: CLINIC | Age: 71
End: 2021-12-07

## 2021-12-08 ENCOUNTER — OFFICE VISIT (OUTPATIENT)
Dept: SURGICAL ONCOLOGY | Facility: CLINIC | Age: 71
End: 2021-12-08
Payer: MEDICARE

## 2021-12-08 VITALS
SYSTOLIC BLOOD PRESSURE: 122 MMHG | HEART RATE: 77 BPM | HEIGHT: 69 IN | RESPIRATION RATE: 16 BRPM | WEIGHT: 198 LBS | BODY MASS INDEX: 29.33 KG/M2 | DIASTOLIC BLOOD PRESSURE: 78 MMHG | TEMPERATURE: 97.2 F | OXYGEN SATURATION: 97 %

## 2021-12-08 DIAGNOSIS — N61.0 CELLULITIS OF RIGHT BREAST: ICD-10-CM

## 2021-12-08 DIAGNOSIS — L53.9 REDNESS OF SKIN: ICD-10-CM

## 2021-12-08 DIAGNOSIS — Z98.890 STATUS POST RIGHT BREAST BIOPSY: Primary | ICD-10-CM

## 2021-12-08 PROCEDURE — 99213 OFFICE O/P EST LOW 20 MIN: CPT | Performed by: SURGERY

## 2021-12-08 RX ORDER — CEPHALEXIN 500 MG/1
500 CAPSULE ORAL EVERY 6 HOURS SCHEDULED
Qty: 28 CAPSULE | Refills: 0 | Status: SHIPPED | OUTPATIENT
Start: 2021-12-08 | End: 2021-12-15

## 2021-12-10 ENCOUNTER — TELEPHONE (OUTPATIENT)
Dept: SURGICAL ONCOLOGY | Facility: CLINIC | Age: 71
End: 2021-12-10

## 2021-12-10 DIAGNOSIS — C50.911 INFLAMMATORY BREAST CANCER, RIGHT (HCC): Primary | ICD-10-CM

## 2021-12-11 ENCOUNTER — HOSPITAL ENCOUNTER (OUTPATIENT)
Dept: CT IMAGING | Facility: HOSPITAL | Age: 71
Discharge: HOME | End: 2021-12-11
Payer: MEDICARE

## 2021-12-11 DIAGNOSIS — C50.911 INFLAMMATORY BREAST CANCER, RIGHT (HCC): ICD-10-CM

## 2021-12-11 PROCEDURE — 74177 CT ABD & PELVIS W/CONTRAST: CPT

## 2021-12-11 PROCEDURE — 71260 CT THORAX DX C+: CPT

## 2021-12-11 PROCEDURE — G1004 CDSM NDSC: HCPCS

## 2021-12-11 RX ADMIN — IOHEXOL 100 ML: 350 INJECTION, SOLUTION INTRAVENOUS at 13:34

## 2021-12-13 ENCOUNTER — CONSULT (OUTPATIENT)
Dept: HEMATOLOGY ONCOLOGY | Facility: CLINIC | Age: 71
End: 2021-12-13
Payer: MEDICARE

## 2021-12-13 ENCOUNTER — HOSPITAL ENCOUNTER (OUTPATIENT)
Dept: MRI IMAGING | Facility: HOSPITAL | Age: 71
Discharge: HOME | End: 2021-12-13
Payer: MEDICARE

## 2021-12-13 VITALS
WEIGHT: 197 LBS | OXYGEN SATURATION: 98 % | TEMPERATURE: 98 F | DIASTOLIC BLOOD PRESSURE: 88 MMHG | BODY MASS INDEX: 29.18 KG/M2 | RESPIRATION RATE: 18 BRPM | HEART RATE: 95 BPM | SYSTOLIC BLOOD PRESSURE: 138 MMHG | HEIGHT: 69 IN

## 2021-12-13 VITALS — BODY MASS INDEX: 29.87 KG/M2 | HEIGHT: 68 IN | WEIGHT: 197.09 LBS

## 2021-12-13 DIAGNOSIS — C50.911 INFLAMMATORY BREAST CANCER, RIGHT (HCC): ICD-10-CM

## 2021-12-13 DIAGNOSIS — C50.919 MALIGNANT NEOPLASM OF FEMALE BREAST, UNSPECIFIED ESTROGEN RECEPTOR STATUS, UNSPECIFIED LATERALITY, UNSPECIFIED SITE OF BREAST (HCC): Primary | ICD-10-CM

## 2021-12-13 DIAGNOSIS — D70.1 CHEMOTHERAPY INDUCED NEUTROPENIA (HCC): ICD-10-CM

## 2021-12-13 DIAGNOSIS — C50.911 MALIGNANT NEOPLASM OF RIGHT FEMALE BREAST, UNSPECIFIED ESTROGEN RECEPTOR STATUS, UNSPECIFIED SITE OF BREAST (HCC): Primary | ICD-10-CM

## 2021-12-13 DIAGNOSIS — T45.1X5A CHEMOTHERAPY INDUCED NEUTROPENIA (HCC): ICD-10-CM

## 2021-12-13 PROCEDURE — 99205 OFFICE O/P NEW HI 60 MIN: CPT | Performed by: INTERNAL MEDICINE

## 2021-12-13 PROCEDURE — A9585 GADOBUTROL INJECTION: HCPCS | Performed by: SURGERY

## 2021-12-13 PROCEDURE — C8908 MRI W/O FOL W/CONT, BREAST,: HCPCS

## 2021-12-13 PROCEDURE — G1004 CDSM NDSC: HCPCS

## 2021-12-13 PROCEDURE — C8937 CAD BREAST MRI: HCPCS

## 2021-12-13 RX ADMIN — GADOBUTROL 10 ML: 604.72 INJECTION INTRAVENOUS at 11:16

## 2021-12-14 ENCOUNTER — HOSPITAL ENCOUNTER (OUTPATIENT)
Dept: RADIOLOGY | Facility: HOSPITAL | Age: 71
Discharge: HOME | End: 2021-12-14
Attending: SURGERY
Payer: MEDICARE

## 2021-12-14 DIAGNOSIS — C50.911 INFLAMMATORY BREAST CANCER, RIGHT (HCC): ICD-10-CM

## 2021-12-14 PROCEDURE — 78306 BONE IMAGING WHOLE BODY: CPT

## 2021-12-14 PROCEDURE — A9503 TC99M MEDRONATE: HCPCS

## 2021-12-14 PROCEDURE — G1004 CDSM NDSC: HCPCS

## 2021-12-15 ENCOUNTER — TELEPHONE (OUTPATIENT)
Dept: GENETICS | Facility: CLINIC | Age: 71
End: 2021-12-15

## 2021-12-15 ENCOUNTER — TELEPHONE (OUTPATIENT)
Dept: SURGICAL ONCOLOGY | Facility: CLINIC | Age: 71
End: 2021-12-15

## 2021-12-15 NOTE — TELEPHONE ENCOUNTER
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

## 2021-12-16 ENCOUNTER — HOSPITAL ENCOUNTER (OUTPATIENT)
Dept: NON INVASIVE DIAGNOSTICS | Facility: CLINIC | Age: 71
Discharge: HOME | End: 2021-12-16
Payer: MEDICARE

## 2021-12-16 VITALS
BODY MASS INDEX: 29.86 KG/M2 | DIASTOLIC BLOOD PRESSURE: 88 MMHG | HEIGHT: 68 IN | SYSTOLIC BLOOD PRESSURE: 138 MMHG | HEART RATE: 73 BPM | WEIGHT: 197 LBS

## 2021-12-16 DIAGNOSIS — C50.911 MALIGNANT NEOPLASM OF RIGHT FEMALE BREAST, UNSPECIFIED ESTROGEN RECEPTOR STATUS, UNSPECIFIED SITE OF BREAST (HCC): ICD-10-CM

## 2021-12-16 LAB
AORTIC ROOT: 3.7 CM
APICAL FOUR CHAMBER EJECTION FRACTION: 64 %
E WAVE DECELERATION TIME: 194 MS
FRACTIONAL SHORTENING: 35 % (ref 28–44)
GLOBAL LONGITUIDAL STRAIN: -18 %
INTERVENTRICULAR SEPTUM IN DIASTOLE (PARASTERNAL SHORT AXIS VIEW): 0.9 CM
LEFT ATRIUM AREA SYSTOLE SINGLE PLANE A4C: 10.9 CM2
LEFT INTERNAL DIMENSION IN SYSTOLE: 3 CM (ref 2.1–4)
LEFT VENTRICULAR INTERNAL DIMENSION IN DIASTOLE: 4.6 CM (ref 5.28–7.87)
LEFT VENTRICULAR POSTERIOR WALL IN END DIASTOLE: 0.8 CM
LEFT VENTRICULAR STROKE VOLUME: 65 ML
MV E'TISSUE VEL-SEP: 9 CM/S
MV PEAK A VEL: 1.07 M/S
MV PEAK E VEL: 69 CM/S
MV STENOSIS PRESSURE HALF TIME: 0 MS
RIGHT ATRIUM AREA SYSTOLE A4C: 10 CM2
RIGHT VENTRICLE ID DIMENSION: 3.1 CM
SL CV LV EF: 60
SL CV PED ECHO LEFT VENTRICLE DIASTOLIC VOLUME (MOD BIPLANE) 2D: 100 ML
SL CV PED ECHO LEFT VENTRICLE SYSTOLIC VOLUME (MOD BIPLANE) 2D: 34 ML
TRICUSPID VALVE S': 0.7 CM/S
Z-SCORE OF LEFT VENTRICULAR DIMENSION IN END SYSTOLE: -3.38

## 2021-12-16 PROCEDURE — 93356 MYOCRD STRAIN IMG SPCKL TRCK: CPT | Performed by: INTERNAL MEDICINE

## 2021-12-16 PROCEDURE — 93306 TTE W/DOPPLER COMPLETE: CPT | Performed by: INTERNAL MEDICINE

## 2021-12-16 PROCEDURE — 93356 MYOCRD STRAIN IMG SPCKL TRCK: CPT

## 2021-12-16 PROCEDURE — 93306 TTE W/DOPPLER COMPLETE: CPT

## 2021-12-20 ENCOUNTER — TRANSCRIBE ORDERS (OUTPATIENT)
Dept: LAB | Facility: CLINIC | Age: 71
End: 2021-12-20

## 2021-12-20 ENCOUNTER — APPOINTMENT (OUTPATIENT)
Dept: LAB | Facility: CLINIC | Age: 71
End: 2021-12-20
Payer: MEDICARE

## 2021-12-20 ENCOUNTER — TELEPHONE (OUTPATIENT)
Dept: INTERNAL MEDICINE CLINIC | Facility: CLINIC | Age: 71
End: 2021-12-20

## 2021-12-20 DIAGNOSIS — C50.911 MALIGNANT NEOPLASM OF RIGHT FEMALE BREAST, UNSPECIFIED ESTROGEN RECEPTOR STATUS, UNSPECIFIED SITE OF BREAST (HCC): Primary | ICD-10-CM

## 2021-12-20 DIAGNOSIS — F32.A DEPRESSION, UNSPECIFIED DEPRESSION TYPE: ICD-10-CM

## 2021-12-20 DIAGNOSIS — F41.9 ANXIETY: Primary | ICD-10-CM

## 2021-12-20 PROCEDURE — 36415 COLL VENOUS BLD VENIPUNCTURE: CPT

## 2021-12-20 RX ORDER — ESCITALOPRAM OXALATE 20 MG/1
20 TABLET ORAL DAILY
Qty: 90 TABLET | Refills: 2
Start: 2021-12-20 | End: 2022-03-23 | Stop reason: SDUPTHER

## 2021-12-20 RX ORDER — ALPRAZOLAM 0.25 MG/1
TABLET ORAL
Qty: 30 TABLET | Refills: 0 | Status: SHIPPED | OUTPATIENT
Start: 2021-12-20

## 2021-12-21 ENCOUNTER — TELEPHONE (OUTPATIENT)
Dept: SURGICAL ONCOLOGY | Facility: CLINIC | Age: 71
End: 2021-12-21

## 2021-12-21 LAB — MISCELLANEOUS LAB TEST RESULT: NORMAL

## 2021-12-23 DIAGNOSIS — T45.1X5A CHEMOTHERAPY INDUCED NEUTROPENIA (HCC): ICD-10-CM

## 2021-12-23 DIAGNOSIS — C50.811 MALIGNANT NEOPLASM OF OVERLAPPING SITES OF RIGHT BREAST IN FEMALE, ESTROGEN RECEPTOR POSITIVE (HCC): ICD-10-CM

## 2021-12-23 DIAGNOSIS — D70.1 CHEMOTHERAPY INDUCED NEUTROPENIA (HCC): ICD-10-CM

## 2021-12-23 DIAGNOSIS — T45.1X5A CHEMOTHERAPY-INDUCED NAUSEA: Primary | ICD-10-CM

## 2021-12-23 DIAGNOSIS — C50.911 MALIGNANT NEOPLASM OF RIGHT FEMALE BREAST, UNSPECIFIED ESTROGEN RECEPTOR STATUS, UNSPECIFIED SITE OF BREAST (HCC): Primary | ICD-10-CM

## 2021-12-23 DIAGNOSIS — R11.0 CHEMOTHERAPY-INDUCED NAUSEA: Primary | ICD-10-CM

## 2021-12-23 DIAGNOSIS — Z17.0 MALIGNANT NEOPLASM OF OVERLAPPING SITES OF RIGHT BREAST IN FEMALE, ESTROGEN RECEPTOR POSITIVE (HCC): ICD-10-CM

## 2021-12-23 RX ORDER — ONDANSETRON 4 MG/1
4 TABLET, FILM COATED ORAL EVERY 8 HOURS PRN
Qty: 30 TABLET | Refills: 1 | Status: SHIPPED | OUTPATIENT
Start: 2021-12-23 | End: 2022-02-16 | Stop reason: SDUPTHER

## 2021-12-28 ENCOUNTER — APPOINTMENT (OUTPATIENT)
Dept: LAB | Facility: CLINIC | Age: 71
End: 2021-12-28
Payer: MEDICARE

## 2021-12-28 DIAGNOSIS — C50.911 MALIGNANT NEOPLASM OF RIGHT FEMALE BREAST, UNSPECIFIED ESTROGEN RECEPTOR STATUS, UNSPECIFIED SITE OF BREAST (HCC): ICD-10-CM

## 2021-12-28 DIAGNOSIS — T45.1X5A CHEMOTHERAPY INDUCED NEUTROPENIA (HCC): ICD-10-CM

## 2021-12-28 DIAGNOSIS — D70.1 CHEMOTHERAPY INDUCED NEUTROPENIA (HCC): ICD-10-CM

## 2021-12-28 LAB
ALBUMIN SERPL BCP-MCNC: 3.6 G/DL (ref 3.5–5)
ALP SERPL-CCNC: 68 U/L (ref 46–116)
ALT SERPL W P-5'-P-CCNC: 24 U/L (ref 12–78)
ANION GAP SERPL CALCULATED.3IONS-SCNC: 2 MMOL/L (ref 4–13)
AST SERPL W P-5'-P-CCNC: 19 U/L (ref 5–45)
BASOPHILS # BLD AUTO: 0.07 THOUSANDS/ΜL (ref 0–0.1)
BASOPHILS NFR BLD AUTO: 2 % (ref 0–1)
BILIRUB SERPL-MCNC: 0.79 MG/DL (ref 0.2–1)
BUN SERPL-MCNC: 13 MG/DL (ref 5–25)
CALCIUM SERPL-MCNC: 9 MG/DL (ref 8.3–10.1)
CHLORIDE SERPL-SCNC: 107 MMOL/L (ref 100–108)
CO2 SERPL-SCNC: 29 MMOL/L (ref 21–32)
CREAT SERPL-MCNC: 0.85 MG/DL (ref 0.6–1.3)
EOSINOPHIL # BLD AUTO: 0.16 THOUSAND/ΜL (ref 0–0.61)
EOSINOPHIL NFR BLD AUTO: 4 % (ref 0–6)
ERYTHROCYTE [DISTWIDTH] IN BLOOD BY AUTOMATED COUNT: 11.6 % (ref 11.6–15.1)
GFR SERPL CREATININE-BSD FRML MDRD: 68 ML/MIN/1.73SQ M
GLUCOSE P FAST SERPL-MCNC: 97 MG/DL (ref 65–99)
HCT VFR BLD AUTO: 44.6 % (ref 34.8–46.1)
HGB BLD-MCNC: 14.3 G/DL (ref 11.5–15.4)
IMM GRANULOCYTES # BLD AUTO: 0.01 THOUSAND/UL (ref 0–0.2)
IMM GRANULOCYTES NFR BLD AUTO: 0 % (ref 0–2)
LYMPHOCYTES # BLD AUTO: 1.48 THOUSANDS/ΜL (ref 0.6–4.47)
LYMPHOCYTES NFR BLD AUTO: 32 % (ref 14–44)
MCH RBC QN AUTO: 30.5 PG (ref 26.8–34.3)
MCHC RBC AUTO-ENTMCNC: 32.1 G/DL (ref 31.4–37.4)
MCV RBC AUTO: 95 FL (ref 82–98)
MONOCYTES # BLD AUTO: 0.46 THOUSAND/ΜL (ref 0.17–1.22)
MONOCYTES NFR BLD AUTO: 10 % (ref 4–12)
NEUTROPHILS # BLD AUTO: 2.45 THOUSANDS/ΜL (ref 1.85–7.62)
NEUTS SEG NFR BLD AUTO: 52 % (ref 43–75)
NRBC BLD AUTO-RTO: 0 /100 WBCS
PLATELET # BLD AUTO: 184 THOUSANDS/UL (ref 149–390)
PMV BLD AUTO: 10.4 FL (ref 8.9–12.7)
POTASSIUM SERPL-SCNC: 4.7 MMOL/L (ref 3.5–5.3)
PROT SERPL-MCNC: 7 G/DL (ref 6.4–8.2)
RBC # BLD AUTO: 4.69 MILLION/UL (ref 3.81–5.12)
SODIUM SERPL-SCNC: 138 MMOL/L (ref 136–145)
WBC # BLD AUTO: 4.63 THOUSAND/UL (ref 4.31–10.16)

## 2021-12-28 PROCEDURE — 80053 COMPREHEN METABOLIC PANEL: CPT

## 2021-12-28 PROCEDURE — 36415 COLL VENOUS BLD VENIPUNCTURE: CPT

## 2021-12-28 PROCEDURE — 85025 COMPLETE CBC W/AUTO DIFF WBC: CPT

## 2021-12-29 ENCOUNTER — HOSPITAL ENCOUNTER (OUTPATIENT)
Dept: INTERVENTIONAL RADIOLOGY/VASCULAR | Facility: HOSPITAL | Age: 71
Discharge: HOME | End: 2021-12-29
Attending: INTERNAL MEDICINE
Payer: MEDICARE

## 2021-12-29 VITALS
HEIGHT: 69 IN | BODY MASS INDEX: 29.52 KG/M2 | HEART RATE: 62 BPM | SYSTOLIC BLOOD PRESSURE: 152 MMHG | OXYGEN SATURATION: 98 % | RESPIRATION RATE: 18 BRPM | DIASTOLIC BLOOD PRESSURE: 92 MMHG | TEMPERATURE: 97 F

## 2021-12-29 DIAGNOSIS — C50.911 MALIGNANT NEOPLASM OF RIGHT FEMALE BREAST, UNSPECIFIED ESTROGEN RECEPTOR STATUS, UNSPECIFIED SITE OF BREAST (HCC): ICD-10-CM

## 2021-12-29 PROCEDURE — 99153 MOD SED SAME PHYS/QHP EA: CPT

## 2021-12-29 PROCEDURE — 77001 FLUOROGUIDE FOR VEIN DEVICE: CPT | Performed by: INTERNAL MEDICINE

## 2021-12-29 PROCEDURE — C1788 PORT, INDWELLING, IMP: HCPCS

## 2021-12-29 PROCEDURE — 99152 MOD SED SAME PHYS/QHP 5/>YRS: CPT

## 2021-12-29 PROCEDURE — 76937 US GUIDE VASCULAR ACCESS: CPT

## 2021-12-29 PROCEDURE — 99152 MOD SED SAME PHYS/QHP 5/>YRS: CPT | Performed by: INTERNAL MEDICINE

## 2021-12-29 PROCEDURE — 36561 INSERT TUNNELED CV CATH: CPT | Performed by: INTERNAL MEDICINE

## 2021-12-29 PROCEDURE — 76937 US GUIDE VASCULAR ACCESS: CPT | Performed by: INTERNAL MEDICINE

## 2021-12-29 PROCEDURE — 36561 INSERT TUNNELED CV CATH: CPT

## 2021-12-29 PROCEDURE — C1894 INTRO/SHEATH, NON-LASER: HCPCS

## 2021-12-29 RX ORDER — SODIUM CHLORIDE 9 MG/ML
75 INJECTION, SOLUTION INTRAVENOUS CONTINUOUS
Status: DISCONTINUED | OUTPATIENT
Start: 2021-12-29 | End: 2021-12-30 | Stop reason: HOSPADM

## 2021-12-29 RX ORDER — CEFAZOLIN SODIUM 2 G/50ML
2000 SOLUTION INTRAVENOUS ONCE
Status: COMPLETED | OUTPATIENT
Start: 2021-12-29 | End: 2021-12-29

## 2021-12-29 RX ORDER — FENTANYL CITRATE 50 UG/ML
INJECTION, SOLUTION INTRAMUSCULAR; INTRAVENOUS CODE/TRAUMA/SEDATION MEDICATION
Status: COMPLETED | OUTPATIENT
Start: 2021-12-29 | End: 2021-12-29

## 2021-12-29 RX ORDER — MIDAZOLAM HYDROCHLORIDE 2 MG/2ML
INJECTION, SOLUTION INTRAMUSCULAR; INTRAVENOUS CODE/TRAUMA/SEDATION MEDICATION
Status: COMPLETED | OUTPATIENT
Start: 2021-12-29 | End: 2021-12-29

## 2021-12-29 RX ADMIN — FENTANYL CITRATE 50 MCG: 50 INJECTION, SOLUTION INTRAMUSCULAR; INTRAVENOUS at 09:41

## 2021-12-29 RX ADMIN — Medication 20 ML: at 10:05

## 2021-12-29 RX ADMIN — MIDAZOLAM 1 MG: 1 INJECTION INTRAMUSCULAR; INTRAVENOUS at 09:41

## 2021-12-29 RX ADMIN — FENTANYL CITRATE 25 MCG: 50 INJECTION, SOLUTION INTRAMUSCULAR; INTRAVENOUS at 09:55

## 2021-12-29 RX ADMIN — MIDAZOLAM 0.5 MG: 1 INJECTION INTRAMUSCULAR; INTRAVENOUS at 09:55

## 2021-12-29 RX ADMIN — CEFAZOLIN SODIUM 2000 MG: 2 SOLUTION INTRAVENOUS at 09:34

## 2021-12-30 ENCOUNTER — HOSPITAL ENCOUNTER (OUTPATIENT)
Dept: INFUSION CENTER | Facility: HOSPITAL | Age: 71
Discharge: HOME | End: 2021-12-30
Attending: INTERNAL MEDICINE
Payer: MEDICARE

## 2021-12-30 VITALS
DIASTOLIC BLOOD PRESSURE: 80 MMHG | HEIGHT: 69 IN | TEMPERATURE: 98.1 F | RESPIRATION RATE: 18 BRPM | OXYGEN SATURATION: 98 % | HEART RATE: 88 BPM | WEIGHT: 201.72 LBS | SYSTOLIC BLOOD PRESSURE: 140 MMHG | BODY MASS INDEX: 29.88 KG/M2

## 2021-12-30 DIAGNOSIS — T45.1X5A CHEMOTHERAPY INDUCED NEUTROPENIA (HCC): Primary | ICD-10-CM

## 2021-12-30 DIAGNOSIS — D70.1 CHEMOTHERAPY INDUCED NEUTROPENIA (HCC): Primary | ICD-10-CM

## 2021-12-30 DIAGNOSIS — C50.911 MALIGNANT NEOPLASM OF RIGHT FEMALE BREAST, UNSPECIFIED ESTROGEN RECEPTOR STATUS, UNSPECIFIED SITE OF BREAST (HCC): ICD-10-CM

## 2021-12-30 PROCEDURE — 96411 CHEMO IV PUSH ADDL DRUG: CPT

## 2021-12-30 PROCEDURE — 96367 TX/PROPH/DG ADDL SEQ IV INF: CPT

## 2021-12-30 PROCEDURE — 96413 CHEMO IV INFUSION 1 HR: CPT

## 2021-12-30 PROCEDURE — 96377 APPLICATON ON-BODY INJECTOR: CPT

## 2021-12-30 RX ORDER — DOXORUBICIN HYDROCHLORIDE 2 MG/ML
120 INJECTION, SOLUTION INTRAVENOUS ONCE
Status: COMPLETED | OUTPATIENT
Start: 2021-12-30 | End: 2021-12-30

## 2021-12-30 RX ORDER — SODIUM CHLORIDE 9 MG/ML
20 INJECTION, SOLUTION INTRAVENOUS ONCE
Status: COMPLETED | OUTPATIENT
Start: 2021-12-30 | End: 2021-12-30

## 2021-12-30 RX ADMIN — DOXORUBICIN HYDROCHLORIDE 120 MG: 2 INJECTION, SOLUTION INTRAVENOUS at 11:45

## 2021-12-30 RX ADMIN — PEGFILGRASTIM 6 MG: KIT SUBCUTANEOUS at 12:27

## 2021-12-30 RX ADMIN — CYCLOPHOSPHAMIDE 1218 MG: 1 INJECTION, POWDER, FOR SOLUTION INTRAVENOUS; ORAL at 12:08

## 2021-12-30 RX ADMIN — FOSAPREPITANT DIMEGLUMINE 150 MG: 150 INJECTION, POWDER, LYOPHILIZED, FOR SOLUTION INTRAVENOUS at 11:01

## 2021-12-30 RX ADMIN — SODIUM CHLORIDE 20 ML/HR: 0.9 INJECTION, SOLUTION INTRAVENOUS at 10:35

## 2021-12-30 RX ADMIN — ONDANSETRON: 2 SOLUTION INTRAMUSCULAR; INTRAVENOUS at 10:34

## 2021-12-30 NOTE — PROGRESS NOTES
Patient completed first day of chemo without incident  Neulasta onpro applied to [facility]  Patient aware to call oncologist with any issues and blood work needed a few days prior to next chemo

## 2022-01-04 ENCOUNTER — CLINICAL SUPPORT (OUTPATIENT)
Dept: GENETICS | Facility: CLINIC | Age: 72
End: 2022-01-04

## 2022-01-04 ENCOUNTER — APPOINTMENT (OUTPATIENT)
Dept: LAB | Facility: CLINIC | Age: 72
End: 2022-01-04
Payer: MEDICARE

## 2022-01-04 DIAGNOSIS — C50.911 MALIGNANT NEOPLASM OF RIGHT FEMALE BREAST, UNSPECIFIED ESTROGEN RECEPTOR STATUS, UNSPECIFIED SITE OF BREAST (HCC): Primary | ICD-10-CM

## 2022-01-04 DIAGNOSIS — Z80.41 FAMILY HISTORY OF MALIGNANT NEOPLASM OF OVARY: ICD-10-CM

## 2022-01-04 DIAGNOSIS — Z80.3 FAMILY HISTORY OF MALIGNANT NEOPLASM OF BREAST: ICD-10-CM

## 2022-01-04 DIAGNOSIS — Z80.42 FAMILY HISTORY OF MALIGNANT NEOPLASM OF PROSTATE: ICD-10-CM

## 2022-01-04 DIAGNOSIS — C50.911 MALIGNANT NEOPLASM OF RIGHT FEMALE BREAST, UNSPECIFIED ESTROGEN RECEPTOR STATUS, UNSPECIFIED SITE OF BREAST (HCC): ICD-10-CM

## 2022-01-04 PROCEDURE — 36415 COLL VENOUS BLD VENIPUNCTURE: CPT

## 2022-01-05 LAB — MISCELLANEOUS LAB TEST RESULT: NORMAL

## 2022-01-06 ENCOUNTER — PATIENT MESSAGE (OUTPATIENT)
Dept: HEMATOLOGY ONCOLOGY | Facility: CLINIC | Age: 72
End: 2022-01-06

## 2022-01-06 ENCOUNTER — TELEPHONE (OUTPATIENT)
Dept: GYNECOLOGIC ONCOLOGY | Facility: CLINIC | Age: 72
End: 2022-01-06

## 2022-01-06 NOTE — TELEPHONE ENCOUNTER
Patient called regarding medication Flexeril 5mg, she would like to know if it is safe to take due to her having chemo treatments

## 2022-01-11 ENCOUNTER — APPOINTMENT (OUTPATIENT)
Dept: LAB | Facility: CLINIC | Age: 72
End: 2022-01-11
Payer: MEDICARE

## 2022-01-11 DIAGNOSIS — C50.911 MALIGNANT NEOPLASM OF RIGHT FEMALE BREAST, UNSPECIFIED ESTROGEN RECEPTOR STATUS, UNSPECIFIED SITE OF BREAST (HCC): ICD-10-CM

## 2022-01-11 DIAGNOSIS — D70.1 CHEMOTHERAPY INDUCED NEUTROPENIA (HCC): ICD-10-CM

## 2022-01-11 DIAGNOSIS — T45.1X5A CHEMOTHERAPY INDUCED NEUTROPENIA (HCC): ICD-10-CM

## 2022-01-11 LAB
ALBUMIN SERPL BCP-MCNC: 3.5 G/DL (ref 3.5–5)
ALP SERPL-CCNC: 77 U/L (ref 46–116)
ALT SERPL W P-5'-P-CCNC: 23 U/L (ref 12–78)
ANION GAP SERPL CALCULATED.3IONS-SCNC: 2 MMOL/L (ref 4–13)
ARTIFACT: PRESENT
AST SERPL W P-5'-P-CCNC: 15 U/L (ref 5–45)
BASOPHILS # BLD MANUAL: 0.06 THOUSAND/UL (ref 0–0.1)
BASOPHILS NFR MAR MANUAL: 1 % (ref 0–1)
BILIRUB SERPL-MCNC: 0.93 MG/DL (ref 0.2–1)
BUN SERPL-MCNC: 12 MG/DL (ref 5–25)
CALCIUM SERPL-MCNC: 9 MG/DL (ref 8.3–10.1)
CHLORIDE SERPL-SCNC: 108 MMOL/L (ref 100–108)
CO2 SERPL-SCNC: 30 MMOL/L (ref 21–32)
CREAT SERPL-MCNC: 0.7 MG/DL (ref 0.6–1.3)
EOSINOPHIL # BLD MANUAL: 0 THOUSAND/UL (ref 0–0.4)
EOSINOPHIL NFR BLD MANUAL: 0 % (ref 0–6)
ERYTHROCYTE [DISTWIDTH] IN BLOOD BY AUTOMATED COUNT: 11.2 % (ref 11.6–15.1)
GFR SERPL CREATININE-BSD FRML MDRD: 86 ML/MIN/1.73SQ M
GLUCOSE P FAST SERPL-MCNC: 95 MG/DL (ref 65–99)
HCT VFR BLD AUTO: 40.7 % (ref 34.8–46.1)
HGB BLD-MCNC: 13.5 G/DL (ref 11.5–15.4)
LYMPHOCYTES # BLD AUTO: 0.74 THOUSAND/UL (ref 0.6–4.47)
LYMPHOCYTES # BLD AUTO: 13 % (ref 14–44)
MCH RBC QN AUTO: 30.7 PG (ref 26.8–34.3)
MCHC RBC AUTO-ENTMCNC: 33.2 G/DL (ref 31.4–37.4)
MCV RBC AUTO: 93 FL (ref 82–98)
METAMYELOCYTES NFR BLD MANUAL: 1 % (ref 0–1)
MONOCYTES # BLD AUTO: 1.08 THOUSAND/UL (ref 0–1.22)
MONOCYTES NFR BLD: 19 % (ref 4–12)
MYELOCYTES NFR BLD MANUAL: 1 % (ref 0–1)
NEUTROPHILS # BLD MANUAL: 3.13 THOUSAND/UL (ref 1.85–7.62)
NEUTS BAND NFR BLD MANUAL: 11 % (ref 0–8)
NEUTS SEG NFR BLD AUTO: 44 % (ref 43–75)
PLATELET # BLD AUTO: 118 THOUSANDS/UL (ref 149–390)
PLATELET BLD QL SMEAR: ABNORMAL
PMV BLD AUTO: 10.7 FL (ref 8.9–12.7)
POTASSIUM SERPL-SCNC: 4.5 MMOL/L (ref 3.5–5.3)
PROT SERPL-MCNC: 7 G/DL (ref 6.4–8.2)
RBC # BLD AUTO: 4.4 MILLION/UL (ref 3.81–5.12)
RBC MORPH BLD: PRESENT
SODIUM SERPL-SCNC: 140 MMOL/L (ref 136–145)
TOXIC GRANULES BLD QL SMEAR: PRESENT
VARIANT LYMPHS # BLD AUTO: 10 %
WBC # BLD AUTO: 5.69 THOUSAND/UL (ref 4.31–10.16)

## 2022-01-11 PROCEDURE — 85027 COMPLETE CBC AUTOMATED: CPT

## 2022-01-11 PROCEDURE — 85007 BL SMEAR W/DIFF WBC COUNT: CPT

## 2022-01-11 PROCEDURE — 80053 COMPREHEN METABOLIC PANEL: CPT

## 2022-01-11 PROCEDURE — 36415 COLL VENOUS BLD VENIPUNCTURE: CPT

## 2022-01-12 ENCOUNTER — OFFICE VISIT (OUTPATIENT)
Dept: HEMATOLOGY ONCOLOGY | Facility: CLINIC | Age: 72
End: 2022-01-12
Payer: MEDICARE

## 2022-01-12 VITALS
HEIGHT: 69 IN | TEMPERATURE: 97.3 F | SYSTOLIC BLOOD PRESSURE: 126 MMHG | HEART RATE: 99 BPM | WEIGHT: 197.5 LBS | OXYGEN SATURATION: 98 % | BODY MASS INDEX: 29.25 KG/M2 | RESPIRATION RATE: 18 BRPM | DIASTOLIC BLOOD PRESSURE: 74 MMHG

## 2022-01-12 DIAGNOSIS — C50.911 MALIGNANT NEOPLASM OF RIGHT FEMALE BREAST, UNSPECIFIED ESTROGEN RECEPTOR STATUS, UNSPECIFIED SITE OF BREAST (HCC): ICD-10-CM

## 2022-01-12 DIAGNOSIS — T45.1X5A CHEMOTHERAPY INDUCED NEUTROPENIA (HCC): ICD-10-CM

## 2022-01-12 DIAGNOSIS — Z17.0 MALIGNANT NEOPLASM OF RIGHT BREAST IN FEMALE, ESTROGEN RECEPTOR POSITIVE, UNSPECIFIED SITE OF BREAST (HCC): Primary | ICD-10-CM

## 2022-01-12 DIAGNOSIS — D70.1 CHEMOTHERAPY INDUCED NEUTROPENIA (HCC): ICD-10-CM

## 2022-01-12 DIAGNOSIS — C50.911 MALIGNANT NEOPLASM OF RIGHT BREAST IN FEMALE, ESTROGEN RECEPTOR POSITIVE, UNSPECIFIED SITE OF BREAST (HCC): Primary | ICD-10-CM

## 2022-01-12 PROCEDURE — 1124F ACP DISCUSS-NO DSCNMKR DOCD: CPT | Performed by: INTERNAL MEDICINE

## 2022-01-12 PROCEDURE — 99214 OFFICE O/P EST MOD 30 MIN: CPT | Performed by: INTERNAL MEDICINE

## 2022-01-12 RX ORDER — DOXORUBICIN HYDROCHLORIDE 2 MG/ML
60 INJECTION, SOLUTION INTRAVENOUS ONCE
Status: CANCELLED | OUTPATIENT
Start: 2022-01-27

## 2022-01-12 RX ORDER — SODIUM CHLORIDE 9 MG/ML
20 INJECTION, SOLUTION INTRAVENOUS ONCE
Status: CANCELLED | OUTPATIENT
Start: 2022-01-27

## 2022-01-12 RX ORDER — SODIUM CHLORIDE 9 MG/ML
20 INJECTION, SOLUTION INTRAVENOUS ONCE
Status: CANCELLED | OUTPATIENT
Start: 2022-02-10

## 2022-01-12 RX ORDER — DOXORUBICIN HYDROCHLORIDE 2 MG/ML
60 INJECTION, SOLUTION INTRAVENOUS ONCE
Status: CANCELLED | OUTPATIENT
Start: 2022-02-10

## 2022-01-12 NOTE — PROGRESS NOTES
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  Based on MRI, she may have 9 millimeter of tumor with clear skin thickening in her right breast   Inflammatory breast cancer was confirmed by the biopsy with dermal invasion  She is currently on neoadjuvant chemotherapy with dose dense AC with excellent tolerance  Based on physical examination, her a resume a and edematous appearing appeared to be less  I recommended her to continue current regimen every 2 weeks  I will see her again in 4 weeks prior to the 4th cycle of AC  She is in agreement with my recommendations               Subjective:      HPI:  A 69-year-old postmenopausal woman noticed red spot on the skin in her right breast in 2021  She brought this to medical attention  Mammography did not show any mass  However, ultrasound shows 6 mm of skin thickening in her right breast at 4:00 a m  position  She was seen by Dr Kirsten Kinney who did a skin biopsy  Skin biopsy was positive for dermal lymphatic invasion of carcinoma, consistent with breast primary  ER, SC and HER2 are pending at this time  She subsequent underwent CT scan of chest abdomen pelvis which showed right breast skin thickening but no evidence the distant metastasis  She presents today with her  and daughter-in-law to discuss the medical treatment for her breast cancer  She has no complaint of pain  She is afebrile  She has no significant past medical history  Her mother  of ovarian cancer at age of 62  Therefore, she underwent prophylactic hysterectomy and bilateral oophorectomy in   She also have cholecystectomy as a surgical past history  She currently feels well  She has no complaint of pain  Her weight is stable  She has no respiratory symptoms  She is a lifetime never smoker  Her performance status is normal            Interval History:   A 65-year-old postmenopausal woman with inflammatory right breast cancer, grade 2, ER 90 percent positive, WI 70 percent positive, HER2 fish negative disease  Her right breast mass is not easy to evaluate  Even MRI showed only 9 millimeter of mass with skin thickening  She started neoadjuvant chemotherapy with dose dense AC  She presents today prior to the 2nd cycle of neoadjuvant chemotherapy  She tolerated 1st cycle of AC very well  However, she had 4 days of nausea as well as Neulasta induced bone pain  Currently, she feels very well  She is afebrile  She has no respiratory symptoms  Her weight is stable  Her performance status is normal            Objective:      Primary Diagnosis:     Right inflammatory breast cancer  Grade 2  ER 90% positive, WI 70% positive, HER2 fish negative disease  Diagnosed in December 2021      Cancer Staging:  Cancer Staging  No matching staging information was found for the patient         Previous Hematologic/ Oncologic Treatment:            Current Hematologic/ Oncologic Treatment:       Neoadjuvant chemotherapy with dose dense AC x4 followed by dose dense paclitaxel x4   2nd cycle of AC to be given in January 13, 2021      Disease Status:      Not evaluated at this time      Test Results:     Pathology:     Right breast skin biopsy showed dermal and lymphatic invasion of carcinoma, consistent with breast primary  At least grade 2  ER 90% positive, WI 70% positive, HER2 2+ disease  HER2 fish was negative for gene application with ratio of 1 2      Radiology:     CT scan of chest abdomen pelvis showed no evidence of distant metastasis      Breast ultrasound shows 6 mm of skin thickening at 4:00 a m  position with no visible underline mass      Echocardiogram showed ejection fraction 60 percent in December 2021      Laboratory:     See below      Physical Exam:        General Appearance:    Alert, oriented          Eyes:    PERRL   Ears:    Normal external ear canals, both ears   Nose:   Nares normal, septum midline   Throat:   Mucosa moist  Pharynx without injection  Neck:   Supple         Lungs:     Clear to auscultation bilaterally   Chest Wall:    No tenderness or deformity    Heart:    Regular rate and rhythm         Abdomen:     Soft, non-tender, bowel sounds +, no organomegaly               Extremities:   Extremities no cyanosis or edema         Skin:   no rash or icterus  Lymph nodes:   Cervical, supraclavicular, and axillary nodes normal   Neurologic:   CNII-XII intact, normal strength, sensation and reflexes     Throughout             Breast exam:   10 cm area of faintly erythematous skin thickening at 4 o'clock position in her right breast   It appear the early small a is less and less edematous  No palpable mass in her right breast   No palpable right axillary adenopathy  Left breast exam is negative               ROS: Review of Systems   All other systems reviewed and are negative  Imaging: NM bone scan whole body    Result Date: 12/14/2021  Narrative: BONE SCAN  WHOLE BODY INDICATION: Inflammatory breast cancer  C50 911: Malignant neoplasm of unspecified site of right female breast PREVIOUS FILM CORRELATION:    CT chest abdomen pelvis 12/11/2021 TECHNIQUE:   This study was performed following the intravenous administration of 25 mCi Tc-99m labeled MDP  Delayed, anterior and posterior whole body images were acquired, 2-3 hours after radiopharmaceutical administration  Additional delayed oblique static images acquired of the bilateral ribs and pelvis  FINDINGS:  Scattered mild heterogeneous foci of radiotracer uptake in the cervical and thoracic spine likely due to degenerative changes as partially seen on prior CT   Foci of radiotracer uptake at the bilateral shoulders, right wrist, knees and feet likely due to arthritic changes given the distribution  There is no scintigraphic evidence of osseous metastasis  The renal activity is symmetric  Impression: 1  No scintigraphic evidence of osseous metastasis  Workstation performed: [id number]     MRI breast bilateral w and wo contrast w cad    Result Date: 12/13/2021  Narrative: DIAGNOSIS: Inflammatory breast cancer, right ([address] ) TECHNIQUE: MRI of both breasts was performed in axial and sagittal planes utilizing a combination of localizer, axial T2 stir, axial dynamic contrast enhanced at multiple time points  This exam was read in conjunction with [employer] software  Intravenous contrast was administered at 2cc/sec, without documented contrast reaction  MEDICATIONS ADMINISTERED: Gadobutrol injection (SINGLE-DOSE) SOLN 10 mL, Total Given: 10 mL (1 dose) COMPARISONS: Prior breast imaging dated: 12/02/2021, 12/02/2021, 11/05/2019, 04/18/2019, 04/18/2019, 04/18/2019, 04/15/2019, 03/07/2016, 01/29/2016, and 11/21/2014 RELEVANT HISTORY: Family Breast Cancer History: No known family history of breast cancer  Family Medical History: Family medical history includes ovarian cancer in mother  Personal History: Hormone history includes other  Surgical history includes breast biopsy and hysterectomy  No known relevant medical history  RISK ASSESSMENT: 5 Year Tyrer-Cuzick: 2 1 % 10 Year Tyrer-Cuzick: 4 45 % Lifetime Tyrer-Cuzick: 5 97 %  TISSUE DENSITY: FGT: The breasts have extreme amounts of fibroglandular tissue  BPE: The background parenchymal enhancement is mild and symmetric  INDICATION: Nghia Larkin is a 66 y o  female presenting for for evaluation of right breast cancer  Inflammatory changes of the right breast with skin punch biopsy 12/06/2021 returning positive for lymphovascular invasion    FINDINGS: RIGHT F) NON-MASS ENHANCEMENT:  There is segmental distribution non mass enhancement extending from the nipple to the periphery of the right breast in the 11-12 o'clock axis  This extends at least 6 8 cm AP  B) MASS:  At the posterior aspect of the non-mass enhancement, there is an irregular enhancing 9 x 7 mm round indistinctly marginated mass 12:00 o'clock 8 cm from the nipple (series 401, image 89)  C) MASS:  A 2nd smaller mass at the posterior aspect of the non-mass enhancement measures 9 x 6 mm 12:00 o'clock 6 cm from the nipple (series 401, image 82)  Separately: D) MASS:  Round circumscribed mass right breast 08:00 o'clock measures 6 x 5 mm (series 401, image 137)  E) NON-MASS ENHANCEMENT: Right breast 06:00 o'clock focal non-mass enhancement measures 28 x 18 mm (series 45552, image 162)  There is diffuse right breast skin thickening compatible with known dermal lymphatic invasion  There is no right axillary internal mammary adenopathy  Left There are no suspicious enhancing masses or suspicious areas of non-mass enhancement  There is no axillary or internal mammary adenopathy  Impression:  1  Findings suspicious for multicentric right breast cancer  Most suspicious area is a 9 mm mass right breast 12:00 o'clock  Recommend second-look ultrasound with intent to biopsy  This mass is within an area of segmental distribution non-mass enhancement  Right breast 06:00 o'clock focal non-mass enhancement is 2nd  most suspicious area  Recommend second-look ultrasound with intent to biopsy  I anticipate that 2 biopsies would likely be adequate to direct management (MR biopsy may be necessary if sonographic correlates cannot be identified) 2  No right axillary or internal mammary adenopathy  3  No MR evidence of contralateral left breast malignancy    ASSESSMENT/BI-RADS CATEGORY: Left: 1 - Negative Right: 5 - Highly Suggestive of Malignancy Overall: 5 - Highly Suggestive of Malignancy RECOMMENDATION:      - Ultrasound-guided breast biopsy for the right breast       - Ultrasound at the current time for the right breast       - Routine screening mammogram in 1 year for the left breast  Workstation ID: [id number]    IR port placement    Result Date: 12/29/2021  Narrative: PROCEDURE: 1  Ultrasound guided access of the left  internal jugular vein 2  Chest port placement STAFF: BALAJI Lechuga  FLUOROSCOPY TIME: 3 4 minutes  NUMBER OF IMAGES: Multiple  COMPLICATIONS: None  MEDICATIONS: Antibiotics: Ancef 2g  Moderate sedation with fentanyl and versed was monitored by radiology nursing staff  Monitoring of conscious sedation time totaled 60 minutes  INDICATION: Right inflammatory breast cancer  Plan for chemotherapy, mastectomy  PROCEDURE: The left neck and upper chest were prepped and draped in the usual sterile fashion  All elements of maximal sterile barrier technique were followed (cap, mask, sterile gown, sterile gloves, large sterile sheet, hand hygiene, and 2% chlorhexidine for cutaneous antisepsis)  Using real-time ultrasound guidance utilizing a sterile ultrasound cover and gel, the left internal jugular vein was punctured  An image was stored in PACS  A wire was advanced into the inferior vena cava  A subcutaneous pocket was then created using blunt dissection  An 8F Midsize Dignity PowerPort was flushed with normal saline  The catheter was then advanced under fluoroscopic guidance through a peel-away sheath into the right atrium  The port was then connected to the catheter  The catheter was attached firmly to the port  The port was accessed and aspirated freely  Heparinized saline was injected and there was no leakage  The sponge count was reconciled  The port was not sutured in the pocket  A subcutaneous closure was then performed using 2-0 and 4-0 absorbable suture and surgical glue  A sterile dressing was applied  The port may be used immediately  FINDINGS: 1   Real-time ultrasound showed an anechoic and freely compressible left internal jugular vein  2   Final fluoroscopic image shows the chest port to be in good position  Impression: Left internal jugular vein chest port placement  Dignity 8F midsize port  26cm  Tip in mid right atrium  Workstation performed: [id number]     Echo complete w/ contrast if indicated    Result Date: 12/16/2021  Narrative: Aurora Lee  Left Ventricle: Left ventricular cavity size is normal  The left ventricular ejection fraction is 60%  Systolic function is normal  Global longitudinal strain is normal at -18 3%  Wall motion is normal  Diastolic function is normal for age    Right Ventricle: Systolic function is normal    Aortic Valve: There is mild regurgitation  Overall, within normal limits for age  Labs:   Lab Results   Component Value Date    WBC 5 69 01/11/2022    HGB 13 5 01/11/2022    HCT 40 7 01/11/2022    MCV 93 01/11/2022     (L) 01/11/2022     Lab Results   Component Value Date     07/03/2015    K 4 5 01/11/2022     01/11/2022    CO2 30 01/11/2022    ANIONGAP 8 07/03/2015    BUN 12 01/11/2022    CREATININE 0 70 01/11/2022    GLUCOSE 83 07/03/2015    GLUF 95 01/11/2022    CALCIUM 9 0 01/11/2022    AST 15 01/11/2022    ALT 23 01/11/2022    ALKPHOS 77 01/11/2022    PROT 7 7 07/03/2015    BILITOT 0 91 07/03/2015    EGFR 86 01/11/2022         Lab Results   Component Value Date    IRON 116 04/11/2016         Current Medications: Reviewed  Allergies: Reviewed  PMH/FH/SH:  Reviewed      Vital Sign:    Body surface area is 2 05 meters squared      Wt Readings from Last 3 Encounters:   01/12/22 89 6 kg (197 lb 8 oz)   12/30/21 91 5 kg (201 lb 11 5 oz)   12/16/21 89 4 kg (197 lb)        Temp Readings from Last 3 Encounters:   01/12/22 (!) 97 3 °F (36 3 °C) (Tympanic Core)   12/30/21 98 1 °F (36 7 °C)   12/29/21 (!) 97 °F (36 1 °C)        BP Readings from Last 3 Encounters:   01/12/22 126/74   12/30/21 140/80   12/29/21 152/92         Pulse Readings from Last 3 Encounters:   01/12/22 99   12/30/21 88   12/29/21 62     @LASTSAO2(3)@

## 2022-01-13 ENCOUNTER — HOSPITAL ENCOUNTER (OUTPATIENT)
Dept: INFUSION CENTER | Facility: CLINIC | Age: 72
Discharge: HOME | End: 2022-01-13
Payer: MEDICARE

## 2022-01-13 VITALS
DIASTOLIC BLOOD PRESSURE: 70 MMHG | RESPIRATION RATE: 18 BRPM | OXYGEN SATURATION: 98 % | HEIGHT: 69 IN | BODY MASS INDEX: 29.4 KG/M2 | SYSTOLIC BLOOD PRESSURE: 124 MMHG | WEIGHT: 198.5 LBS | HEART RATE: 62 BPM | TEMPERATURE: 97.4 F

## 2022-01-13 DIAGNOSIS — D70.1 CHEMOTHERAPY INDUCED NEUTROPENIA (HCC): Primary | ICD-10-CM

## 2022-01-13 DIAGNOSIS — C50.911 MALIGNANT NEOPLASM OF RIGHT FEMALE BREAST, UNSPECIFIED ESTROGEN RECEPTOR STATUS, UNSPECIFIED SITE OF BREAST (HCC): ICD-10-CM

## 2022-01-13 DIAGNOSIS — T45.1X5A CHEMOTHERAPY INDUCED NEUTROPENIA (HCC): Primary | ICD-10-CM

## 2022-01-13 PROCEDURE — 96367 TX/PROPH/DG ADDL SEQ IV INF: CPT

## 2022-01-13 PROCEDURE — 96377 APPLICATON ON-BODY INJECTOR: CPT

## 2022-01-13 PROCEDURE — 96413 CHEMO IV INFUSION 1 HR: CPT

## 2022-01-13 PROCEDURE — 96411 CHEMO IV PUSH ADDL DRUG: CPT

## 2022-01-13 RX ORDER — SODIUM CHLORIDE 9 MG/ML
20 INJECTION, SOLUTION INTRAVENOUS ONCE
Status: COMPLETED | OUTPATIENT
Start: 2022-01-13 | End: 2022-01-13

## 2022-01-13 RX ORDER — DOXORUBICIN HYDROCHLORIDE 2 MG/ML
60 INJECTION, SOLUTION INTRAVENOUS ONCE
Status: COMPLETED | OUTPATIENT
Start: 2022-01-13 | End: 2022-01-13

## 2022-01-13 RX ADMIN — CYCLOPHOSPHAMIDE 1218 MG: 1 INJECTION, POWDER, FOR SOLUTION INTRAVENOUS; ORAL at 10:30

## 2022-01-13 RX ADMIN — DOXORUBICIN HYDROCHLORIDE 122 MG: 2 INJECTION, SOLUTION INTRAVENOUS at 10:07

## 2022-01-13 RX ADMIN — PEGFILGRASTIM 6 MG: KIT SUBCUTANEOUS at 11:11

## 2022-01-13 RX ADMIN — SODIUM CHLORIDE 20 ML/HR: 0.9 INJECTION, SOLUTION INTRAVENOUS at 08:50

## 2022-01-13 RX ADMIN — DEXAMETHASONE SODIUM PHOSPHATE: 10 INJECTION, SOLUTION INTRAMUSCULAR; INTRAVENOUS at 09:06

## 2022-01-13 RX ADMIN — FOSAPREPITANT 150 MG: 150 INJECTION, POWDER, LYOPHILIZED, FOR SOLUTION INTRAVENOUS at 09:31

## 2022-01-13 NOTE — PLAN OF CARE
Problem: Knowledge Deficit  Goal: Patient/family/caregiver demonstrates understanding of disease process, treatment plan, medications, and discharge instructions  Description: Complete learning assessment and assess knowledge base    Interventions:  - Provide teaching at level of understanding  - Provide teaching via preferred learning methods  Outcome: Progressing Normal rate, regular rhythm.  Heart sounds S1, S2.  No murmurs, rubs or gallops.

## 2022-01-13 NOTE — PROGRESS NOTES
Patient is here for Adriamycin and cytoxan  for treatment of breast cancer  Labs reviewed from 1/11, within treatment parameters  Lisa Loving was accessed with good blood return noted  Patient resting with no complains  Will continue to monitor

## 2022-01-13 NOTE — PROGRESS NOTES
Patient tolerated infusion without complications  Stephenie Avina applied to her R arm, green light flashing, pt aware of medication injection and completion time  Pt aware of his next appointment   Declined AVS

## 2022-01-17 ENCOUNTER — TELEPHONE (OUTPATIENT)
Dept: GENETICS | Facility: CLINIC | Age: 72
End: 2022-01-17

## 2022-01-17 NOTE — TELEPHONE ENCOUNTER
Genetic Test Result Note:    Today I left a voicemail for Isabel Herrera reviewing the results of her genetic test for hereditary cancer  She underwent genetic testing through our program on 12/15/2021 due to her recent diagnosis of breast cancer  I encouraged her to call [phone] to discuss this result further  I reviewed that a copy of this consult note will be mailed home along with test results  Her results will also be sent to her breast surgeon Mary Vasquez MD     SUMMARY:    Test(s): [facility] Breast Cancer STAT Panel (9 genes): YARED, BRCA1, BRCA2, CDH1, CHEK2, PALB2, PTEN, STK11, and TP53 with reflex to [facility] Core Cancer Panel (27 additional genes): APC, YARED, AXIN2 BARD1, BRCA1, BRCA2, BRIP1, BMPR1A, CDH1, CDK4, CDKN2A, CHEK2, DICER1, EPCAM, GREM1, HOXB13, MLH1, MSH2, MSH3, MSH6, MUTYH, NBN, NF1, NTHL1, PALB2, PMS2, POLD1, POLE, PTEN, RAD51C, RAD51D, RECQL SMAD4, SMARCA4, STK11, TP53    Result: Negative - No Clinically Significant Variants Detected      Assessment:   A negative result significantly reduces the likelihood that Isabel Herrera has a hereditary cancer syndrome  However, this testing is unable to completely rule out the presence of hereditary cancer  It remains possible that:  - There is a variant in an area of a gene which was not tested or there is a variant not detectable due to technical limitations of this test      - There is a variant in another gene that was not included in this test or in a gene not known to be linked to cancer or tumors  - A family member has a genetic variant that the patient did not inherit  - The cancer in the family is sporadic and is related to non-hereditary factors  Risks and Testing for Family Members:  Isabel Herrera was made aware that all of her first-degree relatives are at increased risk to develop breast cancer based on her recent diagnosis and family history of breast cancer   Her family members may also be at increased risk to develop other cancers in her family history, specifically Brother - Prostate cancer,Maternal cousin - Breast cancer dx 29's, Maternal Aunt - Stomach cancer dx 52's  We recommend that her first-degree relatives make their healthcare providers aware of the family history and discuss their options for screening and risk-reduction  At this time we do not recommend testing for Landry Melaras children based on her negative test result  Landry Melaras children still need to consider the history of cancer on the other side of their family when determining their risks  If Landry Nagy has any affected family members with a cancer diagnosis, especially at a young age, they may still consider genetic testing  Relatives who wish to pursue genetic testing can reach out to the [facility] ([phone]) to schedule an appointment or visit [email or web address] to identify a local genetic counselor  Negative Result: This result does not have surgical implications and surgical options should be discussed with her healthcare provider   Carie's breast surgeon, Dr Guy Figueroa will be made aware of her results

## 2022-01-19 ENCOUNTER — TELEPHONE (OUTPATIENT)
Dept: GENETICS | Facility: CLINIC | Age: 72
End: 2022-01-19

## 2022-01-19 NOTE — TELEPHONE ENCOUNTER
----- [facility] sent at 1/17/2022 12:41 PM EST -----  GC Completed Chart     Result Type: Negative    Result Delivery: [employer] Message    Monthly Review:Does not need monthly review- COMPLETE

## 2022-01-25 ENCOUNTER — APPOINTMENT (OUTPATIENT)
Dept: LAB | Facility: CLINIC | Age: 72
End: 2022-01-25
Payer: MEDICARE

## 2022-01-25 ENCOUNTER — TELEPHONE (OUTPATIENT)
Dept: PALLIATIVE MEDICINE | Facility: CLINIC | Age: 72
End: 2022-01-25

## 2022-01-25 DIAGNOSIS — Z95.828 PORT-A-CATH IN PLACE: Primary | ICD-10-CM

## 2022-01-25 DIAGNOSIS — T45.1X5A CHEMOTHERAPY INDUCED NEUTROPENIA (HCC): ICD-10-CM

## 2022-01-25 DIAGNOSIS — K13.79 MOUTH SORES: ICD-10-CM

## 2022-01-25 DIAGNOSIS — D70.1 CHEMOTHERAPY INDUCED NEUTROPENIA (HCC): ICD-10-CM

## 2022-01-25 DIAGNOSIS — C50.911 MALIGNANT NEOPLASM OF RIGHT FEMALE BREAST, UNSPECIFIED ESTROGEN RECEPTOR STATUS, UNSPECIFIED SITE OF BREAST (HCC): ICD-10-CM

## 2022-01-25 LAB
ALBUMIN SERPL BCP-MCNC: 3.5 G/DL (ref 3.5–5)
ALP SERPL-CCNC: 103 U/L (ref 46–116)
ALT SERPL W P-5'-P-CCNC: 21 U/L (ref 12–78)
ANION GAP SERPL CALCULATED.3IONS-SCNC: 3 MMOL/L (ref 4–13)
AST SERPL W P-5'-P-CCNC: 15 U/L (ref 5–45)
BASOPHILS # BLD MANUAL: 0.18 THOUSAND/UL (ref 0–0.1)
BASOPHILS NFR MAR MANUAL: 2 % (ref 0–1)
BILIRUB SERPL-MCNC: 0.27 MG/DL (ref 0.2–1)
BUN SERPL-MCNC: 13 MG/DL (ref 5–25)
CALCIUM SERPL-MCNC: 9.4 MG/DL (ref 8.3–10.1)
CHLORIDE SERPL-SCNC: 108 MMOL/L (ref 100–108)
CO2 SERPL-SCNC: 28 MMOL/L (ref 21–32)
CREAT SERPL-MCNC: 0.7 MG/DL (ref 0.6–1.3)
EOSINOPHIL # BLD MANUAL: 0 THOUSAND/UL (ref 0–0.4)
EOSINOPHIL NFR BLD MANUAL: 0 % (ref 0–6)
ERYTHROCYTE [DISTWIDTH] IN BLOOD BY AUTOMATED COUNT: 11.5 % (ref 11.6–15.1)
GFR SERPL CREATININE-BSD FRML MDRD: 86 ML/MIN/1.73SQ M
GLUCOSE P FAST SERPL-MCNC: 102 MG/DL (ref 65–99)
HCT VFR BLD AUTO: 37.1 % (ref 34.8–46.1)
HGB BLD-MCNC: 12.4 G/DL (ref 11.5–15.4)
LYMPHOCYTES # BLD AUTO: 2.47 THOUSAND/UL (ref 0.6–4.47)
LYMPHOCYTES # BLD AUTO: 28 % (ref 14–44)
MCH RBC QN AUTO: 30.6 PG (ref 26.8–34.3)
MCHC RBC AUTO-ENTMCNC: 33.4 G/DL (ref 31.4–37.4)
MCV RBC AUTO: 92 FL (ref 82–98)
METAMYELOCYTES NFR BLD MANUAL: 4 % (ref 0–1)
MONOCYTES # BLD AUTO: 0.62 THOUSAND/UL (ref 0–1.22)
MONOCYTES NFR BLD: 7 % (ref 4–12)
MYELOCYTES NFR BLD MANUAL: 2 % (ref 0–1)
NEUTROPHILS # BLD MANUAL: 5.03 THOUSAND/UL (ref 1.85–7.62)
NEUTS BAND NFR BLD MANUAL: 10 % (ref 0–8)
NEUTS SEG NFR BLD AUTO: 47 % (ref 43–75)
PLATELET # BLD AUTO: 122 THOUSANDS/UL (ref 149–390)
PLATELET BLD QL SMEAR: ABNORMAL
PMV BLD AUTO: 10.6 FL (ref 8.9–12.7)
POTASSIUM SERPL-SCNC: 4.1 MMOL/L (ref 3.5–5.3)
PROT SERPL-MCNC: 7 G/DL (ref 6.4–8.2)
RBC # BLD AUTO: 4.05 MILLION/UL (ref 3.81–5.12)
RBC MORPH BLD: NORMAL
SODIUM SERPL-SCNC: 139 MMOL/L (ref 136–145)
TOXIC GRANULES BLD QL SMEAR: PRESENT
WBC # BLD AUTO: 8.82 THOUSAND/UL (ref 4.31–10.16)

## 2022-01-25 PROCEDURE — 36415 COLL VENOUS BLD VENIPUNCTURE: CPT

## 2022-01-25 PROCEDURE — 85027 COMPLETE CBC AUTOMATED: CPT

## 2022-01-25 PROCEDURE — 80053 COMPREHEN METABOLIC PANEL: CPT

## 2022-01-25 PROCEDURE — 85007 BL SMEAR W/DIFF WBC COUNT: CPT

## 2022-01-25 NOTE — TELEPHONE ENCOUNTER
Patient called wanted to know about MMJ staff informed her to registered and then call office to get assessment by nurse and get appt

## 2022-01-25 NOTE — TELEPHONE ENCOUNTER
Medical Marijuana Pre-Visit Screening for Palliative & Supportive Care    Referral Source: Hematology/onc  Diagnosis: Breast CA  Is the diagnosis an approved serious medical condition as outlined by [place] Act 16: yes  History/Symptoms: Nausea, bone pain    Does the patient's diagnosis fall within the current scope of our Palliative & Supportive Care practice: yes  Does the patient intend to use MMJ with palliative intent: yes    Does the patient currently have a signed controlled substances contract with another provider: no  Is the patient a resident of [place] with a valid state ID or 's license: yes  Has the patient registered on the [facility] website: yes  [email or web address]     Prior to any scheduled visit, the patient has been informed of the following:  · [facility] providers are knowledgeable about many ways to help people  A visit to discuss MMJ does not mean that the provider agrees that this is the best way to help you and may make other recommendations  · There is an expectation and requirement by the [facility] law for continuity of care if your certification is completed  · You will be expected to sign an informed consent  · A PDMP report will be reviewed before your visit  · This may effect your ability to purchase a handgun  · Medical marijuana products are not covered by insurance  The dispensaries do not accept credit cards  · The certification does not exempt you from any employer based drug screening programs and may effect your ability to participate in federally funded programs  · [facility] does not allow for medical marijuana possession or use at any of it's inpatient facilities  If it is brought it you will be asked to send it home with a designated representative (friend or family member)  If not one is available to take the product(s) home they will be stored in a secure location until you are discharged    · Please spend time becoming familiar with the information on the website before your visit: [employer]    Date of scheduled visit: 2/16/22 Dr Gillian Harding

## 2022-01-26 RX ORDER — LIDOCAINE AND PRILOCAINE 25; 25 MG/G; MG/G
CREAM TOPICAL AS NEEDED
Qty: 30 G | Refills: 1 | Status: SHIPPED | OUTPATIENT
Start: 2022-01-26

## 2022-01-27 ENCOUNTER — VBI (OUTPATIENT)
Dept: ADMINISTRATIVE | Facility: OTHER | Age: 72
End: 2022-01-27

## 2022-01-27 ENCOUNTER — HOSPITAL ENCOUNTER (OUTPATIENT)
Dept: INFUSION CENTER | Facility: CLINIC | Age: 72
Discharge: HOME | End: 2022-01-27
Payer: MEDICARE

## 2022-01-27 VITALS
HEART RATE: 84 BPM | TEMPERATURE: 97.6 F | WEIGHT: 195.33 LBS | SYSTOLIC BLOOD PRESSURE: 138 MMHG | OXYGEN SATURATION: 97 % | BODY MASS INDEX: 28.93 KG/M2 | RESPIRATION RATE: 18 BRPM | HEIGHT: 69 IN | DIASTOLIC BLOOD PRESSURE: 80 MMHG

## 2022-01-27 DIAGNOSIS — C50.911 MALIGNANT NEOPLASM OF RIGHT FEMALE BREAST, UNSPECIFIED ESTROGEN RECEPTOR STATUS, UNSPECIFIED SITE OF BREAST (HCC): ICD-10-CM

## 2022-01-27 DIAGNOSIS — D70.1 CHEMOTHERAPY INDUCED NEUTROPENIA (HCC): Primary | ICD-10-CM

## 2022-01-27 DIAGNOSIS — T45.1X5A CHEMOTHERAPY INDUCED NEUTROPENIA (HCC): Primary | ICD-10-CM

## 2022-01-27 PROCEDURE — 96377 APPLICATON ON-BODY INJECTOR: CPT

## 2022-01-27 PROCEDURE — 96413 CHEMO IV INFUSION 1 HR: CPT

## 2022-01-27 PROCEDURE — 96367 TX/PROPH/DG ADDL SEQ IV INF: CPT

## 2022-01-27 PROCEDURE — 96411 CHEMO IV PUSH ADDL DRUG: CPT

## 2022-01-27 RX ORDER — DOCUSATE SODIUM 100 MG/1
100 CAPSULE, LIQUID FILLED ORAL 2 TIMES DAILY
COMMUNITY
End: 2022-04-20 | Stop reason: ALTCHOICE

## 2022-01-27 RX ORDER — DOXORUBICIN HYDROCHLORIDE 2 MG/ML
60 INJECTION, SOLUTION INTRAVENOUS ONCE
Status: COMPLETED | OUTPATIENT
Start: 2022-01-27 | End: 2022-01-27

## 2022-01-27 RX ORDER — SODIUM CHLORIDE 9 MG/ML
20 INJECTION, SOLUTION INTRAVENOUS ONCE
Status: COMPLETED | OUTPATIENT
Start: 2022-01-27 | End: 2022-01-27

## 2022-01-27 RX ADMIN — FOSAPREPITANT 150 MG: 150 INJECTION, POWDER, LYOPHILIZED, FOR SOLUTION INTRAVENOUS at 08:48

## 2022-01-27 RX ADMIN — DOXORUBICIN HYDROCHLORIDE 122 MG: 2 INJECTION, SOLUTION INTRAVENOUS at 09:33

## 2022-01-27 RX ADMIN — SODIUM CHLORIDE 20 ML/HR: 0.9 INJECTION, SOLUTION INTRAVENOUS at 08:13

## 2022-01-27 RX ADMIN — PEGFILGRASTIM 6 MG: KIT SUBCUTANEOUS at 10:28

## 2022-01-27 RX ADMIN — CYCLOPHOSPHAMIDE 1218 MG: 1 INJECTION, POWDER, FOR SOLUTION INTRAVENOUS; ORAL at 09:53

## 2022-01-27 RX ADMIN — DEXAMETHASONE SODIUM PHOSPHATE: 10 INJECTION, SOLUTION INTRAMUSCULAR; INTRAVENOUS at 08:27

## 2022-01-27 NOTE — PROGRESS NOTES
Patient here for cycle 3 A/C and is well  She reports nausea and constipation, constipation relieved with colace and she does take zofran at home post treatment  She denies pain or other c/o/changes  Noted a retained stitch on left corner of patient's port incision  Will discuss removal with IR

## 2022-01-27 NOTE — PROGRESS NOTES
Discussed with patient how to manage her nausea with use of zofran at home  She verbalized understanding  Will print AVS and write instructions on AVS at time of discharge

## 2022-01-27 NOTE — QUICK NOTE
The patient was sent from infusion to IR to evaluate port site  The port was placed on 12/29/2021 and the incision was well-healed without erythema, though a short Vicryl suture was protruding through the lateral aspect of the incision  After prepping the area, the protruding suture was cut beneath the skin level  The patient tolerated the procedure well and left the section in satisfactory condition

## 2022-01-27 NOTE — PROGRESS NOTES
Per Megan Leone in IR, patient may come after treatment to check in to have port stitch removed  Patient aware

## 2022-01-27 NOTE — PATIENT INSTRUCTIONS
January 2022 Sunday Monday Tuesday Wednesday Thursday Friday Saturday                                 1                2     3     4    NURSE VISIT PG   1:45 PM   (15 min )   [facility] Nurse   [facility]    LAB WALK IN   2:15 PM   (5 min )   AN MOB LAB PSC CHAIR 1   [facility] [address] MOB 5     6     7     8                9     10     11    LAB WALK IN  10:10 AM   (5 min )   BE 8THAVE CHAIR 1   [facility] [address]    FOLLOW UP PG   8:25 AM   (20 min )   Lauren Ramierz MD   [facility] [place] 13    INF ONCOLOGY TX-TREATMENT PLAN   8:30 AM   (180 min )   AN INF CHAIR [address] 14     15           Cycle 2, Day 1     16     17     18     19     20     21     22                23     24     25    LAB WALK IN  10:05 AM   (5 min )   BE 8THAVE CHAIR 1   [facility] [address]     27    INF ONCOLOGY TX-TREATMENT PLAN   8:00 AM   (180 min )   AN INF CHAIR [address] 28     29           Cycle 3, Day 1     30     31                                              Treatment Details       1/13/2022 - Cycle 2, Day 1      Chemotherapy: ONCBCN PROVIDER COMMUNICATION7, DOXOrubicin (ADRIAMYCIN), CYCLOPHOSPHAMIDE IVPB      Supportive Care: [facility] PROVIDER COMMUNICATION7, pegfilgrastim (Hershell Friar)    1/27/2022 - Cycle 3, Day 1      Chemotherapy: ONCBCN PROVIDER COMMUNICATION7, DOXOrubicin (ADRIAMYCIN), CYCLOPHOSPHAMIDE IVPB      Supportive Care: [facility] PROVIDER COMMUNICATION7, pegfilgrastim (Hershell Friar)        February 2022 Sunday Monday Tuesday Wednesday Thursday Friday Saturday             1     2     3     4     5                6     7     8    FOLLOW UP PG  10:05 AM   (20 min )   Lauren Ramirez MD   [facility] [place] 9     10    INF ONCOLOGY TX-TREATMENT PLAN   8:30 AM   (180 min )   AN INF BED 1   St  [address] 11     12           Cycle 4, Day 1     13     14     15     16    CONSULT PG   9:45 AM   (60 min )   Xavier Dixon MD   [address] [facility] 17     18     19                20     21     22     23     24    INF ONCOLOGY TX-TREATMENT PLAN   8:30 AM   (330 min )   AN INF CHAIR Adrián Na Champion [address] [facility] 25     26           Cycle 5, Day 1     27     28                                              Treatment Details       2/10/2022 - Cycle 4, Day 1      Chemotherapy: ONCBCN PROVIDER COMMUNICATION7, DOXOrubicin (ADRIAMYCIN), CYCLOPHOSPHAMIDE IVPB      Supportive Care: ONCBCN PROVIDER COMMUNICATION7, pegfilgrastim (NEULASTA ONPRO)    2/24/2022 - Cycle 5, Day 1      Chemotherapy: ONCBCN PROVIDER COMMUNICATION2, PACLITAXEL IVPB      Supportive Care: [facility], pegfilgrastim (Kate Marchi)

## 2022-02-07 ENCOUNTER — APPOINTMENT (OUTPATIENT)
Dept: LAB | Facility: CLINIC | Age: 72
End: 2022-02-07
Payer: MEDICARE

## 2022-02-07 DIAGNOSIS — C50.911 MALIGNANT NEOPLASM OF RIGHT FEMALE BREAST, UNSPECIFIED ESTROGEN RECEPTOR STATUS, UNSPECIFIED SITE OF BREAST (HCC): ICD-10-CM

## 2022-02-07 DIAGNOSIS — T45.1X5A CHEMOTHERAPY INDUCED NEUTROPENIA (HCC): ICD-10-CM

## 2022-02-07 DIAGNOSIS — D70.1 CHEMOTHERAPY INDUCED NEUTROPENIA (HCC): ICD-10-CM

## 2022-02-07 LAB
ALBUMIN SERPL BCP-MCNC: 3.4 G/DL (ref 3.5–5)
ALP SERPL-CCNC: 95 U/L (ref 46–116)
ALT SERPL W P-5'-P-CCNC: 24 U/L (ref 12–78)
ANION GAP SERPL CALCULATED.3IONS-SCNC: 6 MMOL/L (ref 4–13)
ARTIFACT: PRESENT
AST SERPL W P-5'-P-CCNC: 15 U/L (ref 5–45)
BASOPHILS # BLD MANUAL: 0.13 THOUSAND/UL (ref 0–0.1)
BASOPHILS NFR MAR MANUAL: 2 % (ref 0–1)
BILIRUB SERPL-MCNC: 0.24 MG/DL (ref 0.2–1)
BUN SERPL-MCNC: 10 MG/DL (ref 5–25)
CALCIUM ALBUM COR SERPL-MCNC: 9.8 MG/DL (ref 8.3–10.1)
CALCIUM SERPL-MCNC: 9.3 MG/DL (ref 8.3–10.1)
CHLORIDE SERPL-SCNC: 108 MMOL/L (ref 100–108)
CO2 SERPL-SCNC: 28 MMOL/L (ref 21–32)
CREAT SERPL-MCNC: 0.68 MG/DL (ref 0.6–1.3)
EOSINOPHIL # BLD MANUAL: 0 THOUSAND/UL (ref 0–0.4)
EOSINOPHIL NFR BLD MANUAL: 0 % (ref 0–6)
ERYTHROCYTE [DISTWIDTH] IN BLOOD BY AUTOMATED COUNT: 11.9 % (ref 11.6–15.1)
GFR SERPL CREATININE-BSD FRML MDRD: 87 ML/MIN/1.73SQ M
GIANT PLATELETS BLD QL SMEAR: PRESENT
GLUCOSE SERPL-MCNC: 127 MG/DL (ref 65–140)
HCT VFR BLD AUTO: 33.4 % (ref 34.8–46.1)
HGB BLD-MCNC: 11.3 G/DL (ref 11.5–15.4)
LYMPHOCYTES # BLD AUTO: 0.99 THOUSAND/UL (ref 0.6–4.47)
LYMPHOCYTES # BLD AUTO: 15 % (ref 14–44)
MCH RBC QN AUTO: 30.9 PG (ref 26.8–34.3)
MCHC RBC AUTO-ENTMCNC: 33.8 G/DL (ref 31.4–37.4)
MCV RBC AUTO: 91 FL (ref 82–98)
METAMYELOCYTES NFR BLD MANUAL: 1 % (ref 0–1)
MONOCYTES # BLD AUTO: 0.4 THOUSAND/UL (ref 0–1.22)
MONOCYTES NFR BLD: 6 % (ref 4–12)
NEUTROPHILS # BLD MANUAL: 4.94 THOUSAND/UL (ref 1.85–7.62)
NEUTS BAND NFR BLD MANUAL: 12 % (ref 0–8)
NEUTS SEG NFR BLD AUTO: 63 % (ref 43–75)
PLATELET # BLD AUTO: 129 THOUSANDS/UL (ref 149–390)
PLATELET BLD QL SMEAR: ABNORMAL
PMV BLD AUTO: 11 FL (ref 8.9–12.7)
POLYCHROMASIA BLD QL SMEAR: PRESENT
POTASSIUM SERPL-SCNC: 3.7 MMOL/L (ref 3.5–5.3)
PROT SERPL-MCNC: 6.7 G/DL (ref 6.4–8.2)
RBC # BLD AUTO: 3.66 MILLION/UL (ref 3.81–5.12)
RBC MORPH BLD: PRESENT
SODIUM SERPL-SCNC: 142 MMOL/L (ref 136–145)
VARIANT LYMPHS # BLD AUTO: 1 %
WBC # BLD AUTO: 6.59 THOUSAND/UL (ref 4.31–10.16)

## 2022-02-07 PROCEDURE — 85027 COMPLETE CBC AUTOMATED: CPT

## 2022-02-07 PROCEDURE — 80053 COMPREHEN METABOLIC PANEL: CPT

## 2022-02-07 PROCEDURE — 85007 BL SMEAR W/DIFF WBC COUNT: CPT

## 2022-02-07 PROCEDURE — 36415 COLL VENOUS BLD VENIPUNCTURE: CPT

## 2022-02-08 ENCOUNTER — OFFICE VISIT (OUTPATIENT)
Dept: HEMATOLOGY ONCOLOGY | Facility: CLINIC | Age: 72
End: 2022-02-08
Payer: MEDICARE

## 2022-02-08 VITALS
BODY MASS INDEX: 29.03 KG/M2 | SYSTOLIC BLOOD PRESSURE: 118 MMHG | DIASTOLIC BLOOD PRESSURE: 74 MMHG | RESPIRATION RATE: 18 BRPM | WEIGHT: 196 LBS | HEART RATE: 83 BPM | TEMPERATURE: 98.6 F | OXYGEN SATURATION: 97 % | HEIGHT: 69 IN

## 2022-02-08 DIAGNOSIS — C50.911 MALIGNANT NEOPLASM OF RIGHT BREAST IN FEMALE, ESTROGEN RECEPTOR POSITIVE, UNSPECIFIED SITE OF BREAST (HCC): Primary | ICD-10-CM

## 2022-02-08 DIAGNOSIS — Z17.0 MALIGNANT NEOPLASM OF RIGHT BREAST IN FEMALE, ESTROGEN RECEPTOR POSITIVE, UNSPECIFIED SITE OF BREAST (HCC): Primary | ICD-10-CM

## 2022-02-08 DIAGNOSIS — Z17.0 MALIGNANT NEOPLASM OF OVERLAPPING SITES OF RIGHT BREAST IN FEMALE, ESTROGEN RECEPTOR POSITIVE (HCC): ICD-10-CM

## 2022-02-08 DIAGNOSIS — T45.1X5A CHEMOTHERAPY INDUCED NEUTROPENIA (HCC): ICD-10-CM

## 2022-02-08 DIAGNOSIS — C50.911 MALIGNANT NEOPLASM OF RIGHT FEMALE BREAST, UNSPECIFIED ESTROGEN RECEPTOR STATUS, UNSPECIFIED SITE OF BREAST (HCC): ICD-10-CM

## 2022-02-08 DIAGNOSIS — C50.811 MALIGNANT NEOPLASM OF OVERLAPPING SITES OF RIGHT BREAST IN FEMALE, ESTROGEN RECEPTOR POSITIVE (HCC): ICD-10-CM

## 2022-02-08 DIAGNOSIS — D70.1 CHEMOTHERAPY INDUCED NEUTROPENIA (HCC): ICD-10-CM

## 2022-02-08 PROCEDURE — 99214 OFFICE O/P EST MOD 30 MIN: CPT | Performed by: INTERNAL MEDICINE

## 2022-02-08 RX ORDER — SODIUM CHLORIDE 9 MG/ML
20 INJECTION, SOLUTION INTRAVENOUS ONCE
Status: CANCELLED | OUTPATIENT
Start: 2022-03-10

## 2022-02-08 RX ORDER — SODIUM CHLORIDE 9 MG/ML
20 INJECTION, SOLUTION INTRAVENOUS ONCE
Status: CANCELLED | OUTPATIENT
Start: 2022-02-24

## 2022-02-08 NOTE — PROGRESS NOTES
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, HER2 fish negative disease  Based on MRI, she may have 9 mm of tumor with clear skin thickening in her right breast   Inflammatory breast cancer was confirmed by the biopsy with dermal invasion  She is currently on neoadjuvant chemotherapy with dose dense AC with excellent tolerance  Based on the scan appearance on the right breast, this is probably responding disease  I recommended her to continue current regimen  4th cycle of AC to be given later this week, assuming that she has adequate ANC  Subsequently, she is going to have dose dense paclitaxel  Side effects of paclitaxel was thoroughly discussed, including but not limited to minimal nausea, allergic reaction, neuropathy, neutropenia, risk infection  She understood and wished to proceed    I will see her again in 4 weeks prior to the 2nd dose of paclitaxel              Subjective:      HPI:  A 42-year-old postmenopausal woman noticed red spot on the skin in her right breast in October 2021   She brought this to medical attention   Mammography did not show any mass   However, ultrasound shows 6 mm of skin thickening in her right breast at 4:00 a m  position   She was seen by Dr Yaakov Cobb who did a skin biopsy   Skin biopsy was positive for dermal lymphatic invasion of carcinoma, consistent with breast primary   ER, WY and HER2 are pending at this time   She subsequent underwent CT scan of chest abdomen pelvis which showed right breast skin thickening but no evidence the distant metastasis   She presents today with her  and daughter-in-law to discuss the medical treatment for her breast cancer   She has no complaint of pain   She is afebrile  Alyce Esteban has no significant past medical history  Ole Etienne mother  of ovarian cancer at age of 62   Therefore, she underwent prophylactic hysterectomy and bilateral oophorectomy in 45   She also have cholecystectomy as a surgical past history   She currently feels well   She has no complaint of pain   Her weight is stable   She has no respiratory symptoms  Jolene Troy is a lifetime never smoker   Her performance status is normal            Interval History:   A 79-year-old postmenopausal woman with inflammatory right breast cancer, grade 2, ER 90 percent positive, FL 70 percent positive, HER2 fish negative disease  Her right breast mass is not easy to evaluate  Even MRI showed only 9 mm of mass with skin thickening  She started neoadjuvant chemotherapy with dose dense AC  She is going to have 4th cycle later this week  She has been tolerating AC very well  However, she has significant nausea but no vomiting  She also have slowly progressive fatigue  She has no febrile episode  She has no complaint of pain  She denied respiratory symptoms  She is maintaining her weight  Her performance status is normal         Objective:      Primary Diagnosis:     Right inflammatory breast cancer  Grade 2  ER 90% positive, FL 70% positive, HER2 fish negative disease  Diagnosed in 2021      Cancer Staging:  Cancer Staging  No matching staging information was found for the patient         Previous Hematologic/ Oncologic Treatment:            Current Hematologic/ Oncologic Treatment:       Neoadjuvant chemotherapy with dose dense AC x4 followed by dose dense paclitaxel x4   2nd cycle of AC to be given in 2021      Disease Status:      Not evaluated at this time      Test Results:     Pathology:     Right breast skin biopsy showed dermal and lymphatic invasion of carcinoma, consistent with breast primary  At least grade 2  ER 90% positive, FL 70% positive, HER2 2+ disease    HER2 fish was negative for gene application with ratio of 1 2      Radiology:     CT scan of chest abdomen pelvis showed no evidence of distant metastasis      Breast ultrasound shows 6 mm of skin thickening at 4:00 a m  position with no visible underline mass      Echocardiogram showed ejection fraction 60 percent in December 2021      Laboratory:     See below      Physical Exam:        General Appearance:    Alert, oriented          Eyes:    PERRL   Ears:    Normal external ear canals, both ears   Nose:   Nares normal, septum midline   Throat:   Mucosa moist  Pharynx without injection  Neck:   Supple         Lungs:     Clear to auscultation bilaterally   Chest Wall:    No tenderness or deformity    Heart:    Regular rate and rhythm         Abdomen:     Soft, non-tender, bowel sounds +, no organomegaly               Extremities:   Extremities no cyanosis or edema         Skin:   no rash or icterus  Lymph nodes:   Cervical, supraclavicular, and axillary nodes normal   Neurologic:   CNII-XII intact, normal strength, sensation and reflexes     Throughout             Breast exam: Previous 10 cm area of erythematous change is clearly less and appear less edematous   No palpable right axillary adenopathy   Left breast exam is negative  ROS: Review of Systems   All other systems reviewed and are negative  Imaging: No results found        Labs:   Lab Results   Component Value Date    WBC 6 59 02/07/2022    HGB 11 3 (L) 02/07/2022    HCT 33 4 (L) 02/07/2022    MCV 91 02/07/2022     (L) 02/07/2022     Lab Results   Component Value Date     07/03/2015    K 3 7 02/07/2022     02/07/2022    CO2 28 02/07/2022    ANIONGAP 8 07/03/2015    BUN 10 02/07/2022    CREATININE 0 68 02/07/2022    GLUCOSE 83 07/03/2015    GLUF 102 (H) 01/25/2022    CALCIUM 9 3 02/07/2022    CORRECTEDCA 9 8 02/07/2022    AST 15 02/07/2022    ALT 24 02/07/2022    ALKPHOS 95 02/07/2022    PROT 7 7 07/03/2015    BILITOT 0 91 07/03/2015    EGFR 87 02/07/2022         Lab Results   Component Value Date    IRON 116 04/11/2016         Current Medications: Reviewed  Allergies: Reviewed  PMH/FH/SH:  Reviewed      Vital Sign:    Body surface area is 2 04 meters squared      Wt Readings from Last 3 Encounters:   02/08/22 88 9 kg (196 lb)   01/27/22 88 6 kg (195 lb 5 2 oz)   01/13/22 90 kg (198 lb 8 oz)        Temp Readings from Last 3 Encounters:   02/08/22 98 6 °F (37 °C) (Tympanic Core)   01/27/22 97 6 °F (36 4 °C) (Temporal)   01/13/22 (!) 97 4 °F (36 3 °C) (Temporal)        BP Readings from Last 3 Encounters:   02/08/22 118/74   01/27/22 138/80   01/13/22 124/70         Pulse Readings from Last 3 Encounters:   02/08/22 83   01/27/22 84   01/13/22 62     @LASTSAO2(3)@

## 2022-02-10 ENCOUNTER — HOSPITAL ENCOUNTER (OUTPATIENT)
Dept: INFUSION CENTER | Facility: CLINIC | Age: 72
Discharge: HOME | End: 2022-02-10
Payer: MEDICARE

## 2022-02-10 ENCOUNTER — DOCUMENTATION (OUTPATIENT)
Dept: HEMATOLOGY ONCOLOGY | Facility: CLINIC | Age: 72
End: 2022-02-10

## 2022-02-10 VITALS
SYSTOLIC BLOOD PRESSURE: 122 MMHG | HEART RATE: 75 BPM | HEIGHT: 69 IN | RESPIRATION RATE: 18 BRPM | BODY MASS INDEX: 28.58 KG/M2 | OXYGEN SATURATION: 97 % | WEIGHT: 193 LBS | TEMPERATURE: 97.8 F | DIASTOLIC BLOOD PRESSURE: 72 MMHG

## 2022-02-10 DIAGNOSIS — D70.1 CHEMOTHERAPY INDUCED NEUTROPENIA (HCC): Primary | ICD-10-CM

## 2022-02-10 DIAGNOSIS — R11.0 CHEMOTHERAPY-INDUCED NAUSEA: Primary | ICD-10-CM

## 2022-02-10 DIAGNOSIS — T45.1X5A CHEMOTHERAPY INDUCED NEUTROPENIA (HCC): Primary | ICD-10-CM

## 2022-02-10 DIAGNOSIS — T45.1X5A CHEMOTHERAPY-INDUCED NAUSEA: Primary | ICD-10-CM

## 2022-02-10 DIAGNOSIS — C50.911 MALIGNANT NEOPLASM OF RIGHT FEMALE BREAST, UNSPECIFIED ESTROGEN RECEPTOR STATUS, UNSPECIFIED SITE OF BREAST (HCC): ICD-10-CM

## 2022-02-10 PROCEDURE — 96413 CHEMO IV INFUSION 1 HR: CPT

## 2022-02-10 PROCEDURE — 96367 TX/PROPH/DG ADDL SEQ IV INF: CPT

## 2022-02-10 PROCEDURE — 96411 CHEMO IV PUSH ADDL DRUG: CPT

## 2022-02-10 PROCEDURE — 96377 APPLICATON ON-BODY INJECTOR: CPT

## 2022-02-10 RX ORDER — PROCHLORPERAZINE MALEATE 10 MG
10 TABLET ORAL EVERY 6 HOURS PRN
Qty: 30 TABLET | Refills: 0 | Status: SHIPPED | OUTPATIENT
Start: 2022-02-10 | End: 2022-02-16 | Stop reason: SINTOL

## 2022-02-10 RX ORDER — SODIUM CHLORIDE 9 MG/ML
20 INJECTION, SOLUTION INTRAVENOUS ONCE
Status: COMPLETED | OUTPATIENT
Start: 2022-02-10 | End: 2022-02-10

## 2022-02-10 RX ORDER — DOXORUBICIN HYDROCHLORIDE 2 MG/ML
60 INJECTION, SOLUTION INTRAVENOUS ONCE
Status: COMPLETED | OUTPATIENT
Start: 2022-02-10 | End: 2022-02-10

## 2022-02-10 RX ADMIN — DOXORUBICIN HYDROCHLORIDE 122 MG: 2 INJECTION, SOLUTION INTRAVENOUS at 10:15

## 2022-02-10 RX ADMIN — SODIUM CHLORIDE 20 ML/HR: 0.9 INJECTION, SOLUTION INTRAVENOUS at 09:11

## 2022-02-10 RX ADMIN — CYCLOPHOSPHAMIDE 1218 MG: 1 INJECTION, POWDER, FOR SOLUTION INTRAVENOUS; ORAL at 10:37

## 2022-02-10 RX ADMIN — PEGFILGRASTIM 6 MG: KIT SUBCUTANEOUS at 11:17

## 2022-02-10 RX ADMIN — DEXAMETHASONE SODIUM PHOSPHATE: 10 INJECTION, SOLUTION INTRAMUSCULAR; INTRAVENOUS at 09:10

## 2022-02-10 RX ADMIN — FOSAPREPITANT 150 MG: 150 INJECTION, POWDER, LYOPHILIZED, FOR SOLUTION INTRAVENOUS at 09:34

## 2022-02-10 NOTE — PROGRESS NOTES
Patient is here for adriamycin, Cytoxan and Neulasta onpro  Labs reviewed from 2/7, within treatment parameters  Port was accessed with good blood return noted  Patient resting with no complains  Will continue to monitor

## 2022-02-10 NOTE — PROGRESS NOTES
Patient tolerated infusion without complications  Port was flushed with good blood return noted  Tad Belling applied to L arm, green light flashing, pt aware of medication injection and completion time  Pt aware of  next appointment  Appointment list provided

## 2022-02-10 NOTE — PROGRESS NOTES
Received request from clinical that patient needed a new prior auth for her emla cream  Auth has been submitted and is pending via cover my meds    Key: [id number]    [employer]  BIN: [id number]  PCN: [id number]  GRP: PSR ID: [id number]

## 2022-02-16 ENCOUNTER — CONSULT (OUTPATIENT)
Dept: PALLIATIVE MEDICINE | Facility: CLINIC | Age: 72
End: 2022-02-16
Payer: MEDICARE

## 2022-02-16 VITALS
TEMPERATURE: 97.3 F | WEIGHT: 189 LBS | SYSTOLIC BLOOD PRESSURE: 120 MMHG | OXYGEN SATURATION: 97 % | BODY MASS INDEX: 27.99 KG/M2 | DIASTOLIC BLOOD PRESSURE: 68 MMHG | RESPIRATION RATE: 18 BRPM | HEIGHT: 69 IN | HEART RATE: 94 BPM

## 2022-02-16 DIAGNOSIS — Z17.0 MALIGNANT NEOPLASM OF RIGHT BREAST IN FEMALE, ESTROGEN RECEPTOR POSITIVE, UNSPECIFIED SITE OF BREAST (HCC): Primary | ICD-10-CM

## 2022-02-16 DIAGNOSIS — R11.0 CHEMOTHERAPY-INDUCED NAUSEA: ICD-10-CM

## 2022-02-16 DIAGNOSIS — F32.A DEPRESSION, UNSPECIFIED DEPRESSION TYPE: ICD-10-CM

## 2022-02-16 DIAGNOSIS — R63.0 ANOREXIA: ICD-10-CM

## 2022-02-16 DIAGNOSIS — F41.9 ANXIETY: ICD-10-CM

## 2022-02-16 DIAGNOSIS — C50.911 MALIGNANT NEOPLASM OF RIGHT BREAST IN FEMALE, ESTROGEN RECEPTOR POSITIVE, UNSPECIFIED SITE OF BREAST (HCC): Primary | ICD-10-CM

## 2022-02-16 DIAGNOSIS — G89.3 CANCER RELATED PAIN: ICD-10-CM

## 2022-02-16 DIAGNOSIS — G47.01 INSOMNIA DUE TO MEDICAL CONDITION: ICD-10-CM

## 2022-02-16 DIAGNOSIS — Z51.5 PALLIATIVE CARE PATIENT: ICD-10-CM

## 2022-02-16 DIAGNOSIS — T45.1X5A CHEMOTHERAPY-INDUCED NAUSEA: ICD-10-CM

## 2022-02-16 DIAGNOSIS — Z79.899 MEDICAL MARIJUANA USE: ICD-10-CM

## 2022-02-16 PROCEDURE — 99205 OFFICE O/P NEW HI 60 MIN: CPT | Performed by: INTERNAL MEDICINE

## 2022-02-16 RX ORDER — ACETAMINOPHEN 500 MG
1000 TABLET ORAL 3 TIMES DAILY
Qty: 180 TABLET | Refills: 2
Start: 2022-02-16

## 2022-02-16 RX ORDER — ONDANSETRON HYDROCHLORIDE 8 MG/1
8 TABLET, FILM COATED ORAL EVERY 8 HOURS PRN
Qty: 40 TABLET | Refills: 2 | Status: SHIPPED | OUTPATIENT
Start: 2022-02-16

## 2022-02-16 NOTE — PROGRESS NOTES
Consult to Palliative and Supportive Care  Stephanie Courser 66 y o  female [id number]    ASSESSMENT & PLAN:  1  Malignant neoplasm of right breast in female, estrogen receptor positive, unspecified site of breast ([address] )    2  Anxiety    3  Depression, unspecified depression type    4  Cancer related pain    5  Chemotherapy-induced nausea    6  Insomnia due to medical condition    7  Anorexia    8  Medical marijuana use    9  Palliative care patient           Time spent introducing the concept of palliative care in general, and the [facility] offering in specific  Assessed patient's understanding of her palliative diagnosis and current plan of treatment   Continue disease-directed cares; patient has "hope for a cure"   Continue Xanax PRN anxiousness; she is using rarely   Continue Lexapro for mood   Patient c/o bone pain, joint pain, myalgia but manages this w/ Tylenol; she does not feel opioids are needed but is hopeful MMJ might help w/ pain   Counseled on Tylenol for pain, using topical heat, using OTC topical agents for pain  Do not use heat in conjunction w/ topical products   Counseled on bowel regimen for constipation   Increasing Zofran to 8mg/dose as 4mg was ineffective  Discontinuing Compazine as it caused dizziness   The patient qualifies for use of MMJ in the [facility] by having the following medical condition: breast cancer  o From a palliative care standpoint the patient is suffering with cancer-related pain including bone pain + joint pain + myalgia, loss of appetite, chemo-induced nausea, insomnia, anxiousness  These symptoms and side effects might be alleviated with use of MMJ products  The patient registered online (patient ID #[id number])  The patient read and I reviewed the informed consent document with the patient  I answered all questions related to it before the patient signed it  The patient's medical certification was completed on this date (certification #[id number])   The patient was given a signed copy of the informed consent and medical certification  o The patient accepts extensive counseling today on MMJ risks and benefits, legal concerns, possible adverse effects, routes of administration, active ingredients such as THC+CBD, etc   o I issued a certification for MMJ use with palliative intent, to help alleviate cancer-related symptoms and cancer-treatment-related side effects  I do not endorse the belief that MMJ can treat cancer and strongly encouraged the patient to continue treatments and surveillance as recommend by cancer specialists  o I suggested the patient ask her  to register as an MMJ caregiver so they can go with the patient to the dispensary, or in the patient's stead  Information provided on the 3nder caregiver program   o Consider PO products for relief of multiple symptoms; consider transdermal products as well as PO products for pain   Patient has completed a living will but wished to make some changes (add her eldest daughter to the surrogate decision-maker list)  She accepts a copy of [facility] along with counseling  ACP may be reviewed in detail at next visit   Reviewed notes (Medical Oncology), labs (2/7/22: CR 0 68, alb 3 4, Hb 11 3), imaging + procedures (12/14/21 bone scan, 12/13/21 MRI b/l breast, 12/11/21 CTCAP, 12/16/21 TTE w/ normal EF)   Emotional support provided   Medication safety issues addressed - no driving under the influence of narcotics, watch for adverse effects including AMS and respiratory depression, keep medications stored in a safe/locked environment        Requested Prescriptions     Signed Prescriptions Disp Refills    acetaminophen (TYLENOL) 500 mg tablet 180 tablet 2     Sig: Take 2 tablets (1,000 mg total) by mouth 3 (three) times a day    ondansetron (ZOFRAN) 8 mg tablet 40 tablet 2     Sig: Take 1 tablet (8 mg total) by mouth every 8 (eight) hours as needed for nausea or vomiting       Medications Discontinued During This Encounter   Medication Reason    prochlorperazine (COMPAZINE) 10 mg tablet Side effects    ondansetron (ZOFRAN) 4 mg tablet Reorder       Representatives have queried the patient's controlled substance dispensing history in the Prescription Drug Monitoring Program in compliance with regulations before I have prescribed any controlled substances  The prescription history is consistent with prescribed therapy and our practice policies  60+ minutes were spent face to face with patient and family ( Earline Montgomery) with greater than 50% of the time spent in counseling or coordination of care including discussions of symptom assessment and management, medication review and adjustment, psychosocial support, chart review, imaging review, lab review, advanced directives, goals of care, medical marijuana, supportive listening and anticipatory guidance  All of the patient's questions were answered during this discussion  Return in about 2 months (around 4/16/2022)  SUBJECTIVE:  Chief Complaint   Patient presents with    Cancer    Cancer Pain    Nausea    Anxiety    Depression    Counseling    Consult        HPI    Don Sorto is a 66 y o  female w/ ER+FL+ inflammatory right breast cancer (diagnosed 12/2021) on dose-dense AC followed by paclitaxel; anxiety, depression  She follows w/ Dr Pamella Blake (Medical Oncology)  Patient is new to [facility] clinic; self-referral for [facility] certification / symptom management  Patient denies significant pain when asked, but does eventually endorses some aching bone pain, some discomfort in her back and legs  She primarily uses Tylenol for this and does not feel stronger medications are warranted  She endorses some occasional and appropriate anxiousness and dysphoric mood; she takes Lexapro, and rarely uses prescribed Xanax  Sleep can be problematic as she can feel restless after her cancer treatments   She endorses some occasional constipation and has primarily been using Colace for this  Patient's primary symptom concerns today include loss of appetite, and nausea  Her nausea can be constant for many days after her AC infusions  She had tried Compazine but that caused dizziness; she feels the 4mg dosing of Zofran was not meeting her needs  Nausea is significantly affecting her quality of life  Patient would like to certify for medical marijuana for assistance w/ multiple symptoms in the setting of malignancy and cancer treatments  She is transitioning off [facility] w/ plan to start paclitaxel, which may cause some changes in her symptomatology  Patient is realistic about her disease but feels that there is hope for a cure, with appropriate treatment  She would like to continue disease-directed cares  She is well-supported by her family, primarily her  Jan Alejandre  Patient's RONALD  s/t breast cancer; she had been supported by a Palliative team so the patient is familiar with our offering  PDMP shows no concerns  The following portions of the medical history were reviewed: past medical history, problem list, medication list, and social history  Current Outpatient Medications:     al mag oxide-diphenhydramine-lidocaine viscous (MAGIC MOUTHWASH) 1:1:1 suspension, Swish and spit 10 mL every 4 (four) hours as needed for mouth pain or discomfort, Disp: 90 mL, Rfl: 1    ALPRAZolam (XANAX) 0 25 mg tablet, Take 1/2 to 1 pill Q 8 hours p r n   For anxiety, Disp: 30 tablet, Rfl: 0    Calcium 250 MG CAPS, Take by mouth, Disp: , Rfl:     cholecalciferol (VITAMIN D3) 1,000 units tablet, Take 2 capsules by mouth daily, Disp: , Rfl:     CRANIAL PROSTHESIS, RX,, One wig as needed, Disp: 1 each, Rfl: 0    docusate sodium (COLACE) 100 mg capsule, Take 100 mg by mouth 2 (two) times a day, Disp: , Rfl:     escitalopram (LEXAPRO) 20 mg tablet, Take 1 tablet (20 mg total) by mouth daily, Disp: 90 tablet, Rfl: 2    Fish Oil-Krill Oil (KRILL OIL PLUS) CAPS, Take by mouth, Disp: , Rfl:     fluocinonide (LIDEX) 0 05 % external solution, APPLY TO SCALP ONCE DAILY AS NEEDED FOR ITCH, Disp: , Rfl: 2    ketoconazole (NIZORAL) 2 % shampoo, PLEASE SEE ATTACHED FOR DETAILED DIRECTIONS, Disp: , Rfl: 5    lidocaine-prilocaine (EMLA) cream, Apply topically as needed for mild pain, Disp: 30 g, Rfl: 1    ondansetron (ZOFRAN) 8 mg tablet, Take 1 tablet (8 mg total) by mouth every 8 (eight) hours as needed for nausea or vomiting, Disp: 40 tablet, Rfl: 2    acetaminophen (TYLENOL) 500 mg tablet, Take 2 tablets (1,000 mg total) by mouth 3 (three) times a day, Disp: 180 tablet, Rfl: 2    Review of Systems   Constitutional: Positive for activity change, appetite change, fatigue and unexpected weight change  HENT: Negative for trouble swallowing  Eyes: Negative for pain and redness  Respiratory: Negative for shortness of breath  Cardiovascular: Negative for chest pain  Gastrointestinal: Positive for constipation, nausea and vomiting  Negative for abdominal pain and diarrhea  Endocrine: Negative for polydipsia and polyphagia  Musculoskeletal: Positive for arthralgias, back pain and myalgias  Aching bone pain  Skin: Negative for pallor  Allergic/Immunologic: Positive for immunocompromised state  Neurological: Positive for dizziness (s/t Compazine; now resolved)  Negative for facial asymmetry  Psychiatric/Behavioral: Positive for dysphoric mood and sleep disturbance  Negative for agitation, behavioral problems, confusion and decreased concentration  The patient is nervous/anxious  OBJECTIVE:  /68 (BP Location: Left arm, Patient Position: Sitting, Cuff Size: Standard)   Pulse 94   Temp (!) 97 3 °F (36 3 °C) (Temporal)   Resp 18   Ht 5' 8 5" (1 74 m)   Wt 85 7 kg (189 lb)   SpO2 97%   BMI 28 32 kg/m²   Physical Exam  Vitals reviewed  Constitutional:       General: She is not in acute distress  Appearance: She is well-developed and well-groomed   She is ill-appearing (chronically)  She is not toxic-appearing  HENT:      Head: Normocephalic and atraumatic  Right Ear: External ear normal       Left Ear: External ear normal    Eyes:      General: No scleral icterus  Right eye: No discharge  Left eye: No discharge  Extraocular Movements: Extraocular movements intact  Conjunctiva/sclera: Conjunctivae normal       Pupils: Pupils are equal, round, and reactive to light  Cardiovascular:      Rate and Rhythm: Normal rate  Pulmonary:      Effort: Pulmonary effort is normal  No tachypnea, bradypnea, accessory muscle usage or respiratory distress  Abdominal:      General: There is no distension  Tenderness: There is no guarding  Musculoskeletal:      Right lower leg: No edema  Left lower leg: No edema  Skin:     General: Skin is dry  Coloration: Skin is not pale  Neurological:      Mental Status: She is alert and oriented to person, place, and time  Cranial Nerves: No dysarthria or facial asymmetry  Psychiatric:         Attention and Perception: Attention normal          Mood and Affect: Mood and affect normal          Speech: Speech normal          Behavior: Behavior normal  Behavior is cooperative  Thought Content: Thought content normal          Cognition and Memory: Cognition and memory normal          Judgment: Judgment normal           Becca Davis MD  [facility]iative [facility] Supportive Care  [phone]    Portions of this document may have been created using dictation software and as such some "sound alike" terms may have been generated by the system  Do not hesitate to contact me with any questions or clarifications

## 2022-02-16 NOTE — PATIENT INSTRUCTIONS
It was good to see you today  Thank you for coming in  · Continue current medications, including Xanax (as needed)  · For back pain, muscle pain, joint pain:  · Try a heating pad or ice pack (you can alternate these about 30 minutes apart) for neck and back pain  · You may consider topical lidocaine products as well (available over-the-counter; brand names include Salonpas, Biofreeze, Aspercreme, [address], etc)  These can be patches, creams or roll-on gels  Do not use topical product under a heating pad; ensure skin is warm and dry  · Tylenol, 1000mg three times per day every day, can be a safe and effective way to reduce chronic pain  · We discussed medical marijuana (MMJ) today and I have issued a certification for its use  You should expect an e-mail from the [place] Dept of Health verifying this  They will mail you your [facility] ([email or web address]) certification card  · You and I reviewed the MMJ informed consent document today  We are providing you with a copy of the signed document  · List of dispensaries provided  Once you have your MMJ card, please call your preferred dispensary to set up an appointment with the dispensary pharmacist to review symptoms and product options  · Symptoms to discuss: cancer-related pain including myalgia and bone pain, loss of appetite, chemo-induced nausea, insomnia, anxiousness  · Consider transdermal lotions as well as oral products  · I recommend you ask your  or other trusted family member or friend to register as an [facility] [employer] ([email or web address]) caregiver (otherwise no one can go into the dispensary with you)  They register on the same site you did (www [employer] [place] [employer])  They may need a background check  · Consider topical / transdermal products for pain; oral product may help with any of the above symptoms  · Your sister may wish to ask her Rheumatologist if MMJ is right for her  · For constipation:  · Drink PLENTY of water  This is important to keep the gut moving    · Some people have success w/ using prunes, prune juice, certain fruits, wheat germ, or fiber gummies  · Try a probiotic  This could be yogurt or kefir, or fermented beverages such as kombucha, but probiotics are also available in capsule form  Aim for 10-15 billion colony-forming-units, w/ bacteria such as Lactobacillus / Saccharomyces / Actinomyces  · Take Benefiber or Miralax (or Citrucel or Metamucil or Bananatrol, etc) daily  · Colace is good for softening hard stools, but does not stimulate the bowel to move things along  · You can use senna, 1 to 2 tabs, once or twice daily as needed for constipation  Use as directed on the box/bottle  Consider senna tea if regular senna tablets are too harsh on your stomach  · Should that not be enough for your constipation, you can try Dulcolax, Milk of Magnesia, and/or magnesium citrate  · Should that not be enough, consider an enema  · All of these medications are available over-the-counter  · A copy of [facility] [employer] was provided; please review and discuss w/ your family  We can discuss it at our next visit  When complete, you may bring it in to any [address] [place] [address]'s appointment where staff can witness your signature and scan it in to the system  · Return in about 2 months  · Call us for refills on medications that we supply, as needed  · If something changes and you need to come in sooner, please call our office  PRESCRIPTION REFILL REMINDER:  All medication refills should be requested prior to [facility] on Friday  Any refill requests after noon on Friday would be addressed the following Monday

## 2022-02-23 ENCOUNTER — APPOINTMENT (OUTPATIENT)
Dept: LAB | Facility: HOSPITAL | Age: 72
End: 2022-02-23
Payer: MEDICARE

## 2022-02-23 LAB
ALBUMIN SERPL BCP-MCNC: 3.3 G/DL (ref 3.5–5)
ALP SERPL-CCNC: 105 U/L (ref 46–116)
ALT SERPL W P-5'-P-CCNC: 21 U/L (ref 12–78)
ANION GAP SERPL CALCULATED.3IONS-SCNC: 5 MMOL/L (ref 4–13)
AST SERPL W P-5'-P-CCNC: 14 U/L (ref 5–45)
BILIRUB SERPL-MCNC: 0.3 MG/DL (ref 0.2–1)
BUN SERPL-MCNC: 8 MG/DL (ref 5–25)
CALCIUM ALBUM COR SERPL-MCNC: 10.2 MG/DL (ref 8.3–10.1)
CALCIUM SERPL-MCNC: 9.6 MG/DL (ref 8.3–10.1)
CHLORIDE SERPL-SCNC: 107 MMOL/L (ref 100–108)
CO2 SERPL-SCNC: 27 MMOL/L (ref 21–32)
CREAT SERPL-MCNC: 0.61 MG/DL (ref 0.6–1.3)
GFR SERPL CREATININE-BSD FRML MDRD: 90 ML/MIN/1.73SQ M
GLUCOSE P FAST SERPL-MCNC: 97 MG/DL (ref 65–99)
POTASSIUM SERPL-SCNC: 4.1 MMOL/L (ref 3.5–5.3)
PROT SERPL-MCNC: 6.5 G/DL (ref 6.4–8.2)
SODIUM SERPL-SCNC: 139 MMOL/L (ref 136–145)

## 2022-02-23 PROCEDURE — 36415 COLL VENOUS BLD VENIPUNCTURE: CPT

## 2022-02-23 PROCEDURE — 85027 COMPLETE CBC AUTOMATED: CPT

## 2022-02-23 PROCEDURE — 85025 COMPLETE CBC W/AUTO DIFF WBC: CPT

## 2022-02-23 PROCEDURE — 85007 BL SMEAR W/DIFF WBC COUNT: CPT

## 2022-02-23 PROCEDURE — 80053 COMPREHEN METABOLIC PANEL: CPT

## 2022-02-24 ENCOUNTER — HOSPITAL ENCOUNTER (OUTPATIENT)
Dept: INFUSION CENTER | Facility: CLINIC | Age: 72
Discharge: HOME | End: 2022-02-24
Payer: MEDICARE

## 2022-02-24 VITALS
OXYGEN SATURATION: 98 % | TEMPERATURE: 97.6 F | HEART RATE: 83 BPM | BODY MASS INDEX: 28.07 KG/M2 | WEIGHT: 189.5 LBS | SYSTOLIC BLOOD PRESSURE: 120 MMHG | HEIGHT: 69 IN | RESPIRATION RATE: 18 BRPM | DIASTOLIC BLOOD PRESSURE: 82 MMHG

## 2022-02-24 DIAGNOSIS — T45.1X5A CHEMOTHERAPY INDUCED NEUTROPENIA (HCC): Primary | ICD-10-CM

## 2022-02-24 DIAGNOSIS — D70.1 CHEMOTHERAPY INDUCED NEUTROPENIA (HCC): Primary | ICD-10-CM

## 2022-02-24 DIAGNOSIS — C50.911 MALIGNANT NEOPLASM OF RIGHT FEMALE BREAST, UNSPECIFIED ESTROGEN RECEPTOR STATUS, UNSPECIFIED SITE OF BREAST (HCC): ICD-10-CM

## 2022-02-24 LAB
ANISOCYTOSIS BLD QL SMEAR: PRESENT
ANISOCYTOSIS BLD QL SMEAR: PRESENT
BASOPHILS # BLD MANUAL: 0.06 THOUSAND/UL (ref 0–0.1)
BASOPHILS # BLD MANUAL: 0.16 THOUSAND/UL (ref 0–0.1)
BASOPHILS NFR MAR MANUAL: 1 % (ref 0–1)
BASOPHILS NFR MAR MANUAL: 3 % (ref 0–1)
DACRYOCYTES BLD QL SMEAR: PRESENT
DACRYOCYTES BLD QL SMEAR: PRESENT
EOSINOPHIL # BLD MANUAL: 0 THOUSAND/UL (ref 0–0.4)
EOSINOPHIL # BLD MANUAL: 0 THOUSAND/UL (ref 0–0.4)
EOSINOPHIL NFR BLD MANUAL: 0 % (ref 0–6)
EOSINOPHIL NFR BLD MANUAL: 0 % (ref 0–6)
ERYTHROCYTE [DISTWIDTH] IN BLOOD BY AUTOMATED COUNT: 13.9 % (ref 11.6–15.1)
ERYTHROCYTE [DISTWIDTH] IN BLOOD BY AUTOMATED COUNT: 14.9 % (ref 11.6–15.1)
HCT VFR BLD AUTO: 29.3 % (ref 34.8–46.1)
HCT VFR BLD AUTO: 29.7 % (ref 34.8–46.1)
HGB BLD-MCNC: 10 G/DL (ref 11.5–15.4)
HGB BLD-MCNC: 9.7 G/DL (ref 11.5–15.4)
LYMPHOCYTES # BLD AUTO: 0.16 THOUSAND/UL (ref 0.6–4.47)
LYMPHOCYTES # BLD AUTO: 0.61 THOUSAND/UL (ref 0.6–4.47)
LYMPHOCYTES # BLD AUTO: 10 % (ref 14–44)
LYMPHOCYTES # BLD AUTO: 3 % (ref 14–44)
MCH RBC QN AUTO: 31.3 PG (ref 26.8–34.3)
MCH RBC QN AUTO: 31.4 PG (ref 26.8–34.3)
MCHC RBC AUTO-ENTMCNC: 33.1 G/DL (ref 31.4–37.4)
MCHC RBC AUTO-ENTMCNC: 33.7 G/DL (ref 31.4–37.4)
MCV RBC AUTO: 93 FL (ref 82–98)
MCV RBC AUTO: 95 FL (ref 82–98)
MONOCYTES # BLD AUTO: 0.67 THOUSAND/UL (ref 0–1.22)
MONOCYTES # BLD AUTO: 1.18 THOUSAND/UL (ref 0–1.22)
MONOCYTES NFR BLD: 11 % (ref 4–12)
MONOCYTES NFR BLD: 22 % (ref 4–12)
NEUTROPHILS # BLD MANUAL: 3.85 THOUSAND/UL (ref 1.85–7.62)
NEUTROPHILS # BLD MANUAL: 4.77 THOUSAND/UL (ref 1.85–7.62)
NEUTS BAND NFR BLD MANUAL: 14 % (ref 0–8)
NEUTS BAND NFR BLD MANUAL: 19 % (ref 0–8)
NEUTS SEG NFR BLD AUTO: 58 % (ref 43–75)
NEUTS SEG NFR BLD AUTO: 59 % (ref 43–75)
NRBC BLD AUTO-RTO: 1 /100 WBC (ref 0–2)
NRBC BLD AUTO-RTO: 2 /100 WBC (ref 0–2)
PATHOLOGY REVIEW: YES
PLATELET # BLD AUTO: 104 THOUSANDS/UL (ref 149–390)
PLATELET # BLD AUTO: 127 THOUSANDS/UL (ref 149–390)
PLATELET BLD QL SMEAR: ABNORMAL
PLATELET BLD QL SMEAR: ABNORMAL
PLATELET CLUMP BLD QL SMEAR: PRESENT
PMV BLD AUTO: 10.9 FL (ref 8.9–12.7)
PMV BLD AUTO: 11.9 FL (ref 8.9–12.7)
POIKILOCYTOSIS BLD QL SMEAR: PRESENT
POLYCHROMASIA BLD QL SMEAR: PRESENT
POLYCHROMASIA BLD QL SMEAR: PRESENT
RBC # BLD AUTO: 3.09 MILLION/UL (ref 3.81–5.12)
RBC # BLD AUTO: 3.2 MILLION/UL (ref 3.81–5.12)
RBC MORPH BLD: PRESENT
WBC # BLD AUTO: 5.35 THOUSAND/UL (ref 4.31–10.16)
WBC # BLD AUTO: 6.11 THOUSAND/UL (ref 4.31–10.16)

## 2022-02-24 PROCEDURE — 96377 APPLICATON ON-BODY INJECTOR: CPT

## 2022-02-24 PROCEDURE — 96413 CHEMO IV INFUSION 1 HR: CPT

## 2022-02-24 PROCEDURE — 85027 COMPLETE CBC AUTOMATED: CPT | Performed by: INTERNAL MEDICINE

## 2022-02-24 PROCEDURE — 85007 BL SMEAR W/DIFF WBC COUNT: CPT | Performed by: INTERNAL MEDICINE

## 2022-02-24 PROCEDURE — 96415 CHEMO IV INFUSION ADDL HR: CPT

## 2022-02-24 PROCEDURE — 96367 TX/PROPH/DG ADDL SEQ IV INF: CPT

## 2022-02-24 RX ORDER — SODIUM CHLORIDE 9 MG/ML
20 INJECTION, SOLUTION INTRAVENOUS ONCE
Status: COMPLETED | OUTPATIENT
Start: 2022-02-24 | End: 2022-02-24

## 2022-02-24 RX ADMIN — PEGFILGRASTIM 6 MG: KIT SUBCUTANEOUS at 13:13

## 2022-02-24 RX ADMIN — DIPHENHYDRAMINE HYDROCHLORIDE 25 MG: 50 INJECTION, SOLUTION INTRAMUSCULAR; INTRAVENOUS at 09:15

## 2022-02-24 RX ADMIN — PACLITAXEL 355.2 MG: 6 INJECTION, SOLUTION, CONCENTRATE INTRAVENOUS at 10:10

## 2022-02-24 RX ADMIN — FAMOTIDINE 20 MG: 10 INJECTION INTRAVENOUS at 09:35

## 2022-02-24 RX ADMIN — DEXAMETHASONE SODIUM PHOSPHATE 20 MG: 10 INJECTION, SOLUTION INTRAMUSCULAR; INTRAVENOUS at 08:55

## 2022-02-24 RX ADMIN — SODIUM CHLORIDE 20 ML/HR: 0.9 INJECTION, SOLUTION INTRAVENOUS at 08:55

## 2022-02-24 NOTE — PROGRESS NOTES
Pt tolerated treatment without incident  Neulasta Onpro applied to pt's LEFT ARM, green light blinking  Pt aware of when Neulasta Onpro will start and when she can remove it   Pt aware of next apt, declined AVS

## 2022-02-24 NOTE — PROGRESS NOTES
Pt to clinic for Taxol infusion  Pt offers no complaints at this time, resting comfortably in chair, will continue to monitor

## 2022-03-08 ENCOUNTER — APPOINTMENT (OUTPATIENT)
Dept: LAB | Facility: CLINIC | Age: 72
End: 2022-03-08
Payer: MEDICARE

## 2022-03-08 DIAGNOSIS — T45.1X5A CHEMOTHERAPY INDUCED NEUTROPENIA (HCC): ICD-10-CM

## 2022-03-08 DIAGNOSIS — D70.1 CHEMOTHERAPY INDUCED NEUTROPENIA (HCC): ICD-10-CM

## 2022-03-08 DIAGNOSIS — C50.911 MALIGNANT NEOPLASM OF RIGHT FEMALE BREAST, UNSPECIFIED ESTROGEN RECEPTOR STATUS, UNSPECIFIED SITE OF BREAST (HCC): ICD-10-CM

## 2022-03-08 LAB
ALBUMIN SERPL BCP-MCNC: 3.3 G/DL (ref 3.5–5)
ALP SERPL-CCNC: 125 U/L (ref 46–116)
ALT SERPL W P-5'-P-CCNC: 28 U/L (ref 12–78)
ANION GAP SERPL CALCULATED.3IONS-SCNC: 4 MMOL/L (ref 4–13)
ANISOCYTOSIS BLD QL SMEAR: PRESENT
AST SERPL W P-5'-P-CCNC: 23 U/L (ref 5–45)
BASOPHILS # BLD MANUAL: 0.13 THOUSAND/UL (ref 0–0.1)
BASOPHILS NFR MAR MANUAL: 1 % (ref 0–1)
BILIRUB SERPL-MCNC: 0.68 MG/DL (ref 0.2–1)
BUN SERPL-MCNC: 9 MG/DL (ref 5–25)
CALCIUM ALBUM COR SERPL-MCNC: 9.8 MG/DL (ref 8.3–10.1)
CALCIUM SERPL-MCNC: 9.2 MG/DL (ref 8.3–10.1)
CHLORIDE SERPL-SCNC: 106 MMOL/L (ref 100–108)
CO2 SERPL-SCNC: 26 MMOL/L (ref 21–32)
CREAT SERPL-MCNC: 0.81 MG/DL (ref 0.6–1.3)
EOSINOPHIL # BLD MANUAL: 0 THOUSAND/UL (ref 0–0.4)
EOSINOPHIL NFR BLD MANUAL: 0 % (ref 0–6)
ERYTHROCYTE [DISTWIDTH] IN BLOOD BY AUTOMATED COUNT: 17.3 % (ref 11.6–15.1)
GFR SERPL CREATININE-BSD FRML MDRD: 72 ML/MIN/1.73SQ M
GLUCOSE SERPL-MCNC: 146 MG/DL (ref 65–140)
HCT VFR BLD AUTO: 32.8 % (ref 34.8–46.1)
HGB BLD-MCNC: 10.5 G/DL (ref 11.5–15.4)
LYMPHOCYTES # BLD AUTO: 0.63 THOUSAND/UL (ref 0.6–4.47)
LYMPHOCYTES # BLD AUTO: 5 % (ref 14–44)
MCH RBC QN AUTO: 30.9 PG (ref 26.8–34.3)
MCHC RBC AUTO-ENTMCNC: 32 G/DL (ref 31.4–37.4)
MCV RBC AUTO: 97 FL (ref 82–98)
METAMYELOCYTES NFR BLD MANUAL: 1 % (ref 0–1)
MONOCYTES # BLD AUTO: 1.01 THOUSAND/UL (ref 0–1.22)
MONOCYTES NFR BLD: 8 % (ref 4–12)
NEUTROPHILS # BLD MANUAL: 10.73 THOUSAND/UL (ref 1.85–7.62)
NEUTS BAND NFR BLD MANUAL: 13 % (ref 0–8)
NEUTS SEG NFR BLD AUTO: 72 % (ref 43–75)
PLATELET # BLD AUTO: 143 THOUSANDS/UL (ref 149–390)
PLATELET BLD QL SMEAR: ADEQUATE
PMV BLD AUTO: 10.6 FL (ref 8.9–12.7)
POLYCHROMASIA BLD QL SMEAR: PRESENT
POTASSIUM SERPL-SCNC: 4.1 MMOL/L (ref 3.5–5.3)
PROT SERPL-MCNC: 7.2 G/DL (ref 6.4–8.2)
RBC # BLD AUTO: 3.4 MILLION/UL (ref 3.81–5.12)
SODIUM SERPL-SCNC: 136 MMOL/L (ref 136–145)
WBC # BLD AUTO: 12.62 THOUSAND/UL (ref 4.31–10.16)

## 2022-03-08 PROCEDURE — 36415 COLL VENOUS BLD VENIPUNCTURE: CPT

## 2022-03-08 PROCEDURE — 80053 COMPREHEN METABOLIC PANEL: CPT

## 2022-03-08 PROCEDURE — 85007 BL SMEAR W/DIFF WBC COUNT: CPT

## 2022-03-08 PROCEDURE — 85027 COMPLETE CBC AUTOMATED: CPT

## 2022-03-09 ENCOUNTER — OFFICE VISIT (OUTPATIENT)
Dept: HEMATOLOGY ONCOLOGY | Facility: CLINIC | Age: 72
End: 2022-03-09
Payer: MEDICARE

## 2022-03-09 ENCOUNTER — TELEPHONE (OUTPATIENT)
Dept: HEMATOLOGY ONCOLOGY | Facility: CLINIC | Age: 72
End: 2022-03-09

## 2022-03-09 VITALS
SYSTOLIC BLOOD PRESSURE: 138 MMHG | HEART RATE: 114 BPM | WEIGHT: 187 LBS | RESPIRATION RATE: 18 BRPM | DIASTOLIC BLOOD PRESSURE: 80 MMHG | HEIGHT: 69 IN | OXYGEN SATURATION: 97 % | BODY MASS INDEX: 27.7 KG/M2 | TEMPERATURE: 99.5 F

## 2022-03-09 DIAGNOSIS — Z17.0 MALIGNANT NEOPLASM OF RIGHT BREAST IN FEMALE, ESTROGEN RECEPTOR POSITIVE, UNSPECIFIED SITE OF BREAST (HCC): Primary | ICD-10-CM

## 2022-03-09 DIAGNOSIS — C50.911 MALIGNANT NEOPLASM OF RIGHT BREAST IN FEMALE, ESTROGEN RECEPTOR POSITIVE, UNSPECIFIED SITE OF BREAST (HCC): Primary | ICD-10-CM

## 2022-03-09 PROCEDURE — 99214 OFFICE O/P EST MOD 30 MIN: CPT | Performed by: INTERNAL MEDICINE

## 2022-03-09 RX ORDER — SODIUM CHLORIDE 9 MG/ML
20 INJECTION, SOLUTION INTRAVENOUS ONCE
Status: CANCELLED | OUTPATIENT
Start: 2022-03-24

## 2022-03-09 RX ORDER — SODIUM CHLORIDE 9 MG/ML
20 INJECTION, SOLUTION INTRAVENOUS ONCE
Status: CANCELLED | OUTPATIENT
Start: 2022-04-07

## 2022-03-09 NOTE — TELEPHONE ENCOUNTER
Left a detailed message on mobile phone with 4 week follow up appointment  Patient was given date, time and location  Patient was also provided with Hop[facility]'s number if she needs to reschedule

## 2022-03-09 NOTE — PROGRESS NOTES
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, HER2 fish negative disease   Based on MRI, she may have 9 mm of tumor with clear skin thickening in her right breast   Inflammatory breast cancer was confirmed by the biopsy with dermal invasion   She is currently on neoadjuvant chemotherapy  She already completed 4 cycles of dose dense AC resulting in clinical partial response  After 1 cycle of dose dense paclitaxel, she has significant bone pain associated with Neulasta support  We discussed the possible dose reduction of Neulasta  However, she decided to stay on 6 mg of Neulasta with Onpro  Clinically, she has responding disease, based on appearance of her skin over the right breast   She has adequate ANC to have 2nd cycle of dose dense paclitaxel tomorrow  She may give us a call if she wished to have dose reduction of Neulasta  I will see her again in 4 weeks prior to the last cycle of neoadjuvant chemotherapy    She is in agreement with my recommendation           Subjective:      HPI:  A 79-year-old postmenopausal woman noticed red spot on the skin in her right breast in October 2021   She brought this to medical attention   Mammography did not show any mass   However, ultrasound shows 6 mm of skin thickening in her right breast at 4:00 a m  position   She was seen by Dr Yaquelin Presley who did a skin biopsy   Skin biopsy was positive for dermal lymphatic invasion of carcinoma, consistent with breast primary   ER, MO and HER2 are pending at this time   She subsequent underwent CT scan of chest abdomen pelvis which showed right breast skin thickening but no evidence the distant metastasis   She presents today with her  and daughter-in-law to discuss the medical treatment for her breast cancer   She has no complaint of pain   She is afebrile   She has no significant past medical history   Her mother  of ovarian cancer at age of 62   Therefore, she underwent prophylactic hysterectomy and bilateral oophorectomy in 45   She also have cholecystectomy as a surgical past history   She currently feels well   She has no complaint of pain   Her weight is stable   She has no respiratory symptoms  Kee Castro is a lifetime never smoker   Her performance status is normal            Interval History:   A 66year-old postmenopausal woman with inflammatory right breast cancer, grade 2, ER 90 percent positive, NE 70 percent positive, HER2 fish negative disease   Her right breast mass is not easy to evaluate   Even MRI showed only 9 mm of mass with skin thickening  She is currently on neoadjuvant chemotherapy  She completed 4 cycle of dose dense AC  She had 1 dose of dose dense paclitaxel with Neulasta support  Subsequently, she has significant bone pain for 3 days  Ibuprofen helped her  She has been afebrile  She has no respiratory symptoms  She has some anorexia  Her weight is stable  She has chronic fatigue    Her performance status is normal      Objective:      Primary Diagnosis:     Right inflammatory breast cancer   Grade 2   ER 90% positive, NE 70% positive, HER2 fish negative disease   Diagnosed in 2021      Cancer Staging:  Cancer Staging  No matching staging information was found for the patient         Previous Hematologic/ Oncologic Treatment:            Current Hematologic/ Oncologic Treatment:       Neoadjuvant chemotherapy with dose dense AC x4 followed by dose dense paclitaxel x4   2nd cycle of paclitaxel to be given in March 10, 2022      Disease Status:      Clinically responding disease      Test Results:     Pathology:     Right breast skin biopsy showed dermal and lymphatic invasion of carcinoma, consistent with breast primary   At least grade 2   ER 90% positive, GA 70% positive, HER2 2+ disease  [facility] fish was negative for gene application with ratio of 1 2      Radiology:     CT scan of chest abdomen pelvis showed no evidence of distant metastasis      Breast ultrasound shows 6 mm of skin thickening at 4:00 a m  position with no visible underline mass      Echocardiogram showed ejection fraction 60 percent in December 2021      Laboratory:     See below      Physical Exam:        General Appearance:    Alert, oriented          Eyes:    PERRL   Ears:    Normal external ear canals, both ears   Nose:   Nares normal, septum midline   Throat:   Mucosa moist  Pharynx without injection  Neck:   Supple         Lungs:     Clear to auscultation bilaterally   Chest Wall:    No tenderness or deformity    Heart:    Regular rate and rhythm         Abdomen:     Soft, non-tender, bowel sounds +, no organomegaly               Extremities:   Extremities no cyanosis or edema         Skin:   no rash or icterus  Lymph nodes:   Cervical, supraclavicular, and axillary nodes normal   Neurologic:   CNII-XII intact, normal strength, sensation and reflexes     Throughout             Breast exam: Previous 10 cm area of erythematous change has disappeared     No palpable right axillary adenopathy   Left breast exam is negative  ROS: Review of Systems   All other systems reviewed and are negative  Imaging: No results found        Labs:   Lab Results   Component Value Date    WBC 12 62 (H) 03/08/2022    HGB 10 5 (L) 03/08/2022    HCT 32 8 (L) 03/08/2022    MCV 97 03/08/2022     (L) 03/08/2022     Lab Results   Component Value Date     07/03/2015    K 4 1 03/08/2022     03/08/2022    CO2 26 03/08/2022    ANIONGAP 8 07/03/2015    BUN 9 03/08/2022    CREATININE 0 81 03/08/2022    GLUCOSE 83 07/03/2015    GLUF 97 02/23/2022    CALCIUM 9 2 03/08/2022    CORRECTEDCA 9 8 03/08/2022    AST 23 03/08/2022    ALT 28 03/08/2022    ALKPHOS 125 (H) 03/08/2022    PROT 7 7 07/03/2015    BILITOT 0 91 07/03/2015    EGFR 72 03/08/2022         Lab Results   Component Value Date    IRON 116 04/11/2016         Current Medications: Reviewed  Allergies: Reviewed  PMH/FH/SH:  Reviewed      Vital Sign:    Body surface area is 2 meters squared      Wt Readings from Last 3 Encounters:   03/09/22 84 8 kg (187 lb)   02/24/22 86 kg (189 lb 8 oz)   02/16/22 85 7 kg (189 lb)        Temp Readings from Last 3 Encounters:   03/09/22 99 5 °F (37 5 °C) (Tympanic Core)   02/24/22 97 6 °F (36 4 °C) (Temporal)   02/16/22 (!) 97 3 °F (36 3 °C) (Temporal)        BP Readings from Last 3 Encounters:   03/09/22 138/80   02/24/22 120/82   02/16/22 120/68         Pulse Readings from Last 3 Encounters:   03/09/22 (!) 114   02/24/22 83   02/16/22 94     @LASTSAO2(3)@

## 2022-03-10 ENCOUNTER — TELEPHONE (OUTPATIENT)
Dept: HEMATOLOGY ONCOLOGY | Facility: MEDICAL CENTER | Age: 72
End: 2022-03-10

## 2022-03-10 ENCOUNTER — HOSPITAL ENCOUNTER (OUTPATIENT)
Dept: INFUSION CENTER | Facility: CLINIC | Age: 72
Discharge: HOME | End: 2022-03-10
Payer: MEDICARE

## 2022-03-10 VITALS
HEIGHT: 69 IN | HEART RATE: 88 BPM | TEMPERATURE: 97.7 F | RESPIRATION RATE: 18 BRPM | WEIGHT: 186 LBS | SYSTOLIC BLOOD PRESSURE: 126 MMHG | BODY MASS INDEX: 27.55 KG/M2 | OXYGEN SATURATION: 99 % | DIASTOLIC BLOOD PRESSURE: 78 MMHG

## 2022-03-10 DIAGNOSIS — C50.911 MALIGNANT NEOPLASM OF RIGHT FEMALE BREAST, UNSPECIFIED ESTROGEN RECEPTOR STATUS, UNSPECIFIED SITE OF BREAST (HCC): Primary | ICD-10-CM

## 2022-03-10 DIAGNOSIS — D70.1 CHEMOTHERAPY INDUCED NEUTROPENIA (HCC): Primary | ICD-10-CM

## 2022-03-10 DIAGNOSIS — T45.1X5A CHEMOTHERAPY INDUCED NEUTROPENIA (HCC): Primary | ICD-10-CM

## 2022-03-10 DIAGNOSIS — C50.911 MALIGNANT NEOPLASM OF RIGHT FEMALE BREAST, UNSPECIFIED ESTROGEN RECEPTOR STATUS, UNSPECIFIED SITE OF BREAST (HCC): ICD-10-CM

## 2022-03-10 DIAGNOSIS — C50.811 MALIGNANT NEOPLASM OF OVERLAPPING SITES OF RIGHT BREAST IN FEMALE, ESTROGEN RECEPTOR POSITIVE (HCC): ICD-10-CM

## 2022-03-10 DIAGNOSIS — Z17.0 MALIGNANT NEOPLASM OF OVERLAPPING SITES OF RIGHT BREAST IN FEMALE, ESTROGEN RECEPTOR POSITIVE (HCC): ICD-10-CM

## 2022-03-10 DIAGNOSIS — T45.1X5A CHEMOTHERAPY INDUCED NEUTROPENIA (HCC): ICD-10-CM

## 2022-03-10 DIAGNOSIS — D70.1 CHEMOTHERAPY INDUCED NEUTROPENIA (HCC): ICD-10-CM

## 2022-03-10 PROCEDURE — 96367 TX/PROPH/DG ADDL SEQ IV INF: CPT

## 2022-03-10 PROCEDURE — 96413 CHEMO IV INFUSION 1 HR: CPT

## 2022-03-10 PROCEDURE — 96375 TX/PRO/DX INJ NEW DRUG ADDON: CPT

## 2022-03-10 PROCEDURE — 96415 CHEMO IV INFUSION ADDL HR: CPT

## 2022-03-10 RX ORDER — PALONOSETRON 0.05 MG/ML
0.25 INJECTION, SOLUTION INTRAVENOUS ONCE
Status: CANCELLED
Start: 2022-03-24

## 2022-03-10 RX ORDER — SODIUM CHLORIDE 9 MG/ML
20 INJECTION, SOLUTION INTRAVENOUS ONCE
Status: COMPLETED | OUTPATIENT
Start: 2022-03-10 | End: 2022-03-10

## 2022-03-10 RX ADMIN — DEXAMETHASONE SODIUM PHOSPHATE 20 MG: 10 INJECTION, SOLUTION INTRAMUSCULAR; INTRAVENOUS at 09:48

## 2022-03-10 RX ADMIN — FAMOTIDINE 20 MG: 10 INJECTION INTRAVENOUS at 10:47

## 2022-03-10 RX ADMIN — DIPHENHYDRAMINE HYDROCHLORIDE 25 MG: 50 INJECTION, SOLUTION INTRAMUSCULAR; INTRAVENOUS at 10:27

## 2022-03-10 RX ADMIN — ONDANSETRON 8 MG: 2 INJECTION INTRAMUSCULAR; INTRAVENOUS at 10:09

## 2022-03-10 RX ADMIN — SODIUM CHLORIDE 20 ML/HR: 0.9 INJECTION, SOLUTION INTRAVENOUS at 09:48

## 2022-03-10 RX ADMIN — PACLITAXEL 355.2 MG: 6 INJECTION, SOLUTION, CONCENTRATE INTRAVENOUS at 11:23

## 2022-03-10 NOTE — PROGRESS NOTES
Patient completed treatment today without incident  Port with good blood return upon patient laying flat  Port flushed well without resistance  Port deaccessed, bandaid in place  Patient aware to come back tomorrow at 1430 for Neulasta injection at reduce dose of 4 mg  Patient declines AVS  Patient AAOx4 and ambulatory upon DC without gait disturbance noted

## 2022-03-10 NOTE — TELEPHONE ENCOUNTER
Per Infusion RN:   Mariely Pulido here for taxol today  She reports unrelieved queasiness/nausea that last from last treatment until yesterday  The compazine made her feel a [address] and she did try the Zofran too, which ended up constipating her  She gets dec, Benadryl and Pepcid as premeds  Would we be able to add emend or aloxi at all?     Also she had really bad pain with the neulasta onpro 6 mg, I know she discussed with Faby possibly going to the 4 mg injection day after - initially she wanted to stay with the onpro but wants to go to the 4 mg snd come back tomorrow for injection if youre able to adjust       Added zofran to chemo plan today  Reduced neulasta to 4mg D2     FYI to Dr Hiro England RN

## 2022-03-10 NOTE — PROGRESS NOTES
Patient arrives to infusion center for D1 C6 Taxol today  Patient reports unrelieved nausea - tried Compazine but that made her feel off/dizzy  Patient reports she was taking the Zofran as directed q6h for 3 days straight but really did not feel relief  Patient reports she also had very terrible bone pain that felt "like my bones were breaking everytime I moved" s/p Neulasta Onpro  Patient states she did even try the Claritin  Spoke with Leisa Rm in Dr Alize Merino office via telephone  Plan to add Zofran to patient premedication list and also plan to discontinue Neulasta Onpro and start 4 mg Neulasta injection tomorrow  Possibly Emend/ALoxi to be added after discussion with Dr Bakari Sherwood  Spoke with Anitha Salas RN - Aloxi to be added to cycle 7 and 8  Patient aware  VSS, labs reviewed from 3/8/22 and are within parameters for treatment today  L PAC accessed without incident, flushed without resistance - able to get blood return with patient laying flat and taking deep breath in  Gauze dressing in place  Patient resting on recliner chair, call bell within reach

## 2022-03-11 ENCOUNTER — HOSPITAL ENCOUNTER (OUTPATIENT)
Dept: INFUSION CENTER | Facility: CLINIC | Age: 72
Discharge: HOME | End: 2022-03-11
Payer: MEDICARE

## 2022-03-11 DIAGNOSIS — C50.911 MALIGNANT NEOPLASM OF RIGHT FEMALE BREAST, UNSPECIFIED ESTROGEN RECEPTOR STATUS, UNSPECIFIED SITE OF BREAST (HCC): ICD-10-CM

## 2022-03-11 DIAGNOSIS — T45.1X5A CHEMOTHERAPY INDUCED NEUTROPENIA (HCC): Primary | ICD-10-CM

## 2022-03-11 DIAGNOSIS — D70.1 CHEMOTHERAPY INDUCED NEUTROPENIA (HCC): Primary | ICD-10-CM

## 2022-03-11 PROBLEM — Z17.0 MALIGNANT NEOPLASM OF OVERLAPPING SITES OF RIGHT BREAST IN FEMALE, ESTROGEN RECEPTOR POSITIVE (HCC): Status: ACTIVE | Noted: 2021-12-13

## 2022-03-11 PROBLEM — C50.811 MALIGNANT NEOPLASM OF OVERLAPPING SITES OF RIGHT BREAST IN FEMALE, ESTROGEN RECEPTOR POSITIVE (HCC): Status: ACTIVE | Noted: 2021-12-13

## 2022-03-11 PROCEDURE — 96372 THER/PROPH/DIAG INJ SC/IM: CPT

## 2022-03-11 RX ADMIN — PEGFILGRASTIM 4 MG: 6 INJECTION SUBCUTANEOUS at 14:30

## 2022-03-11 NOTE — PROGRESS NOTES
Pt here for Neulasta injection, offers no complaints  Neulasta given in R arm without any issues  Pt aware of next appointments   Declines AVS 
Cardiac

## 2022-03-14 ENCOUNTER — VBI (OUTPATIENT)
Dept: ADMINISTRATIVE | Facility: OTHER | Age: 72
End: 2022-03-14

## 2022-03-16 ENCOUNTER — OFFICE VISIT (OUTPATIENT)
Dept: SURGICAL ONCOLOGY | Facility: CLINIC | Age: 72
End: 2022-03-16
Payer: MEDICARE

## 2022-03-16 VITALS
BODY MASS INDEX: 26.66 KG/M2 | RESPIRATION RATE: 16 BRPM | WEIGHT: 180 LBS | DIASTOLIC BLOOD PRESSURE: 78 MMHG | HEIGHT: 69 IN | OXYGEN SATURATION: 97 % | SYSTOLIC BLOOD PRESSURE: 122 MMHG | HEART RATE: 124 BPM | TEMPERATURE: 97.3 F

## 2022-03-16 DIAGNOSIS — Z17.0 MALIGNANT NEOPLASM OF OVERLAPPING SITES OF RIGHT BREAST IN FEMALE, ESTROGEN RECEPTOR POSITIVE (HCC): Primary | ICD-10-CM

## 2022-03-16 DIAGNOSIS — C50.811 MALIGNANT NEOPLASM OF OVERLAPPING SITES OF RIGHT BREAST IN FEMALE, ESTROGEN RECEPTOR POSITIVE (HCC): Primary | ICD-10-CM

## 2022-03-16 PROCEDURE — 99214 OFFICE O/P EST MOD 30 MIN: CPT | Performed by: SURGERY

## 2022-03-16 NOTE — PROGRESS NOTES
Surgical Oncology Follow Up       [address] [facility]  [facility] [place]  [address] [facility] [address]    Randa Porter  1950  [id number]  [address]  [facility] [place]  [address] [address]    Chief Complaint   Patient presents with    Follow-up          Assessment & Plan: To have surgery 3 weeks after she finishes her last dose of chemotherapy  We are planning on having an MRI around the 15th  The patient has had a good clinical response  She will need a modified radical mastectomy without reconstruction  She was originally node-negative however sentinel lymph node mapping is unreliable inflammatory breast cancer  She knows she will need radiation therapy following this  Cancer History:     Oncology History   Malignant neoplasm of overlapping sites of right breast in female, estrogen receptor positive ([facility] [address] )   12/6/2021 Biopsy    Punch biopsy of right breast  Dermal lymphovascular invasion, immunoprofile consistent with breast primary  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)  Grade 2  ER 90; NM 70; HER2 2+, FISH negative     12/13/2021 Observation    Bilateral breast MRI - findings suspicious for multicentric right breast cnacer; 2 biopsies recommended at areas of concern (never done); left breast clear; no right axillary adenopathy       12/30/2021 -  Chemotherapy    DOXOrubicin (ADRIAMYCIN), 122 mg, Intravenous, Once, 4 of 4 cycles  Administration: 120 mg (12/30/2021), 122 mg (1/13/2022), 122 mg (1/27/2022), 122 mg (2/10/2022)  palonosetron (ALOXI), 0 25 mg, Intravenous, Once, 0 of 2 cycles  pegfilgrastim (NEULASTA), 4 mg (100 % of original dose 4 mg), Subcutaneous, Once, 1 of 3 cycles  Dose modification: 4 mg (original dose 4 mg, Cycle 6)  Administration: 4 mg (3/11/2022)  pegfilgrastim (NEULASTA ONPRO), 6 mg, Subcutaneous, Once, 5 of 5 cycles  Administration: 6 mg (12/30/2021), 6 mg (1/13/2022), 6 mg (2/24/2022), 6 mg (1/27/2022), 6 mg (2/10/2022)  cyclophosphamide (CYTOXAN) IVPB, 600 mg/m2 = 1,218 mg, Intravenous, Once, 4 of 4 cycles  Administration: 1,218 mg (12/30/2021), 1,218 mg (1/13/2022), 1,218 mg (1/27/2022), 1,218 mg (2/10/2022)  fosaprepitant (EMEND) IVPB, 150 mg, Intravenous, Once, 4 of 4 cycles  Administration: 150 mg (12/30/2021), 150 mg (1/13/2022), 150 mg (1/27/2022), 150 mg (2/10/2022)  PACLItaxel (TAXOL) chemo IVPB, 175 mg/m2 = 355 2 mg, Intravenous, Once, 2 of 4 cycles  Administration: 355 2 mg (2/24/2022), 355 2 mg (3/10/2022)           Interval History:   Overall the patient is doing quite well considering she is undergoing chemotherapy  Review of Systems:   Review of Systems   Constitutional: Positive for fatigue  Negative for chills and fever  HENT: Negative for ear pain and sore throat  Eyes: Negative for pain and visual disturbance  Respiratory: Negative for cough and shortness of breath  Cardiovascular: Negative for chest pain and palpitations  Gastrointestinal: Negative for abdominal pain and vomiting  Genitourinary: Negative for dysuria and hematuria  Musculoskeletal: Negative for arthralgias and back pain  Skin: Negative for color change and rash  Neurological: Negative for seizures and syncope  All other systems reviewed and are negative        Past Medical History     Patient Active Problem List   Diagnosis    Varicose vein of leg    Anxiety    Arthritis    Depression    Hyperbilirubinemia    Hyperlipidemia, mild    Over weight    Vitamin D deficiency    Digital mucinous cyst of finger of left hand    Acute midline low back pain with left-sided sciatica    Closed fracture of distal end of radius    Malignant neoplasm of overlapping sites of right breast in female, estrogen receptor positive ([facility] [address] )    Chemotherapy induced neutropenia ([facility] [address] )    Palliative care patient    Medical marijuana use    Cancer related pain    Chemotherapy-induced nausea    Insomnia due to medical condition    Anorexia     Past Medical History:   Diagnosis Date    Cancer [facility])     Right breast cancer    Fracture of radius, distal, closed     Last Assessed:  6/10/14     Past Surgical History:   Procedure Laterality Date    ANKLE SURGERY      ANKLE SURGERY      BREAST BIOPSY Left 2001    neg    GALLBLADDER SURGERY      HYSTERECTOMY      IR PORT PLACEMENT  12/29/2021    WISDOM TOOTH EXTRACTION       Family History   Problem Relation Age of Onset    Ovarian cancer Mother 64    Heart disease Father     Leukemia Brother 62    Heart disease Brother     Heart disease Brother     Cancer Maternal Grandmother         Unknown primary; mid 52's    Stomach cancer Maternal Aunt         60's     Social History     Socioeconomic History    Marital status: /Civil Union     Spouse name: Not on file    Number of children: Not on file    Years of education: Not on file    Highest education level: Not on file   Occupational History    Not on file   Tobacco Use    Smoking status: Never Smoker    Smokeless tobacco: Never Used   Vaping Use    Vaping Use: Never used   Substance and Sexual Activity    Alcohol use: No    Drug use: No    Sexual activity: Not Currently     Partners: Male     Birth control/protection: Female Sterilization   Other Topics Concern    Not on file   Social History Narrative     to Betty Mirandavers       Social Determinants of Health     Financial Resource Strain: Not on file   Food Insecurity: Not on file   Transportation Needs: Not on file   Physical Activity: Not on file   Stress: Not on file   Social Connections: Not on file   Intimate Partner Violence: Not on file   Housing Stability: Not on file       Current Outpatient Medications:     acetaminophen (TYLENOL) 500 mg tablet, Take 2 tablets (1,000 mg total) by mouth 3 (three) times a day, Disp: 180 tablet, Rfl: 2    al [employer] oxide-diphenhydramine-lidocaine viscous (MAGIC MOUTHWASH) 1:1:1 suspension, Swish and spit 10 mL every 4 (four) hours as needed for mouth pain or discomfort, Disp: 90 mL, Rfl: 1    ALPRAZolam (XANAX) 0 25 mg tablet, Take 1/2 to 1 pill Q 8 hours p r n  For anxiety, Disp: 30 tablet, Rfl: 0    Calcium 250 MG CAPS, Take by mouth, Disp: , Rfl:     cholecalciferol (VITAMIN D3) 1,000 units tablet, Take 2 capsules by mouth daily, Disp: , Rfl:     CRANIAL PROSTHESIS, RX,, One wig as needed, Disp: 1 each, Rfl: 0    docusate sodium (COLACE) 100 mg capsule, Take 100 mg by mouth 2 (two) times a day, Disp: , Rfl:     escitalopram (LEXAPRO) 20 mg tablet, Take 1 tablet (20 mg total) by mouth daily, Disp: 90 tablet, Rfl: 2    Fish Oil-Krill Oil (KRILL OIL PLUS) CAPS, Take by mouth, Disp: , Rfl:     fluocinonide (LIDEX) 0 05 % external solution, APPLY TO SCALP ONCE DAILY AS NEEDED FOR ITCH, Disp: , Rfl: 2    ketoconazole (NIZORAL) 2 % shampoo, PLEASE SEE ATTACHED FOR DETAILED DIRECTIONS, Disp: , Rfl: 5    lidocaine-prilocaine (EMLA) cream, Apply topically as needed for mild pain, Disp: 30 g, Rfl: 1    ondansetron (ZOFRAN) 8 mg tablet, Take 1 tablet (8 mg total) by mouth every 8 (eight) hours as needed for nausea or vomiting, Disp: 40 tablet, Rfl: 2  No Known Allergies    Physical Exam:     Vitals:    03/16/22 1404   BP: 122/78   Pulse: (!) 124   Resp: 16   Temp: (!) 97 3 °F (36 3 °C)   SpO2: 97%     Physical Exam  Vitals reviewed  Constitutional:       Appearance: She is well-developed  HENT:      Head: Normocephalic and atraumatic  Eyes:      Pupils: Pupils are equal, round, and reactive to light  Neck:      Thyroid: No thyromegaly  Vascular: No JVD  Trachea: No tracheal deviation  Cardiovascular:      Rate and Rhythm: Normal rate and regular rhythm  Heart sounds: Normal heart sounds  No murmur heard  No friction rub  No gallop      Pulmonary:      Effort: Pulmonary effort is normal  No respiratory distress  Breath sounds: Normal breath sounds  No wheezing or rales  Chest:      Comments: The left breast was examined in the sitting and supine position  There are no worrisome skin changes, tenderness, inverted nipple, nipple discharge, swelling, bleeding or evidence of a mass in any quadrant  Sigifredo survey demonstrated no evidence of any clinically suspicious axillary, pectoral or paraclavicular lymph nodes  Examination the right breast demonstrates no evidence of any adenopathy  She has no worrisome skin findings  There is no peau de orange  There is a slight fullness in the central portion of the breast which is dramatically improved  Abdominal:      General: There is no distension  Palpations: Abdomen is soft  There is no hepatomegaly or mass  Tenderness: There is no abdominal tenderness  There is no guarding or rebound  Musculoskeletal:         General: No tenderness  Normal range of motion  Cervical back: Normal range of motion and neck supple  Lymphadenopathy:      Cervical: No cervical adenopathy  Skin:     General: Skin is warm and dry  Findings: No erythema or rash  Neurological:      Mental Status: She is alert and oriented to person, place, and time  Cranial Nerves: No cranial nerve deficit  Psychiatric:         Behavior: Behavior normal            Results & Discussion:   Patient understands she would need a modified radical mastectomy followed by radiation therapy  We would not perform immediate reconstruction  Patient does not want delayed reconstruction  We will see her back just prior to or completion of her chemotherapy  Advance Care Planning/Advance Directives:  I discussed the disease status, treatment plans and follow-up with the patient

## 2022-03-18 ENCOUNTER — TELEPHONE (OUTPATIENT)
Dept: SURGICAL ONCOLOGY | Facility: CLINIC | Age: 72
End: 2022-03-18

## 2022-03-18 NOTE — TELEPHONE ENCOUNTER
Called patient to discuss the tentative plan for her surgery with Dr Mustapha Wolf  There was no answer; message was left with direct call back number provided

## 2022-03-23 ENCOUNTER — APPOINTMENT (OUTPATIENT)
Dept: LAB | Facility: CLINIC | Age: 72
End: 2022-03-23
Payer: MEDICARE

## 2022-03-23 DIAGNOSIS — T45.1X5A CHEMOTHERAPY INDUCED NEUTROPENIA (HCC): ICD-10-CM

## 2022-03-23 DIAGNOSIS — C50.811 MALIGNANT NEOPLASM OF OVERLAPPING SITES OF RIGHT BREAST IN FEMALE, ESTROGEN RECEPTOR POSITIVE (HCC): ICD-10-CM

## 2022-03-23 DIAGNOSIS — F32.A DEPRESSION, UNSPECIFIED DEPRESSION TYPE: ICD-10-CM

## 2022-03-23 DIAGNOSIS — Z17.0 MALIGNANT NEOPLASM OF OVERLAPPING SITES OF RIGHT BREAST IN FEMALE, ESTROGEN RECEPTOR POSITIVE (HCC): ICD-10-CM

## 2022-03-23 DIAGNOSIS — D70.1 CHEMOTHERAPY INDUCED NEUTROPENIA (HCC): ICD-10-CM

## 2022-03-23 LAB
ALBUMIN SERPL BCP-MCNC: 3.3 G/DL (ref 3.5–5)
ALP SERPL-CCNC: 106 U/L (ref 46–116)
ALT SERPL W P-5'-P-CCNC: 25 U/L (ref 12–78)
ANION GAP SERPL CALCULATED.3IONS-SCNC: 5 MMOL/L (ref 4–13)
AST SERPL W P-5'-P-CCNC: 27 U/L (ref 5–45)
BASOPHILS # BLD AUTO: 0.08 THOUSANDS/ΜL (ref 0–0.1)
BASOPHILS NFR BLD AUTO: 1 % (ref 0–1)
BILIRUB SERPL-MCNC: 0.74 MG/DL (ref 0.2–1)
BUN SERPL-MCNC: 11 MG/DL (ref 5–25)
CALCIUM ALBUM COR SERPL-MCNC: 9.7 MG/DL (ref 8.3–10.1)
CALCIUM SERPL-MCNC: 9.1 MG/DL (ref 8.3–10.1)
CHLORIDE SERPL-SCNC: 107 MMOL/L (ref 100–108)
CO2 SERPL-SCNC: 26 MMOL/L (ref 21–32)
CREAT SERPL-MCNC: 0.79 MG/DL (ref 0.6–1.3)
EOSINOPHIL # BLD AUTO: 0.58 THOUSAND/ΜL (ref 0–0.61)
EOSINOPHIL NFR BLD AUTO: 10 % (ref 0–6)
ERYTHROCYTE [DISTWIDTH] IN BLOOD BY AUTOMATED COUNT: 17.9 % (ref 11.6–15.1)
GFR SERPL CREATININE-BSD FRML MDRD: 75 ML/MIN/1.73SQ M
GLUCOSE P FAST SERPL-MCNC: 106 MG/DL (ref 65–99)
HCT VFR BLD AUTO: 33 % (ref 34.8–46.1)
HGB BLD-MCNC: 10.8 G/DL (ref 11.5–15.4)
IMM GRANULOCYTES # BLD AUTO: 0.13 THOUSAND/UL (ref 0–0.2)
IMM GRANULOCYTES NFR BLD AUTO: 2 % (ref 0–2)
LYMPHOCYTES # BLD AUTO: 0.83 THOUSANDS/ΜL (ref 0.6–4.47)
LYMPHOCYTES NFR BLD AUTO: 14 % (ref 14–44)
MCH RBC QN AUTO: 31.2 PG (ref 26.8–34.3)
MCHC RBC AUTO-ENTMCNC: 32.7 G/DL (ref 31.4–37.4)
MCV RBC AUTO: 95 FL (ref 82–98)
MONOCYTES # BLD AUTO: 0.82 THOUSAND/ΜL (ref 0.17–1.22)
MONOCYTES NFR BLD AUTO: 14 % (ref 4–12)
NEUTROPHILS # BLD AUTO: 3.43 THOUSANDS/ΜL (ref 1.85–7.62)
NEUTS SEG NFR BLD AUTO: 59 % (ref 43–75)
NRBC BLD AUTO-RTO: 0 /100 WBCS
PLATELET # BLD AUTO: 263 THOUSANDS/UL (ref 149–390)
PMV BLD AUTO: 10.9 FL (ref 8.9–12.7)
POTASSIUM SERPL-SCNC: 4.3 MMOL/L (ref 3.5–5.3)
PROT SERPL-MCNC: 6.8 G/DL (ref 6.4–8.2)
RBC # BLD AUTO: 3.46 MILLION/UL (ref 3.81–5.12)
SODIUM SERPL-SCNC: 138 MMOL/L (ref 136–145)
WBC # BLD AUTO: 5.87 THOUSAND/UL (ref 4.31–10.16)

## 2022-03-23 PROCEDURE — 80053 COMPREHEN METABOLIC PANEL: CPT

## 2022-03-23 PROCEDURE — 85025 COMPLETE CBC W/AUTO DIFF WBC: CPT

## 2022-03-23 PROCEDURE — 36415 COLL VENOUS BLD VENIPUNCTURE: CPT

## 2022-03-23 RX ORDER — ESCITALOPRAM OXALATE 20 MG/1
20 TABLET ORAL DAILY
Qty: 90 TABLET | Refills: 2 | Status: SHIPPED | OUTPATIENT
Start: 2022-03-23

## 2022-03-24 ENCOUNTER — HOSPITAL ENCOUNTER (OUTPATIENT)
Dept: INFUSION CENTER | Facility: CLINIC | Age: 72
Discharge: HOME | End: 2022-03-24
Payer: MEDICARE

## 2022-03-24 VITALS
BODY MASS INDEX: 26.96 KG/M2 | HEART RATE: 100 BPM | RESPIRATION RATE: 18 BRPM | WEIGHT: 182 LBS | DIASTOLIC BLOOD PRESSURE: 72 MMHG | TEMPERATURE: 96.9 F | HEIGHT: 69 IN | SYSTOLIC BLOOD PRESSURE: 135 MMHG | OXYGEN SATURATION: 96 %

## 2022-03-24 DIAGNOSIS — T45.1X5A CHEMOTHERAPY INDUCED NEUTROPENIA (HCC): Primary | ICD-10-CM

## 2022-03-24 DIAGNOSIS — C50.811 MALIGNANT NEOPLASM OF OVERLAPPING SITES OF RIGHT BREAST IN FEMALE, ESTROGEN RECEPTOR POSITIVE (HCC): ICD-10-CM

## 2022-03-24 DIAGNOSIS — Z17.0 MALIGNANT NEOPLASM OF OVERLAPPING SITES OF RIGHT BREAST IN FEMALE, ESTROGEN RECEPTOR POSITIVE (HCC): ICD-10-CM

## 2022-03-24 DIAGNOSIS — D70.1 CHEMOTHERAPY INDUCED NEUTROPENIA (HCC): Primary | ICD-10-CM

## 2022-03-24 PROCEDURE — 96415 CHEMO IV INFUSION ADDL HR: CPT

## 2022-03-24 PROCEDURE — 96367 TX/PROPH/DG ADDL SEQ IV INF: CPT

## 2022-03-24 PROCEDURE — 96375 TX/PRO/DX INJ NEW DRUG ADDON: CPT

## 2022-03-24 PROCEDURE — 96413 CHEMO IV INFUSION 1 HR: CPT

## 2022-03-24 RX ORDER — SODIUM CHLORIDE 9 MG/ML
20 INJECTION, SOLUTION INTRAVENOUS ONCE
Status: COMPLETED | OUTPATIENT
Start: 2022-03-24 | End: 2022-03-24

## 2022-03-24 RX ORDER — PALONOSETRON 0.05 MG/ML
0.25 INJECTION, SOLUTION INTRAVENOUS ONCE
Status: COMPLETED | OUTPATIENT
Start: 2022-03-24 | End: 2022-03-24

## 2022-03-24 RX ADMIN — DEXAMETHASONE SODIUM PHOSPHATE 20 MG: 10 INJECTION, SOLUTION INTRAMUSCULAR; INTRAVENOUS at 08:49

## 2022-03-24 RX ADMIN — SODIUM CHLORIDE 20 ML/HR: 0.9 INJECTION, SOLUTION INTRAVENOUS at 08:31

## 2022-03-24 RX ADMIN — FAMOTIDINE 20 MG: 10 INJECTION INTRAVENOUS at 09:40

## 2022-03-24 RX ADMIN — PACLITAXEL 355.2 MG: 6 INJECTION, SOLUTION, CONCENTRATE INTRAVENOUS at 10:18

## 2022-03-24 RX ADMIN — PALONOSETRON HYDROCHLORIDE 0.25 MG: 0.25 INJECTION INTRAVENOUS at 09:13

## 2022-03-24 RX ADMIN — DIPHENHYDRAMINE HYDROCHLORIDE 25 MG: 50 INJECTION, SOLUTION INTRAMUSCULAR; INTRAVENOUS at 09:17

## 2022-03-24 NOTE — PROGRESS NOTES
Patient is here for chemo  Labs reviewed and meet parameters  Patient states her nausea is better and having the neulasta at 4mg is a big improvement with her bone pain

## 2022-03-25 ENCOUNTER — HOSPITAL ENCOUNTER (OUTPATIENT)
Dept: INFUSION CENTER | Facility: CLINIC | Age: 72
Discharge: HOME | End: 2022-03-25
Payer: MEDICARE

## 2022-03-25 DIAGNOSIS — C50.811 MALIGNANT NEOPLASM OF OVERLAPPING SITES OF RIGHT BREAST IN FEMALE, ESTROGEN RECEPTOR POSITIVE (HCC): ICD-10-CM

## 2022-03-25 DIAGNOSIS — Z17.0 MALIGNANT NEOPLASM OF OVERLAPPING SITES OF RIGHT BREAST IN FEMALE, ESTROGEN RECEPTOR POSITIVE (HCC): ICD-10-CM

## 2022-03-25 DIAGNOSIS — T45.1X5A CHEMOTHERAPY INDUCED NEUTROPENIA (HCC): Primary | ICD-10-CM

## 2022-03-25 DIAGNOSIS — D70.1 CHEMOTHERAPY INDUCED NEUTROPENIA (HCC): Primary | ICD-10-CM

## 2022-03-25 PROCEDURE — 96372 THER/PROPH/DIAG INJ SC/IM: CPT

## 2022-03-25 RX ADMIN — PEGFILGRASTIM 4 MG: 6 INJECTION SUBCUTANEOUS at 13:47

## 2022-03-25 NOTE — PROGRESS NOTES
Patient arrives to infusion center for Neulasta injection today  Patient reports last injection with the dose reduction to 4 mg was more tolerable  Patient tolerated injection to RIGHT arm without issue, bandaid in place  Patient aware of upcoming appointments, AVS offered and declined  Patient AAOx4 and ambulatory upon DC without gait disturbance noted

## 2022-03-30 ENCOUNTER — TELEPHONE (OUTPATIENT)
Dept: HEMATOLOGY ONCOLOGY | Facility: CLINIC | Age: 72
End: 2022-03-30

## 2022-03-30 ENCOUNTER — TELEPHONE (OUTPATIENT)
Dept: SURGICAL ONCOLOGY | Facility: CLINIC | Age: 72
End: 2022-03-30

## 2022-03-30 NOTE — TELEPHONE ENCOUNTER
CALL RETURN FORM   Reason for patient call? Patient had a voicemail    Patient returning call    Patient's primary oncologist? Dr Daryl Klein    Name of person the patient was calling for? Dr Dayrl Klein   Any additional information to add, if applicable? Informed patient that the message will be forwarded to the team and someone will get back to them as soon as possible    Did you relay this information to the patient?  Yes

## 2022-03-30 NOTE — TELEPHONE ENCOUNTER
Patient returned my call  Patient is okay doing surgery on 4/26  I will hold that time for her  Patient was appreciative of the call and has no further questions at this time  We will see her back on 4/6

## 2022-03-30 NOTE — TELEPHONE ENCOUNTER
I called patient but her phone went right to voicemail  Left message for patient to please call back to discuss a surgery date

## 2022-04-05 ENCOUNTER — APPOINTMENT (OUTPATIENT)
Dept: LAB | Facility: CLINIC | Age: 72
End: 2022-04-05
Payer: MEDICARE

## 2022-04-05 DIAGNOSIS — Z17.0 MALIGNANT NEOPLASM OF OVERLAPPING SITES OF RIGHT BREAST IN FEMALE, ESTROGEN RECEPTOR POSITIVE (HCC): ICD-10-CM

## 2022-04-05 DIAGNOSIS — T45.1X5A CHEMOTHERAPY INDUCED NEUTROPENIA (HCC): ICD-10-CM

## 2022-04-05 DIAGNOSIS — D70.1 CHEMOTHERAPY INDUCED NEUTROPENIA (HCC): ICD-10-CM

## 2022-04-05 DIAGNOSIS — C50.811 MALIGNANT NEOPLASM OF OVERLAPPING SITES OF RIGHT BREAST IN FEMALE, ESTROGEN RECEPTOR POSITIVE (HCC): ICD-10-CM

## 2022-04-05 LAB
ALBUMIN SERPL BCP-MCNC: 3.5 G/DL (ref 3.5–5)
ALP SERPL-CCNC: 126 U/L (ref 46–116)
ALT SERPL W P-5'-P-CCNC: 30 U/L (ref 12–78)
ANION GAP SERPL CALCULATED.3IONS-SCNC: 6 MMOL/L (ref 4–13)
ANISOCYTOSIS BLD QL SMEAR: PRESENT
ARTIFACT: PRESENT
AST SERPL W P-5'-P-CCNC: 29 U/L (ref 5–45)
BASOPHILS # BLD MANUAL: 0.14 THOUSAND/UL (ref 0–0.1)
BASOPHILS NFR MAR MANUAL: 2 % (ref 0–1)
BILIRUB SERPL-MCNC: 0.62 MG/DL (ref 0.2–1)
BUN SERPL-MCNC: 9 MG/DL (ref 5–25)
CALCIUM SERPL-MCNC: 9.4 MG/DL (ref 8.3–10.1)
CHLORIDE SERPL-SCNC: 109 MMOL/L (ref 100–108)
CO2 SERPL-SCNC: 26 MMOL/L (ref 21–32)
CREAT SERPL-MCNC: 0.74 MG/DL (ref 0.6–1.3)
EOSINOPHIL # BLD MANUAL: 0.2 THOUSAND/UL (ref 0–0.4)
EOSINOPHIL NFR BLD MANUAL: 3 % (ref 0–6)
ERYTHROCYTE [DISTWIDTH] IN BLOOD BY AUTOMATED COUNT: 17.6 % (ref 11.6–15.1)
GFR SERPL CREATININE-BSD FRML MDRD: 80 ML/MIN/1.73SQ M
GLUCOSE P FAST SERPL-MCNC: 102 MG/DL (ref 65–99)
HCT VFR BLD AUTO: 35.7 % (ref 34.8–46.1)
HGB BLD-MCNC: 11.1 G/DL (ref 11.5–15.4)
LYMPHOCYTES # BLD AUTO: 0.27 THOUSAND/UL (ref 0.6–4.47)
LYMPHOCYTES # BLD AUTO: 4 % (ref 14–44)
MACROCYTES BLD QL AUTO: PRESENT
MCH RBC QN AUTO: 32 PG (ref 26.8–34.3)
MCHC RBC AUTO-ENTMCNC: 31.1 G/DL (ref 31.4–37.4)
MCV RBC AUTO: 103 FL (ref 82–98)
MONOCYTES # BLD AUTO: 0.68 THOUSAND/UL (ref 0–1.22)
MONOCYTES NFR BLD: 10 % (ref 4–12)
NEUTROPHILS # BLD MANUAL: 5.22 THOUSAND/UL (ref 1.85–7.62)
NEUTS SEG NFR BLD AUTO: 77 % (ref 43–75)
PLATELET # BLD AUTO: 166 THOUSANDS/UL (ref 149–390)
PLATELET BLD QL SMEAR: ADEQUATE
PMV BLD AUTO: 11 FL (ref 8.9–12.7)
POLYCHROMASIA BLD QL SMEAR: PRESENT
POTASSIUM SERPL-SCNC: 4.8 MMOL/L (ref 3.5–5.3)
PROT SERPL-MCNC: 6.8 G/DL (ref 6.4–8.2)
RBC # BLD AUTO: 3.47 MILLION/UL (ref 3.81–5.12)
RBC MORPH BLD: PRESENT
SODIUM SERPL-SCNC: 141 MMOL/L (ref 136–145)
VARIANT LYMPHS # BLD AUTO: 4 %
WBC # BLD AUTO: 6.78 THOUSAND/UL (ref 4.31–10.16)

## 2022-04-05 PROCEDURE — 80053 COMPREHEN METABOLIC PANEL: CPT

## 2022-04-05 PROCEDURE — 85027 COMPLETE CBC AUTOMATED: CPT

## 2022-04-05 PROCEDURE — 85007 BL SMEAR W/DIFF WBC COUNT: CPT

## 2022-04-05 PROCEDURE — 36415 COLL VENOUS BLD VENIPUNCTURE: CPT

## 2022-04-06 ENCOUNTER — OFFICE VISIT (OUTPATIENT)
Dept: SURGICAL ONCOLOGY | Facility: CLINIC | Age: 72
End: 2022-04-06
Payer: MEDICARE

## 2022-04-06 ENCOUNTER — OFFICE VISIT (OUTPATIENT)
Dept: LAB | Facility: CLINIC | Age: 72
End: 2022-04-06
Payer: MEDICARE

## 2022-04-06 ENCOUNTER — OFFICE VISIT (OUTPATIENT)
Dept: HEMATOLOGY ONCOLOGY | Facility: CLINIC | Age: 72
End: 2022-04-06
Payer: MEDICARE

## 2022-04-06 ENCOUNTER — PATIENT OUTREACH (OUTPATIENT)
Dept: HEMATOLOGY ONCOLOGY | Facility: CLINIC | Age: 72
End: 2022-04-06

## 2022-04-06 VITALS
OXYGEN SATURATION: 98 % | HEART RATE: 112 BPM | DIASTOLIC BLOOD PRESSURE: 76 MMHG | HEIGHT: 69 IN | SYSTOLIC BLOOD PRESSURE: 112 MMHG | RESPIRATION RATE: 16 BRPM | WEIGHT: 183 LBS | BODY MASS INDEX: 27.11 KG/M2 | TEMPERATURE: 97.8 F

## 2022-04-06 VITALS
RESPIRATION RATE: 18 BRPM | SYSTOLIC BLOOD PRESSURE: 128 MMHG | HEIGHT: 69 IN | DIASTOLIC BLOOD PRESSURE: 82 MMHG | TEMPERATURE: 98.5 F | BODY MASS INDEX: 27.11 KG/M2 | WEIGHT: 183 LBS | OXYGEN SATURATION: 97 % | HEART RATE: 92 BPM

## 2022-04-06 DIAGNOSIS — C50.811 MALIGNANT NEOPLASM OF OVERLAPPING SITES OF RIGHT BREAST IN FEMALE, ESTROGEN RECEPTOR POSITIVE (HCC): Primary | ICD-10-CM

## 2022-04-06 DIAGNOSIS — Z17.0 MALIGNANT NEOPLASM OF OVERLAPPING SITES OF RIGHT BREAST IN FEMALE, ESTROGEN RECEPTOR POSITIVE (HCC): Primary | ICD-10-CM

## 2022-04-06 DIAGNOSIS — C50.811 MALIGNANT NEOPLASM OF OVERLAPPING SITES OF RIGHT BREAST IN FEMALE, ESTROGEN RECEPTOR POSITIVE (HCC): ICD-10-CM

## 2022-04-06 DIAGNOSIS — Z01.818 PREOPERATIVE TESTING: ICD-10-CM

## 2022-04-06 DIAGNOSIS — Z17.0 MALIGNANT NEOPLASM OF OVERLAPPING SITES OF RIGHT BREAST IN FEMALE, ESTROGEN RECEPTOR POSITIVE (HCC): ICD-10-CM

## 2022-04-06 DIAGNOSIS — Z79.2 PROPHYLACTIC ANTIBIOTIC: ICD-10-CM

## 2022-04-06 DIAGNOSIS — Z01.818 PREOP EXAMINATION: Primary | ICD-10-CM

## 2022-04-06 PROCEDURE — 93005 ELECTROCARDIOGRAM TRACING: CPT

## 2022-04-06 PROCEDURE — 99214 OFFICE O/P EST MOD 30 MIN: CPT | Performed by: INTERNAL MEDICINE

## 2022-04-06 PROCEDURE — 99215 OFFICE O/P EST HI 40 MIN: CPT | Performed by: SURGERY

## 2022-04-06 RX ORDER — OXYCODONE HYDROCHLORIDE AND ACETAMINOPHEN 5; 325 MG/1; MG/1
1 TABLET ORAL EVERY 6 HOURS PRN
Qty: 20 TABLET | Refills: 0 | Status: SHIPPED | OUTPATIENT
Start: 2022-04-06

## 2022-04-06 NOTE — H&P (VIEW-ONLY)
Surgical Oncology Follow Up       [address] [facility]  [facility] [place]  [address]  [place] [address]    Herbert Anthony  1950  [id number]  [address]  [facility] [place]  [address] [address]    Chief Complaint   Patient presents with    Pre-op Exam          Assessment & Plan:   Patient presents for preoperative visit  She has right breast inflammatory breast cancer she has completed her chemotherapy tomorrow  She will have an MRI prior to surgery to help delineate surgical outlined of her modified radical mastectomy  We would also use lymphatic mapping to help identify any nodes to include knowing full well that it is not overly reliable inflammatory breast cancer but could be considered complimentary to an axillary dissection  We went through the process of informed consent  Cancer History:     Oncology History   Malignant neoplasm of overlapping sites of right breast in female, estrogen receptor positive ([facility] [address] )   12/6/2021 Biopsy    Punch biopsy of right breast  Dermal lymphovascular invasion, immunoprofile consistent with breast primary  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)  Grade 2  ER 90; NC 70; HER2 2+, FISH negative     12/13/2021 Observation    Bilateral breast MRI - findings suspicious for multicentric right breast cnacer; 2 biopsies recommended at areas of concern (never done); left breast clear; no right axillary adenopathy       12/30/2021 -  Chemotherapy    DOXOrubicin (ADRIAMYCIN), 122 mg, Intravenous, Once, 4 of 4 cycles  Administration: 120 mg (12/30/2021), 122 mg (1/13/2022), 122 mg (1/27/2022), 122 mg (2/10/2022)  palonosetron (ALOXI), 0 25 mg, Intravenous, Once, 1 of 2 cycles  Administration: 0 25 mg (3/24/2022)  pegfilgrastim (NEULASTA), 4 mg (100 % of original dose 4 mg), Subcutaneous, Once, 2 of 3 cycles  Dose modification: 4 mg (original dose 4 mg, Cycle 6)  Administration: 4 mg (3/11/2022), 4 mg (3/25/2022)  pegfilgrastim (Jessa [place]), 6 mg, Subcutaneous, Once, 5 of 5 cycles  Administration: 6 mg (12/30/2021), 6 mg (1/13/2022), 6 mg (2/24/2022), 6 mg (1/27/2022), 6 mg (2/10/2022)  cyclophosphamide (CYTOXAN) IVPB, 600 mg/m2 = 1,218 mg, Intravenous, Once, 4 of 4 cycles  Administration: 1,218 mg (12/30/2021), 1,218 mg (1/13/2022), 1,218 mg (1/27/2022), 1,218 mg (2/10/2022)  fosaprepitant (EMEND) IVPB, 150 mg, Intravenous, Once, 4 of 4 cycles  Administration: 150 mg (12/30/2021), 150 mg (1/13/2022), 150 mg (1/27/2022), 150 mg (2/10/2022)  PACLItaxel (TAXOL) chemo IVPB, 175 mg/m2 = 355 2 mg, Intravenous, Once, 3 of 4 cycles  Administration: 355 2 mg (2/24/2022), 355 2 mg (3/10/2022), 355 2 mg (3/24/2022)           Interval History:   Patient states that she is tolerating her chemotherapy much better than previously  She has 1 more dose tomorrow  She has an MRI scheduled in next week or so  Review of Systems:   Review of Systems   Constitutional: Negative for chills and fever  HENT: Negative for ear pain and sore throat  Eyes: Negative for pain and visual disturbance  Respiratory: Negative for cough and shortness of breath  Cardiovascular: Negative for chest pain and palpitations  Gastrointestinal: Negative for abdominal pain and vomiting  Genitourinary: Negative for dysuria and hematuria  Musculoskeletal: Negative for arthralgias and back pain  Skin: Negative for color change and rash  Neurological: Negative for seizures and syncope  All other systems reviewed and are negative        Past Medical History     Patient Active Problem List   Diagnosis    Varicose vein of leg    Anxiety    Arthritis    Depression    Hyperbilirubinemia    Hyperlipidemia, mild    Over weight    Vitamin D deficiency    Digital mucinous cyst of finger of left hand    Acute midline low back pain with left-sided sciatica    Closed fracture of distal end of radius    Malignant neoplasm of overlapping sites of right breast in female, estrogen receptor positive ([facility] [address] )    Chemotherapy induced neutropenia ([facility] [address] )    Palliative care patient    Medical marijuana use    Cancer related pain    Chemotherapy-induced nausea    Insomnia due to medical condition    Anorexia     Past Medical History:   Diagnosis Date    Cancer [facility])     Right breast cancer    Fracture of radius, distal, closed     Last Assessed:  6/10/14     Past Surgical History:   Procedure Laterality Date    ANKLE SURGERY      ANKLE SURGERY      BREAST BIOPSY Left 2001    neg    GALLBLADDER SURGERY      HYSTERECTOMY      IR PORT PLACEMENT  12/29/2021    WISDOM TOOTH EXTRACTION       Family History   Problem Relation Age of Onset    Ovarian cancer Mother 64    Heart disease Father     Leukemia Brother 62    Heart disease Brother     Heart disease Brother     Cancer Maternal Grandmother         Unknown primary; mid 52's    Stomach cancer Maternal Aunt         60's     Social History     Socioeconomic History    Marital status: /Civil Union     Spouse name: Not on file    Number of children: Not on file    Years of education: Not on file    Highest education level: Not on file   Occupational History    Not on file   Tobacco Use    Smoking status: Never Smoker    Smokeless tobacco: Never Used   Vaping Use    Vaping Use: Never used   Substance and Sexual Activity    Alcohol use: No    Drug use: No    Sexual activity: Not Currently     Partners: Male     Birth control/protection: Female Sterilization   Other Topics Concern    Not on file   Social History Narrative     to Katya Winn       Social Determinants of Health     Financial Resource Strain: Not on file   Food Insecurity: Not on file   Transportation Needs: Not on file   Physical Activity: Not on file   Stress: Not on file   Social Connections: Not on file   Intimate Partner Violence: Not on file   Housing Stability: Not on file       Current Outpatient Medications:     acetaminophen (TYLENOL) 500 mg tablet, Take 2 tablets (1,000 mg total) by mouth 3 (three) times a day, Disp: 180 tablet, Rfl: 2    al mag oxide-diphenhydramine-lidocaine viscous (MAGIC MOUTHWASH) 1:1:1 suspension, Swish and spit 10 mL every 4 (four) hours as needed for mouth pain or discomfort, Disp: 90 mL, Rfl: 1    ALPRAZolam (XANAX) 0 25 mg tablet, Take 1/2 to 1 pill Q 8 hours p r n  For anxiety, Disp: 30 tablet, Rfl: 0    Calcium 250 MG CAPS, Take by mouth, Disp: , Rfl:     cholecalciferol (VITAMIN D3) 1,000 units tablet, Take 2 capsules by mouth daily, Disp: , Rfl:     CRANIAL PROSTHESIS, RX,, One wig as needed, Disp: 1 each, Rfl: 0    docusate sodium (COLACE) 100 mg capsule, Take 100 mg by mouth 2 (two) times a day, Disp: , Rfl:     escitalopram (LEXAPRO) 20 mg tablet, Take 1 tablet (20 mg total) by mouth daily, Disp: 90 tablet, Rfl: 2    Fish Oil-Krill Oil (KRILL OIL PLUS) CAPS, Take by mouth, Disp: , Rfl:     fluocinonide (LIDEX) 0 05 % external solution, APPLY TO SCALP ONCE DAILY AS NEEDED FOR ITCH, Disp: , Rfl: 2    ketoconazole (NIZORAL) 2 % shampoo, PLEASE SEE ATTACHED FOR DETAILED DIRECTIONS, Disp: , Rfl: 5    lidocaine-prilocaine (EMLA) cream, Apply topically as needed for mild pain, Disp: 30 g, Rfl: 1    ondansetron (ZOFRAN) 8 mg tablet, Take 1 tablet (8 mg total) by mouth every 8 (eight) hours as needed for nausea or vomiting, Disp: 40 tablet, Rfl: 2  No Known Allergies    Physical Exam:     Vitals:    04/06/22 1015   BP: 112/76   Pulse: (!) 112   Resp: 16   Temp: 97 8 °F (36 6 °C)   SpO2: 98%     Physical Exam  Vitals reviewed  Constitutional:       Appearance: She is well-developed  HENT:      Head: Normocephalic and atraumatic  Eyes:      Pupils: Pupils are equal, round, and reactive to light  Neck:      Thyroid: No thyromegaly  Vascular: No JVD  Trachea: No tracheal deviation     Cardiovascular: Rate and Rhythm: Normal rate and regular rhythm  Heart sounds: Normal heart sounds  No murmur heard  No friction rub  No gallop  Pulmonary:      Effort: Pulmonary effort is normal  No respiratory distress  Breath sounds: Normal breath sounds  No wheezing or rales  Chest:      Comments: Examination the right breast demonstrates significant skin improvement there is still indurated tissue within the breast there is no obvious current skin involvement and there is no axillary adenopathy  The left breast was examined in the sitting and supine position  There are no worrisome skin changes, tenderness, inverted nipple, nipple discharge, swelling, bleeding or evidence of a mass in any quadrant  Sigifredo survey demonstrated no evidence of any clinically suspicious axillary, pectoral or paraclavicular lymph nodes  Abdominal:      General: There is no distension  Palpations: Abdomen is soft  There is no hepatomegaly or mass  Tenderness: There is no abdominal tenderness  There is no guarding or rebound  Musculoskeletal:         General: No tenderness  Normal range of motion  Cervical back: Normal range of motion and neck supple  Lymphadenopathy:      Cervical: No cervical adenopathy  Skin:     General: Skin is warm and dry  Findings: No erythema or rash  Neurological:      Mental Status: She is alert and oriented to person, place, and time  Cranial Nerves: No cranial nerve deficit  Psychiatric:         Behavior: Behavior normal            Results & Discussion:   The patient will have her MRI to facilitate outlining the skin flaps try to make sure they are negative  The patient and her daughter had multiple questions regarding the pre and postoperative care  All of this was reviewed  We went through the process of informed consent      The patient and I underwent the process of consent for right breast modified radical mastectomy with right lymphatic mapping using blue and radioactive dye     The complications outlined on the consent including relatively minor problems (wound infection, wound healing problems, hematomas, scarring, chronic discomfort/pain), moderate problems (injury to nerves or blood vessels, allergic reactions) and major complications (extensive blood loss requiring transfusions or possible addtional surgeries, cardiac arrest, stroke and possible death)  We also reviewed specific complications as outlined on the consent form including possible cancer recurrence, need for adjuvant therapies or surgery  Arm swelling  All questions were answered to the patient's satisfaction  We will coordinate the surgery at our next mutual convience  Advance Care Planning/Advance Directives:  I discussed the disease status, treatment plans and follow-up with the patient

## 2022-04-06 NOTE — PROGRESS NOTES
Hematology / Oncology Outpatient Follow Up Note    Alex Freeman 67 y o  female NZK: [id number]         Date:  2022    Assessment / Plan:  A 67years old postmenopausal woman with inflammatory right breast cancer, grade at least 2, ER 90 % positive, ID 70 % positive, HER2 fish negative disease   Based on MRI, she may have 9 mm of tumor with clear skin thickening in her right breast   Inflammatory breast cancer was confirmed by the biopsy with dermal invasion   She is currently on neoadjuvant chemotherapy  She already completed 4 cycles of dose dense AC resulting in clinical partial response  She is currently on neoadjuvant chemotherapy with dose dense paclitaxel with excellent tolerance  Based on physical examination, she has at least partial response  She is going to have last dose of neoadjuvant chemotherapy, tomorrow  She is already scheduled to have mastectomy and axillary lymph node dissection without reconstruction in 2022  I will see her again toward the end of May 2022 for follow-up    She is in agreement with my recommendations        Subjective:      HPI:  A 69-year-old postmenopausal woman noticed red spot on the skin in her right breast in 2021   She brought this to medical attention   Mammography did not show any mass   However, ultrasound shows 6 mm of skin thickening in her right breast at 4:00 a m  position   She was seen by Dr Sayra Luo who did a skin biopsy   Skin biopsy was positive for dermal lymphatic invasion of carcinoma, consistent with breast primary   ER, ID and HER2 are pending at this time   She subsequent underwent CT scan of chest abdomen pelvis which showed right breast skin thickening but no evidence the distant metastasis   She presents today with her  and daughter-in-law to discuss the medical treatment for her breast cancer   She has no complaint of pain   She is afebrile  Eliceo Alcazar has no significant past medical history   Her mother  of ovarian cancer at age of 62   Therefore, she underwent prophylactic hysterectomy and bilateral oophorectomy in 1993   She also have cholecystectomy as a surgical past history   She currently feels well   She has no complaint of pain   Her weight is stable   She has no respiratory symptoms  Yuly Bingham is a lifetime never smoker   Her performance status is normal            Interval History:   A 66year-old postmenopausal woman with inflammatory right breast cancer, grade 2, ER 90 % positive, ND 70 % positive, HER2 fish negative disease   Her right breast mass is not easy to evaluate   Even MRI showed only 9 mm of mass with skin thickening  She is currently on neoadjuvant chemotherapy  She completed 4 cycle of dose dense AC  She had 1 dose of dose dense paclitaxel with Neulasta support  Subsequently, she has significant bone pain for 3 days  Therefore, Neulasta dose was reduced  She is tolerating dose dense paclitaxel very well with no nausea vomiting  She had brief diarrhea  She has stable weight  She currently does not have any pain  She has no respiratory symptoms  She is going to have last dose of paclitaxel tomorrow    Her performance status is normal          Objective:      Primary Diagnosis:     Right inflammatory breast cancer   Grade 2   ER 90% positive, ND 70% positive, HER2 fish negative disease   Diagnosed in December 2021      Cancer Staging:  Cancer Staging  No matching staging information was found for the patient         Previous Hematologic/ Oncologic Treatment:            Current Hematologic/ Oncologic Treatment:       Neoadjuvant chemotherapy with dose dense AC x4 followed by dose dense paclitaxel x4   4th cycle paclitaxel to be given in April 7, 2022       Disease Status:      Clinically responding disease      Test Results:     Pathology:     Right breast skin biopsy showed dermal and lymphatic invasion of carcinoma, consistent with breast primary   At least grade 2   ER 90% positive, ND 70% positive, HER2 2+ disease  Mellissa Aguirre fish was negative for gene application with ratio of 1 2      Radiology:     CT scan of chest abdomen pelvis showed no evidence of distant metastasis      Breast ultrasound shows 6 mm of skin thickening at 4:00 a m  position with no visible underline mass      Echocardiogram showed ejection fraction 60 percent in December 2021      Laboratory:     See below      Physical Exam:        General Appearance:    Alert, oriented          Eyes:    PERRL   Ears:    Normal external ear canals, both ears   Nose:   Nares normal, septum midline   Throat:   Mucosa moist  Pharynx without injection  Neck:   Supple         Lungs:     Clear to auscultation bilaterally   Chest Wall:    No tenderness or deformity    Heart:    Regular rate and rhythm         Abdomen:     Soft, non-tender, bowel sounds +, no organomegaly               Extremities:   Extremities no cyanosis or edema         Skin:   no rash or icterus  Lymph nodes:   Cervical, supraclavicular, and axillary nodes normal   Neurologic:   CNII-XII intact, normal strength, sensation and reflexes     Throughout             Breast exam: Previous 10 cm area of erythematous change has disappeared     No palpable right axillary adenopathy   Left breast exam is negative               ROS: Review of Systems   All other systems reviewed and are negative  Imaging: No results found        Labs:   Lab Results   Component Value Date    WBC 6 78 04/05/2022    HGB 11 1 (L) 04/05/2022    HCT 35 7 04/05/2022     (H) 04/05/2022     04/05/2022     Lab Results   Component Value Date     07/03/2015    K 4 8 04/05/2022     (H) 04/05/2022    CO2 26 04/05/2022    ANIONGAP 8 07/03/2015    BUN 9 04/05/2022    CREATININE 0 74 04/05/2022    GLUCOSE 83 07/03/2015    GLUF 102 (H) 04/05/2022    CALCIUM 9 4 04/05/2022    CORRECTEDCA 9 7 03/23/2022    AST 29 04/05/2022    ALT 30 04/05/2022    ALKPHOS 126 (H) 04/05/2022    PROT 7 7 07/03/2015    BILITOT 0 91 07/03/2015    EGFR 80 04/05/2022         Lab Results   Component Value Date    IRON 116 04/11/2016         Current Medications: Reviewed  Allergies: Reviewed  PMH/FH/SH:  Reviewed      Vital Sign:    There is no height or weight on file to calculate BSA      Wt Readings from Last 3 Encounters:   04/06/22 83 kg (183 lb)   03/24/22 82 6 kg (182 lb)   03/16/22 81 6 kg (180 lb)        Temp Readings from Last 3 Encounters:   04/06/22 97 8 °F (36 6 °C)   03/24/22 (!) 96 9 °F (36 1 °C) (Temporal)   03/16/22 (!) 97 3 °F (36 3 °C)        BP Readings from Last 3 Encounters:   04/06/22 112/76   03/24/22 135/72   03/16/22 122/78         Pulse Readings from Last 3 Encounters:   04/06/22 (!) 112   03/24/22 100   03/16/22 (!) 124     @AWCODFY2(7)@

## 2022-04-06 NOTE — PROGRESS NOTES
Surgical Oncology Follow Up       [facility]  [facility] [place]  [address] [place] [address]    Darrel Watts  1950  [id number]  [address]  [facility] [place]  [address] [address]    Chief Complaint   Patient presents with    Pre-op Exam          Assessment & Plan:   Patient presents for preoperative visit  She has right breast inflammatory breast cancer she has completed her chemotherapy tomorrow  She will have an MRI prior to surgery to help delineate surgical outlined of her modified radical mastectomy  We would also use lymphatic mapping to help identify any nodes to include knowing full well that it is not overly reliable inflammatory breast cancer but could be considered complimentary to an axillary dissection  We went through the process of informed consent  Cancer History:     Oncology History   Malignant neoplasm of overlapping sites of right breast in female, estrogen receptor positive ([facility] [address] )   12/6/2021 Biopsy    Punch biopsy of right breast  Dermal lymphovascular invasion, immunoprofile consistent with breast primary  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)  Grade 2  ER 90; IN 70; HER2 2+, FISH negative     12/13/2021 Observation    Bilateral breast MRI - findings suspicious for multicentric right breast cnacer; 2 biopsies recommended at areas of concern (never done); left breast clear; no right axillary adenopathy       12/30/2021 -  Chemotherapy    DOXOrubicin (ADRIAMYCIN), 122 mg, Intravenous, Once, 4 of 4 cycles  Administration: 120 mg (12/30/2021), 122 mg (1/13/2022), 122 mg (1/27/2022), 122 mg (2/10/2022)  palonosetron (ALOXI), 0 25 mg, Intravenous, Once, 1 of 2 cycles  Administration: 0 25 mg (3/24/2022)  pegfilgrastim (NEULASTA), 4 mg (100 % of original dose 4 mg), Subcutaneous, Once, 2 of 3 cycles  Dose modification: 4 mg (original dose 4 mg, Cycle 6)  Administration: 4 mg (3/11/2022), 4 mg (3/25/2022)  pegfilgrastim ([place]), 6 mg, Subcutaneous, Once, 5 of 5 cycles  Administration: 6 mg (12/30/2021), 6 mg (1/13/2022), 6 mg (2/24/2022), 6 mg (1/27/2022), 6 mg (2/10/2022)  cyclophosphamide (CYTOXAN) IVPB, 600 mg/m2 = 1,218 mg, Intravenous, Once, 4 of 4 cycles  Administration: 1,218 mg (12/30/2021), 1,218 mg (1/13/2022), 1,218 mg (1/27/2022), 1,218 mg (2/10/2022)  fosaprepitant (EMEND) IVPB, 150 mg, Intravenous, Once, 4 of 4 cycles  Administration: 150 mg (12/30/2021), 150 mg (1/13/2022), 150 mg (1/27/2022), 150 mg (2/10/2022)  PACLItaxel (TAXOL) chemo IVPB, 175 mg/m2 = 355 2 mg, Intravenous, Once, 3 of 4 cycles  Administration: 355 2 mg (2/24/2022), 355 2 mg (3/10/2022), 355 2 mg (3/24/2022)           Interval History:   Patient states that she is tolerating her chemotherapy much better than previously  She has 1 more dose tomorrow  She has an MRI scheduled in next week or so  Review of Systems:   Review of Systems   Constitutional: Negative for chills and fever  HENT: Negative for ear pain and sore throat  Eyes: Negative for pain and visual disturbance  Respiratory: Negative for cough and shortness of breath  Cardiovascular: Negative for chest pain and palpitations  Gastrointestinal: Negative for abdominal pain and vomiting  Genitourinary: Negative for dysuria and hematuria  Musculoskeletal: Negative for arthralgias and back pain  Skin: Negative for color change and rash  Neurological: Negative for seizures and syncope  All other systems reviewed and are negative        Past Medical History     Patient Active Problem List   Diagnosis    Varicose vein of leg    Anxiety    Arthritis    Depression    Hyperbilirubinemia    Hyperlipidemia, mild    Over weight    Vitamin D deficiency    Digital mucinous cyst of finger of left hand    Acute midline low back pain with left-sided sciatica    Closed fracture of distal end of radius    Malignant neoplasm of overlapping sites of right breast in female, estrogen receptor positive ([facility] [address] )    Chemotherapy induced neutropenia ([facility] [address] )    Palliative care patient    Medical marijuana use    Cancer related pain    Chemotherapy-induced nausea    Insomnia due to medical condition    Anorexia     Past Medical History:   Diagnosis Date    Cancer [facility])     Right breast cancer    Fracture of radius, distal, closed     Last Assessed:  6/10/14     Past Surgical History:   Procedure Laterality Date    ANKLE SURGERY      ANKLE SURGERY      BREAST BIOPSY Left 2001    neg    GALLBLADDER SURGERY      HYSTERECTOMY      IR PORT PLACEMENT  12/29/2021    WISDOM TOOTH EXTRACTION       Family History   Problem Relation Age of Onset    Ovarian cancer Mother 64    Heart disease Father     Leukemia Brother 62    Heart disease Brother     Heart disease Brother     Cancer Maternal Grandmother         Unknown primary; mid 52's    Stomach cancer Maternal Aunt         60's     Social History     Socioeconomic History    Marital status: /Civil Union     Spouse name: Not on file    Number of children: Not on file    Years of education: Not on file    Highest education level: Not on file   Occupational History    Not on file   Tobacco Use    Smoking status: Never Smoker    Smokeless tobacco: Never Used   Vaping Use    Vaping Use: Never used   Substance and Sexual Activity    Alcohol use: No    Drug use: No    Sexual activity: Not Currently     Partners: Male     Birth control/protection: Female Sterilization   Other Topics Concern    Not on file   Social History Narrative     to Michel Krishnamurthy       Social Determinants of Health     Financial Resource Strain: Not on file   Food Insecurity: Not on file   Transportation Needs: Not on file   Physical Activity: Not on file   Stress: Not on file   Social Connections: Not on file   Intimate Partner Violence: Not on file   Housing Stability: Not on file       Current Outpatient Medications:     acetaminophen (TYLENOL) 500 mg tablet, Take 2 tablets (1,000 mg total) by mouth 3 (three) times a day, Disp: 180 tablet, Rfl: 2    al mag oxide-diphenhydramine-lidocaine viscous (MAGIC MOUTHWASH) 1:1:1 suspension, Swish and spit 10 mL every 4 (four) hours as needed for mouth pain or discomfort, Disp: 90 mL, Rfl: 1    ALPRAZolam (XANAX) 0 25 mg tablet, Take 1/2 to 1 pill Q 8 hours p r n  For anxiety, Disp: 30 tablet, Rfl: 0    Calcium 250 MG CAPS, Take by mouth, Disp: , Rfl:     cholecalciferol (VITAMIN D3) 1,000 units tablet, Take 2 capsules by mouth daily, Disp: , Rfl:     CRANIAL PROSTHESIS, RX,, One wig as needed, Disp: 1 each, Rfl: 0    docusate sodium (COLACE) 100 mg capsule, Take 100 mg by mouth 2 (two) times a day, Disp: , Rfl:     escitalopram (LEXAPRO) 20 mg tablet, Take 1 tablet (20 mg total) by mouth daily, Disp: 90 tablet, Rfl: 2    Fish Oil-Krill Oil (KRILL OIL PLUS) CAPS, Take by mouth, Disp: , Rfl:     fluocinonide (LIDEX) 0 05 % external solution, APPLY TO SCALP ONCE DAILY AS NEEDED FOR ITCH, Disp: , Rfl: 2    ketoconazole (NIZORAL) 2 % shampoo, PLEASE SEE ATTACHED FOR DETAILED DIRECTIONS, Disp: , Rfl: 5    lidocaine-prilocaine (EMLA) cream, Apply topically as needed for mild pain, Disp: 30 g, Rfl: 1    ondansetron (ZOFRAN) 8 mg tablet, Take 1 tablet (8 mg total) by mouth every 8 (eight) hours as needed for nausea or vomiting, Disp: 40 tablet, Rfl: 2  No Known Allergies    Physical Exam:     Vitals:    04/06/22 1015   BP: 112/76   Pulse: (!) 112   Resp: 16   Temp: 97 8 °F (36 6 °C)   SpO2: 98%     Physical Exam  Vitals reviewed  Constitutional:       Appearance: She is well-developed  HENT:      Head: Normocephalic and atraumatic  Eyes:      Pupils: Pupils are equal, round, and reactive to light  Neck:      Thyroid: No thyromegaly  Vascular: No JVD  Trachea: No tracheal deviation     Cardiovascular: Rate and Rhythm: Normal rate and regular rhythm  Heart sounds: Normal heart sounds  No murmur heard  No friction rub  No gallop  Pulmonary:      Effort: Pulmonary effort is normal  No respiratory distress  Breath sounds: Normal breath sounds  No wheezing or rales  Chest:      Comments: Examination the right breast demonstrates significant skin improvement there is still indurated tissue within the breast there is no obvious current skin involvement and there is no axillary adenopathy  The left breast was examined in the sitting and supine position  There are no worrisome skin changes, tenderness, inverted nipple, nipple discharge, swelling, bleeding or evidence of a mass in any quadrant  Sigifredo survey demonstrated no evidence of any clinically suspicious axillary, pectoral or paraclavicular lymph nodes  Abdominal:      General: There is no distension  Palpations: Abdomen is soft  There is no hepatomegaly or mass  Tenderness: There is no abdominal tenderness  There is no guarding or rebound  Musculoskeletal:         General: No tenderness  Normal range of motion  Cervical back: Normal range of motion and neck supple  Lymphadenopathy:      Cervical: No cervical adenopathy  Skin:     General: Skin is warm and dry  Findings: No erythema or rash  Neurological:      Mental Status: She is alert and oriented to person, place, and time  Cranial Nerves: No cranial nerve deficit  Psychiatric:         Behavior: Behavior normal            Results & Discussion:   The patient will have her MRI to facilitate outlining the skin flaps try to make sure they are negative  The patient and her daughter had multiple questions regarding the pre and postoperative care  All of this was reviewed  We went through the process of informed consent      The patient and I underwent the process of consent for right breast modified radical mastectomy with right lymphatic mapping using blue and radioactive dye     The complications outlined on the consent including relatively minor problems (wound infection, wound healing problems, hematomas, scarring, chronic discomfort/pain), moderate problems (injury to nerves or blood vessels, allergic reactions) and major complications (extensive blood loss requiring transfusions or possible addtional surgeries, cardiac arrest, stroke and possible death)  We also reviewed specific complications as outlined on the consent form including possible cancer recurrence, need for adjuvant therapies or surgery  Arm swelling  All questions were answered to the patient's satisfaction  We will coordinate the surgery at our next mutual convience  Advance Care Planning/Advance Directives:  I discussed the disease status, treatment plans and follow-up with the patient

## 2022-04-07 ENCOUNTER — HOSPITAL ENCOUNTER (OUTPATIENT)
Dept: INFUSION CENTER | Facility: CLINIC | Age: 72
Discharge: HOME | End: 2022-04-07
Payer: MEDICARE

## 2022-04-07 VITALS
HEIGHT: 69 IN | SYSTOLIC BLOOD PRESSURE: 124 MMHG | OXYGEN SATURATION: 98 % | TEMPERATURE: 97.4 F | RESPIRATION RATE: 18 BRPM | HEART RATE: 73 BPM | WEIGHT: 179.5 LBS | DIASTOLIC BLOOD PRESSURE: 70 MMHG | BODY MASS INDEX: 26.59 KG/M2

## 2022-04-07 DIAGNOSIS — C50.811 MALIGNANT NEOPLASM OF OVERLAPPING SITES OF RIGHT BREAST IN FEMALE, ESTROGEN RECEPTOR POSITIVE (HCC): ICD-10-CM

## 2022-04-07 DIAGNOSIS — T45.1X5A CHEMOTHERAPY INDUCED NEUTROPENIA (HCC): Primary | ICD-10-CM

## 2022-04-07 DIAGNOSIS — D70.1 CHEMOTHERAPY INDUCED NEUTROPENIA (HCC): Primary | ICD-10-CM

## 2022-04-07 DIAGNOSIS — Z17.0 MALIGNANT NEOPLASM OF OVERLAPPING SITES OF RIGHT BREAST IN FEMALE, ESTROGEN RECEPTOR POSITIVE (HCC): ICD-10-CM

## 2022-04-07 LAB
ATRIAL RATE: 80 BPM
P AXIS: 63 DEGREES
PR INTERVAL: 152 MS
QRS AXIS: 54 DEGREES
QRSD INTERVAL: 82 MS
QT INTERVAL: 378 MS
QTC INTERVAL: 435 MS
T WAVE AXIS: 42 DEGREES
VENTRICULAR RATE: 80 BPM

## 2022-04-07 PROCEDURE — 96415 CHEMO IV INFUSION ADDL HR: CPT

## 2022-04-07 PROCEDURE — 96367 TX/PROPH/DG ADDL SEQ IV INF: CPT

## 2022-04-07 PROCEDURE — 96413 CHEMO IV INFUSION 1 HR: CPT

## 2022-04-07 PROCEDURE — 96375 TX/PRO/DX INJ NEW DRUG ADDON: CPT

## 2022-04-07 PROCEDURE — 93010 ELECTROCARDIOGRAM REPORT: CPT | Performed by: INTERNAL MEDICINE

## 2022-04-07 RX ORDER — PALONOSETRON 0.05 MG/ML
0.25 INJECTION, SOLUTION INTRAVENOUS ONCE
Status: COMPLETED | OUTPATIENT
Start: 2022-04-07 | End: 2022-04-07

## 2022-04-07 RX ORDER — SODIUM CHLORIDE 9 MG/ML
20 INJECTION, SOLUTION INTRAVENOUS ONCE
Status: COMPLETED | OUTPATIENT
Start: 2022-04-07 | End: 2022-04-07

## 2022-04-07 RX ADMIN — FAMOTIDINE 20 MG: 10 INJECTION INTRAVENOUS at 09:48

## 2022-04-07 RX ADMIN — SODIUM CHLORIDE 20 ML/HR: 0.9 INJECTION, SOLUTION INTRAVENOUS at 09:08

## 2022-04-07 RX ADMIN — DEXAMETHASONE SODIUM PHOSPHATE 20 MG: 10 INJECTION, SOLUTION INTRAMUSCULAR; INTRAVENOUS at 09:07

## 2022-04-07 RX ADMIN — PALONOSETRON HYDROCHLORIDE 0.25 MG: 0.25 INJECTION INTRAVENOUS at 10:37

## 2022-04-07 RX ADMIN — DIPHENHYDRAMINE HYDROCHLORIDE 25 MG: 50 INJECTION, SOLUTION INTRAMUSCULAR; INTRAVENOUS at 09:27

## 2022-04-07 RX ADMIN — PACLITAXEL 355.2 MG: 6 INJECTION, SOLUTION, CONCENTRATE INTRAVENOUS at 10:44

## 2022-04-07 NOTE — PROGRESS NOTES
Pt to clinic for Taxol  Pt c/o eight episodes of diarrhea on day two AFTER cycle 7, each episode was a scant amount of diarrhea  Pt stated she started drinking Pedialyte  Pt educated on Imodium, increasing fluid intake, and when to call the office  Pt verbally understands

## 2022-04-08 ENCOUNTER — HOSPITAL ENCOUNTER (OUTPATIENT)
Dept: INFUSION CENTER | Facility: CLINIC | Age: 72
Discharge: HOME | End: 2022-04-08
Payer: MEDICARE

## 2022-04-08 VITALS — TEMPERATURE: 97.9 F

## 2022-04-08 DIAGNOSIS — T45.1X5A CHEMOTHERAPY INDUCED NEUTROPENIA (HCC): Primary | ICD-10-CM

## 2022-04-08 DIAGNOSIS — C50.811 MALIGNANT NEOPLASM OF OVERLAPPING SITES OF RIGHT BREAST IN FEMALE, ESTROGEN RECEPTOR POSITIVE (HCC): ICD-10-CM

## 2022-04-08 DIAGNOSIS — Z17.0 MALIGNANT NEOPLASM OF OVERLAPPING SITES OF RIGHT BREAST IN FEMALE, ESTROGEN RECEPTOR POSITIVE (HCC): ICD-10-CM

## 2022-04-08 DIAGNOSIS — D70.1 CHEMOTHERAPY INDUCED NEUTROPENIA (HCC): Primary | ICD-10-CM

## 2022-04-08 PROCEDURE — 96372 THER/PROPH/DIAG INJ SC/IM: CPT

## 2022-04-08 RX ADMIN — PEGFILGRASTIM 4 MG: 6 INJECTION SUBCUTANEOUS at 14:35

## 2022-04-08 NOTE — PROGRESS NOTES
Patient to Casimiro Butler: Offers no complaints at present time: Lab work ( 04/05/22 ) reviewed: Within parameters to treat: Injection given in Right UA without incident: No adverse reactions noted: No further appt's scheduled:  Will notify unit if needed: AVS offered and declined

## 2022-04-11 ENCOUNTER — PATIENT OUTREACH (OUTPATIENT)
Dept: HEMATOLOGY ONCOLOGY | Facility: CLINIC | Age: 72
End: 2022-04-11

## 2022-04-11 NOTE — PROGRESS NOTES
MSW placed call to [facility] and spoke with Génesis Jensen in intake  Génesis Jensen reports that the pt has been accepted for VNA post surgery  MSW sent an in basket message to Tori Martino and Zaria Stanton in case management at [facility] to notify

## 2022-04-13 ENCOUNTER — TELEPHONE (OUTPATIENT)
Dept: PALLIATIVE MEDICINE | Facility: CLINIC | Age: 72
End: 2022-04-13

## 2022-04-19 ENCOUNTER — APPOINTMENT (OUTPATIENT)
Dept: LAB | Facility: CLINIC | Age: 72
End: 2022-04-19
Payer: MEDICARE

## 2022-04-19 DIAGNOSIS — C50.811 MALIGNANT NEOPLASM OF OVERLAPPING SITES OF RIGHT BREAST IN FEMALE, ESTROGEN RECEPTOR POSITIVE (HCC): ICD-10-CM

## 2022-04-19 DIAGNOSIS — Z17.0 MALIGNANT NEOPLASM OF OVERLAPPING SITES OF RIGHT BREAST IN FEMALE, ESTROGEN RECEPTOR POSITIVE (HCC): ICD-10-CM

## 2022-04-19 DIAGNOSIS — Z01.818 PREOPERATIVE TESTING: ICD-10-CM

## 2022-04-19 LAB
BASOPHILS # BLD AUTO: 0.07 THOUSANDS/ΜL (ref 0–0.1)
BASOPHILS NFR BLD AUTO: 1 % (ref 0–1)
EOSINOPHIL # BLD AUTO: 0.18 THOUSAND/ΜL (ref 0–0.61)
EOSINOPHIL NFR BLD AUTO: 3 % (ref 0–6)
ERYTHROCYTE [DISTWIDTH] IN BLOOD BY AUTOMATED COUNT: 16.1 % (ref 11.6–15.1)
HCT VFR BLD AUTO: 36.6 % (ref 34.8–46.1)
HGB BLD-MCNC: 11.5 G/DL (ref 11.5–15.4)
IMM GRANULOCYTES # BLD AUTO: 0.12 THOUSAND/UL (ref 0–0.2)
IMM GRANULOCYTES NFR BLD AUTO: 2 % (ref 0–2)
LYMPHOCYTES # BLD AUTO: 1.42 THOUSANDS/ΜL (ref 0.6–4.47)
LYMPHOCYTES NFR BLD AUTO: 26 % (ref 14–44)
MCH RBC QN AUTO: 32.4 PG (ref 26.8–34.3)
MCHC RBC AUTO-ENTMCNC: 31.4 G/DL (ref 31.4–37.4)
MCV RBC AUTO: 103 FL (ref 82–98)
MONOCYTES # BLD AUTO: 0.7 THOUSAND/ΜL (ref 0.17–1.22)
MONOCYTES NFR BLD AUTO: 13 % (ref 4–12)
NEUTROPHILS # BLD AUTO: 2.91 THOUSANDS/ΜL (ref 1.85–7.62)
NEUTS SEG NFR BLD AUTO: 55 % (ref 43–75)
NRBC BLD AUTO-RTO: 0 /100 WBCS
PLATELET # BLD AUTO: 193 THOUSANDS/UL (ref 149–390)
PMV BLD AUTO: 11.2 FL (ref 8.9–12.7)
RBC # BLD AUTO: 3.55 MILLION/UL (ref 3.81–5.12)
WBC # BLD AUTO: 5.4 THOUSAND/UL (ref 4.31–10.16)

## 2022-04-19 PROCEDURE — 36415 COLL VENOUS BLD VENIPUNCTURE: CPT

## 2022-04-19 PROCEDURE — 85025 COMPLETE CBC W/AUTO DIFF WBC: CPT

## 2022-04-20 ENCOUNTER — HOSPITAL ENCOUNTER (OUTPATIENT)
Dept: MRI IMAGING | Facility: HOSPITAL | Age: 72
Discharge: HOME | End: 2022-04-20
Attending: SURGERY
Payer: MEDICARE

## 2022-04-20 DIAGNOSIS — C50.811 MALIGNANT NEOPLASM OF OVERLAPPING SITES OF RIGHT BREAST IN FEMALE, ESTROGEN RECEPTOR POSITIVE (HCC): ICD-10-CM

## 2022-04-20 DIAGNOSIS — Z17.0 MALIGNANT NEOPLASM OF OVERLAPPING SITES OF RIGHT BREAST IN FEMALE, ESTROGEN RECEPTOR POSITIVE (HCC): ICD-10-CM

## 2022-04-20 PROCEDURE — A9585 GADOBUTROL INJECTION: HCPCS | Performed by: SURGERY

## 2022-04-20 PROCEDURE — C8937 CAD BREAST MRI: HCPCS

## 2022-04-20 PROCEDURE — C8908 MRI W/O FOL W/CONT, BREAST,: HCPCS

## 2022-04-20 PROCEDURE — G1004 CDSM NDSC: HCPCS

## 2022-04-20 RX ADMIN — GADOBUTROL 8 ML: 604.72 INJECTION INTRAVENOUS at 11:46

## 2022-04-20 NOTE — PRE-PROCEDURE INSTRUCTIONS
Pre-Surgery Instructions:   Medication Instructions    acetaminophen (TYLENOL) 500 mg tablet prn    al mag oxide-diphenhydramine-lidocaine viscous (MAGIC MOUTHWASH) 1:1:1 suspension PRN    ALPRAZolam (XANAX) 0 25 mg tablet Take day of surgery   Calcium 250 MG CAPS Stop taking 7 days prior to surgery   cholecalciferol (VITAMIN D3) 1,000 units tablet Stop taking 7 days prior to surgery   escitalopram (LEXAPRO) 20 mg tablet Take day of surgery   Fish Oil-Krill Oil (KRILL OIL PLUS) CAPS STOP 7 DAYS PRIOR TO SURG    fluocinonide (LIDEX) 0 05 % external solution TOPICAL    ketoconazole (NIZORAL) 2 % shampoo TOPICAL    lidocaine-prilocaine (EMLA) cream TOPICAl    ondansetron (ZOFRAN) 8 mg tablet prn    oxyCODONE-acetaminophen (Percocet) 5-325 mg per tablet for post op   INSTR ON JOSEPH CALL,  REPORT LOC , BRING PHOTO ID/MED LIST/INS  INFO ,SHOWER REV , STOP ASA/NSAID/VIT 7 DAY PREOP, PT VERBALIZES UNDERSTANDING W/ NO FURTHER QUESTIONS

## 2022-04-20 NOTE — TELEPHONE ENCOUNTER
I spoke to patient she stated she is going for surgery on Tuesday and doesn't want to schedule at this time  She will call if were needed

## 2022-04-22 ENCOUNTER — ANESTHESIA EVENT (OUTPATIENT)
Dept: PERIOP | Facility: HOSPITAL | Age: 72
End: 2022-04-22
Payer: MEDICARE

## 2022-04-26 ENCOUNTER — ANESTHESIA (OUTPATIENT)
Dept: PERIOP | Facility: HOSPITAL | Age: 72
End: 2022-04-26
Payer: MEDICARE

## 2022-04-26 ENCOUNTER — APPOINTMENT (OUTPATIENT)
Dept: RADIOLOGY | Facility: HOSPITAL | Age: 72
End: 2022-04-26
Payer: MEDICARE

## 2022-04-26 ENCOUNTER — HOSPITAL ENCOUNTER (OUTPATIENT)
Dept: NUCLEAR MEDICINE | Facility: HOSPITAL | Age: 72
Discharge: HOME | End: 2022-04-26
Attending: SURGERY
Payer: MEDICARE

## 2022-04-26 ENCOUNTER — HOSPITAL ENCOUNTER (OUTPATIENT)
Facility: HOSPITAL | Age: 72
Setting detail: OUTPATIENT SURGERY
Discharge: HOME | End: 2022-04-27
Attending: SURGERY | Admitting: SURGERY
Payer: MEDICARE

## 2022-04-26 DIAGNOSIS — Z17.0 MALIGNANT NEOPLASM OF OVERLAPPING SITES OF RIGHT BREAST IN FEMALE, ESTROGEN RECEPTOR POSITIVE (HCC): ICD-10-CM

## 2022-04-26 DIAGNOSIS — C50.811 MALIGNANT NEOPLASM OF OVERLAPPING SITES OF RIGHT BREAST IN FEMALE, ESTROGEN RECEPTOR POSITIVE (HCC): ICD-10-CM

## 2022-04-26 LAB
ERYTHROCYTE [DISTWIDTH] IN BLOOD BY AUTOMATED COUNT: 14.8 % (ref 11.6–15.1)
HCT VFR BLD AUTO: 32.7 % (ref 34.8–46.1)
HGB BLD-MCNC: 10.3 G/DL (ref 11.5–15.4)
MCH RBC QN AUTO: 32.8 PG (ref 26.8–34.3)
MCHC RBC AUTO-ENTMCNC: 31.5 G/DL (ref 31.4–37.4)
MCV RBC AUTO: 104 FL (ref 82–98)
PLATELET # BLD AUTO: 183 THOUSANDS/UL (ref 149–390)
PMV BLD AUTO: 9.9 FL (ref 8.9–12.7)
RBC # BLD AUTO: 3.14 MILLION/UL (ref 3.81–5.12)
WBC # BLD AUTO: 6.87 THOUSAND/UL (ref 4.31–10.16)

## 2022-04-26 PROCEDURE — 19307 MAST MOD RAD: CPT | Performed by: SURGERY

## 2022-04-26 PROCEDURE — 38900 IO MAP OF SENT LYMPH NODE: CPT | Performed by: SURGERY

## 2022-04-26 PROCEDURE — C9290 INJ, BUPIVACAINE LIPOSOME: HCPCS | Performed by: ANESTHESIOLOGY

## 2022-04-26 PROCEDURE — A9541 TC99M SULFUR COLLOID: HCPCS

## 2022-04-26 PROCEDURE — 88309 TISSUE EXAM BY PATHOLOGIST: CPT | Performed by: PATHOLOGY

## 2022-04-26 PROCEDURE — 38792 RA TRACER ID OF SENTINL NODE: CPT

## 2022-04-26 PROCEDURE — 85027 COMPLETE CBC AUTOMATED: CPT | Performed by: SURGERY

## 2022-04-26 PROCEDURE — 38792 RA TRACER ID OF SENTINL NODE: CPT | Performed by: SURGERY

## 2022-04-26 RX ORDER — ONDANSETRON 2 MG/ML
4 INJECTION INTRAMUSCULAR; INTRAVENOUS ONCE AS NEEDED
Status: DISCONTINUED | OUTPATIENT
Start: 2022-04-26 | End: 2022-04-26 | Stop reason: HOSPADM

## 2022-04-26 RX ORDER — ONDANSETRON 2 MG/ML
INJECTION INTRAMUSCULAR; INTRAVENOUS AS NEEDED
Status: DISCONTINUED | OUTPATIENT
Start: 2022-04-26 | End: 2022-04-26

## 2022-04-26 RX ORDER — SODIUM CHLORIDE, SODIUM LACTATE, POTASSIUM CHLORIDE, CALCIUM CHLORIDE 600; 310; 30; 20 MG/100ML; MG/100ML; MG/100ML; MG/100ML
INJECTION, SOLUTION INTRAVENOUS CONTINUOUS PRN
Status: DISCONTINUED | OUTPATIENT
Start: 2022-04-26 | End: 2022-04-26

## 2022-04-26 RX ORDER — HYDROMORPHONE HCL/PF 1 MG/ML
0.2 SYRINGE (ML) INJECTION
Status: DISCONTINUED | OUTPATIENT
Start: 2022-04-26 | End: 2022-04-27 | Stop reason: HOSPADM

## 2022-04-26 RX ORDER — ACETAMINOPHEN 325 MG/1
650 TABLET ORAL EVERY 6 HOURS PRN
Status: DISCONTINUED | OUTPATIENT
Start: 2022-04-26 | End: 2022-04-27 | Stop reason: HOSPADM

## 2022-04-26 RX ORDER — OXYCODONE HYDROCHLORIDE 5 MG/1
5 TABLET ORAL EVERY 4 HOURS PRN
Status: DISCONTINUED | OUTPATIENT
Start: 2022-04-26 | End: 2022-04-27 | Stop reason: HOSPADM

## 2022-04-26 RX ORDER — HEPARIN SODIUM 5000 [USP'U]/ML
5000 INJECTION, SOLUTION INTRAVENOUS; SUBCUTANEOUS EVERY 8 HOURS SCHEDULED
Status: DISCONTINUED | OUTPATIENT
Start: 2022-04-26 | End: 2022-04-27 | Stop reason: HOSPADM

## 2022-04-26 RX ORDER — MELATONIN
1000 DAILY
Status: DISCONTINUED | OUTPATIENT
Start: 2022-04-26 | End: 2022-04-27 | Stop reason: HOSPADM

## 2022-04-26 RX ORDER — FENTANYL CITRATE/PF 50 MCG/ML
25 SYRINGE (ML) INJECTION
Status: DISCONTINUED | OUTPATIENT
Start: 2022-04-26 | End: 2022-04-26 | Stop reason: HOSPADM

## 2022-04-26 RX ORDER — ALPRAZOLAM 0.25 MG/1
0.25 TABLET ORAL 3 TIMES DAILY PRN
Status: DISCONTINUED | OUTPATIENT
Start: 2022-04-26 | End: 2022-04-27 | Stop reason: HOSPADM

## 2022-04-26 RX ORDER — MIDAZOLAM HYDROCHLORIDE 2 MG/2ML
INJECTION, SOLUTION INTRAMUSCULAR; INTRAVENOUS AS NEEDED
Status: DISCONTINUED | OUTPATIENT
Start: 2022-04-26 | End: 2022-04-26

## 2022-04-26 RX ORDER — FENTANYL CITRATE 50 UG/ML
INJECTION, SOLUTION INTRAMUSCULAR; INTRAVENOUS AS NEEDED
Status: DISCONTINUED | OUTPATIENT
Start: 2022-04-26 | End: 2022-04-26

## 2022-04-26 RX ORDER — BUPIVACAINE HYDROCHLORIDE 2.5 MG/ML
INJECTION, SOLUTION EPIDURAL; INFILTRATION; INTRACAUDAL AS NEEDED
Status: DISCONTINUED | OUTPATIENT
Start: 2022-04-26 | End: 2022-04-26 | Stop reason: HOSPADM

## 2022-04-26 RX ORDER — MAGNESIUM HYDROXIDE 1200 MG/15ML
LIQUID ORAL AS NEEDED
Status: DISCONTINUED | OUTPATIENT
Start: 2022-04-26 | End: 2022-04-26 | Stop reason: HOSPADM

## 2022-04-26 RX ORDER — HYDROMORPHONE HCL/PF 1 MG/ML
SYRINGE (ML) INJECTION AS NEEDED
Status: DISCONTINUED | OUTPATIENT
Start: 2022-04-26 | End: 2022-04-26

## 2022-04-26 RX ORDER — OXYCODONE HYDROCHLORIDE 10 MG/1
10 TABLET ORAL EVERY 4 HOURS PRN
Status: DISCONTINUED | OUTPATIENT
Start: 2022-04-26 | End: 2022-04-27 | Stop reason: HOSPADM

## 2022-04-26 RX ORDER — DEXAMETHASONE SODIUM PHOSPHATE 10 MG/ML
INJECTION, SOLUTION INTRAMUSCULAR; INTRAVENOUS AS NEEDED
Status: DISCONTINUED | OUTPATIENT
Start: 2022-04-26 | End: 2022-04-26

## 2022-04-26 RX ORDER — LIDOCAINE HYDROCHLORIDE 10 MG/ML
INJECTION, SOLUTION EPIDURAL; INFILTRATION; INTRACAUDAL; PERINEURAL AS NEEDED
Status: DISCONTINUED | OUTPATIENT
Start: 2022-04-26 | End: 2022-04-26

## 2022-04-26 RX ORDER — SUCCINYLCHOLINE/SOD CL,ISO/PF 100 MG/5ML
SYRINGE (ML) INTRAVENOUS AS NEEDED
Status: DISCONTINUED | OUTPATIENT
Start: 2022-04-26 | End: 2022-04-26

## 2022-04-26 RX ORDER — EPHEDRINE SULFATE 50 MG/ML
INJECTION INTRAVENOUS AS NEEDED
Status: DISCONTINUED | OUTPATIENT
Start: 2022-04-26 | End: 2022-04-26

## 2022-04-26 RX ORDER — HYDROMORPHONE HCL/PF 1 MG/ML
0.5 SYRINGE (ML) INJECTION
Status: DISCONTINUED | OUTPATIENT
Start: 2022-04-26 | End: 2022-04-26 | Stop reason: HOSPADM

## 2022-04-26 RX ORDER — SODIUM CHLORIDE, SODIUM LACTATE, POTASSIUM CHLORIDE, CALCIUM CHLORIDE 600; 310; 30; 20 MG/100ML; MG/100ML; MG/100ML; MG/100ML
100 INJECTION, SOLUTION INTRAVENOUS CONTINUOUS
Status: DISCONTINUED | OUTPATIENT
Start: 2022-04-26 | End: 2022-04-27

## 2022-04-26 RX ORDER — ESCITALOPRAM OXALATE 20 MG/1
20 TABLET ORAL DAILY
Status: DISCONTINUED | OUTPATIENT
Start: 2022-04-26 | End: 2022-04-27 | Stop reason: HOSPADM

## 2022-04-26 RX ORDER — PROPOFOL 10 MG/ML
INJECTION, EMULSION INTRAVENOUS AS NEEDED
Status: DISCONTINUED | OUTPATIENT
Start: 2022-04-26 | End: 2022-04-26

## 2022-04-26 RX ORDER — CEFAZOLIN SODIUM 2 G/50ML
2000 SOLUTION INTRAVENOUS ONCE
Status: COMPLETED | OUTPATIENT
Start: 2022-04-26 | End: 2022-04-26

## 2022-04-26 RX ADMIN — Medication 1000 UNITS: at 14:41

## 2022-04-26 RX ADMIN — FENTANYL CITRATE 25 MCG: 50 INJECTION, SOLUTION INTRAMUSCULAR; INTRAVENOUS at 11:05

## 2022-04-26 RX ADMIN — SODIUM CHLORIDE, SODIUM LACTATE, POTASSIUM CHLORIDE, AND CALCIUM CHLORIDE: .6; .31; .03; .02 INJECTION, SOLUTION INTRAVENOUS at 11:02

## 2022-04-26 RX ADMIN — FENTANYL CITRATE 25 MCG: 50 INJECTION INTRAMUSCULAR; INTRAVENOUS at 12:29

## 2022-04-26 RX ADMIN — DEXAMETHASONE SODIUM PHOSPHATE 8 MG: 10 INJECTION, SOLUTION INTRAMUSCULAR; INTRAVENOUS at 09:45

## 2022-04-26 RX ADMIN — FENTANYL CITRATE 25 MCG: 50 INJECTION, SOLUTION INTRAMUSCULAR; INTRAVENOUS at 09:09

## 2022-04-26 RX ADMIN — HYDROMORPHONE HYDROCHLORIDE 0.1 MG: 1 INJECTION, SOLUTION INTRAMUSCULAR; INTRAVENOUS; SUBCUTANEOUS at 10:54

## 2022-04-26 RX ADMIN — FENTANYL CITRATE 25 MCG: 50 INJECTION INTRAMUSCULAR; INTRAVENOUS at 12:15

## 2022-04-26 RX ADMIN — FENTANYL CITRATE 25 MCG: 50 INJECTION, SOLUTION INTRAMUSCULAR; INTRAVENOUS at 08:59

## 2022-04-26 RX ADMIN — EPHEDRINE SULFATE 5 MG: 50 INJECTION, SOLUTION INTRAVENOUS at 09:34

## 2022-04-26 RX ADMIN — ONDANSETRON 4 MG: 2 INJECTION INTRAMUSCULAR; INTRAVENOUS at 10:41

## 2022-04-26 RX ADMIN — PROPOFOL 20 MG: 10 INJECTION, EMULSION INTRAVENOUS at 10:42

## 2022-04-26 RX ADMIN — MIDAZOLAM HYDROCHLORIDE 0.5 MG: 1 INJECTION, SOLUTION INTRAMUSCULAR; INTRAVENOUS at 08:52

## 2022-04-26 RX ADMIN — PROPOFOL 100 MG: 10 INJECTION, EMULSION INTRAVENOUS at 08:59

## 2022-04-26 RX ADMIN — ESCITALOPRAM OXALATE 20 MG: 20 TABLET ORAL at 14:42

## 2022-04-26 RX ADMIN — BUPIVACAINE 20 ML: 13.3 INJECTION, SUSPENSION, LIPOSOMAL INFILTRATION at 09:00

## 2022-04-26 RX ADMIN — FENTANYL CITRATE 25 MCG: 50 INJECTION, SOLUTION INTRAMUSCULAR; INTRAVENOUS at 09:28

## 2022-04-26 RX ADMIN — SODIUM CHLORIDE, SODIUM LACTATE, POTASSIUM CHLORIDE, AND CALCIUM CHLORIDE: .6; .31; .03; .02 INJECTION, SOLUTION INTRAVENOUS at 08:15

## 2022-04-26 RX ADMIN — PROPOFOL 30 MG: 10 INJECTION, EMULSION INTRAVENOUS at 09:01

## 2022-04-26 RX ADMIN — Medication 90 MG: at 08:59

## 2022-04-26 RX ADMIN — HYDROMORPHONE HYDROCHLORIDE 0.15 MG: 1 INJECTION, SOLUTION INTRAMUSCULAR; INTRAVENOUS; SUBCUTANEOUS at 10:41

## 2022-04-26 RX ADMIN — MIDAZOLAM HYDROCHLORIDE 0.5 MG: 1 INJECTION, SOLUTION INTRAMUSCULAR; INTRAVENOUS at 08:55

## 2022-04-26 RX ADMIN — EPHEDRINE SULFATE 5 MG: 50 INJECTION, SOLUTION INTRAVENOUS at 09:38

## 2022-04-26 RX ADMIN — EPHEDRINE SULFATE 5 MG: 50 INJECTION, SOLUTION INTRAVENOUS at 09:37

## 2022-04-26 RX ADMIN — PROPOFOL 20 MG: 10 INJECTION, EMULSION INTRAVENOUS at 10:50

## 2022-04-26 RX ADMIN — HYDROMORPHONE HYDROCHLORIDE 0.25 MG: 1 INJECTION, SOLUTION INTRAMUSCULAR; INTRAVENOUS; SUBCUTANEOUS at 09:58

## 2022-04-26 RX ADMIN — HEPARIN SODIUM 5000 UNITS: 5000 INJECTION INTRAVENOUS; SUBCUTANEOUS at 14:42

## 2022-04-26 RX ADMIN — CEFAZOLIN SODIUM 2000 MG: 2 SOLUTION INTRAVENOUS at 08:48

## 2022-04-26 RX ADMIN — SODIUM CHLORIDE, SODIUM LACTATE, POTASSIUM CHLORIDE, AND CALCIUM CHLORIDE 100 ML/HR: .6; .31; .03; .02 INJECTION, SOLUTION INTRAVENOUS at 14:42

## 2022-04-26 RX ADMIN — LIDOCAINE HYDROCHLORIDE 40 MG: 10 INJECTION, SOLUTION EPIDURAL; INFILTRATION; INTRACAUDAL; PERINEURAL at 08:59

## 2022-04-26 RX ADMIN — HEPARIN SODIUM 5000 UNITS: 5000 INJECTION INTRAVENOUS; SUBCUTANEOUS at 21:04

## 2022-04-26 NOTE — QUICK NOTE
Post-Op Check    Assessment: 67 y o  F s/p right modified radical mastectomy    Vitals normal on room air  KAREEN drains x 2 sanguinous output    WBC 6 87  Hb 10 3    Plan:  Diet as tolerated  [email or web address]  Maintain KAREEN drains, monitor output  PRN analgesia  DVT PPX    Subjective:  No acute events since surgery, pain controlled, has not yet eaten but denies nausea or vomiting    Vitals:    04/26/22 1434   BP: 144/75   Pulse: 89   Resp:    Temp:    SpO2: 94%       PE:  Gen: NAD, AAOx3  Breast: Incision c/d/i, KAREEN x 2 sanguinous output  CV: RRR  Pulm: no resp distress  Abd: Soft, non-distended, non-tender    Ray Cisneros MD  General Surgery, PGY2

## 2022-04-26 NOTE — INTERVAL H&P NOTE
H&P reviewed  After examining the patient I find no changes in the patients condition since the H&P had been written        Vitals:    04/26/22 0823   BP: 129/77   Pulse: 74   Resp: 18   Temp: (!) 97 °F (36 1 °C)   SpO2: 98%

## 2022-04-26 NOTE — OR NURSING
Anesthesia Focused Assessment    STOP-BANG Sleep Apnea Questionnaire    SNORE loudly (heard through closed doors)? No  TIRED, fatigued, sleepy during daytime? No  OBSERVED stopping breathing during sleep? No  High blood PRESSURE being treated? No    BMI over 35? No  AGE over 48? No  NECK circumference over 16\"? No  GENDER (male)? Yes             Total 1  High risk 5-8  Intermediate risk 3-4  Low risk 0-2    Obstructive Sleep Apnea: no  If YES, machine used: no     Type 1 DM:   no  T2DM:  no    Coronary Artery Disease:  no  Hypertension:  no    Active smoker:  no  Drinks Alcohol:  no    Dentition: dental caries    Defib / AICD / Pacemaker: no      Renal Failure/dialysis:  no    Patient was evaluated in PAT & anesthesia guidelines were applied. NPO guidelines, medication instructions and scheduled arrival time were reviewed with patient.     Hx of anesthesia complications:  no  Family hx of anesthesia complications:  Grandmother has prolonged emergence                                                                                                                     Anesthesia contacted:   no  Medical or cardiac clearance ordered: SON Pearson  2/22/18  10:52 AM Right breast weight 1285 6 g

## 2022-04-26 NOTE — PROGRESS NOTES
Progress Note - Surgical Oncology   [facility] 67 y o  female MRN: [id number]  Unit/Bed#: S -01 Encounter: [id number]    Assessment:  68 yo F with inflammatory R breast cancer, now s/p modified radical mastectomy on 4/26    Vitals normal on room air  KAREEN drain 1 28cc serosanguinous  KAREEN drain 2 97cc serosanguinous    Plan:  Diet as tolerated  D/C IVF  Maintain KAREEN drains, monitor output  PRN analgesia  DVT PPX  Anticipate discharge later today with VNA for drain care    Subjective/Objective     Subjective: No acute events overnight, tolerating diet, pain controlled, no other issues    Objective:    Blood pressure 127/63, pulse 75, temperature 98 3 °F (36 8 °C), temperature source Oral, resp  rate 17, height 5' 8 5" (1 74 m), weight 81 7 kg (180 lb 1 9 oz), SpO2 95 %, not currently breastfeeding  ,Body mass index is 26 99 kg/m²  Intake/Output Summary (Last 24 hours) at 4/27/2022 0616  Last data filed at 4/26/2022 1826  Gross per 24 hour   Intake 1950 ml   Output 445 ml   Net 1505 ml       Invasive Devices  Report    Central Venous Catheter Line            Port A Cath 12/29/21 Left Chest 118 days          Peripheral Intravenous Line            Peripheral IV 04/26/22 Left Hand <1 day          Drain            Closed/Suction Drain Inferior; Lateral;Right Breast Bulb 19 Fr  <1 day    Closed/Suction Drain Right Breast Bulb 19 Fr  <1 day                Physical Exam:  Gen:    NAD  Breast: R breast incision c/d/i, KAREEN's x 2 serosanguinous  CV:      warm, well-perfused  Lungs: No respiratory distress  Abd:     soft, NT/ND  Ext:      no CCE  Neuro: A&Ox3

## 2022-04-26 NOTE — ANESTHESIA PREPROCEDURE EVALUATION
Procedure:  BREAST MODIFIED RADICAL MASTECTOMY WITH RIGHT LYMPHATIC MAPPING WITH BLUE AND RADIOACTIVE DYE (INJECT AT 0900 BY DR DEJESUS IN THE OR) (Right Breast)    Relevant Problems   CARDIO   (+) Hyperlipidemia, mild      GYN   (+) Malignant neoplasm of overlapping sites of right breast in female, estrogen receptor positive ([address] )      MUSCULOSKELETAL   (+) Acute midline low back pain with left-sided sciatica   (+) Arthritis      NEURO/PSYCH   (+) Anxiety   (+) Depression      TTE Dec 2021 reviewed, unremarkable  Physical Exam    Airway    Mallampati score: II  TM Distance: >3 FB  Neck ROM: full     Dental   No notable dental hx     Cardiovascular  Rate: normal,     Pulmonary  Breath sounds clear to auscultation,     Other Findings        Anesthesia Plan  ASA Score- 2     Anesthesia Type- general with ASA Monitors  Additional Monitors:   Airway Plan: ETT  Comment: Surgeon requests PECs blocks to be performed by surgeon prior to emergence          Plan Factors-    Chart reviewed  EKG reviewed  Existing labs reviewed  Patient summary reviewed  Induction- intravenous  Postoperative Plan-     Informed Consent- Anesthetic plan and risks discussed with patient  I personally reviewed this patient with the CRNA  Discussed and agreed on the Anesthesia Plan with the CRNA  Isaías Soriano

## 2022-04-26 NOTE — PLAN OF CARE
Problem: PAIN - ADULT  Goal: Verbalizes/displays adequate comfort level or baseline comfort level  Description: Interventions:  - Encourage patient to monitor pain and request assistance  - Assess pain using appropriate pain scale  - Administer analgesics based on type and severity of pain and evaluate response  - Implement non-pharmacological measures as appropriate and evaluate response  - Consider cultural and social influences on pain and pain management  - Notify physician/advanced practitioner if interventions unsuccessful or patient reports new pain  Outcome: Progressing     Problem: INFECTION - ADULT  Goal: Absence or prevention of progression during hospitalization  Description: INTERVENTIONS:  - Assess and monitor for signs and symptoms of infection  - Monitor lab/diagnostic results  - Monitor all insertion sites, i e  indwelling lines, tubes, and drains  - Monitor endotracheal if appropriate and nasal secretions for changes in amount and color  - Rosie appropriate cooling/warming therapies per order  - Administer medications as ordered  - Instruct and encourage patient and family to use good hand hygiene technique  - Identify and instruct in appropriate isolation precautions for identified infection/condition  Outcome: Progressing  Goal: Absence of fever/infection during neutropenic period  Description: INTERVENTIONS:  - Monitor WBC    Outcome: Progressing     Problem: SAFETY ADULT  Goal: Patient will remain free of falls  Description: INTERVENTIONS:  - Educate patient/family on patient safety including physical limitations  - Instruct patient to call for assistance with activity   - Consult OT/PT to assist with strengthening/mobility   - Keep Call bell within reach  - Keep bed low and locked with side rails adjusted as appropriate  - Keep care items and personal belongings within reach  - Initiate and maintain comfort rounds  - Make Fall Risk Sign visible to staff  - Offer Toileting every  Hours, in advance of need  - Initiate/Maintain alarm  - Obtain necessary fall risk management equipment:   - Apply yellow socks and bracelet for high fall risk patients  - Consider moving patient to room near nurses station  Outcome: Progressing  Goal: Maintain or return to baseline ADL function  Description: INTERVENTIONS:  -  Assess patient's ability to carry out ADLs; assess patient's baseline for ADL function and identify physical deficits which impact ability to perform ADLs (bathing, care of mouth/teeth, toileting, grooming, dressing, etc )  - Assess/evaluate cause of self-care deficits   - Assess range of motion  - Assess patient's mobility; develop plan if impaired  - Assess patient's need for assistive devices and provide as appropriate  - Encourage maximum independence but intervene and supervise when necessary  - Involve family in performance of ADLs  - Assess for home care needs following discharge   - Consider OT consult to assist with ADL evaluation and planning for discharge  - Provide patient education as appropriate  Outcome: Progressing  Goal: Maintains/Returns to pre admission functional level  Description: INTERVENTIONS:  - Perform BMAT or MOVE assessment daily    - Set and communicate daily mobility goal to care team and patient/family/caregiver  - Collaborate with rehabilitation services on mobility goals if consulted  - Perform Range of Motion  times a day  - Reposition patient every  hours    - Dangle patient  times a day  - Stand patient  times a day  - Ambulate patient  times a day  - Out of bed to chair  times a day   - Out of bed for meals times a day  - Out of bed for toileting  - Record patient progress and toleration of activity level   Outcome: Progressing     Problem: DISCHARGE PLANNING  Goal: Discharge to home or other facility with appropriate resources  Description: INTERVENTIONS:  - Identify barriers to discharge w/patient and caregiver  - Arrange for needed discharge resources and transportation as appropriate  - Identify discharge learning needs (meds, wound care, etc )  - Arrange for interpretive services to assist at discharge as needed  - Refer to Case Management Department for coordinating discharge planning if the patient needs post-hospital services based on physician/advanced practitioner order or complex needs related to functional status, cognitive ability, or social support system  Outcome: Progressing     Problem: Knowledge Deficit  Goal: Patient/family/caregiver demonstrates understanding of disease process, treatment plan, medications, and discharge instructions  Description: Complete learning assessment and assess knowledge base    Interventions:  - Provide teaching at level of understanding  - Provide teaching via preferred learning methods  Outcome: Progressing

## 2022-04-26 NOTE — ANESTHESIA POSTPROCEDURE EVALUATION
Post-Op Assessment Note    CV Status:  Stable  Pain Score: 0    Pain management: adequate     Mental Status:  Alert and awake   Hydration Status:  Euvolemic and stable   PONV Controlled:  Controlled   Airway Patency:  Patent and adequate      Post Op Vitals Reviewed: Yes      Staff: CRNA         No complications documented      BP  163/78    Temp      Pulse 109   Resp 16   SpO2 98%

## 2022-04-27 VITALS
TEMPERATURE: 97.8 F | HEART RATE: 62 BPM | RESPIRATION RATE: 18 BRPM | BODY MASS INDEX: 26.68 KG/M2 | SYSTOLIC BLOOD PRESSURE: 132 MMHG | WEIGHT: 180.12 LBS | OXYGEN SATURATION: 97 % | HEIGHT: 69 IN | DIASTOLIC BLOOD PRESSURE: 69 MMHG

## 2022-04-27 PROCEDURE — NC001 PR NO CHARGE: Performed by: SURGERY

## 2022-04-27 RX ADMIN — HEPARIN SODIUM 5000 UNITS: 5000 INJECTION INTRAVENOUS; SUBCUTANEOUS at 06:12

## 2022-04-27 NOTE — DISCHARGE INSTRUCTIONS
POST-OPERATIVE BREAST CARE INSTRUCTIONS    Wound Closure/Dressing: Your wound is closed with:   Steri strips-white pieces of tape that hold your incision together along with surgical glue           Dissolvable sutures-underneath the skin    Wound care:     If you have gauze over your wound you may remove it the day after surgery  Leave the Steri-strips on, they will fall off on their own in 1-2 weeks   You may shower using soap and water to clean your wound  Gently pat dry  Drain Care: If you do not have a drain, disregard this section   Visiting Nursing should have been arranged upon discharge from hospital   A nurse will call your home to schedule an initial visit  Please call the number below if you have not received a call within 48 hours of hospital discharge   You may shower with the KAREEN drains  Wash area around the drains with soap and water and pat dry   Record drain outputs on chart given to you at pre op visit and bring that chart to office at post op appt   KAREEN drains can be removed in the office by a nurse either at post op visit OR when drain output is 30 ccs or less in a 24 hour period for three days in a row   If you had plastic surgery or reconstructive surgery, the plastic surgeon will manage the drains  Other:       You can begin wearing your own bra when it feels comfortable   You may resume using deodorant the day after surgery                 Post-op visit:  your post-op visit is scheduled for:   @12:45    Call our office at [phone] if you have any  of the followin  Redness, swelling, heat, unusual drainage or heavy bleeding from wound  2  Fever greater than 101 °  3  Pain not relieved by medication

## 2022-04-27 NOTE — PLAN OF CARE
Problem: PAIN - ADULT  Goal: Verbalizes/displays adequate comfort level or baseline comfort level  Description: Interventions:  - Encourage patient to monitor pain and request assistance  - Assess pain using appropriate pain scale  - Administer analgesics based on type and severity of pain and evaluate response  - Implement non-pharmacological measures as appropriate and evaluate response  - Consider cultural and social influences on pain and pain management  - Notify physician/advanced practitioner if interventions unsuccessful or patient reports new pain  Outcome: Progressing     Problem: INFECTION - ADULT  Goal: Absence or prevention of progression during hospitalization  Description: INTERVENTIONS:  - Assess and monitor for signs and symptoms of infection  - Monitor lab/diagnostic results  - Monitor all insertion sites, i e  indwelling lines, tubes, and drains  - Monitor endotracheal if appropriate and nasal secretions for changes in amount and color  - [place] appropriate cooling/warming therapies per order  - Administer medications as ordered  - Instruct and encourage patient and family to use good hand hygiene technique  - Identify and instruct in appropriate isolation precautions for identified infection/condition  Outcome: Progressing     Problem: INFECTION - ADULT  Goal: Absence of fever/infection during neutropenic period  Description: INTERVENTIONS:  - Monitor WBC    Outcome: Progressing     Problem: SAFETY ADULT  Goal: Maintain or return to baseline ADL function  Description: INTERVENTIONS:  -  Assess patient's ability to carry out ADLs; assess patient's baseline for ADL function and identify physical deficits which impact ability to perform ADLs (bathing, care of mouth/teeth, toileting, grooming, dressing, etc )  - Assess/evaluate cause of self-care deficits   - Assess range of motion  - Assess patient's mobility; develop plan if impaired  - Assess patient's need for assistive devices and provide as appropriate  - Encourage maximum independence but intervene and supervise when necessary  - Involve family in performance of ADLs  - Assess for home care needs following discharge   - Consider OT consult to assist with ADL evaluation and planning for discharge  - Provide patient education as appropriate  Outcome: Progressing     Problem: DISCHARGE PLANNING  Goal: Discharge to home or other facility with appropriate resources  Description: INTERVENTIONS:  - Identify barriers to discharge w/patient and caregiver  - Arrange for needed discharge resources and transportation as appropriate  - Identify discharge learning needs (meds, wound care, etc )  - Arrange for interpretive services to assist at discharge as needed  - Refer to Case Management Department for coordinating discharge planning if the patient needs post-hospital services based on physician/advanced practitioner order or complex needs related to functional status, cognitive ability, or social support system  Outcome: Progressing     Problem: Knowledge Deficit  Goal: Patient/family/caregiver demonstrates understanding of disease process, treatment plan, medications, and discharge instructions  Description: Complete learning assessment and assess knowledge base    Interventions:  - Provide teaching at level of understanding  - Provide teaching via preferred learning methods  Outcome: Progressing

## 2022-04-28 ENCOUNTER — TELEPHONE (OUTPATIENT)
Dept: OTHER | Facility: OTHER | Age: 72
End: 2022-04-28

## 2022-04-29 NOTE — TELEPHONE ENCOUNTER
Patient is not interested in our services at this point, patient is independent ad have a good family support  If any question you can give the office a call

## 2022-05-09 ENCOUNTER — TELEPHONE (OUTPATIENT)
Dept: HEMATOLOGY ONCOLOGY | Facility: CLINIC | Age: 72
End: 2022-05-09

## 2022-05-09 NOTE — TELEPHONE ENCOUNTER
Called patient back to review  Pt would like to have drain #2 removed  Pt states that drain #2 has been less than 30ml for 3 days, yesterday was less than 5ml  Drain #1 is still draining more, 50mls yesterday  Explained that drain #2 can come out  Offered appt this afternoon but she has a prior commitment  Appt made with Steve Rico NP for tomorrow at 9:30 at the [place] location  Pt verbally understands

## 2022-05-09 NOTE — TELEPHONE ENCOUNTER
CALL RETURN FORM   Reason for patient call? Patient has questions regarding having her drains removed  Patient's primary oncologist? Yetta Fresh     Name of person the patient was calling for? Yetta Fresh   Any additional information to add, if applicable? Best call back number [phone]   Informed patient that the message will be forwarded to the team and someone will get back to them as soon as possible    Did you relay this information to the patient?  Yes

## 2022-05-10 ENCOUNTER — OFFICE VISIT (OUTPATIENT)
Dept: SURGICAL ONCOLOGY | Facility: CLINIC | Age: 72
End: 2022-05-10

## 2022-05-10 VITALS
WEIGHT: 178 LBS | DIASTOLIC BLOOD PRESSURE: 90 MMHG | SYSTOLIC BLOOD PRESSURE: 142 MMHG | HEART RATE: 96 BPM | HEIGHT: 69 IN | RESPIRATION RATE: 18 BRPM | OXYGEN SATURATION: 97 % | TEMPERATURE: 98.1 F | BODY MASS INDEX: 26.36 KG/M2

## 2022-05-10 DIAGNOSIS — Z17.0 MALIGNANT NEOPLASM OF OVERLAPPING SITES OF RIGHT BREAST IN FEMALE, ESTROGEN RECEPTOR POSITIVE (HCC): Primary | ICD-10-CM

## 2022-05-10 DIAGNOSIS — C50.811 MALIGNANT NEOPLASM OF OVERLAPPING SITES OF RIGHT BREAST IN FEMALE, ESTROGEN RECEPTOR POSITIVE (HCC): Primary | ICD-10-CM

## 2022-05-10 PROCEDURE — 99024 POSTOP FOLLOW-UP VISIT: CPT

## 2022-05-10 NOTE — PROGRESS NOTES
Surgical Oncology Follow Up       [address] [facility] [place]  [address]  [facility] [id number] [phone]    Hansel Leos  1950  [id number]  [address] [facility] [address] [id number] [phone]    Diagnoses and all orders for this visit:    Malignant neoplasm of overlapping sites of right breast in female, estrogen receptor positive [facility])        Chief Complaint   Patient presents with    Follow-up       Return for Patient is already scheduled for post-op visit with Dr Kilo Elder in 1 week         Oncology History   Malignant neoplasm of overlapping sites of right breast in female, estrogen receptor positive ([facility] [address] )   12/6/2021 Biopsy    Punch biopsy of right breast  Dermal lymphovascular invasion, immunoprofile consistent with breast primary  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)  Grade 2  ER 90; MI 70; HER2 2+, FISH negative     12/13/2021 Observation    Bilateral breast MRI - findings suspicious for multicentric right breast cnacer; 2 biopsies recommended at areas of concern (never done); left breast clear; no right axillary adenopathy       12/30/2021 -  Chemotherapy    DOXOrubicin (ADRIAMYCIN), 122 mg, Intravenous, Once, 4 of 4 cycles  Administration: 120 mg (12/30/2021), 122 mg (1/13/2022), 122 mg (1/27/2022), 122 mg (2/10/2022)  palonosetron (ALOXI), 0 25 mg, Intravenous, Once, 2 of 2 cycles  Administration: 0 25 mg (3/24/2022), 0 25 mg (4/7/2022)  pegfilgrastim (NEULASTA), 4 mg (100 % of original dose 4 mg), Subcutaneous, Once, 3 of 3 cycles  Dose modification: 4 mg (original dose 4 mg, Cycle 6)  Administration: 4 mg (3/11/2022), 4 mg (3/25/2022), 4 mg (4/8/2022)  pegfilgrastim (Charna Sciara), 6 mg, Subcutaneous, Once, 5 of 5 cycles  Administration: 6 mg (12/30/2021), 6 mg (1/13/2022), 6 mg (2/24/2022), 6 mg (1/27/2022), 6 mg (2/10/2022)  cyclophosphamide (CYTOXAN) IVPB, 600 mg/m2 = 1,218 mg, Intravenous, Once, 4 of 4 cycles  Administration: 1,218 mg (12/30/2021), 1,218 mg (1/13/2022), 1,218 mg (1/27/2022), 1,218 mg (2/10/2022)  fosaprepitant (EMEND) IVPB, 150 mg, Intravenous, Once, 4 of 4 cycles  Administration: 150 mg (12/30/2021), 150 mg (1/13/2022), 150 mg (1/27/2022), 150 mg (2/10/2022)  PACLItaxel (TAXOL) chemo IVPB, 175 mg/m2 = 355 2 mg, Intravenous, Once, 4 of 4 cycles  Administration: 355 2 mg (2/24/2022), 355 2 mg (3/10/2022), 355 2 mg (3/24/2022), 355 2 mg (4/7/2022)         History of Present Illness: The patient is here today had of her scheduled postoperative appointment for KAREEN drain care  She is 2 weeks status-post right modified radical mastectomy  She currently has 2 drains in place in the right chest wall  Drain 1  Is still collecting a significant amount of drainage daily  However drain 2  Is down to approximately 10 cc per day  Review of Systems   Constitutional: Negative for chills, fatigue and fever  Respiratory: Negative for cough and shortness of breath  Cardiovascular: Negative for chest pain  Gastrointestinal: Negative for nausea and vomiting  Skin: Positive for wound (right chest wall)  Negative for color change, pallor and rash  Neurological: Negative for dizziness and light-headedness  Hematological: Negative for adenopathy  Does not bruise/bleed easily  Psychiatric/Behavioral: Negative            Patient Active Problem List   Diagnosis    Varicose vein of leg    Anxiety    Arthritis    Depression    Hyperbilirubinemia    Hyperlipidemia, mild    Over weight    Vitamin D deficiency    Digital mucinous cyst of finger of left hand    Acute midline low back pain with left-sided sciatica    Closed fracture of distal end of radius    Malignant neoplasm of overlapping sites of right breast in female, estrogen receptor positive ([address] )    Chemotherapy induced neutropenia ([facility] [address] )  Palliative care patient    Medical marijuana use    Cancer related pain    Chemotherapy-induced nausea    Insomnia due to medical condition    Anorexia     Past Medical History:   Diagnosis Date    Anxiety     Cancer [facility])     Right breast cancer    Depression     Fracture of radius, distal, closed     Last Assessed:  6/10/14    Thrombophlebitis     Vitamin D deficiency      Past Surgical History:   Procedure Laterality Date    ANKLE SURGERY      ANKLE SURGERY      BREAST BIOPSY Left 2001    neg    GALLBLADDER SURGERY      HYSTERECTOMY      IR PORT PLACEMENT  12/29/2021    VA MASTECTOMY, MODIFIED RADICAL Right 4/26/2022    Procedure: BREAST MODIFIED RADICAL MASTECTOMY WITH RIGHT LYMPHATIC MAPPING WITH BLUE AND RADIOACTIVE DYE (INJECT AT 0900 BY DR DEJESUS IN THE OR); Surgeon: Mauricio Joseph MD;  [facility];  Service: Surgical Oncology    WISDOM TOOTH EXTRACTION       Family History   Problem Relation Age of Onset    Ovarian cancer Mother 64    Heart disease Father     Leukemia Brother 62    Heart disease Brother     Heart disease Brother     Cancer Maternal Grandmother         Unknown primary; mid 52's    Stomach cancer Maternal Aunt         60's     Social History     Socioeconomic History    Marital status: /Civil Union     Spouse name: Not on file    Number of children: Not on file    Years of education: Not on file    Highest education level: Not on file   Occupational History    Not on file   Tobacco Use    Smoking status: Never Smoker    Smokeless tobacco: Never Used   Vaping Use    Vaping Use: Never used   Substance and Sexual Activity    Alcohol use: Yes     Comment: rarely    Drug use: Yes     Types: Marijuana     Comment: not helping stopped    Sexual activity: Not Currently     Partners: Male     Birth control/protection: Female Sterilization   Other Topics Concern    Not on file   Social History Narrative     to Dom Anand       Social Determinants [facility]     Financial Resource Strain: Not on file   Food Insecurity: Not on file   Transportation Needs: Not on file   Physical Activity: Not on file   Stress: Not on file   Social Connections: Not on file   Intimate Partner Violence: Not on file   Housing Stability: Not on file       Current Outpatient Medications:     acetaminophen (TYLENOL) 500 mg tablet, Take 2 tablets (1,000 mg total) by mouth 3 (three) times a day, Disp: 180 tablet, Rfl: 2    ALPRAZolam (XANAX) 0 25 mg tablet, Take 1/2 to 1 pill Q 8 hours p r n   For anxiety, Disp: 30 tablet, Rfl: 0    Calcium 250 MG CAPS, Take 250 mg by mouth in the morning  , Disp: , Rfl:     cholecalciferol (VITAMIN D3) 1,000 units tablet, Take 2 capsules by mouth daily, Disp: , Rfl:     escitalopram (LEXAPRO) 20 mg tablet, Take 1 tablet (20 mg total) by mouth daily, Disp: 90 tablet, Rfl: 2    Fish Oil-Krill Oil (KRILL OIL PLUS) CAPS, Take by mouth in the morning  , Disp: , Rfl:     fluocinonide (LIDEX) 0 05 % external solution, APPLY TO SCALP ONCE DAILY AS NEEDED FOR ITCH, Disp: , Rfl: 2    ketoconazole (NIZORAL) 2 % shampoo, Apply 1 application topically if needed  , Disp: , Rfl: 5    lidocaine-prilocaine (EMLA) cream, Apply topically as needed for mild pain, Disp: 30 g, Rfl: 1    ondansetron (ZOFRAN) 8 mg tablet, Take 1 tablet (8 mg total) by mouth every 8 (eight) hours as needed for nausea or vomiting, Disp: 40 tablet, Rfl: 2    al mag oxide-diphenhydramine-lidocaine viscous (MAGIC MOUTHWASH) 1:1:1 suspension, Swish and spit 10 mL every 4 (four) hours as needed for mouth pain or discomfort, Disp: 90 mL, Rfl: 1    CRANIAL PROSTHESIS, RX,, One wig as needed, Disp: 1 each, Rfl: 0    oxyCODONE-acetaminophen (Percocet) 5-325 mg per tablet, Take 1 tablet by mouth every 6 (six) hours as needed for moderate pain Max Daily Amount: 4 tablets (Patient not taking: Reported on 5/10/2022 ), Disp: 20 tablet, Rfl: 0  No Known Allergies  Vitals:    05/10/22 0953   BP: 142/90   Pulse: 96   Resp: 18   Temp: 98 1 °F (36 7 °C)   SpO2: 97%       Physical Exam  Vitals reviewed  Constitutional:       General: She is not in acute distress  Appearance: Normal appearance  She is normal weight  She is not ill-appearing or toxic-appearing  HENT:      Head: Normocephalic and atraumatic  Eyes:      General: No scleral icterus  Extraocular Movements: Extraocular movements intact  Conjunctiva/sclera: Conjunctivae normal       Pupils: Pupils are equal, round, and reactive to light  Cardiovascular:      Rate and Rhythm: Normal rate  Pulmonary:      Effort: Pulmonary effort is normal    Chest:      Chest wall: No swelling or edema  Breasts:      Right: Absent  Comments: Right chest wall incision is healing well  No erythema or swelling present on chest wall or in axilla  2 KAREEN drains present  Abdominal:      Palpations: Abdomen is soft  Musculoskeletal:         General: Normal range of motion  Cervical back: Normal range of motion and neck supple  Skin:     General: Skin is warm and dry  Findings: No bruising  Neurological:      General: No focal deficit present  Mental Status: She is alert and oriented to person, place, and time  Psychiatric:         Mood and Affect: Mood normal          Behavior: Behavior normal          Discussion/Summary: This is a very pleasant 76-year-old female who presents to the office today for drain care following right modified radical mastectomy  Drain #2 was removed today, patient tolerated removal well  Dry sterile dressing was placed  Patient was instructed to call when collection in drain #1 is less than 30 cc per day for 3 days in a row  She is already scheduled to see Dr Katie Peoples for her postoperative visit on Monday  I will defer pathology review to him at that time  Final pathology was not discussed with the patient today, and patient was not given a copy of her pathology report

## 2022-05-16 ENCOUNTER — TELEPHONE (OUTPATIENT)
Dept: OTHER | Facility: OTHER | Age: 72
End: 2022-05-16

## 2022-05-16 ENCOUNTER — OFFICE VISIT (OUTPATIENT)
Dept: SURGICAL ONCOLOGY | Facility: CLINIC | Age: 72
End: 2022-05-16

## 2022-05-16 VITALS
HEIGHT: 69 IN | RESPIRATION RATE: 16 BRPM | WEIGHT: 180 LBS | TEMPERATURE: 98.5 F | BODY MASS INDEX: 26.66 KG/M2 | OXYGEN SATURATION: 98 % | DIASTOLIC BLOOD PRESSURE: 88 MMHG | SYSTOLIC BLOOD PRESSURE: 152 MMHG | HEART RATE: 90 BPM

## 2022-05-16 DIAGNOSIS — Z17.0 MALIGNANT NEOPLASM OF OVERLAPPING SITES OF RIGHT BREAST IN FEMALE, ESTROGEN RECEPTOR POSITIVE (HCC): ICD-10-CM

## 2022-05-16 DIAGNOSIS — C50.911 BREAST CANCER METASTASIZED TO AXILLARY LYMPH NODE, RIGHT (HCC): ICD-10-CM

## 2022-05-16 DIAGNOSIS — C77.3 BREAST CANCER METASTASIZED TO AXILLARY LYMPH NODE, RIGHT (HCC): ICD-10-CM

## 2022-05-16 DIAGNOSIS — Z71.89 OTHER SPECIFIED COUNSELING: Primary | ICD-10-CM

## 2022-05-16 DIAGNOSIS — C50.811 MALIGNANT NEOPLASM OF OVERLAPPING SITES OF RIGHT BREAST IN FEMALE, ESTROGEN RECEPTOR POSITIVE (HCC): ICD-10-CM

## 2022-05-16 DIAGNOSIS — Z90.11 STATUS POST RIGHT MASTECTOMY: ICD-10-CM

## 2022-05-16 PROCEDURE — 99024 POSTOP FOLLOW-UP VISIT: CPT | Performed by: SURGERY

## 2022-05-16 NOTE — PROGRESS NOTES
Surgical Oncology Breast Post-Op       [address] [facility]  [facility] [place]  [address] [facility] [address] [place] [address]    Domingo Graham  1950  [id number]  [address]  [facility] [place]  [address] [facility] [address]    Chief Complaint:   Doris Hylton is seen for a post-operative visit of her recent breast surgery  Oncology History:     Oncology History   Malignant neoplasm of overlapping sites of right breast in female, estrogen receptor positive ([address] )   12/6/2021 Biopsy    Punch biopsy of right breast  Dermal lymphovascular invasion, immunoprofile consistent with breast primary  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)  Grade 2  ER 90; MO 70; HER2 2+, FISH negative     12/13/2021 Observation    Bilateral breast MRI - findings suspicious for multicentric right breast cancer; 2 biopsies recommended at areas of concern (never done); left breast clear; no right axillary adenopathy       12/30/2021 -  Chemotherapy    DOXOrubicin (ADRIAMYCIN), 122 mg, Intravenous, Once, 4 of 4 cycles  Administration: 120 mg (12/30/2021), 122 mg (1/13/2022), 122 mg (1/27/2022), 122 mg (2/10/2022)  palonosetron (ALOXI), 0 25 mg, Intravenous, Once, 2 of 2 cycles  Administration: 0 25 mg (3/24/2022), 0 25 mg (4/7/2022)  pegfilgrastim (NEULASTA), 4 mg (100 % of original dose 4 mg), Subcutaneous, Once, 3 of 3 cycles  Dose modification: 4 mg (original dose 4 mg, Cycle 6)  Administration: 4 mg (3/11/2022), 4 mg (3/25/2022), 4 mg (4/8/2022)  pegfilgrastim (Dilan Rubins), 6 mg, Subcutaneous, Once, 5 of 5 cycles  Administration: 6 mg (12/30/2021), 6 mg (1/13/2022), 6 mg (2/24/2022), 6 mg (1/27/2022), 6 mg (2/10/2022)  cyclophosphamide (CYTOXAN) IVPB, 600 mg/m2 = 1,218 mg, Intravenous, Once, 4 of 4 cycles  Administration: 1,218 mg (12/30/2021), 1,218 mg (1/13/2022), 1,218 mg (1/27/2022), 1,218 mg (2/10/2022)  fosaprepitant (EMEND) IVPB, 150 mg, Intravenous, Once, 4 of 4 cycles  Administration: 150 mg (12/30/2021), 150 mg (1/13/2022), 150 mg (1/27/2022), 150 mg (2/10/2022)  PACLItaxel (TAXOL) chemo IVPB, 175 mg/m2 = 355 2 mg, Intravenous, Once, 4 of 4 cycles  Administration: 355 2 mg (2/24/2022), 355 2 mg (3/10/2022), 355 2 mg (3/24/2022), 355 2 mg (4/7/2022)     4/26/2022 Surgery    Right breast modified radical mastectomy  Inflammatory breast cancer  Grade 1  1 5 cm (multiple foci involving all four quadrants)  Lymphovascular invasion present  Margins negative  14/19 Lymph nodes with macro-metastases  Anatomic Stage IIIC  Prognostic Stage IIIA         Assessment & Plan:   Assessment/Plan    Patient presents for postoperative visit  Her pathology demonstrated 14 adult night 18 lymph nodes positive there was some extracapsular extension largest lymph node metastasis was 8 mm  She did have grade 1 small multifocal disease scattered throughout all 4 quadrants of the breast remaining  Her margins were all negative  The largest tumor size was 1 5 cm  I will have her see Radiation Oncology  She has an appointment to see Dr Jamel Mari in in a couple of weeks to discuss adjuvant therapies  I will see her back in 3 months  Her wound is healing well  Prior to her 3 month visit we will remove her remaining drain at the end of this week or early next week  History of Present Illness:   See Oncology History    Interval History:   See Assessment & Plan    Review of Systems:   Review of Systems   All other systems reviewed and are negative        Past Medical History:     Patient Active Problem List   Diagnosis    Varicose vein of leg    Anxiety    Arthritis    Depression    Hyperbilirubinemia    Hyperlipidemia, mild    Over weight    Vitamin D deficiency    Digital mucinous cyst of finger of left hand    Acute midline low back pain with left-sided sciatica    Closed fracture of distal end of radius    Malignant neoplasm of overlapping sites of right breast in female, estrogen receptor positive ([facility] )    Chemotherapy induced neutropenia ([facility] )    Palliative care patient    Medical marijuana use    Cancer related pain    Chemotherapy-induced nausea    Insomnia due to medical condition    Anorexia     Past Medical History:   Diagnosis Date    Anxiety     Cancer [facility])     Right breast cancer    Depression     Fracture of radius, distal, closed     Last Assessed:  6/10/14    Thrombophlebitis     Vitamin D deficiency      Past Surgical History:   Procedure Laterality Date    ANKLE SURGERY      ANKLE SURGERY      BREAST BIOPSY Left 2001    neg    GALLBLADDER SURGERY      HYSTERECTOMY      IR PORT PLACEMENT  12/29/2021    [place] MASTECTOMY, MODIFIED RADICAL Right 4/26/2022    Procedure: BREAST MODIFIED RADICAL MASTECTOMY WITH RIGHT LYMPHATIC MAPPING WITH BLUE AND RADIOACTIVE DYE (INJECT AT 0900 BY DR DEJESUS IN THE OR);   Surgeon: Luz Stern MD;  [facility];  Service: Surgical Oncology    WISDOM TOOTH EXTRACTION       Family History   Problem Relation Age of Onset    Ovarian cancer Mother 64    Heart disease Father     Leukemia Brother 62    Heart disease Brother     Heart disease Brother     Cancer Maternal Grandmother         Unknown primary; mid 52's    Stomach cancer Maternal Aunt         60's     Social History     Socioeconomic History    Marital status: /Civil Union     Spouse name: Not on file    Number of children: Not on file    Years of education: Not on file    Highest education level: Not on file   Occupational History    Not on file   Tobacco Use    Smoking status: Never Smoker    Smokeless tobacco: Never Used   Vaping Use    Vaping Use: Never used   Substance and Sexual Activity    Alcohol use: Yes     Comment: rarely    Drug use: Yes     Types: Marijuana     Comment: not helping stopped    Sexual activity: Not Currently     Partners: Male     Birth control/protection: Female Sterilization   Other Topics Concern    Not on file   Social History Narrative     to Rekha Hussein  Social Determinants of Health     Financial Resource Strain: Not on file   Food Insecurity: Not on file   Transportation Needs: Not on file   Physical Activity: Not on file   Stress: Not on file   Social Connections: Not on file   Intimate Partner Violence: Not on file   Housing Stability: Not on file       Current Outpatient Medications:     acetaminophen (TYLENOL) 500 mg tablet, Take 2 tablets (1,000 mg total) by mouth 3 (three) times a day, Disp: 180 tablet, Rfl: 2    ALPRAZolam (XANAX) 0 25 mg tablet, Take 1/2 to 1 pill Q 8 hours p r n   For anxiety, Disp: 30 tablet, Rfl: 0    Calcium 250 MG CAPS, Take 250 mg by mouth in the morning  , Disp: , Rfl:     cholecalciferol (VITAMIN D3) 1,000 units tablet, Take 2 capsules by mouth daily, Disp: , Rfl:     escitalopram (LEXAPRO) 20 mg tablet, Take 1 tablet (20 mg total) by mouth daily, Disp: 90 tablet, Rfl: 2    Fish Oil-Krill Oil (KRILL OIL PLUS) CAPS, Take by mouth in the morning  , Disp: , Rfl:     fluocinonide (LIDEX) 0 05 % external solution, APPLY TO SCALP ONCE DAILY AS NEEDED FOR ITCH, Disp: , Rfl: 2    ketoconazole (NIZORAL) 2 % shampoo, Apply 1 application topically if needed  , Disp: , Rfl: 5    lidocaine-prilocaine (EMLA) cream, Apply topically as needed for mild pain, Disp: 30 g, Rfl: 1    ondansetron (ZOFRAN) 8 mg tablet, Take 1 tablet (8 mg total) by mouth every 8 (eight) hours as needed for nausea or vomiting, Disp: 40 tablet, Rfl: 2    al mag oxide-diphenhydramine-lidocaine viscous (MAGIC MOUTHWASH) 1:1:1 suspension, Swish and spit 10 mL every 4 (four) hours as needed for mouth pain or discomfort, Disp: 90 mL, Rfl: 1    CRANIAL PROSTHESIS, RX,, One wig as needed, Disp: 1 each, Rfl: 0    oxyCODONE-acetaminophen (Percocet) 5-325 mg per tablet, Take 1 tablet by mouth every 6 (six) hours as needed for moderate pain Max Daily Amount: 4 tablets (Patient not taking: No sig reported), Disp: 20 tablet, Rfl: 0  No Known Allergies    Physical Exams:     Vitals:    05/16/22 1307   BP: 152/88   Pulse: 90   Resp: 16   Temp: 98 5 °F (36 9 °C)   SpO2: 98%     Physical Exam  Chest:      Comments: Examination the right mastectomy site demonstrates a single drain that is healing reasonably well with minimal induration around it  There is no evidence of infection  Patient does have hyperesthesias in the arm secondary to her axillary dissection        Results & Discussion:   Overall the patient is doing quite well  Her pathology demonstrated surprisingly number of positive lymph nodes which were unanticipated  Her margins her widely negative  I have recommended she see Radiation Oncology and follow-up with Dr Ced Shannon for adjuvant recommendations  I will have her drain removed within a week and then see her back in 3 months thereafter  All questions were answered the patient's satisfaction

## 2022-05-17 NOTE — TELEPHONE ENCOUNTER
Patients  calling back stating that he was informed by Dr Fauzia Riggs that he was paged incorrectly and surg onc needed to be paged, I apologized to patients  and informed him I would reach out to the patients provider, as he is on call  Paged Dr Tommie Chinchilla via [employer] regarding the patient feeling unwell and having a fever

## 2022-05-17 NOTE — TELEPHONE ENCOUNTER
Called the patient to discuss her concerns  Patient reports that when she woke up yesterday, she started to "not feel good" but late yesterday afternoon her symptoms worsened and she developed a fever of 101 7 last night  Patient reports that this morning, her temperature has gone down to 99 6 and she is feeling better than she was yesterday  Patient was seen by Dr Tommie Chinchilla yesterday for a post-op appointment and had no evidence of infection or issues at her surgical site  Instructed patient to continue monitoring her symptoms and to call if they worsened again or if she developed any issues with her incision  Patient verbalized understanding and was appreciative of the call

## 2022-05-17 NOTE — TELEPHONE ENCOUNTER
PT's  called back in stating he missed on calls return call, he is requesting another kaitlyn back  Re-paged Dr Bere Bee to call Isabel Storey back

## 2022-05-17 NOTE — TELEPHONE ENCOUNTER
Pt's spouse called, requesting a call back from on call provider, regarding pt having fever and feeling sick   Provider was paged via [facility]

## 2022-05-19 ENCOUNTER — TELEPHONE (OUTPATIENT)
Dept: SURGICAL ONCOLOGY | Facility: CLINIC | Age: 72
End: 2022-05-19

## 2022-05-19 NOTE — TELEPHONE ENCOUNTER
Patient called the office to report worsening redness around her drain site over the past day  Patient states that previously, the drainage "looked like apple juice but now looks like apple cider " Patient still reports an output of 50-60 mL daily  Reviewed with Dr Oracio Terrazas who recommended that the drain stay in until Monday, despite the inflammation at the drain site  This was explained to the patient who verbalized understanding  She also stated that her daughter, who is a nurse practitioner, assessed the area and there was no evidence of infection  Reviewed symptoms to look for and instructed the patient to contact the office if any issues arose  She was appreciative of the discussion

## 2022-05-23 ENCOUNTER — OFFICE VISIT (OUTPATIENT)
Dept: SURGICAL ONCOLOGY | Facility: CLINIC | Age: 72
End: 2022-05-23

## 2022-05-23 VITALS
DIASTOLIC BLOOD PRESSURE: 78 MMHG | HEART RATE: 92 BPM | WEIGHT: 176 LBS | BODY MASS INDEX: 26.07 KG/M2 | RESPIRATION RATE: 16 BRPM | TEMPERATURE: 97.2 F | SYSTOLIC BLOOD PRESSURE: 118 MMHG | HEIGHT: 69 IN | OXYGEN SATURATION: 98 %

## 2022-05-23 DIAGNOSIS — Z48.03 ENCOUNTER FOR CHANGE OR REMOVAL OF DRAINS: Primary | ICD-10-CM

## 2022-05-23 DIAGNOSIS — C50.811 MALIGNANT NEOPLASM OF OVERLAPPING SITES OF RIGHT BREAST IN FEMALE, ESTROGEN RECEPTOR POSITIVE (HCC): ICD-10-CM

## 2022-05-23 DIAGNOSIS — Z90.11 STATUS POST RIGHT MASTECTOMY: ICD-10-CM

## 2022-05-23 DIAGNOSIS — Z17.0 MALIGNANT NEOPLASM OF OVERLAPPING SITES OF RIGHT BREAST IN FEMALE, ESTROGEN RECEPTOR POSITIVE (HCC): ICD-10-CM

## 2022-05-23 PROCEDURE — 99024 POSTOP FOLLOW-UP VISIT: CPT | Performed by: SURGERY

## 2022-05-23 NOTE — PROGRESS NOTES
Surgical Oncology Follow Up       [address] [facility]  [facility] [place]  [address]  [place] [address]    Marquita Barrios  1950  [id number]  [address]  [facility] [place]  [address] [address]    Chief Complaint   Patient presents with    Post-op          Assessment & Plan:   Patient presents for a drain removal   We have already reviewed her pathology  She has appointments with medical as well as Radiation Oncology  Her single drain was removed without difficulty  Post drain pull instructions were provided  Cancer History:     Oncology History   Malignant neoplasm of overlapping sites of right breast in female, estrogen receptor positive ([facility] [address] )   12/6/2021 Biopsy    Punch biopsy of right breast  Dermal lymphovascular invasion, immunoprofile consistent with breast primary  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)  Grade 2  ER 90; ND 70; HER2 2+, FISH negative     12/13/2021 Observation    Bilateral breast MRI - findings suspicious for multicentric right breast cancer; 2 biopsies recommended at areas of concern (never done); left breast clear; no right axillary adenopathy       12/30/2021 -  Chemotherapy    DOXOrubicin (ADRIAMYCIN), 122 mg, Intravenous, Once, 4 of 4 cycles  Administration: 120 mg (12/30/2021), 122 mg (1/13/2022), 122 mg (1/27/2022), 122 mg (2/10/2022)  palonosetron (ALOXI), 0 25 mg, Intravenous, Once, 2 of 2 cycles  Administration: 0 25 mg (3/24/2022), 0 25 mg (4/7/2022)  pegfilgrastim (NEULASTA), 4 mg (100 % of original dose 4 mg), Subcutaneous, Once, 3 of 3 cycles  Dose modification: 4 mg (original dose 4 mg, Cycle 6)  Administration: 4 mg (3/11/2022), 4 mg (3/25/2022), 4 mg (4/8/2022)  pegfilgrastim (Hobert Damme), 6 mg, Subcutaneous, Once, 5 of 5 cycles  Administration: 6 mg (12/30/2021), 6 mg (1/13/2022), 6 mg (2/24/2022), 6 mg (1/27/2022), 6 mg (2/10/2022)  cyclophosphamide (CYTOXAN) IVPB, 600 mg/m2 = 1,218 mg, Intravenous, Once, 4 of 4 cycles  Administration: 1,218 mg (12/30/2021), 1,218 mg (1/13/2022), 1,218 mg (1/27/2022), 1,218 mg (2/10/2022)  fosaprepitant (EMEND) IVPB, 150 mg, Intravenous, Once, 4 of 4 cycles  Administration: 150 mg (12/30/2021), 150 mg (1/13/2022), 150 mg (1/27/2022), 150 mg (2/10/2022)  PACLItaxel (TAXOL) chemo IVPB, 175 mg/m2 = 355 2 mg, Intravenous, Once, 4 of 4 cycles  Administration: 355 2 mg (2/24/2022), 355 2 mg (3/10/2022), 355 2 mg (3/24/2022), 355 2 mg (4/7/2022)     4/26/2022 Surgery    Right breast modified radical mastectomy  Inflammatory breast cancer  Grade 1  1 5 cm (multiple foci involving all four quadrants)  Lymphovascular invasion present  Margins negative  14/19 Lymph nodes with macro-metastases  Anatomic Stage IIIC  Prognostic Stage IIIA           Interval History:   See above    Review of Systems:   Review of Systems   All other systems reviewed and are negative        Past Medical History     Patient Active Problem List   Diagnosis    Varicose vein of leg    Anxiety    Arthritis    Depression    Hyperbilirubinemia    Hyperlipidemia, mild    Over weight    Vitamin D deficiency    Digital mucinous cyst of finger of left hand    Acute midline low back pain with left-sided sciatica    Closed fracture of distal end of radius    Malignant neoplasm of overlapping sites of right breast in female, estrogen receptor positive ([address] )    Chemotherapy induced neutropenia ([address] )    Palliative care patient    Medical marijuana use    Cancer related pain    Chemotherapy-induced nausea    Insomnia due to medical condition    Anorexia     Past Medical History:   Diagnosis Date    Anxiety     Cancer [facility])     Right breast cancer    Depression     Fracture of radius, distal, closed     Last Assessed:  6/10/14    Thrombophlebitis     Vitamin D deficiency      Past Surgical History:   Procedure Laterality Date  ANKLE SURGERY      ANKLE SURGERY      BREAST BIOPSY Left 2001    neg    GALLBLADDER SURGERY      HYSTERECTOMY      IR PORT PLACEMENT  12/29/2021    [place] MASTECTOMY, MODIFIED RADICAL Right 4/26/2022    Procedure: BREAST MODIFIED RADICAL MASTECTOMY WITH RIGHT LYMPHATIC MAPPING WITH BLUE AND RADIOACTIVE DYE (INJECT AT 0900 BY DR DEJESUS IN THE OR); Surgeon: Lenoid Maurer MD;  [facility];  Service: Surgical Oncology    WISDOM TOOTH EXTRACTION       Family History   Problem Relation Age of Onset    Ovarian cancer Mother 64    Heart disease Father     Leukemia Brother 62    Heart disease Brother     Heart disease Brother     Cancer Maternal Grandmother         Unknown primary; mid 52's    Stomach cancer Maternal Aunt         60's     Social History     Socioeconomic History    Marital status: /Civil Union     Spouse name: Not on file    Number of children: Not on file    Years of education: Not on file    Highest education level: Not on file   Occupational History    Not on file   Tobacco Use    Smoking status: Never Smoker    Smokeless tobacco: Never Used   Vaping Use    Vaping Use: Never used   Substance and Sexual Activity    Alcohol use: Yes     Comment: rarely    Drug use: Yes     Types: Marijuana     Comment: not helping stopped    Sexual activity: Not Currently     Partners: Male     Birth control/protection: Female Sterilization   Other Topics Concern    Not on file   Social History Narrative     to [facility]       Social Determinants of Health     Financial Resource Strain: Not on file   Food Insecurity: Not on file   Transportation Needs: Not on file   Physical Activity: Not on file   Stress: Not on file   Social Connections: Not on file   Intimate Partner Violence: Not on file   Housing Stability: Not on file       Current Outpatient Medications:     acetaminophen (TYLENOL) 500 mg tablet, Take 2 tablets (1,000 mg total) by mouth 3 (three) times a day, Disp: 180 tablet, Rfl: 2    ALPRAZolam (XANAX) 0 25 mg tablet, Take 1/2 to 1 pill Q 8 hours p r n  For anxiety, Disp: 30 tablet, Rfl: 0    Calcium 250 MG CAPS, Take 250 mg by mouth in the morning  , Disp: , Rfl:     cholecalciferol (VITAMIN D3) 1,000 units tablet, Take 2 capsules by mouth daily, Disp: , Rfl:     escitalopram (LEXAPRO) 20 mg tablet, Take 1 tablet (20 mg total) by mouth daily, Disp: 90 tablet, Rfl: 2    Fish Oil-Krill Oil (KRILL OIL PLUS) CAPS, Take by mouth in the morning  , Disp: , Rfl:     fluocinonide (LIDEX) 0 05 % external solution, APPLY TO SCALP ONCE DAILY AS NEEDED FOR ITCH, Disp: , Rfl: 2    ketoconazole (NIZORAL) 2 % shampoo, Apply 1 application topically if needed  , Disp: , Rfl: 5    lidocaine-prilocaine (EMLA) cream, Apply topically as needed for mild pain, Disp: 30 g, Rfl: 1    ondansetron (ZOFRAN) 8 mg tablet, Take 1 tablet (8 mg total) by mouth every 8 (eight) hours as needed for nausea or vomiting, Disp: 40 tablet, Rfl: 2    al mag oxide-diphenhydramine-lidocaine viscous (MAGIC MOUTHWASH) 1:1:1 suspension, Swish and spit 10 mL every 4 (four) hours as needed for mouth pain or discomfort, Disp: 90 mL, Rfl: 1    CRANIAL PROSTHESIS, RX,, One wig as needed, Disp: 1 each, Rfl: 0    oxyCODONE-acetaminophen (Percocet) 5-325 mg per tablet, Take 1 tablet by mouth every 6 (six) hours as needed for moderate pain Max Daily Amount: 4 tablets, Disp: 20 tablet, Rfl: 0  No Known Allergies    Physical Exam:     Vitals:    05/23/22 1140   BP: 118/78   Pulse: 92   Resp: 16   Temp: (!) 97 2 °F (36 2 °C)   SpO2: 98%     Physical Exam  Chest:      Comments: There is mild induration at the drain site certainly within normal limits  There is no evidence of any purulence or cellulitis  Her drain is still putting out approximately 40-60 cc of serous fluid a day  We reviewed signs of seroma formation and she will call us if she develops those            Results & Discussion:   Overall the patient is doing well   She still has serous fluid coming out of her drain however is been sufficiently long that I think leaving the drain in longer is only going to complicated with an infection  Her chest skin seems to be a tear in to the underlying chest wall  Hopefully she will not develop a seroma  Advance Care Planning/Advance Directives:  I discussed the disease status, treatment plans and follow-up with the patient

## 2022-05-25 ENCOUNTER — CLINICAL SUPPORT (OUTPATIENT)
Dept: RADIATION ONCOLOGY | Facility: HOSPITAL | Age: 72
End: 2022-05-25
Attending: RADIOLOGY
Payer: MEDICARE

## 2022-05-25 VITALS
WEIGHT: 175 LBS | TEMPERATURE: 98 F | SYSTOLIC BLOOD PRESSURE: 130 MMHG | OXYGEN SATURATION: 98 % | BODY MASS INDEX: 26.22 KG/M2 | HEART RATE: 75 BPM | RESPIRATION RATE: 18 BRPM | DIASTOLIC BLOOD PRESSURE: 82 MMHG

## 2022-05-25 DIAGNOSIS — C77.3 BREAST CANCER METASTASIZED TO AXILLARY LYMPH NODE, RIGHT (HCC): ICD-10-CM

## 2022-05-25 DIAGNOSIS — Z17.0 MALIGNANT NEOPLASM OF OVERLAPPING SITES OF RIGHT BREAST IN FEMALE, ESTROGEN RECEPTOR POSITIVE (HCC): Primary | ICD-10-CM

## 2022-05-25 DIAGNOSIS — C50.811 MALIGNANT NEOPLASM OF OVERLAPPING SITES OF RIGHT BREAST IN FEMALE, ESTROGEN RECEPTOR POSITIVE (HCC): Primary | ICD-10-CM

## 2022-05-25 DIAGNOSIS — C50.911 BREAST CANCER METASTASIZED TO AXILLARY LYMPH NODE, RIGHT (HCC): ICD-10-CM

## 2022-05-25 PROCEDURE — 99204 OFFICE O/P NEW MOD 45 MIN: CPT | Performed by: RADIOLOGY

## 2022-05-25 PROCEDURE — 99211 OFF/OP EST MAY X REQ PHY/QHP: CPT | Performed by: RADIOLOGY

## 2022-05-25 PROCEDURE — 77263 THER RADIOLOGY TX PLNG CPLX: CPT | Performed by: RADIOLOGY

## 2022-05-25 NOTE — PROGRESS NOTES
Josh Carey 1950 is a 67 y o  female with diagnosis of inflammatory right breast cancer  She presents today for radiation oncology consult, referred by Dr Arnaud Blood  She noticed a red spot on the skin of her right breast in October 2021  Mammogram did not show a mass but US showed 6 mm skin thickening right breast at 4:00  Skin biopsy was done 12/6/21 and revealed dermal lymphatic invasion of carcinoma, consistent with breast primary, ER 90 TX 70 HER2 fish negative  Imaging showed no distant metastasis  She was seen by Dr Gary Rodas and started neoadjuvant chemo with dose dense paclitaxel on 12/30/21  She has a family h/o ovarian cancer in her mother  She underwent prophylactic hysterectomy and bilateral oophorectomy in 1993 12/2/21 diagnostic bilateral mammogram/US right breast-  FINDINGS:   RIGHT  A) SKIN LESION  Mammo diagnostic bilateral w 3d & cad: There is skin thickening  Skin thickness measures up to about 6 mm maximum at the 4 o'clock position of the breast   This is best seen with ultrasound of this area  No convincing evidence of focal solid or cystic breast mass at this time and no obvious architectural distortion  No visible axillary adenopathy  Left  There are no suspicious masses, grouped microcalcifications or areas of unexplained architectural distortion  The skin and nipple areolar complex are unremarkable  A few scattered stable benign-type calcifications are present  There Are 3  Biopsy clips on the left  IMPRESSION:   thickening of the right breast skin mostly in the lower inner quadrant  Surgical consultation recommended for possible skin biopsy  ASSESSMENT/BI-RADS CATEGORY:  Left: 2 - Benign  Right: 4 - Suspicious  Overall: 4 - Suspicious      12/6/21 Right breast, punch biopsy:  Dermal lymphovascular invasion, immunoprofile consistent with breast primary  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)  Grade 2  ER 90; TX 70;  HER2 2+, FISH negative      12/11/21 CT C/A/P-  -No definite evidence of metastatic disease in the chest, abdomen, or pelvis   -Nonspecific 5 mm pulmonary nodule in the right lower lobe, likely infectious/inflammatory  Recommend continued surveillance according to the patient's oncologic imaging protocol   -Asymmetric skin thickening in the right breast consistent with the reported history of inflammatory breast cancer  12/13/21 MRI breast bilateral   1  Findings suspicious for multicentric right breast cancer  Most suspicious area is a 9 mm mass right breast 12:00 o'clock  Recommend second-look ultrasound with intent to biopsy  This mass is within an area of segmental distribution non-mass enhancement  Right breast 06:00 o'clock focal non-mass enhancement is 2nd  most suspicious area  Recommend second-look ultrasound with intent to biopsy  I anticipate that 2 biopsies would likely be adequate to direct management (MR biopsy may be necessary if sonographic correlates cannot be identified)     2  No right axillary or internal mammary adenopathy  3  No MR evidence of contralateral left breast malignancy  12/14/21 bone scan- 1  No scintigraphic evidence of osseous metastasis  4/6/22 Dr Bret Garcia- She completed 4 cycles resulting in clinical partial response  Excellent tolerance  Last dose tomorrow  F/u end of May      4/7/22 Last cycle neoadjuvant chemo      4/20/22 MRI bilateral breast-  FINDINGS:   RIGHT  B) MASS: Again noted is enhancing mass in the 12 o'clock position of the right breast, 8 cm from the nipple  This has decreased in size  It previously measured 10 mm and currently measures 6 mm  Series 401, image 105/224  C) MASS:  Previously noted 9 mm mass in the 12 o'clock position of the right breast, 6 cm from the nipple has markedly decreased in size/no longer definitely seen; tiny focus of enhancement is noted in this region  Series 401, image 100/224    D) MASS:  Previously noted 6 mm enhancing mass in the 8 o'clock position of the breast has markedly decreased in size/no longer definitely seen; tiny focus of enhancement is noted in this region  Series 401, image 156/224  E) NON-MASS ENHANCEMENT:  Previously noted area of non mass enhancement in the 6 o'clock position of the breast has markedly decreased; previously was a 28 x 17 mm area of enhancement and currently 1 1 mm linear area of enhancement  Series 401, image 177/224  F) NON-MASS ENHANCEMENT:  Again noted is an area of non mass enhancement in the 11 o'clock position of the right breast   The amount of enhancement within this region has decreased; however, the span is similar  Series 401, image 108/224  Diffuse right breast skin thickening again noted  Catheter from port noted  No right axillary axillary or internal mammary adenopathy  Left  There are no suspicious enhancing masses or suspicious areas of non-mass enhancement  There is no axillary or internal mammary adenopathy  IMPRESSION:  Partial response to neoadjuvant chemotherapy  ASSESSMENT/BI-RADS CATEGORY:  Left: 2 - Benign  Right: 6 - Known Biopsy-Proven Malignancy  Overall: 6 - Known Biopsy-Proven Malignancy      4/26/22 Right breast modified radical mastectomy  Inflammatory breast cancer  Grade 1  1 5 cm (multiple foci involving all four quadrants)  Lymphovascular invasion present  Margins negative  14/19 Lymph nodes with macro-metastases  Anatomic Stage IIIC  Prognostic Stage IIIA      5/16/22 Dr Dean Marcus- 14 out of 19 lymph nodes positive there was some extracapsular extension largest lymph node metastasis was 8 mm  She did have grade 1 small multifocal disease scattered throughout all 4 quadrants of the breast remaining  Her margins were all negative  The largest tumor size was 1 5 cm  Refer to rad onc   F/u in 3 months      5/23/22 Dr Dean Marcus- drain removed      6/2/22 Dr Mccrary Files  8/18/22 Dr Dean Marcus          Oncology History   Malignant neoplasm of overlapping sites of right breast in female, estrogen receptor positive ([facility] [address] )   12/6/2021 Biopsy    Punch biopsy of right breast  Dermal lymphovascular invasion, immunoprofile consistent with breast primary  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)  Grade 2  ER 90; [place] 70; HER2 2+, FISH negative     12/13/2021 Observation    Bilateral breast MRI - findings suspicious for multicentric right breast cancer; 2 biopsies recommended at areas of concern (never done); left breast clear; no right axillary adenopathy       12/30/2021 -  Chemotherapy    DOXOrubicin (ADRIAMYCIN), 122 mg, Intravenous, Once, 4 of 4 cycles  Administration: 120 mg (12/30/2021), 122 mg (1/13/2022), 122 mg (1/27/2022), 122 mg (2/10/2022)  palonosetron (ALOXI), 0 25 mg, Intravenous, Once, 2 of 2 cycles  Administration: 0 25 mg (3/24/2022), 0 25 mg (4/7/2022)  pegfilgrastim (NEULASTA), 4 mg (100 % of original dose 4 mg), Subcutaneous, Once, 3 of 3 cycles  Dose modification: 4 mg (original dose 4 mg, Cycle 6)  Administration: 4 mg (3/11/2022), 4 mg (3/25/2022), 4 mg (4/8/2022)  pegfilgrastim (Chryl Lefort), 6 mg, Subcutaneous, Once, 5 of 5 cycles  Administration: 6 mg (12/30/2021), 6 mg (1/13/2022), 6 mg (2/24/2022), 6 mg (1/27/2022), 6 mg (2/10/2022)  cyclophosphamide (CYTOXAN) IVPB, 600 mg/m2 = 1,218 mg, Intravenous, Once, 4 of 4 cycles  Administration: 1,218 mg (12/30/2021), 1,218 mg (1/13/2022), 1,218 mg (1/27/2022), 1,218 mg (2/10/2022)  fosaprepitant (EMEND) IVPB, 150 mg, Intravenous, Once, 4 of 4 cycles  Administration: 150 mg (12/30/2021), 150 mg (1/13/2022), 150 mg (1/27/2022), 150 mg (2/10/2022)  PACLItaxel (TAXOL) chemo IVPB, 175 mg/m2 = 355 2 mg, Intravenous, Once, 4 of 4 cycles  Administration: 355 2 mg (2/24/2022), 355 2 mg (3/10/2022), 355 2 mg (3/24/2022), 355 2 mg (4/7/2022)     4/26/2022 Surgery    Right breast modified radical mastectomy  Inflammatory breast cancer  Grade 1  1 5 cm (multiple foci involving all four quadrants)  Lymphovascular invasion present  Margins negative  14/19 Lymph nodes with macro-metastases  Anatomic Stage IIIC  Prognostic Stage IIIA        Procedure  Total mastectomy    Specimen Laterality  Right    TUMOR   Tumor Site  Upper outer quadrant      Lower outer quadrant      Upper inner quadrant      Lower inner quadrant      Central    Histologic Type  inflammatory  breast carcinoma    Histologic Grade ([place] Histologic Score)     Glandular (Acinar) / Tubular Differentiation  Score 1    Nuclear Pleomorphism  Score 2    Mitotic Rate  Score 1    Overall Grade  Grade 1 (scores of 3, 4 or 5)    Tumor Size  Greatest dimension of largest invasive focus (Millimeters): Extensive  invasive breast carcinoma involving all four breast quadrants mm   Tumor Focality  Multiple foci of invasive carcinoma    Number of Foci  Cannot be determined         Sizes of Individual Foci (Millimeters)  Multiple foci, largest measures 15 mm   Ductal Carcinoma In Situ (DCIS)  Not identified    Lobular Carcinoma In Situ (LCIS)  Not identified         Tumor Extent  Skin is present and involved    Skin Invasion  Invasive carcinoma directly invades into the dermis or epidermis without skin ulceration (this does not change the T classification)    Skin Satellite Foci  Satellite skin foci of invasive carcinoma are present    Lymphovascular Invasion  Present    Dermal Lymphovascular Invasion  Present    Microcalcifications  Present in invasive carcinoma      Present in non-neoplastic tissue    Treatment Effect in the Breast  No definite response to presurgical therapy in the invasive carcinoma    Treatment Effect in the Lymph Nodes  No definite response to presurgical therapy in metastatic carcinoma    MARGINS   Margin Status for Invasive Carcinoma  All margins negative for invasive carcinoma    Distance from Invasive Carcinoma to Closest Margin  10 mm   Closest Margin(s) to Invasive Carcinoma  Posterior    Distance from Invasive Carcinoma to Inferior Margin  13 mm   REGIONAL LYMPH NODES   Regional Lymph Node Status  Tumor present in regional lymph node(s)    Number of Lymph Nodes with Macrometastases  14    Number of Lymph Nodes with Micrometastases  0    Size of Largest Sigifredo Metastatic Deposit  8 mm   Extranodal Extension  Present, 2 mm or less    Total Number of Lymph Nodes Examined (sentinel and non-sentinel)  19             Review of Systems:  Review of Systems   Constitutional: Positive for fatigue (some days)  Appetite and taste improving since finishing chemo   HENT: Negative  Eyes: Negative  Respiratory: Negative  Cardiovascular: Negative  Gastrointestinal: Negative  Endocrine: Negative  Genitourinary: Negative  Musculoskeletal: Negative  Skin:        Healing from surgery, KAREEN drain removed   Mild tenderness, denies any drainage    Allergic/Immunologic: Negative  Neurological: Negative  Hematological: Negative  Psychiatric/Behavioral: Negative  Clinical Trial: no    OB/GYN History:  The patient underwent menarche at 14 years  Menopause Status Pre, Bhavya, Post, Unknown and No Answer  No LMP recorded (lmp unknown)  Patient has had a hysterectomy  Menopause at  Mid 40's years  Menopause Reason hysterectomy  Hormone replacement therapy: yes  Years used 6-8 years   3   Para 4 (has twins)   Birth control pills: yes    Years used 5-6     Pregnancy test needed:  no    ONCOTYPE/MAMMOPRINT results: pt is s/p neoadjuvant chemo    PFT: n/a    Prior Radiation: no    Teaching: NCI radiation packet     MST: completed     Implantable Devices (Port, Pacemaker, pain stimulator): LCW port     Hip Replacement: n/a    Covid Vaccine Status: vaccinated        Health Maintenance   Topic Date Due    Hepatitis C Screening  Never done    Colorectal Cancer Screening  2021    COVID-19 Vaccine (2 - Moderna risk series) 2021    Medicare Annual Wellness Visit (AWV)  2021    BMI: Followup Plan  06/10/2022    Fall Risk  08/20/2022    Breast Cancer Screening: Mammogram  12/02/2022    BMI: Adult  05/23/2023    DTaP,Tdap,and Td Vaccines (3 - Td or Tdap) 01/18/2028    Osteoporosis Screening  Completed    Pneumococcal Vaccine: 65+ Years  Completed    Influenza Vaccine  Completed    HIB Vaccine  Aged Out    Hepatitis B Vaccine  Aged Out    IPV Vaccine  Aged Out    Hepatitis A Vaccine  Aged Out    Meningococcal ACWY Vaccine  Aged Out    HPV Vaccine  Aged Out     Past Medical History:   Diagnosis Date    Anxiety     Cancer ([address] )     Right breast cancer    Depression     Fracture of radius, distal, closed     Last Assessed:  6/10/14    Thrombophlebitis     Vitamin D deficiency      Past Surgical History:   Procedure Laterality Date    ANKLE SURGERY      ANKLE SURGERY      BREAST BIOPSY Left 2001    neg    GALLBLADDER SURGERY      HYSTERECTOMY      IR PORT PLACEMENT  12/29/2021    ND MASTECTOMY, MODIFIED RADICAL Right 4/26/2022    Procedure: BREAST MODIFIED RADICAL MASTECTOMY WITH RIGHT LYMPHATIC MAPPING WITH BLUE AND RADIOACTIVE DYE (INJECT AT 0900 BY DR DEJESUS IN THE OR); Surgeon: Chris Baum MD;  Location: [facility];  Service: Surgical Oncology    WISDOM TOOTH EXTRACTION       Family History   Problem Relation Age of Onset    Ovarian cancer Mother 64    Heart disease Father     Leukemia Brother 62    Heart disease Brother     Heart disease Brother     Cancer Maternal Grandmother         Unknown primary; mid 52's    Stomach cancer Maternal Aunt         60's     Social History     Tobacco Use    Smoking status: Never Smoker    Smokeless tobacco: Never Used   Vaping Use    Vaping Use: Never used   Substance Use Topics    Alcohol use: Yes     Comment: rarely    Drug use: Yes     Types: Marijuana     Comment: not helping stopped        Current Outpatient Medications:     ALPRAZolam (XANAX) 0 25 mg tablet, Take 1/2 to 1 pill Q 8 hours p r n   For anxiety, Disp: 30 tablet, Rfl: 0    Calcium 250 MG CAPS, Take 250 mg by mouth in the morning  , Disp: , Rfl:     cholecalciferol (VITAMIN D3) 1,000 units tablet, Take 2 capsules by mouth daily, Disp: , Rfl:     escitalopram (LEXAPRO) 20 mg tablet, Take 1 tablet (20 mg total) by mouth daily, Disp: 90 tablet, Rfl: 2    Fish Oil-Krill Oil (KRILL OIL PLUS) CAPS, Take by mouth in the morning  , Disp: , Rfl:     fluocinonide (LIDEX) 0 05 % external solution, APPLY TO SCALP ONCE DAILY AS NEEDED FOR ITCH, Disp: , Rfl: 2    ketoconazole (NIZORAL) 2 % shampoo, Apply 1 application topically if needed  , Disp: , Rfl: 5    lidocaine-prilocaine (EMLA) cream, Apply topically as needed for mild pain, Disp: 30 g, Rfl: 1    acetaminophen (TYLENOL) 500 mg tablet, Take 2 tablets (1,000 mg total) by mouth 3 (three) times a day (Patient not taking: Reported on 5/25/2022), Disp: 180 tablet, Rfl: 2    al mag oxide-diphenhydramine-lidocaine viscous (MAGIC MOUTHWASH) 1:1:1 suspension, Swish and spit 10 mL every 4 (four) hours as needed for mouth pain or discomfort, Disp: 90 mL, Rfl: 1    CRANIAL PROSTHESIS, RX,, One wig as needed, Disp: 1 each, Rfl: 0    ondansetron (ZOFRAN) 8 mg tablet, Take 1 tablet (8 mg total) by mouth every 8 (eight) hours as needed for nausea or vomiting (Patient not taking: Reported on 5/25/2022), Disp: 40 tablet, Rfl: 2    oxyCODONE-acetaminophen (Percocet) 5-325 mg per tablet, Take 1 tablet by mouth every 6 (six) hours as needed for moderate pain Max Daily Amount: 4 tablets (Patient not taking: Reported on 5/25/2022), Disp: 20 tablet, Rfl: 0  No Known Allergies   Vitals:    05/25/22 1233   BP: 130/82   BP Location: Left arm   Pulse: 75   Resp: 18   Temp: 98 °F (36 7 °C)   TempSrc: Temporal   SpO2: 98%   Weight: 79 4 kg (175 lb)

## 2022-05-25 NOTE — LETTER
May 25, 2022     Valentín Jaimes MD  [facility]  2nd [address] [place] [address]    Patient: Kaur Marshall   YOB: 1950   Date of Visit: 2022       Dear Dr Cheyenne Vazquez:    Thank you for referring Hermila Freed to me for evaluation  Below are my notes for this consultation  If you have questions, please do not hesitate to call me  I look forward to following your patient along with you  Sincerely,        Andreas Lopez MD        CC: No Recipients  Andreas Lopez MD  2022  2:49 PM  Sign when Signing Visit  Consultation - Radiation Oncology      PHR:[id number] : 1950  Encounter: [id number]  Patient Information: Kassie Morales  Chief Complaint   Patient presents with    Consult     Radiation Oncology      Cancer Staging  No matching staging information was found for the patient  History of Present Illness   Kaur Marshall 1950 is a 67 y o  female with diagnosis of inflammatory right breast cancer  She presents today for radiation oncology consult, referred by Dr Cheyenne Vazquez       She noticed a red spot on the skin of her right breast in 2021  Mammogram did not show a mass but US showed 6 mm skin thickening right breast at 4:00  Skin biopsy was done 21 and revealed dermal lymphatic invasion of carcinoma, consistent with breast primary, ER 90 CT 70 HER2 fish negative  Imaging showed no distant metastasis  She was seen by Dr Kody Nguyen and started neoadjuvant chemo with dose dense paclitaxel on 21      She has a family h/o ovarian cancer in her mother  She underwent prophylactic hysterectomy and bilateral oophorectomy in 19 diagnostic bilateral mammogram/US right breast-  FINDINGS:   RIGHT  A) SKIN LESION  Mammo diagnostic bilateral w 3d & cad:   There is skin thickening  Skin thickness measures up to about 6 mm maximum at the 4 o'clock position of the breast   This is best seen with ultrasound of this area   No convincing evidence of focal solid or cystic breast mass at this time and no obvious architectural distortion   No visible axillary adenopathy      Left  There are no suspicious masses, grouped microcalcifications or areas of unexplained architectural distortion  The skin and nipple areolar complex are unremarkable    A few scattered stable benign-type calcifications are present  [place] Are 3  Biopsy clips on the left      IMPRESSION:   thickening of the right breast skin mostly in the lower inner quadrant   Surgical consultation recommended for possible skin biopsy      ASSESSMENT/BI-RADS CATEGORY:  Left: 2 - Benign  Right: 4 - Suspicious  Overall: 4 - Suspicious        12/6/21 Right breast, punch biopsy:  Dermal lymphovascular invasion, immunoprofile consistent with breast primary  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)  Grade 2  ER 90; CO 70; HER2 2+, FISH negative        12/11/21 CT C/A/P-  -No definite evidence of metastatic disease in the chest, abdomen, or pelvis   -Nonspecific 5 mm pulmonary nodule in the right lower lobe, likely infectious/inflammatory   Recommend continued surveillance according to the patient's oncologic imaging protocol   -Asymmetric skin thickening in the right breast consistent with the reported history of inflammatory breast cancer         12/13/21 MRI breast bilateral   1  Findings suspicious for multicentric right breast cancer   Most suspicious area is a 9 mm mass right breast 12:00 o'clock   Recommend second-look ultrasound with intent to biopsy  This mass is within an area of segmental distribution non-mass enhancement      Right breast 06:00 o'clock focal non-mass enhancement is 2nd  most suspicious area   Recommend second-look ultrasound with intent to biopsy    I anticipate that 2 biopsies would likely be adequate to direct management (MR biopsy may be necessary if sonographic correlates cannot be identified)     2   No right axillary or internal mammary adenopathy  3  No MR evidence of contralateral left breast malignancy           12/14/21 bone scan- 1   No scintigraphic evidence of osseous metastasis        4/6/22 Dr Manuelito Sharp- She completed 4 cycles resulting in clinical partial response  Excellent tolerance  Last dose tomorrow  F/u end of May        4/7/22 Last cycle neoadjuvant chemo        4/20/22 MRI bilateral breast-  FINDINGS:   RIGHT  B) MASS: Again noted is enhancing mass in the 12 o'clock position of the right breast, 8 cm from the nipple   This has decreased in size   It previously measured 10 mm and currently measures 6 mm  Series 401, image 105/224  C) MASS:  Previously noted 9 mm mass in the 12 o'clock position of the right breast, 6 cm from the nipple has markedly decreased in size/no longer definitely seen; tiny focus of enhancement is noted in this region   Series 401, image 100/224  D) MASS:  Previously noted 6 mm enhancing mass in the 8 o'clock position of the breast has markedly decreased in size/no longer definitely seen; tiny focus of enhancement is noted in this region  Series 401, image 156/224  E) NON-MASS ENHANCEMENT:  Previously noted area of non mass enhancement in the 6 o'clock position of the breast has markedly decreased; previously was a 28 x 17 mm area of enhancement and currently 1 1 mm linear area of enhancement  Series 401, image 177/224  F) NON-MASS ENHANCEMENT:  Again noted is an area of non mass enhancement in the 11 o'clock position of the right breast   The amount of enhancement within this region has decreased; however, the span is similar   Series 401, image 108/224  Diffuse right breast skin thickening again noted  Catheter from port noted  No right axillary axillary or internal mammary adenopathy  Left  There are no suspicious enhancing masses or suspicious areas of non-mass enhancement   There is no axillary or internal mammary adenopathy       IMPRESSION:  Partial response to neoadjuvant chemotherapy      ASSESSMENT/BI-RADS CATEGORY:  Left: 2 - Benign  Right: 6 - Known Biopsy-Proven Malignancy  Overall: 6 - Known Biopsy-Proven Malignancy        4/26/22 Right breast modified radical mastectomy  Inflammatory breast cancer  Grade 1  1 5 cm (multiple foci involving all four quadrants)  Lymphovascular invasion present  Margins negative  14/19 Lymph nodes with macro-metastases  Anatomic Stage IIIC  Prognostic Stage IIIA        5/16/22 Dr Reji Mobley- 14 out of 19 lymph nodes positive there was some extracapsular extension largest lymph node metastasis was 8 mm  She did have grade 1 small multifocal disease scattered throughout all 4 quadrants of the breast remaining  Her margins were all negative  The largest tumor size was 1 5 cm  Refer to rad onc  F/u in 3 months        5/23/22 Dr Reji Mobley- drain removed        6/2/22 Dr Silas Vaca  8/18/22 Dr Brigida Solano   Oncology History   Malignant neoplasm of overlapping sites of right breast in female, estrogen receptor positive ([facility] [address] )   12/6/2021 Biopsy    Punch biopsy of right breast  Dermal lymphovascular invasion, immunoprofile consistent with breast primary  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)  Grade 2  ER 90; ND 70; HER2 2+, FISH negative     12/13/2021 Observation    Bilateral breast MRI - findings suspicious for multicentric right breast cancer; 2 biopsies recommended at areas of concern (never done); left breast clear; no right axillary adenopathy       12/30/2021 -  Chemotherapy    DOXOrubicin (ADRIAMYCIN), 122 mg, Intravenous, Once, 4 of 4 cycles  Administration: 120 mg (12/30/2021), 122 mg (1/13/2022), 122 mg (1/27/2022), 122 mg (2/10/2022)  palonosetron (ALOXI), 0 25 mg, Intravenous, Once, 2 of 2 cycles  Administration: 0 25 mg (3/24/2022), 0 25 mg (4/7/2022)  pegfilgrastim (NEULASTA), 4 mg (100 % of original dose 4 mg), Subcutaneous, Once, 3 of 3 cycles  Dose modification: 4 mg (original dose 4 mg, Cycle 6)  Administration: 4 mg (3/11/2022), 4 mg (3/25/2022), 4 mg (4/8/2022)  pegfilgrastim (NEULASTA ONPRO), 6 mg, Subcutaneous, Once, 5 of 5 cycles  Administration: 6 mg (12/30/2021), 6 mg (1/13/2022), 6 mg (2/24/2022), 6 mg (1/27/2022), 6 mg (2/10/2022)  cyclophosphamide (CYTOXAN) IVPB, 600 mg/m2 = 1,218 mg, Intravenous, Once, 4 of 4 cycles  Administration: 1,218 mg (12/30/2021), 1,218 mg (1/13/2022), 1,218 mg (1/27/2022), 1,218 mg (2/10/2022)  fosaprepitant (EMEND) IVPB, 150 mg, Intravenous, Once, 4 of 4 cycles  Administration: 150 mg (12/30/2021), 150 mg (1/13/2022), 150 mg (1/27/2022), 150 mg (2/10/2022)  PACLItaxel (TAXOL) chemo IVPB, 175 mg/m2 = 355 2 mg, Intravenous, Once, 4 of 4 cycles  Administration: 355 2 mg (2/24/2022), 355 2 mg (3/10/2022), 355 2 mg (3/24/2022), 355 2 mg (4/7/2022)     4/26/2022 Surgery    Right breast modified radical mastectomy  Inflammatory breast cancer  Grade 1  1 5 cm (multiple foci involving all four quadrants)  Lymphovascular invasion present  Margins negative  14/19 Lymph nodes with macro-metastases  Anatomic Stage IIIC  Prognostic Stage IIIA        Procedure  Total mastectomy    Specimen Laterality  Right    TUMOR   Tumor Site  Upper outer quadrant      Lower outer quadrant      Upper inner quadrant      Lower inner quadrant      Central    Histologic Type  inflammatory  breast carcinoma    Histologic Grade (Earl Histologic Score)     Glandular (Acinar) / Tubular Differentiation  Score 1    Nuclear Pleomorphism  Score 2    Mitotic Rate  Score 1    Overall Grade  Grade 1 (scores of 3, 4 or 5)    Tumor Size  Greatest dimension of largest invasive focus (Millimeters): Extensive  invasive breast carcinoma involving all four breast quadrants mm   Tumor Focality  Multiple foci of invasive carcinoma    Number of Foci  Cannot be determined         Sizes of Individual Foci (Millimeters)  Multiple foci, largest measures 15 mm   Ductal Carcinoma In Situ (DCIS)  Not identified Lobular Carcinoma In Situ (LCIS)  Not identified         Tumor Extent  Skin is present and involved    Skin Invasion  Invasive carcinoma directly invades into the dermis or epidermis without skin ulceration (this does not change the T classification)    Skin Satellite Foci  Satellite skin foci of invasive carcinoma are present    Lymphovascular Invasion  Present    Dermal Lymphovascular Invasion  Present    Microcalcifications  Present in invasive carcinoma      Present in non-neoplastic tissue    Treatment Effect in the Breast  No definite response to presurgical therapy in the invasive carcinoma    Treatment Effect in the Lymph Nodes  No definite response to presurgical therapy in metastatic carcinoma    MARGINS   Margin Status for Invasive Carcinoma  All margins negative for invasive carcinoma    Distance from Invasive Carcinoma to Closest Margin  10 mm   Closest Margin(s) to Invasive Carcinoma  Posterior    Distance from Invasive Carcinoma to Inferior Margin  13 mm   REGIONAL LYMPH NODES   Regional Lymph Node Status  Tumor present in regional lymph node(s)    Number of Lymph Nodes with Macrometastases  14    Number of Lymph Nodes with Micrometastases  0    Size of Largest Sigifredo Metastatic Deposit  8 mm   Extranodal Extension  Present, 2 mm or less    Total Number of Lymph Nodes Examined (sentinel and non-sentinel)  19               Past Medical History:   Diagnosis Date    Anxiety     Cancer [facility])     Right breast cancer    Depression     Fracture of radius, distal, closed     Last Assessed:  6/10/14    Thrombophlebitis     Vitamin D deficiency      Past Surgical History:   Procedure Laterality Date    ANKLE SURGERY      ANKLE SURGERY      BREAST BIOPSY Left 2001    neg    GALLBLADDER SURGERY      HYSTERECTOMY      IR PORT PLACEMENT  12/29/2021    AR MASTECTOMY, MODIFIED RADICAL Right 4/26/2022    Procedure: BREAST MODIFIED RADICAL MASTECTOMY WITH RIGHT LYMPHATIC MAPPING WITH BLUE AND RADIOACTIVE DYE (INJECT AT 0900 BY DR DEJESUS IN THE OR); Surgeon: Annabella Howell MD;  [facility]: [facility];  Service: Surgical Oncology    WISDOM TOOTH EXTRACTION         Family History   Problem Relation Age of Onset    Ovarian cancer Mother 64    Heart disease Father     Leukemia Brother 62    Heart disease Brother     Heart disease Brother     Cancer Maternal Grandmother         Unknown primary; mid 52's    Stomach cancer Maternal Aunt         63's       Social History   Social History     Substance and Sexual Activity   Alcohol Use Yes    Comment: rarely     Social History     Substance and Sexual Activity   Drug Use Yes    Types: Marijuana    Comment: not helping stopped     Social History     Tobacco Use   Smoking Status Never Smoker   Smokeless Tobacco Never Used         Meds/Allergies     Current Outpatient Medications:     ALPRAZolam (XANAX) 0 25 mg tablet, Take 1/2 to 1 pill Q 8 hours p r n   For anxiety, Disp: 30 tablet, Rfl: 0    Calcium 250 MG CAPS, Take 250 mg by mouth in the morning  , Disp: , Rfl:     cholecalciferol (VITAMIN D3) 1,000 units tablet, Take 2 capsules by mouth daily, Disp: , Rfl:     escitalopram (LEXAPRO) 20 mg tablet, Take 1 tablet (20 mg total) by mouth daily, Disp: 90 tablet, Rfl: 2    Fish Oil-Krill Oil (KRILL OIL PLUS) CAPS, Take by mouth in the morning  , Disp: , Rfl:     fluocinonide (LIDEX) 0 05 % external solution, APPLY TO SCALP ONCE DAILY AS NEEDED FOR ITCH, Disp: , Rfl: 2    ketoconazole (NIZORAL) 2 % shampoo, Apply 1 application topically if needed  , Disp: , Rfl: 5    lidocaine-prilocaine (EMLA) cream, Apply topically as needed for mild pain, Disp: 30 g, Rfl: 1    acetaminophen (TYLENOL) 500 mg tablet, Take 2 tablets (1,000 mg total) by mouth 3 (three) times a day (Patient not taking: Reported on 5/25/2022), Disp: 180 tablet, Rfl: 2    al mag oxide-diphenhydramine-lidocaine viscous (MAGIC MOUTHWASH) 1:1:1 suspension, Swish and spit 10 mL every 4 (four) hours as needed for mouth pain or discomfort, Disp: 90 mL, Rfl: 1    CRANIAL PROSTHESIS, RX,, One wig as needed, Disp: 1 each, Rfl: 0    ondansetron (ZOFRAN) 8 mg tablet, Take 1 tablet (8 mg total) by mouth every 8 (eight) hours as needed for nausea or vomiting (Patient not taking: Reported on 2022), Disp: 40 tablet, Rfl: 2    oxyCODONE-acetaminophen (Percocet) 5-325 mg per tablet, Take 1 tablet by mouth every 6 (six) hours as needed for moderate pain Max Daily Amount: 4 tablets (Patient not taking: Reported on 2022), Disp: 20 tablet, Rfl: 0  No Known Allergies      Review of Systems   Constitutional: Positive for fatigue (some days)  Appetite and taste improving since finishing chemo   HENT: Negative  Eyes: Negative  Respiratory: Negative  Cardiovascular: Negative  Gastrointestinal: Negative  Endocrine: Negative  Genitourinary: Negative  Musculoskeletal: Negative  Skin:        Healing from surgery, KAREEN drain removed   Mild tenderness, denies any drainage    Allergic/Immunologic: Negative  Neurological: Negative  Hematological: Negative  Psychiatric/Behavioral: Negative           Clinical Trial: no     OB/GYN History:  The patient underwent menarche at 14 years  Menopause Status Pre, Bhavya, Post, Unknown and No Answer  No LMP recorded (lmp unknown)  Patient has had a hysterectomy  Menopause at  Mid 40's years  Menopause Reason hysterectomy  Hormone replacement therapy: yes  Years used 6-8 years   3   Para 4 (has twins)   Birth control pills: yes  Years used 5-6       OBJECTIVE:   /82 (BP Location: Left arm)   Pulse 75   Temp 98 °F (36 7 °C) (Temporal)   Resp 18   Wt 79 4 kg (175 lb)   LMP  (LMP Unknown)   SpO2 98%   BMI 26 22 kg/m²   Pain Assessment:  0  Performance Status: ECOG/Zubrod/WHO: 0 - Asymptomatic    Physical Exam  Vitals reviewed  Constitutional:       Appearance: She is well-developed  HENT:      Head: Normocephalic   Hair is normal  Eyes:      General: No scleral icterus  Cardiovascular:      Rate and Rhythm: Normal rate and regular rhythm  Pulmonary:      Effort: Pulmonary effort is normal  No respiratory distress  Breath sounds: Normal breath sounds  No wheezing  Chest:      Chest wall: No tenderness  Comments: There is no supraclavicular axillary adenopathy palpable  Left breast is without any suspicious lesions  Her incision in the right chest wall is well healed  Overlying her drain site is some scabbing  She has some fullness in the lateral aspect of her chest wall consistent with fluid accumulation  She does have some limitation on full extension of her right arm  No lymphedema  Abdominal:      General: Bowel sounds are normal  There is no distension  Palpations: Abdomen is soft  There is no mass  Tenderness: There is no abdominal tenderness  There is no rebound  Genitourinary:     Vagina: Normal    Musculoskeletal:         General: No tenderness  Normal range of motion  Cervical back: Neck supple  No edema  No spinous process tenderness  Lymphadenopathy:      Cervical: No cervical adenopathy  Neurological:      Mental Status: She is alert  Cranial Nerves: No cranial nerve deficit        Coordination: Coordination normal       Gait: Gait normal    Psychiatric:         Speech: Speech normal          Behavior: Behavior normal            RESULTS  Lab Results    Chemistry        Component Value Date/Time     07/03/2015 1004    K 4 8 04/05/2022 0844    K 4 3 07/03/2015 1004     (H) 04/05/2022 0844     07/03/2015 1004    CO2 26 04/05/2022 0844    CO2 29 07/03/2015 1004    BUN 9 04/05/2022 0844    BUN 18 07/03/2015 1004    CREATININE 0 74 04/05/2022 0844    CREATININE 0 71 07/03/2015 1004        Component Value Date/Time    CALCIUM 9 4 04/05/2022 0844    CALCIUM 9 1 07/03/2015 1004    ALKPHOS 126 (H) 04/05/2022 0844    ALKPHOS 74 07/03/2015 1004    AST 29 04/05/2022 0844    AST 23 07/03/2015 1004    ALT 30 04/05/2022 0844    ALT 25 07/03/2015 1004    BILITOT 0 91 07/03/2015 1004            Lab Results   Component Value Date    WBC 6 87 04/26/2022    HGB 10 3 (L) 04/26/2022    HCT 32 7 (L) 04/26/2022     (H) 04/26/2022     04/26/2022         Imaging Studies  NM sentinal node inj    Result Date: 4/26/2022  Narrative: SENTINEL NODE LYMPHOSCINTIGRAPHY INDICATION:  C50 811: Malignant neoplasm of overlapping sites of right female breast Z17 0: Estrogen receptor positive status (ER+)  FINDINGS: 0 5 mCi Tc-99m filtered sulfur colloid (0 6 cc volume) was administered intradermally into the right breast by Dr Enrico Dakin in the OR  No imaging was performed  Impression: Injection of  0 5 mCi Tc-99m sulfur colloid into the right breast in the OR   Workstation performed: [id number]         Pathology:  SPECIMEN   Procedure  Total mastectomy    Specimen Laterality  Right    TUMOR   Tumor Site  Upper outer quadrant      Lower outer quadrant      Upper inner quadrant      Lower inner quadrant      Central    Histologic Type  inflammatory  breast carcinoma    Histologic Grade (Earl Histologic Score)     Glandular (Acinar) / Tubular Differentiation  Score 1    Nuclear Pleomorphism  Score 2    Mitotic Rate  Score 1    Overall Grade  Grade 1 (scores of 3, 4 or 5)    Tumor Size  Greatest dimension of largest invasive focus (Millimeters): Extensive  invasive breast carcinoma involving all four breast quadrants mm   Tumor Focality  Multiple foci of invasive carcinoma    Number of Foci  Cannot be determined         Sizes of Individual Foci (Millimeters)  Multiple foci, largest measures 15 mm   Ductal Carcinoma In Situ (DCIS)  Not identified    Lobular Carcinoma In Situ (LCIS)  Not identified         Tumor Extent  Skin is present and involved    Skin Invasion  Invasive carcinoma directly invades into the dermis or epidermis without skin ulceration (this does not change the T classification) Skin Satellite Foci  Satellite skin foci of invasive carcinoma are present    Lymphovascular Invasion  Present    Dermal Lymphovascular Invasion  Present    Microcalcifications  Present in invasive carcinoma      Present in non-neoplastic tissue    Treatment Effect in the Breast  No definite response to presurgical therapy in the invasive carcinoma    Treatment Effect in the Lymph Nodes  No definite response to presurgical therapy in metastatic carcinoma    MARGINS   Margin Status for Invasive Carcinoma  All margins negative for invasive carcinoma    Distance from Invasive Carcinoma to Closest Margin  10 mm   Closest Margin(s) to Invasive Carcinoma  Posterior    Distance from Invasive Carcinoma to Inferior Margin  13 mm   REGIONAL LYMPH NODES   Regional Lymph Node Status  Tumor present in regional lymph node(s)    Number of Lymph Nodes with Macrometastases  14    Number of Lymph Nodes with Micrometastases  0    Size of Largest Sigifredo Metastatic Deposit  8 mm   Extranodal Extension  Present, 2 mm or less    Total Number of Lymph Nodes Examined (sentinel and non-sentinel)  19    PATHOLOGIC STAGE CLASSIFICATION (pTNM, AJCC 8th Edition)   Reporting of pT, pN, and (when applicable) pM categories is based on information available to the pathologist at the time the report is issued  As per the AJCC (Chapter 1, 8th Ed ) it is the managing physicians responsibility to establish the final pathologic stage based upon all pertinent information, including but potentially not limited to this pathology report  TNM Descriptors  y (post-treatment)    pT Category  pT4d    Regional Lymph Nodes Modifier  (sn): Rush node(s) evaluated      pN Category  pN3a    SPECIAL STUDIES        Estrogen Receptor (ER) Status  Positive (greater than 10% of cells demonstrate nuclear positivity)    Percentage of Cells with Nuclear Positivity  %         Progesterone Receptor (PgR) Status  Positive    Percentage of Cells with Nuclear Positivity  71-80%         HER2 (by immunohistochemistry)  Equivocal (Score 2+)         HER2 (by in situ hybridization)  Negative (not amplified)    Testing Performed on Case Number  [id number]              ASSESSMENT  1  Malignant neoplasm of overlapping sites of right breast in female, estrogen receptor positive ([address] )     2  Breast cancer metastasized to axillary lymph node, right [facility])  Ambulatory referral to Radiation Oncology     Cancer Staging  No matching staging information was found for the patient  PLAN/DISCUSSION  No orders of the defined types were placed in this encounter  Heber Bella is a 67y o  year old female with 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 followed by mastectomy  Her tumor is ER/AZ positive and HER2 negative  She had 14 lymph nodes out of 19 lymph nodes positive for metastasis  There was also extranodal extension noted  Her margins of resection were negative  She has undergone drain removal 2 days ago with some  reaccumulation in the lateral aspect of her right chest wall  She also has limited extension of her right arm and is scheduled for physical therapy next week  No clinical evidence of lymphedema  I reviewed with her a course of adjuvant radiation therapy to include the right chest wall and regional lymphatics to dose of 5000 cGy  We discussed that in light of her inflammatory histology with dermal invasion bolus will be utilized on a daily basis to ensure adequate skin dosage  This will result in heightened skin reaction during the course of her treatment  Other side effects reviewed with her can include but not limited to fatigue, skin erythema, hyperpigmentation, desquamation, pneumonitis, lymphedema, chest wall fibrosis  Her and her  understand the goal of treatment  We have scheduled her for CT simulation and her treatment will begin shortly thereafter          Vicky Tony MD  4/80/2439,7:73 PM      Portions of the record may have been created with voice recognition software  Occasional wrong word or "sound a like" substitutions may have occurred due to the inherent limitations of voice recognition software  Read the chart carefully and recognize, using context, where substitutions have occurred

## 2022-05-25 NOTE — LETTER
May 25, 2022     Miguel Angel Nieves MD  [address] [place] [address]    Patient: Benson Bush   YOB: 1950   Date of Visit: 2022       Dear Dr Hernandez Necessary:    Thank you for referring Ary Jeffery to me for evaluation  Below are my notes for this consultation  If you have questions, please do not hesitate to call me  I look forward to following your patient along with you  Sincerely,        Sandra Olivas MD        CC: No Recipients  Sandra Olivas MD  2022  2:48 PM  Sign when Signing Visit  Consultation - Radiation Oncology      AHZ:[id number] : 1950  Encounter: [id number]  [facility]  Chief Complaint   Patient presents with    Consult     Radiation Oncology      Cancer Staging  No matching staging information was found for the patient  History of Present Illness   Benson Bush 1950 is a 67 y o  female with diagnosis of inflammatory right breast cancer  She presents today for radiation oncology consult, referred by Dr Hernandez Necessary       She noticed a red spot on the skin of her right breast in 2021  Mammogram did not show a mass but US showed 6 mm skin thickening right breast at 4:00  Skin biopsy was done 21 and revealed dermal lymphatic invasion of carcinoma, consistent with breast primary, ER 90 [place] 70 HER2 fish negative  Imaging showed no distant metastasis  She was seen by Dr Bret Garcia and started neoadjuvant chemo with dose dense paclitaxel on 21      She has a family h/o ovarian cancer in her mother  She underwent prophylactic hysterectomy and bilateral oophorectomy in 19 diagnostic bilateral mammogram/US right breast-  FINDINGS:   RIGHT  A) SKIN LESION  Mammo diagnostic bilateral w 3d & cad:   There is skin thickening  Skin thickness measures up to about 6 mm maximum at the 4 o'clock position of the breast   This is best seen with ultrasound of this area   No convincing evidence of focal solid or cystic breast mass at this time and no obvious architectural distortion   No visible axillary adenopathy      Left  There are no suspicious masses, grouped microcalcifications or areas of unexplained architectural distortion  The skin and nipple areolar complex are unremarkable    A few scattered stable benign-type calcifications are present  Pearley Nora Are 3  Biopsy clips on the left      IMPRESSION:   thickening of the right breast skin mostly in the lower inner quadrant   Surgical consultation recommended for possible skin biopsy      ASSESSMENT/BI-RADS CATEGORY:  Left: 2 - Benign  Right: 4 - Suspicious  Overall: 4 - Suspicious        12/6/21 Right breast, punch biopsy:  Dermal lymphovascular invasion, immunoprofile consistent with breast primary  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)  Grade 2  ER 90; [place] 70; HER2 2+, FISH negative        12/11/21 CT C/A/P-  -No definite evidence of metastatic disease in the chest, abdomen, or pelvis   -Nonspecific 5 mm pulmonary nodule in the right lower lobe, likely infectious/inflammatory   Recommend continued surveillance according to the patient's oncologic imaging protocol   -Asymmetric skin thickening in the right breast consistent with the reported history of inflammatory breast cancer         12/13/21 MRI breast bilateral   1  Findings suspicious for multicentric right breast cancer   Most suspicious area is a 9 mm mass right breast 12:00 o'clock   Recommend second-look ultrasound with intent to biopsy  This mass is within an area of segmental distribution non-mass enhancement      Right breast 06:00 o'clock focal non-mass enhancement is 2nd  most suspicious area   Recommend second-look ultrasound with intent to biopsy    I anticipate that 2 biopsies would likely be adequate to direct management (MR biopsy may be necessary if sonographic correlates cannot be identified)     2   No right axillary or internal mammary adenopathy  3  No MR evidence of contralateral left breast malignancy           12/14/21 bone scan- 1   No scintigraphic evidence of osseous metastasis        4/6/22 Dr Heath Paul- She completed 4 cycles resulting in clinical partial response  Excellent tolerance  Last dose tomorrow  F/u end of May        4/7/22 Last cycle neoadjuvant chemo        4/20/22 MRI bilateral breast-  FINDINGS:   RIGHT  B) MASS: Again noted is enhancing mass in the 12 o'clock position of the right breast, 8 cm from the nipple   This has decreased in size   It previously measured 10 mm and currently measures 6 mm  Series 401, image 105/224  C) MASS:  Previously noted 9 mm mass in the 12 o'clock position of the right breast, 6 cm from the nipple has markedly decreased in size/no longer definitely seen; tiny focus of enhancement is noted in this region   Series 401, image 100/224  D) MASS:  Previously noted 6 mm enhancing mass in the 8 o'clock position of the breast has markedly decreased in size/no longer definitely seen; tiny focus of enhancement is noted in this region  Series 401, image 156/224  E) NON-MASS ENHANCEMENT:  Previously noted area of non mass enhancement in the 6 o'clock position of the breast has markedly decreased; previously was a 28 x 17 mm area of enhancement and currently 1 1 mm linear area of enhancement  Series 401, image 177/224  F) NON-MASS ENHANCEMENT:  Again noted is an area of non mass enhancement in the 11 o'clock position of the right breast   The amount of enhancement within this region has decreased; however, the span is similar   Series 401, image 108/224  Diffuse right breast skin thickening again noted  Catheter from port noted  No right axillary axillary or internal mammary adenopathy  Left  There are no suspicious enhancing masses or suspicious areas of non-mass enhancement   There is no axillary or internal mammary adenopathy       IMPRESSION:  Partial response to neoadjuvant chemotherapy      ASSESSMENT/BI-RADS CATEGORY:  Left: 2 - Benign  Right: 6 - Known Biopsy-Proven Malignancy  Overall: 6 - Known Biopsy-Proven Malignancy        4/26/22 Right breast modified radical mastectomy  Inflammatory breast cancer  Grade 1  1 5 cm (multiple foci involving all four quadrants)  Lymphovascular invasion present  Margins negative  14/19 Lymph nodes with macro-metastases  Anatomic Stage IIIC  Prognostic Stage IIIA        5/16/22 Dr Aman Bond- 14 out of 19 lymph nodes positive there was some extracapsular extension largest lymph node metastasis was 8 mm  She did have grade 1 small multifocal disease scattered throughout all 4 quadrants of the breast remaining  Her margins were all negative  The largest tumor size was 1 5 cm  Refer to rad onc  F/u in 3 months        5/23/22 Dr Aman Bond- drain removed        6/2/22 Dr Sp Avery  8/18/22 Dr Amanuel Nickerson   Oncology History   Malignant neoplasm of overlapping sites of right breast in female, estrogen receptor positive ([facility] [address] )   12/6/2021 Biopsy    Punch biopsy of right breast  Dermal lymphovascular invasion, immunoprofile consistent with breast primary  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)  Grade 2  ER 90; CT 70; HER2 2+, FISH negative     12/13/2021 Observation    Bilateral breast MRI - findings suspicious for multicentric right breast cancer; 2 biopsies recommended at areas of concern (never done); left breast clear; no right axillary adenopathy       12/30/2021 -  Chemotherapy    DOXOrubicin (ADRIAMYCIN), 122 mg, Intravenous, Once, 4 of 4 cycles  Administration: 120 mg (12/30/2021), 122 mg (1/13/2022), 122 mg (1/27/2022), 122 mg (2/10/2022)  palonosetron (ALOXI), 0 25 mg, Intravenous, Once, 2 of 2 cycles  Administration: 0 25 mg (3/24/2022), 0 25 mg (4/7/2022)  pegfilgrastim (NEULASTA), 4 mg (100 % of original dose 4 mg), Subcutaneous, Once, 3 of 3 cycles  Dose modification: 4 mg (original dose 4 mg, Cycle 6)  Administration: 4 mg (3/11/2022), 4 mg (3/25/2022), 4 mg (4/8/2022)  pegfilgrastim (NEULASTA ONPRO), 6 mg, Subcutaneous, Once, 5 of 5 cycles  Administration: 6 mg (12/30/2021), 6 mg (1/13/2022), 6 mg (2/24/2022), 6 mg (1/27/2022), 6 mg (2/10/2022)  cyclophosphamide (CYTOXAN) IVPB, 600 mg/m2 = 1,218 mg, Intravenous, Once, 4 of 4 cycles  Administration: 1,218 mg (12/30/2021), 1,218 mg (1/13/2022), 1,218 mg (1/27/2022), 1,218 mg (2/10/2022)  fosaprepitant (EMEND) IVPB, 150 mg, Intravenous, Once, 4 of 4 cycles  Administration: 150 mg (12/30/2021), 150 mg (1/13/2022), 150 mg (1/27/2022), 150 mg (2/10/2022)  PACLItaxel (TAXOL) chemo IVPB, 175 mg/m2 = 355 2 mg, Intravenous, Once, 4 of 4 cycles  Administration: 355 2 mg (2/24/2022), 355 2 mg (3/10/2022), 355 2 mg (3/24/2022), 355 2 mg (4/7/2022)     4/26/2022 Surgery    Right breast modified radical mastectomy  Inflammatory breast cancer  Grade 1  1 5 cm (multiple foci involving all four quadrants)  Lymphovascular invasion present  Margins negative  14/19 Lymph nodes with macro-metastases  Anatomic Stage IIIC  Prognostic Stage IIIA        Procedure  Total mastectomy    Specimen Laterality  Right    TUMOR   Tumor Site  Upper outer quadrant      Lower outer quadrant      Upper inner quadrant      Lower inner quadrant      Central    Histologic Type  inflammatory  breast carcinoma    Histologic Grade (Earl Histologic Score)     Glandular (Acinar) / Tubular Differentiation  Score 1    Nuclear Pleomorphism  Score 2    Mitotic Rate  Score 1    Overall Grade  Grade 1 (scores of 3, 4 or 5)    Tumor Size  Greatest dimension of largest invasive focus (Millimeters): Extensive  invasive breast carcinoma involving all four breast quadrants mm   Tumor Focality  Multiple foci of invasive carcinoma    Number of Foci  Cannot be determined         Sizes of Individual Foci (Millimeters)  Multiple foci, largest measures 15 mm   Ductal Carcinoma In Situ (DCIS)  Not identified Lobular Carcinoma In Situ (LCIS)  Not identified         Tumor Extent  Skin is present and involved    Skin Invasion  Invasive carcinoma directly invades into the dermis or epidermis without skin ulceration (this does not change the T classification)    Skin Satellite Foci  Satellite skin foci of invasive carcinoma are present    Lymphovascular Invasion  Present    Dermal Lymphovascular Invasion  Present    Microcalcifications  Present in invasive carcinoma      Present in non-neoplastic tissue    Treatment Effect in the Breast  No definite response to presurgical therapy in the invasive carcinoma    Treatment Effect in the Lymph Nodes  No definite response to presurgical therapy in metastatic carcinoma    MARGINS   Margin Status for Invasive Carcinoma  All margins negative for invasive carcinoma    Distance from Invasive Carcinoma to Closest Margin  10 mm   Closest Margin(s) to Invasive Carcinoma  Posterior    Distance from Invasive Carcinoma to Inferior Margin  13 mm   REGIONAL LYMPH NODES   Regional Lymph Node Status  Tumor present in regional lymph node(s)    Number of Lymph Nodes with Macrometastases  14    Number of Lymph Nodes with Micrometastases  0    Size of Largest Sigifredo Metastatic Deposit  8 mm   Extranodal Extension  Present, 2 mm or less    Total Number of Lymph Nodes Examined (sentinel and non-sentinel)  19               Past Medical History:   Diagnosis Date    Anxiety     Cancer [facility])     Right breast cancer    Depression     Fracture of radius, distal, closed     Last Assessed:  6/10/14    Thrombophlebitis     Vitamin D deficiency      Past Surgical History:   Procedure Laterality Date    ANKLE SURGERY      ANKLE SURGERY      BREAST BIOPSY Left 2001    neg    GALLBLADDER SURGERY      HYSTERECTOMY      IR PORT PLACEMENT  12/29/2021    IN MASTECTOMY, MODIFIED RADICAL Right 4/26/2022    Procedure: BREAST MODIFIED RADICAL MASTECTOMY WITH RIGHT LYMPHATIC MAPPING WITH BLUE AND RADIOACTIVE DYE (INJECT AT 0900 BY DR DEJESUS IN THE OR); Surgeon: Mauricio Joseph MD;  [facility]: [facility];  Service: Surgical Oncology    WISDOM TOOTH EXTRACTION         Family History   Problem Relation Age of Onset    Ovarian cancer Mother 64    Heart disease Father     Leukemia Brother 62    Heart disease Brother     Heart disease Brother     Cancer Maternal Grandmother         Unknown primary; mid 52's    Stomach cancer Maternal Aunt         63's       Social History   Social History     Substance and Sexual Activity   Alcohol Use Yes    Comment: rarely     Social History     Substance and Sexual Activity   Drug Use Yes    Types: Marijuana    Comment: not helping stopped     Social History     Tobacco Use   Smoking Status Never Smoker   Smokeless Tobacco Never Used         Meds/Allergies     Current Outpatient Medications:     ALPRAZolam (XANAX) 0 25 mg tablet, Take 1/2 to 1 pill Q 8 hours p r n   For anxiety, Disp: 30 tablet, Rfl: 0    Calcium 250 MG CAPS, Take 250 mg by mouth in the morning  , Disp: , Rfl:     cholecalciferol (VITAMIN D3) 1,000 units tablet, Take 2 capsules by mouth daily, Disp: , Rfl:     escitalopram (LEXAPRO) 20 mg tablet, Take 1 tablet (20 mg total) by mouth daily, Disp: 90 tablet, Rfl: 2    Fish Oil-Krill Oil (KRILL OIL PLUS) CAPS, Take by mouth in the morning  , Disp: , Rfl:     fluocinonide (LIDEX) 0 05 % external solution, APPLY TO SCALP ONCE DAILY AS NEEDED FOR ITCH, Disp: , Rfl: 2    ketoconazole (NIZORAL) 2 % shampoo, Apply 1 application topically if needed  , Disp: , Rfl: 5    lidocaine-prilocaine (EMLA) cream, Apply topically as needed for mild pain, Disp: 30 g, Rfl: 1    acetaminophen (TYLENOL) 500 mg tablet, Take 2 tablets (1,000 mg total) by mouth 3 (three) times a day (Patient not taking: Reported on 5/25/2022), Disp: 180 tablet, Rfl: 2    al mag oxide-diphenhydramine-lidocaine viscous (MAGIC MOUTHWASH) 1:1:1 suspension, Swish and spit 10 mL every 4 (four) hours as needed for mouth pain or discomfort, Disp: 90 mL, Rfl: 1    CRANIAL PROSTHESIS, RX,, One wig as needed, Disp: 1 each, Rfl: 0    ondansetron (ZOFRAN) 8 mg tablet, Take 1 tablet (8 mg total) by mouth every 8 (eight) hours as needed for nausea or vomiting (Patient not taking: Reported on 2022), Disp: 40 tablet, Rfl: 2    oxyCODONE-acetaminophen (Percocet) 5-325 mg per tablet, Take 1 tablet by mouth every 6 (six) hours as needed for moderate pain Max Daily Amount: 4 tablets (Patient not taking: Reported on 2022), Disp: 20 tablet, Rfl: 0  No Known Allergies      Review of Systems   Constitutional: Positive for fatigue (some days)  Appetite and taste improving since finishing chemo   HENT: Negative  Eyes: Negative  Respiratory: Negative  Cardiovascular: Negative  Gastrointestinal: Negative  Endocrine: Negative  Genitourinary: Negative  Musculoskeletal: Negative  Skin:        Healing from surgery, KAREEN drain removed   Mild tenderness, denies any drainage    Allergic/Immunologic: Negative  Neurological: Negative  Hematological: Negative  Psychiatric/Behavioral: Negative           Clinical Trial: no     OB/GYN History:  The patient underwent menarche at 14 years  Menopause Status Pre, Bhavya, Post, Unknown and No Answer  No LMP recorded (lmp unknown)  Patient has had a hysterectomy  Menopause at  Mid 40's years  Menopause Reason hysterectomy  Hormone replacement therapy: yes  Years used 6-8 years   3   Para 4 (has twins)   Birth control pills: yes  Years used 5-6       OBJECTIVE:   /82 (BP Location: Left arm)   Pulse 75   Temp 98 °F (36 7 °C) (Temporal)   Resp 18   Wt 79 4 kg (175 lb)   LMP  (LMP Unknown)   SpO2 98%   BMI 26 22 kg/m²   Pain Assessment:  {Pain Score 0-10, 0 default:53716}  Performance Status: {MDA IP Performance Status Choice:56048}    Physical Exam  Vitals reviewed  Constitutional:       Appearance: She is well-developed     HENT: Head: Normocephalic  Hair is normal    Eyes:      General: No scleral icterus  Cardiovascular:      Rate and Rhythm: Normal rate and regular rhythm  Pulmonary:      Effort: Pulmonary effort is normal  No respiratory distress  Breath sounds: Normal breath sounds  No wheezing  Chest:      Chest wall: No tenderness  Comments: There is no supraclavicular axillary adenopathy palpable  Left breast is without any suspicious lesions  Her incision in the right chest wall is well healed  Overlying her drain site is some scabbing  She has some fullness in the lateral aspect of her chest wall consistent with fluid accumulation  She does have some limitation on full extension of her right arm  No lymphedema  Abdominal:      General: Bowel sounds are normal  There is no distension  Palpations: Abdomen is soft  There is no mass  Tenderness: There is no abdominal tenderness  There is no rebound  Genitourinary:     Vagina: Normal    Musculoskeletal:         General: No tenderness  Normal range of motion  Cervical back: Neck supple  No edema  No spinous process tenderness  Lymphadenopathy:      Cervical: No cervical adenopathy  Neurological:      Mental Status: She is alert  Cranial Nerves: No cranial nerve deficit        Coordination: Coordination normal       Gait: Gait normal    Psychiatric:         Speech: Speech normal          Behavior: Behavior normal            RESULTS  Lab Results    Chemistry        Component Value Date/Time     07/03/2015 1004    K 4 8 04/05/2022 0844    K 4 3 07/03/2015 1004     (H) 04/05/2022 0844     07/03/2015 1004    CO2 26 04/05/2022 0844    CO2 29 07/03/2015 1004    BUN 9 04/05/2022 0844    BUN 18 07/03/2015 1004    CREATININE 0 74 04/05/2022 0844    CREATININE 0 71 07/03/2015 1004        Component Value Date/Time    CALCIUM 9 4 04/05/2022 0844    CALCIUM 9 1 07/03/2015 1004    ALKPHOS 126 (H) 04/05/2022 0844    ALKPHOS 74 07/03/2015 1004    AST 29 04/05/2022 0844    AST 23 07/03/2015 1004    ALT 30 04/05/2022 0844    ALT 25 07/03/2015 1004    BILITOT 0 91 07/03/2015 1004            Lab Results   Component Value Date    WBC 6 87 04/26/2022    HGB 10 3 (L) 04/26/2022    HCT 32 7 (L) 04/26/2022     (H) 04/26/2022     04/26/2022         Imaging Studies  NM sentinal node inj    Result Date: 4/26/2022  Narrative: SENTINEL NODE LYMPHOSCINTIGRAPHY INDICATION:  C50 811: Malignant neoplasm of overlapping sites of right female breast Z17 0: Estrogen receptor positive status (ER+)  FINDINGS: 0 5 mCi Tc-99m filtered sulfur colloid (0 6 cc volume) was administered intradermally into the right breast by Dr Tunde Fung in the OR  No imaging was performed  Impression: Injection of  0 5 mCi Tc-99m sulfur colloid into the right breast in the OR   Workstation performed: [id number]         Pathology:  SPECIMEN   Procedure  Total mastectomy    Specimen Laterality  Right    TUMOR   Tumor Site  Upper outer quadrant      Lower outer quadrant      Upper inner quadrant      Lower inner quadrant      Central    Histologic Type  inflammatory  breast carcinoma    Histologic Grade ([place] Histologic Score)     Glandular (Acinar) / Tubular Differentiation  Score 1    Nuclear Pleomorphism  Score 2    Mitotic Rate  Score 1    Overall Grade  Grade 1 (scores of 3, 4 or 5)    Tumor Size  Greatest dimension of largest invasive focus (Millimeters): Extensive  invasive breast carcinoma involving all four breast quadrants mm   Tumor Focality  Multiple foci of invasive carcinoma    Number of Foci  Cannot be determined         Sizes of Individual Foci (Millimeters)  Multiple foci, largest measures 15 mm   Ductal Carcinoma In Situ (DCIS)  Not identified    Lobular Carcinoma In Situ (LCIS)  Not identified         Tumor Extent  Skin is present and involved    Skin Invasion  Invasive carcinoma directly invades into the dermis or epidermis without skin ulceration (this does not change the T classification)    Skin Satellite Foci  Satellite skin foci of invasive carcinoma are present    Lymphovascular Invasion  Present    Dermal Lymphovascular Invasion  Present    Microcalcifications  Present in invasive carcinoma      Present in non-neoplastic tissue    Treatment Effect in the Breast  No definite response to presurgical therapy in the invasive carcinoma    Treatment Effect in the Lymph Nodes  No definite response to presurgical therapy in metastatic carcinoma    MARGINS   Margin Status for Invasive Carcinoma  All margins negative for invasive carcinoma    Distance from Invasive Carcinoma to Closest Margin  10 mm   Closest Margin(s) to Invasive Carcinoma  Posterior    Distance from Invasive Carcinoma to Inferior Margin  13 mm   REGIONAL LYMPH NODES   Regional Lymph Node Status  Tumor present in regional lymph node(s)    Number of Lymph Nodes with Macrometastases  14    Number of Lymph Nodes with Micrometastases  0    Size of Largest Sigifredo Metastatic Deposit  8 mm   Extranodal Extension  Present, 2 mm or less    Total Number of Lymph Nodes Examined (sentinel and non-sentinel)  19    PATHOLOGIC STAGE CLASSIFICATION (pTNM, AJCC 8th Edition)   Reporting of pT, pN, and (when applicable) pM categories is based on information available to the pathologist at the time the report is issued  As per the AJCC (Chapter 1, 8th Ed ) it is the managing physicians responsibility to establish the final pathologic stage based upon all pertinent information, including but potentially not limited to this pathology report  TNM Descriptors  y (post-treatment)    pT Category  pT4d    Regional Lymph Nodes Modifier  (sn): [place] node(s) evaluated      pN Category  pN3a    SPECIAL STUDIES        Estrogen Receptor (ER) Status  Positive (greater than 10% of cells demonstrate nuclear positivity)    Percentage of Cells with Nuclear Positivity  %         Progesterone Receptor (PgR) Status  Positive Percentage of Cells with Nuclear Positivity  71-80%         HER2 (by immunohistochemistry)  Equivocal (Score 2+)         HER2 (by in situ hybridization)  Negative (not amplified)    Testing Performed on Case Number  [id number]              ASSESSMENT  1  Malignant neoplasm of overlapping sites of right breast in female, estrogen receptor positive ([facility] [address] )     2  Breast cancer metastasized to axillary lymph node, right [facility])  Ambulatory referral to Radiation Oncology     Cancer Staging  No matching staging information was found for the patient  PLAN/DISCUSSION  No orders of the defined types were placed in this encounter  Hansel Leos is a 67y o  year old female with 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 followed by mastectomy  Her tumor is ER/KS positive and HER2 negative  She had 14 lymph nodes out of 19 lymph nodes positive for metastasis  There was also extranodal extension noted  Her margins of resection were negative  She has undergone drain removal 2 days ago with some  reaccumulation in the lateral aspect of her right chest wall  She also has limited extension of her right arm and is scheduled for physical therapy next week  No clinical evidence of lymphedema  I reviewed with her a course of adjuvant radiation therapy to include the right chest wall and regional lymphatics to dose of 5000 cGy  We discussed that in light of her inflammatory histology with dermal invasion bolus will be utilized on a daily basis to ensure adequate skin dosage  This will result in heightened skin reaction during the course of her treatment  Other side effects reviewed with her can include but not limited to fatigue, skin erythema, hyperpigmentation, desquamation, pneumonitis, lymphedema, chest wall fibrosis  Her and her  understand the goal of treatment  We have scheduled her for CT simulation and her treatment will begin shortly thereafter          Melly Umair Reid MD  5/25/2022,1:10 PM      Portions of the record may have been created with voice recognition software  Occasional wrong word or "sound a like" substitutions may have occurred due to the inherent limitations of voice recognition software  Read the chart carefully and recognize, using context, where substitutions have occurred

## 2022-05-25 NOTE — PROGRESS NOTES
Consultation - Radiation Oncology      TFQ:[id number] : 1950  Encounter: [id number]  Patient Information: Kassie Morales  Chief Complaint   Patient presents with   Deep Shrestha Consult     Radiation Oncology      Cancer Staging  No matching staging information was found for the patient  History of Present Illness   Parvin Torres 1950 is a 67 y o  female with diagnosis of inflammatory right breast cancer  She presents today for radiation oncology consult, referred by Dr Jonathan Guzman       She noticed a red spot on the skin of her right breast in 2021  Mammogram did not show a mass but US showed 6 mm skin thickening right breast at 4:00  Skin biopsy was done 21 and revealed dermal lymphatic invasion of carcinoma, consistent with breast primary, ER 90 NM 70 HER2 fish negative  Imaging showed no distant metastasis  She was seen by Dr Ita Paul and started neoadjuvant chemo with dose dense paclitaxel on 21      She has a family h/o ovarian cancer in her mother  She underwent prophylactic hysterectomy and bilateral oophorectomy in 19 diagnostic bilateral mammogram/US right breast-  FINDINGS:   RIGHT  A) SKIN LESION  Mammo diagnostic bilateral w 3d & cad:   There is skin thickening  Skin thickness measures up to about 6 mm maximum at the 4 o'clock position of the breast   This is best seen with ultrasound of this area   No convincing evidence of focal solid or cystic breast mass at this time and no obvious architectural distortion   No visible axillary adenopathy      Left  There are no suspicious masses, grouped microcalcifications or areas of unexplained architectural distortion   The skin and nipple areolar complex are unremarkable    A few scattered stable benign-type calcifications are present  Franchesca Sox Are 3  Biopsy clips on the left      IMPRESSION:   thickening of the right breast skin mostly in the lower inner quadrant   Surgical consultation recommended for possible skin biopsy      ASSESSMENT/BI-RADS CATEGORY:  Left: 2 - Benign  Right: 4 - Suspicious  Overall: 4 - Suspicious        12/6/21 Right breast, punch biopsy:  Dermal lymphovascular invasion, immunoprofile consistent with breast primary  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)  Grade 2  ER 90; MO 70; HER2 2+, FISH negative        12/11/21 CT C/A/P-  -No definite evidence of metastatic disease in the chest, abdomen, or pelvis   -Nonspecific 5 mm pulmonary nodule in the right lower lobe, likely infectious/inflammatory   Recommend continued surveillance according to the patient's oncologic imaging protocol   -Asymmetric skin thickening in the right breast consistent with the reported history of inflammatory breast cancer         12/13/21 MRI breast bilateral   1  Findings suspicious for multicentric right breast cancer   Most suspicious area is a 9 mm mass right breast 12:00 o'clock   Recommend second-look ultrasound with intent to biopsy  This mass is within an area of segmental distribution non-mass enhancement      Right breast 06:00 o'clock focal non-mass enhancement is 2nd  most suspicious area   Recommend second-look ultrasound with intent to biopsy    I anticipate that 2 biopsies would likely be adequate to direct management (MR biopsy may be necessary if sonographic correlates cannot be identified)     2  No right axillary or internal mammary adenopathy  3  No MR evidence of contralateral left breast malignancy           12/14/21 bone scan- 1   No scintigraphic evidence of osseous metastasis        4/6/22 Dr Shine Armenta- She completed 4 cycles resulting in clinical partial response  Excellent tolerance  Last dose tomorrow   F/u end of May        4/7/22 Last cycle neoadjuvant chemo        4/20/22 MRI bilateral breast-  FINDINGS:   RIGHT  B) MASS: Again noted is enhancing mass in the 12 o'clock position of the right breast, 8 cm from the nipple   This has decreased in size   It previously measured 10 mm and currently measures 6 mm  Series 401, image 105/224  C) MASS:  Previously noted 9 mm mass in the 12 o'clock position of the right breast, 6 cm from the nipple has markedly decreased in size/no longer definitely seen; tiny focus of enhancement is noted in this region   Series 401, image 100/224  D) MASS:  Previously noted 6 mm enhancing mass in the 8 o'clock position of the breast has markedly decreased in size/no longer definitely seen; tiny focus of enhancement is noted in this region  Series 401, image 156/224  E) NON-MASS ENHANCEMENT:  Previously noted area of non mass enhancement in the 6 o'clock position of the breast has markedly decreased; previously was a 28 x 17 mm area of enhancement and currently 1 1 mm linear area of enhancement  Series 401, image 177/224  F) NON-MASS ENHANCEMENT:  Again noted is an area of non mass enhancement in the 11 o'clock position of the right breast   The amount of enhancement within this region has decreased; however, the span is similar   Series 401, image 108/224  Diffuse right breast skin thickening again noted  Catheter from port noted  No right axillary axillary or internal mammary adenopathy  Left  There are no suspicious enhancing masses or suspicious areas of non-mass enhancement   There is no axillary or internal mammary adenopathy       IMPRESSION:  Partial response to neoadjuvant chemotherapy      ASSESSMENT/BI-RADS CATEGORY:  Left: 2 - Benign  Right: 6 - Known Biopsy-Proven Malignancy  Overall: 6 - Known Biopsy-Proven Malignancy        4/26/22 Right breast modified radical mastectomy  Inflammatory breast cancer  Grade 1  1 5 cm (multiple foci involving all four quadrants)  Lymphovascular invasion present  Margins negative  14/19 Lymph nodes with macro-metastases  Anatomic Stage IIIC  Prognostic Stage IIIA        5/16/22 Dr Alida Long- 14 out of 19 lymph nodes positive there was some extracapsular extension largest lymph node metastasis was 8 mm  She did have grade 1 small multifocal disease scattered throughout all 4 quadrants of the breast remaining  Her margins were all negative  The largest tumor size was 1 5 cm  Refer to rad onc  F/u in 3 months        5/23/22 Dr Ronaldo Castleman- drain removed        6/2/22 Dr Lisa Leon  8/18/22 Dr Buck Adjutant   Oncology History   Malignant neoplasm of overlapping sites of right breast in female, estrogen receptor positive ([address] )   12/6/2021 Biopsy    Punch biopsy of right breast  Dermal lymphovascular invasion, immunoprofile consistent with breast primary  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)  Grade 2  ER 90; MT 70; HER2 2+, FISH negative     12/13/2021 Observation    Bilateral breast MRI - findings suspicious for multicentric right breast cancer; 2 biopsies recommended at areas of concern (never done); left breast clear; no right axillary adenopathy       12/30/2021 -  Chemotherapy    DOXOrubicin (ADRIAMYCIN), 122 mg, Intravenous, Once, 4 of 4 cycles  Administration: 120 mg (12/30/2021), 122 mg (1/13/2022), 122 mg (1/27/2022), 122 mg (2/10/2022)  palonosetron (ALOXI), 0 25 mg, Intravenous, Once, 2 of 2 cycles  Administration: 0 25 mg (3/24/2022), 0 25 mg (4/7/2022)  pegfilgrastim (NEULASTA), 4 mg (100 % of original dose 4 mg), Subcutaneous, Once, 3 of 3 cycles  Dose modification: 4 mg (original dose 4 mg, Cycle 6)  Administration: 4 mg (3/11/2022), 4 mg (3/25/2022), 4 mg (4/8/2022)  pegfilgrastim (Sandi Shiver), 6 mg, Subcutaneous, Once, 5 of 5 cycles  Administration: 6 mg (12/30/2021), 6 mg (1/13/2022), 6 mg (2/24/2022), 6 mg (1/27/2022), 6 mg (2/10/2022)  cyclophosphamide (CYTOXAN) IVPB, 600 mg/m2 = 1,218 mg, Intravenous, Once, 4 of 4 cycles  Administration: 1,218 mg (12/30/2021), 1,218 mg (1/13/2022), 1,218 mg (1/27/2022), 1,218 mg (2/10/2022)  fosaprepitant (EMEND) IVPB, 150 mg, Intravenous, Once, 4 of 4 cycles  Administration: 150 mg (12/30/2021), 150 mg (1/13/2022), 150 mg (1/27/2022), 150 mg (2/10/2022)  PACLItaxel (TAXOL) chemo IVPB, 175 mg/m2 = 355 2 mg, Intravenous, Once, 4 of 4 cycles  Administration: 355 2 mg (2/24/2022), 355 2 mg (3/10/2022), 355 2 mg (3/24/2022), 355 2 mg (4/7/2022)     4/26/2022 Surgery    Right breast modified radical mastectomy  Inflammatory breast cancer  Grade 1  1 5 cm (multiple foci involving all four quadrants)  Lymphovascular invasion present  Margins negative  14/19 Lymph nodes with macro-metastases  Anatomic Stage IIIC  Prognostic Stage IIIA        Procedure  Total mastectomy    Specimen Laterality  Right    TUMOR   Tumor Site  Upper outer quadrant      Lower outer quadrant      Upper inner quadrant      Lower inner quadrant      Central    Histologic Type  inflammatory  breast carcinoma    Histologic Grade (Ealr Histologic Score)     Glandular (Acinar) / Tubular Differentiation  Score 1    Nuclear Pleomorphism  Score 2    Mitotic Rate  Score 1    Overall Grade  Grade 1 (scores of 3, 4 or 5)    Tumor Size  Greatest dimension of largest invasive focus (Millimeters): Extensive  invasive breast carcinoma involving all four breast quadrants mm   Tumor Focality  Multiple foci of invasive carcinoma    Number of Foci  Cannot be determined         Sizes of Individual Foci (Millimeters)  Multiple foci, largest measures 15 mm   Ductal Carcinoma In Situ (DCIS)  Not identified    Lobular Carcinoma In Situ (LCIS)  Not identified         Tumor Extent  Skin is present and involved    Skin Invasion  Invasive carcinoma directly invades into the dermis or epidermis without skin ulceration (this does not change the T classification)    Skin Satellite Foci  Satellite skin foci of invasive carcinoma are present    Lymphovascular Invasion  Present    Dermal Lymphovascular Invasion  Present    Microcalcifications  Present in invasive carcinoma      Present in non-neoplastic tissue    Treatment Effect in the Breast  No definite response to presurgical therapy in the invasive carcinoma    Treatment Effect in the Lymph Nodes  No definite response to presurgical therapy in metastatic carcinoma    MARGINS   Margin Status for Invasive Carcinoma  All margins negative for invasive carcinoma    Distance from Invasive Carcinoma to Closest Margin  10 mm   Closest Margin(s) to Invasive Carcinoma  Posterior    Distance from Invasive Carcinoma to Inferior Margin  13 mm   REGIONAL LYMPH NODES   Regional Lymph Node Status  Tumor present in regional lymph node(s)    Number of Lymph Nodes with Macrometastases  14    Number of Lymph Nodes with Micrometastases  0    Size of Largest Sigifredo Metastatic Deposit  8 mm   Extranodal Extension  Present, 2 mm or less    Total Number of Lymph Nodes Examined (sentinel and non-sentinel)  19               Past Medical History:   Diagnosis Date    Anxiety     Cancer [facility])     Right breast cancer    Depression     Fracture of radius, distal, closed     Last Assessed:  6/10/14    Thrombophlebitis     Vitamin D deficiency      Past Surgical History:   Procedure Laterality Date    ANKLE SURGERY      ANKLE SURGERY      BREAST BIOPSY Left 2001    neg    GALLBLADDER SURGERY      HYSTERECTOMY      IR PORT PLACEMENT  12/29/2021    RI MASTECTOMY, MODIFIED RADICAL Right 4/26/2022    Procedure: BREAST MODIFIED RADICAL MASTECTOMY WITH RIGHT LYMPHATIC MAPPING WITH BLUE AND RADIOACTIVE DYE (INJECT AT 0900 BY DR DEJESUS IN THE OR);   Surgeon: Susan Meeks MD;  [facility]: [facility];  Service: Surgical Oncology    WISDOM TOOTH EXTRACTION         Family History   Problem Relation Age of Onset    Ovarian cancer Mother 64    Heart disease Father     Leukemia Brother 62    Heart disease Brother     Heart disease Brother     Cancer Maternal Grandmother         Unknown primary; mid 52's    Stomach cancer Maternal Aunt         63's       Social History   Social History     Substance and Sexual Activity   Alcohol Use Yes    Comment: rarely Social History     Substance and Sexual Activity   Drug Use Yes    Types: Marijuana    Comment: not helping stopped     Social History     Tobacco Use   Smoking Status Never Smoker   Smokeless Tobacco Never Used         Meds/Allergies     Current Outpatient Medications:     ALPRAZolam (XANAX) 0 25 mg tablet, Take 1/2 to 1 pill Q 8 hours p r n   For anxiety, Disp: 30 tablet, Rfl: 0    Calcium 250 MG CAPS, Take 250 mg by mouth in the morning  , Disp: , Rfl:     cholecalciferol (VITAMIN D3) 1,000 units tablet, Take 2 capsules by mouth daily, Disp: , Rfl:     escitalopram (LEXAPRO) 20 mg tablet, Take 1 tablet (20 mg total) by mouth daily, Disp: 90 tablet, Rfl: 2    Fish Oil-Krill Oil (KRILL OIL PLUS) CAPS, Take by mouth in the morning  , Disp: , Rfl:     fluocinonide (LIDEX) 0 05 % external solution, APPLY TO SCALP ONCE DAILY AS NEEDED FOR ITCH, Disp: , Rfl: 2    ketoconazole (NIZORAL) 2 % shampoo, Apply 1 application topically if needed  , Disp: , Rfl: 5    lidocaine-prilocaine (EMLA) cream, Apply topically as needed for mild pain, Disp: 30 g, Rfl: 1    acetaminophen (TYLENOL) 500 mg tablet, Take 2 tablets (1,000 mg total) by mouth 3 (three) times a day (Patient not taking: Reported on 5/25/2022), Disp: 180 tablet, Rfl: 2    al mag oxide-diphenhydramine-lidocaine viscous (MAGIC MOUTHWASH) 1:1:1 suspension, Swish and spit 10 mL every 4 (four) hours as needed for mouth pain or discomfort, Disp: 90 mL, Rfl: 1    CRANIAL PROSTHESIS, RX,, One wig as needed, Disp: 1 each, Rfl: 0    ondansetron (ZOFRAN) 8 mg tablet, Take 1 tablet (8 mg total) by mouth every 8 (eight) hours as needed for nausea or vomiting (Patient not taking: Reported on 5/25/2022), Disp: 40 tablet, Rfl: 2    oxyCODONE-acetaminophen (Percocet) 5-325 mg per tablet, Take 1 tablet by mouth every 6 (six) hours as needed for moderate pain Max Daily Amount: 4 tablets (Patient not taking: Reported on 5/25/2022), Disp: 20 tablet, Rfl: 0  No Known Allergies      Review of Systems   Constitutional: Positive for fatigue (some days)  Appetite and taste improving since finishing chemo   HENT: Negative  Eyes: Negative  Respiratory: Negative  Cardiovascular: Negative  Gastrointestinal: Negative  Endocrine: Negative  Genitourinary: Negative  Musculoskeletal: Negative  Skin:        Healing from surgery, KAREEN drain removed   Mild tenderness, denies any drainage    Allergic/Immunologic: Negative  Neurological: Negative  Hematological: Negative  Psychiatric/Behavioral: Negative           Clinical Trial: no     OB/GYN History:  The patient underwent menarche at 14 years  Menopause Status Pre, Bhavya, Post, Unknown and No Answer  No LMP recorded (lmp unknown)  Patient has had a hysterectomy  Menopause at  Mid 40's years  Menopause Reason hysterectomy  Hormone replacement therapy: yes  Years used 6-8 years   3   Para 4 (has twins)   Birth control pills: yes  Years used 5-6       OBJECTIVE:   /82 (BP Location: Left arm)   Pulse 75   Temp 98 °F (36 7 °C) (Temporal)   Resp 18   Wt 79 4 kg (175 lb)   LMP  (LMP Unknown)   SpO2 98%   BMI 26 22 kg/m²   Pain Assessment:  0  Performance Status: ECOG/Zubrod/WHO: 0 - Asymptomatic    Physical Exam  Vitals reviewed  Constitutional:       Appearance: She is well-developed  HENT:      Head: Normocephalic  Hair is normal    Eyes:      General: No scleral icterus  Cardiovascular:      Rate and Rhythm: Normal rate and regular rhythm  Pulmonary:      Effort: Pulmonary effort is normal  No respiratory distress  Breath sounds: Normal breath sounds  No wheezing  Chest:      Chest wall: No tenderness  Comments: There is no supraclavicular axillary adenopathy palpable  Left breast is without any suspicious lesions  Her incision in the right chest wall is well healed  Overlying her drain site is some scabbing    She has some fullness in the lateral aspect of her chest wall consistent with fluid accumulation  She does have some limitation on full extension of her right arm  No lymphedema  Abdominal:      General: Bowel sounds are normal  There is no distension  Palpations: Abdomen is soft  There is no mass  Tenderness: There is no abdominal tenderness  There is no rebound  Genitourinary:     Vagina: Normal    Musculoskeletal:         General: No tenderness  Normal range of motion  Cervical back: Neck supple  No edema  No spinous process tenderness  Lymphadenopathy:      Cervical: No cervical adenopathy  Neurological:      Mental Status: She is alert  Cranial Nerves: No cranial nerve deficit        Coordination: Coordination normal       Gait: Gait normal    Psychiatric:         Speech: Speech normal          Behavior: Behavior normal            RESULTS  Lab Results    Chemistry        Component Value Date/Time     07/03/2015 1004    K 4 8 04/05/2022 0844    K 4 3 07/03/2015 1004     (H) 04/05/2022 0844     07/03/2015 1004    CO2 26 04/05/2022 0844    CO2 29 07/03/2015 1004    BUN 9 04/05/2022 0844    BUN 18 07/03/2015 1004    CREATININE 0 74 04/05/2022 0844    CREATININE 0 71 07/03/2015 1004        Component Value Date/Time    CALCIUM 9 4 04/05/2022 0844    CALCIUM 9 1 07/03/2015 1004    ALKPHOS 126 (H) 04/05/2022 0844    ALKPHOS 74 07/03/2015 1004    AST 29 04/05/2022 0844    AST 23 07/03/2015 1004    ALT 30 04/05/2022 0844    ALT 25 07/03/2015 1004    BILITOT 0 91 07/03/2015 1004            Lab Results   Component Value Date    WBC 6 87 04/26/2022    HGB 10 3 (L) 04/26/2022    HCT 32 7 (L) 04/26/2022     (H) 04/26/2022     04/26/2022         Imaging Studies  NM sentinal node inj    Result Date: 4/26/2022  Narrative: SENTINEL NODE LYMPHOSCINTIGRAPHY INDICATION:  C50 811: Malignant neoplasm of overlapping sites of right female breast Z17 0: Estrogen receptor positive status (ER+)  FINDINGS: 0 5 mCi Tc-99m filtered sulfur colloid (0 6 cc volume) was administered intradermally into the right breast by Dr Zee Dillard in the OR  No imaging was performed  Impression: Injection of  0 5 mCi Tc-99m sulfur colloid into the right breast in the OR   Workstation performed: [id number]         Pathology:  SPECIMEN   Procedure  Total mastectomy    Specimen Laterality  Right    TUMOR   Tumor Site  Upper outer quadrant      Lower outer quadrant      Upper inner quadrant      Lower inner quadrant      Central    Histologic Type  inflammatory  breast carcinoma    Histologic Grade (Earl Histologic Score)     Glandular (Acinar) / Tubular Differentiation  Score 1    Nuclear Pleomorphism  Score 2    Mitotic Rate  Score 1    Overall Grade  Grade 1 (scores of 3, 4 or 5)    Tumor Size  Greatest dimension of largest invasive focus (Millimeters): Extensive  invasive breast carcinoma involving all four breast quadrants mm   Tumor Focality  Multiple foci of invasive carcinoma    Number of Foci  Cannot be determined         Sizes of Individual Foci (Millimeters)  Multiple foci, largest measures 15 mm   Ductal Carcinoma In Situ (DCIS)  Not identified    Lobular Carcinoma In Situ (LCIS)  Not identified         Tumor Extent  Skin is present and involved    Skin Invasion  Invasive carcinoma directly invades into the dermis or epidermis without skin ulceration (this does not change the T classification)    Skin Satellite Foci  Satellite skin foci of invasive carcinoma are present    Lymphovascular Invasion  Present    Dermal Lymphovascular Invasion  Present    Microcalcifications  Present in invasive carcinoma      Present in non-neoplastic tissue    Treatment Effect in the Breast  No definite response to presurgical therapy in the invasive carcinoma    Treatment Effect in the Lymph Nodes  No definite response to presurgical therapy in metastatic carcinoma    MARGINS   Margin Status for Invasive Carcinoma  All margins negative for invasive carcinoma Distance from Invasive Carcinoma to Closest Margin  10 mm   Closest Margin(s) to Invasive Carcinoma  Posterior    Distance from Invasive Carcinoma to Inferior Margin  13 mm   REGIONAL LYMPH NODES   Regional Lymph Node Status  Tumor present in regional lymph node(s)    Number of Lymph Nodes with Macrometastases  14    Number of Lymph Nodes with Micrometastases  0    Size of Largest Sigifredo Metastatic Deposit  8 mm   Extranodal Extension  Present, 2 mm or less    Total Number of Lymph Nodes Examined (sentinel and non-sentinel)  19    PATHOLOGIC STAGE CLASSIFICATION (pTNM, AJCC 8th Edition)   Reporting of pT, pN, and (when applicable) pM categories is based on information available to the pathologist at the time the report is issued  As per the AJCC (Chapter 1, 8th Ed ) it is the managing physicians responsibility to establish the final pathologic stage based upon all pertinent information, including but potentially not limited to this pathology report  TNM Descriptors  y (post-treatment)    pT Category  pT4d    Regional Lymph Nodes Modifier  (sn): Garita node(s) evaluated  pN Category  pN3a    SPECIAL STUDIES        Estrogen Receptor (ER) Status  Positive (greater than 10% of cells demonstrate nuclear positivity)    Percentage of Cells with Nuclear Positivity  %         Progesterone Receptor (PgR) Status  Positive    Percentage of Cells with Nuclear Positivity  71-80%         HER2 (by immunohistochemistry)  Equivocal (Score 2+)         HER2 (by in situ hybridization)  Negative (not amplified)    Testing Performed on Case Number  [id number]              ASSESSMENT  1  Malignant neoplasm of overlapping sites of right breast in female, estrogen receptor positive ([facility] [address] )     2  Breast cancer metastasized to axillary lymph node, right [facility])  Ambulatory referral to Radiation Oncology     Cancer Staging  No matching staging information was found for the patient        PLAN/DISCUSSION  No orders of the defined types were placed in this encounter  Alex Freeman is a 67y o  year old female with 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 followed by mastectomy  Her tumor is ER/NM positive and HER2 negative  She had 14 lymph nodes out of 19 lymph nodes positive for metastasis  There was also extranodal extension noted  Her margins of resection were negative  She has undergone drain removal 2 days ago with some  reaccumulation in the lateral aspect of her right chest wall  She also has limited extension of her right arm and is scheduled for physical therapy next week  No clinical evidence of lymphedema  I reviewed with her a course of adjuvant radiation therapy to include the right chest wall and regional lymphatics to dose of 5000 cGy  We discussed that in light of her inflammatory histology with dermal invasion bolus will be utilized on a daily basis to ensure adequate skin dosage  This will result in heightened skin reaction during the course of her treatment  Other side effects reviewed with her can include but not limited to fatigue, skin erythema, hyperpigmentation, desquamation, pneumonitis, lymphedema, chest wall fibrosis  Her and her  understand the goal of treatment  We have scheduled her for CT simulation and her treatment will begin shortly thereafter  Frank Laboy MD  5/25/2022,1:10 PM      Portions of the record may have been created with voice recognition software  Occasional wrong word or "sound a like" substitutions may have occurred due to the inherent limitations of voice recognition software  Read the chart carefully and recognize, using context, where substitutions have occurred

## 2022-05-25 NOTE — LETTER
May 25, 2022     Dmitriy Herrera MD  [address] [facility]  [address] [facility] [facility]    Patient: Chon Franco   YOB: 1950   Date of Visit: 2022       Dear Dr Arnie Kennedy:    Thank you for referring Cordelia Alvarado to me for evaluation  Below are my notes for this consultation  If you have questions, please do not hesitate to call me  I look forward to following your patient along with you  Sincerely,        Becky Suazo MD        CC: No Recipients  Becky Suazo MD  2022  2:48 PM  Sign when Signing Visit  Consultation - Radiation Oncology      HJB:[id number] : 1950  Encounter: [id number]  Patient Information: Kassie Malvinchase  Chief Complaint   Patient presents with    Consult     Radiation Oncology      Cancer Staging  No matching staging information was found for the patient  History of Present Illness   Chon Franco 1950 is a 67 y o  female with diagnosis of inflammatory right breast cancer  She presents today for radiation oncology consult, referred by Dr Arnie Kennedy       She noticed a red spot on the skin of her right breast in 2021  Mammogram did not show a mass but US showed 6 mm skin thickening right breast at 4:00  Skin biopsy was done 21 and revealed dermal lymphatic invasion of carcinoma, consistent with breast primary, ER 90 NH 70 HER2 fish negative  Imaging showed no distant metastasis  She was seen by Dr Tyson Smith and started neoadjuvant chemo with dose dense paclitaxel on 21      She has a family h/o ovarian cancer in her mother  She underwent prophylactic hysterectomy and bilateral oophorectomy in 19 diagnostic bilateral mammogram/US right breast-  FINDINGS:   RIGHT  A) SKIN LESION  Mammo diagnostic bilateral w 3d & cad:   There is skin thickening  Skin thickness measures up to about 6 mm maximum at the 4 o'clock position of the breast   This is best seen with ultrasound of this area   No convincing evidence of focal solid or cystic breast mass at this time and no obvious architectural distortion   No visible axillary adenopathy      Left  There are no suspicious masses, grouped microcalcifications or areas of unexplained architectural distortion  The skin and nipple areolar complex are unremarkable    A few scattered stable benign-type calcifications are present  Lunette Jojo Are 3  Biopsy clips on the left      IMPRESSION:   thickening of the right breast skin mostly in the lower inner quadrant   Surgical consultation recommended for possible skin biopsy      ASSESSMENT/BI-RADS CATEGORY:  Left: 2 - Benign  Right: 4 - Suspicious  Overall: 4 - Suspicious        12/6/21 Right breast, punch biopsy:  Dermal lymphovascular invasion, immunoprofile consistent with breast primary  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)  Grade 2  ER 90; IN 70; HER2 2+, FISH negative        12/11/21 CT C/A/P-  -No definite evidence of metastatic disease in the chest, abdomen, or pelvis   -Nonspecific 5 mm pulmonary nodule in the right lower lobe, likely infectious/inflammatory   Recommend continued surveillance according to the patient's oncologic imaging protocol   -Asymmetric skin thickening in the right breast consistent with the reported history of inflammatory breast cancer         12/13/21 MRI breast bilateral   1  Findings suspicious for multicentric right breast cancer   Most suspicious area is a 9 mm mass right breast 12:00 o'clock   Recommend second-look ultrasound with intent to biopsy  This mass is within an area of segmental distribution non-mass enhancement      Right breast 06:00 o'clock focal non-mass enhancement is 2nd  most suspicious area   Recommend second-look ultrasound with intent to biopsy    I anticipate that 2 biopsies would likely be adequate to direct management (MR biopsy may be necessary if sonographic correlates cannot be identified)     2   No right axillary or internal mammary adenopathy  3  No MR evidence of contralateral left breast malignancy           12/14/21 bone scan- 1   No scintigraphic evidence of osseous metastasis        4/6/22 Dr Sapna Pederson- She completed 4 cycles resulting in clinical partial response  Excellent tolerance  Last dose tomorrow  F/u end of May        4/7/22 Last cycle neoadjuvant chemo        4/20/22 MRI bilateral breast-  FINDINGS:   RIGHT  B) MASS: Again noted is enhancing mass in the 12 o'clock position of the right breast, 8 cm from the nipple   This has decreased in size   It previously measured 10 mm and currently measures 6 mm  Series 401, image 105/224  C) MASS:  Previously noted 9 mm mass in the 12 o'clock position of the right breast, 6 cm from the nipple has markedly decreased in size/no longer definitely seen; tiny focus of enhancement is noted in this region   Series 401, image 100/224  D) MASS:  Previously noted 6 mm enhancing mass in the 8 o'clock position of the breast has markedly decreased in size/no longer definitely seen; tiny focus of enhancement is noted in this region  Series 401, image 156/224  E) NON-MASS ENHANCEMENT:  Previously noted area of non mass enhancement in the 6 o'clock position of the breast has markedly decreased; previously was a 28 x 17 mm area of enhancement and currently 1 1 mm linear area of enhancement  Series 401, image 177/224  F) NON-MASS ENHANCEMENT:  Again noted is an area of non mass enhancement in the 11 o'clock position of the right breast   The amount of enhancement within this region has decreased; however, the span is similar   Series 401, image 108/224  Diffuse right breast skin thickening again noted  Catheter from port noted  No right axillary axillary or internal mammary adenopathy  Left  There are no suspicious enhancing masses or suspicious areas of non-mass enhancement   There is no axillary or internal mammary adenopathy       IMPRESSION:  Partial response to neoadjuvant chemotherapy      ASSESSMENT/BI-RADS CATEGORY:  Left: 2 - Benign  Right: 6 - Known Biopsy-Proven Malignancy  Overall: 6 - Known Biopsy-Proven Malignancy        4/26/22 Right breast modified radical mastectomy  Inflammatory breast cancer  Grade 1  1 5 cm (multiple foci involving all four quadrants)  Lymphovascular invasion present  Margins negative  14/19 Lymph nodes with macro-metastases  Anatomic Stage IIIC  Prognostic Stage IIIA        5/16/22 Dr Aman Bond- 14 out of 19 lymph nodes positive there was some extracapsular extension largest lymph node metastasis was 8 mm  She did have grade 1 small multifocal disease scattered throughout all 4 quadrants of the breast remaining  Her margins were all negative  The largest tumor size was 1 5 cm  Refer to rad onc  F/u in 3 months        5/23/22 Dr Aman Bond- drain removed        6/2/22 Dr Sp Aveyr  8/18/22 Dr Amanuel Nickerson   Oncology History   Malignant neoplasm of overlapping sites of right breast in female, estrogen receptor positive ([address] )   12/6/2021 Biopsy    Punch biopsy of right breast  Dermal lymphovascular invasion, immunoprofile consistent with breast primary  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)  Grade 2  ER 90; DE 70; HER2 2+, FISH negative     12/13/2021 Observation    Bilateral breast MRI - findings suspicious for multicentric right breast cancer; 2 biopsies recommended at areas of concern (never done); left breast clear; no right axillary adenopathy       12/30/2021 -  Chemotherapy    DOXOrubicin (ADRIAMYCIN), 122 mg, Intravenous, Once, 4 of 4 cycles  Administration: 120 mg (12/30/2021), 122 mg (1/13/2022), 122 mg (1/27/2022), 122 mg (2/10/2022)  palonosetron (ALOXI), 0 25 mg, Intravenous, Once, 2 of 2 cycles  Administration: 0 25 mg (3/24/2022), 0 25 mg (4/7/2022)  pegfilgrastim (NEULASTA), 4 mg (100 % of original dose 4 mg), Subcutaneous, Once, 3 of 3 cycles  Dose modification: 4 mg (original dose 4 mg, Cycle 6)  Administration: 4 mg (3/11/2022), 4 mg (3/25/2022), 4 mg (4/8/2022)  pegfilgrastim (NEULASTA ONPRO), 6 mg, Subcutaneous, Once, 5 of 5 cycles  Administration: 6 mg (12/30/2021), 6 mg (1/13/2022), 6 mg (2/24/2022), 6 mg (1/27/2022), 6 mg (2/10/2022)  cyclophosphamide (CYTOXAN) IVPB, 600 mg/m2 = 1,218 mg, Intravenous, Once, 4 of 4 cycles  Administration: 1,218 mg (12/30/2021), 1,218 mg (1/13/2022), 1,218 mg (1/27/2022), 1,218 mg (2/10/2022)  fosaprepitant (EMEND) IVPB, 150 mg, Intravenous, Once, 4 of 4 cycles  Administration: 150 mg (12/30/2021), 150 mg (1/13/2022), 150 mg (1/27/2022), 150 mg (2/10/2022)  PACLItaxel (TAXOL) chemo IVPB, 175 mg/m2 = 355 2 mg, Intravenous, Once, 4 of 4 cycles  Administration: 355 2 mg (2/24/2022), 355 2 mg (3/10/2022), 355 2 mg (3/24/2022), 355 2 mg (4/7/2022)     4/26/2022 Surgery    Right breast modified radical mastectomy  Inflammatory breast cancer  Grade 1  1 5 cm (multiple foci involving all four quadrants)  Lymphovascular invasion present  Margins negative  14/19 Lymph nodes with macro-metastases  Anatomic Stage IIIC  Prognostic Stage IIIA        Procedure  Total mastectomy    Specimen Laterality  Right    TUMOR   Tumor Site  Upper outer quadrant      Lower outer quadrant      Upper inner quadrant      Lower inner quadrant      Central    Histologic Type  inflammatory  breast carcinoma    Histologic Grade ([place] Histologic Score)     Glandular (Acinar) / Tubular Differentiation  Score 1    Nuclear Pleomorphism  Score 2    Mitotic Rate  Score 1    Overall Grade  Grade 1 (scores of 3, 4 or 5)    Tumor Size  Greatest dimension of largest invasive focus (Millimeters): Extensive  invasive breast carcinoma involving all four breast quadrants mm   Tumor Focality  Multiple foci of invasive carcinoma    Number of Foci  Cannot be determined         Sizes of Individual Foci (Millimeters)  Multiple foci, largest measures 15 mm   Ductal Carcinoma In Situ (DCIS)  Not identified Lobular Carcinoma In Situ (LCIS)  Not identified         Tumor Extent  Skin is present and involved    Skin Invasion  Invasive carcinoma directly invades into the dermis or epidermis without skin ulceration (this does not change the T classification)    Skin Satellite Foci  Satellite skin foci of invasive carcinoma are present    Lymphovascular Invasion  Present    Dermal Lymphovascular Invasion  Present    Microcalcifications  Present in invasive carcinoma      Present in non-neoplastic tissue    Treatment Effect in the Breast  No definite response to presurgical therapy in the invasive carcinoma    Treatment Effect in the Lymph Nodes  No definite response to presurgical therapy in metastatic carcinoma    MARGINS   Margin Status for Invasive Carcinoma  All margins negative for invasive carcinoma    Distance from Invasive Carcinoma to Closest Margin  10 mm   Closest Margin(s) to Invasive Carcinoma  Posterior    Distance from Invasive Carcinoma to Inferior Margin  13 mm   REGIONAL LYMPH NODES   Regional Lymph Node Status  Tumor present in regional lymph node(s)    Number of Lymph Nodes with Macrometastases  14    Number of Lymph Nodes with Micrometastases  0    Size of Largest Sigifredo Metastatic Deposit  8 mm   Extranodal Extension  Present, 2 mm or less    Total Number of Lymph Nodes Examined (sentinel and non-sentinel)  19               Past Medical History:   Diagnosis Date    Anxiety     Cancer [facility])     Right breast cancer    Depression     Fracture of radius, distal, closed     Last Assessed:  6/10/14    Thrombophlebitis     Vitamin D deficiency      Past Surgical History:   Procedure Laterality Date    ANKLE SURGERY      ANKLE SURGERY      BREAST BIOPSY Left 2001    neg    GALLBLADDER SURGERY      HYSTERECTOMY      IR PORT PLACEMENT  12/29/2021    SC MASTECTOMY, MODIFIED RADICAL Right 4/26/2022    Procedure: BREAST MODIFIED RADICAL MASTECTOMY WITH RIGHT LYMPHATIC MAPPING WITH BLUE AND RADIOACTIVE DYE (INJECT AT 0900 BY DR DEJESUS IN THE OR); Surgeon: Kenton Whitten MD;  [facility]: [facility];  Service: Surgical Oncology    WISDOM TOOTH EXTRACTION         Family History   Problem Relation Age of Onset    Ovarian cancer Mother 64    Heart disease Father     Leukemia Brother 62    Heart disease Brother     Heart disease Brother     Cancer Maternal Grandmother         Unknown primary; mid 52's    Stomach cancer Maternal Aunt         63's       Social History   Social History     Substance and Sexual Activity   Alcohol Use Yes    Comment: rarely     Social History     Substance and Sexual Activity   Drug Use Yes    Types: Marijuana    Comment: not helping stopped     Social History     Tobacco Use   Smoking Status Never Smoker   Smokeless Tobacco Never Used         Meds/Allergies     Current Outpatient Medications:     ALPRAZolam (XANAX) 0 25 mg tablet, Take 1/2 to 1 pill Q 8 hours p r n   For anxiety, Disp: 30 tablet, Rfl: 0    Calcium 250 MG CAPS, Take 250 mg by mouth in the morning  , Disp: , Rfl:     cholecalciferol (VITAMIN D3) 1,000 units tablet, Take 2 capsules by mouth daily, Disp: , Rfl:     escitalopram (LEXAPRO) 20 mg tablet, Take 1 tablet (20 mg total) by mouth daily, Disp: 90 tablet, Rfl: 2    Fish Oil-Krill Oil (KRILL OIL PLUS) CAPS, Take by mouth in the morning  , Disp: , Rfl:     fluocinonide (LIDEX) 0 05 % external solution, APPLY TO SCALP ONCE DAILY AS NEEDED FOR ITCH, Disp: , Rfl: 2    ketoconazole (NIZORAL) 2 % shampoo, Apply 1 application topically if needed  , Disp: , Rfl: 5    lidocaine-prilocaine (EMLA) cream, Apply topically as needed for mild pain, Disp: 30 g, Rfl: 1    acetaminophen (TYLENOL) 500 mg tablet, Take 2 tablets (1,000 mg total) by mouth 3 (three) times a day (Patient not taking: Reported on 5/25/2022), Disp: 180 tablet, Rfl: 2    al mag oxide-diphenhydramine-lidocaine viscous (MAGIC MOUTHWASH) 1:1:1 suspension, Swish and spit 10 mL every 4 (four) hours as needed for mouth pain or discomfort, Disp: 90 mL, Rfl: 1    CRANIAL PROSTHESIS, RX,, One wig as needed, Disp: 1 each, Rfl: 0    ondansetron (ZOFRAN) 8 mg tablet, Take 1 tablet (8 mg total) by mouth every 8 (eight) hours as needed for nausea or vomiting (Patient not taking: Reported on 2022), Disp: 40 tablet, Rfl: 2    oxyCODONE-acetaminophen (Percocet) 5-325 mg per tablet, Take 1 tablet by mouth every 6 (six) hours as needed for moderate pain Max Daily Amount: 4 tablets (Patient not taking: Reported on 2022), Disp: 20 tablet, Rfl: 0  No Known Allergies      Review of Systems   Constitutional: Positive for fatigue (some days)  Appetite and taste improving since finishing chemo   HENT: Negative  Eyes: Negative  Respiratory: Negative  Cardiovascular: Negative  Gastrointestinal: Negative  Endocrine: Negative  Genitourinary: Negative  Musculoskeletal: Negative  Skin:        Healing from surgery, KAREEN drain removed   Mild tenderness, denies any drainage    Allergic/Immunologic: Negative  Neurological: Negative  Hematological: Negative  Psychiatric/Behavioral: Negative           Clinical Trial: no     OB/GYN History:  The patient underwent menarche at 14 years  Menopause Status Pre, Bhavya, Post, Unknown and No Answer  No LMP recorded (lmp unknown)  Patient has had a hysterectomy  Menopause at  Mid 40's years  Menopause Reason hysterectomy  Hormone replacement therapy: yes  Years used 6-8 years   3   Para 4 (has twins)   Birth control pills: yes  Years used 5-6       OBJECTIVE:   /82 (BP Location: Left arm)   Pulse 75   Temp 98 °F (36 7 °C) (Temporal)   Resp 18   Wt 79 4 kg (175 lb)   LMP  (LMP Unknown)   SpO2 98%   BMI 26 22 kg/m²   Pain Assessment:  {Pain Score 0-10, 0 default:90718}  Performance Status: {MDA IP Performance Status Choice:79279}    Physical Exam  Vitals reviewed  Constitutional:       Appearance: She is well-developed     HENT: Head: Normocephalic  Hair is normal    Eyes:      General: No scleral icterus  Cardiovascular:      Rate and Rhythm: Normal rate and regular rhythm  Pulmonary:      Effort: Pulmonary effort is normal  No respiratory distress  Breath sounds: Normal breath sounds  No wheezing  Chest:      Chest wall: No tenderness  Comments: There is no supraclavicular axillary adenopathy palpable  Left breast is without any suspicious lesions  Her incision in the right chest wall is well healed  Overlying her drain site is some scabbing  She has some fullness in the lateral aspect of her chest wall consistent with fluid accumulation  She does have some limitation on full extension of her right arm  No lymphedema  Abdominal:      General: Bowel sounds are normal  There is no distension  Palpations: Abdomen is soft  There is no mass  Tenderness: There is no abdominal tenderness  There is no rebound  Genitourinary:     Vagina: Normal    Musculoskeletal:         General: No tenderness  Normal range of motion  Cervical back: Neck supple  No edema  No spinous process tenderness  Lymphadenopathy:      Cervical: No cervical adenopathy  Neurological:      Mental Status: She is alert  Cranial Nerves: No cranial nerve deficit        Coordination: Coordination normal       Gait: Gait normal    Psychiatric:         Speech: Speech normal          Behavior: Behavior normal            RESULTS  Lab Results    Chemistry        Component Value Date/Time     07/03/2015 1004    K 4 8 04/05/2022 0844    K 4 3 07/03/2015 1004     (H) 04/05/2022 0844     07/03/2015 1004    CO2 26 04/05/2022 0844    CO2 29 07/03/2015 1004    BUN 9 04/05/2022 0844    BUN 18 07/03/2015 1004    CREATININE 0 74 04/05/2022 0844    CREATININE 0 71 07/03/2015 1004        Component Value Date/Time    CALCIUM 9 4 04/05/2022 0844    CALCIUM 9 1 07/03/2015 1004    ALKPHOS 126 (H) 04/05/2022 0844    ALKPHOS 74 07/03/2015 1004    AST 29 04/05/2022 0844    AST 23 07/03/2015 1004    ALT 30 04/05/2022 0844    ALT 25 07/03/2015 1004    BILITOT 0 91 07/03/2015 1004            Lab Results   Component Value Date    WBC 6 87 04/26/2022    HGB 10 3 (L) 04/26/2022    HCT 32 7 (L) 04/26/2022     (H) 04/26/2022     04/26/2022         Imaging Studies  NM sentinal node inj    Result Date: 4/26/2022  Narrative: SENTINEL NODE LYMPHOSCINTIGRAPHY INDICATION:  C50 811: Malignant neoplasm of overlapping sites of right female breast Z17 0: Estrogen receptor positive status (ER+)  FINDINGS: 0 5 mCi Tc-99m filtered sulfur colloid (0 6 cc volume) was administered intradermally into the right breast by Dr Raj Arvizu in the OR  No imaging was performed  Impression: Injection of  0 5 mCi Tc-99m sulfur colloid into the right breast in the OR   Workstation performed: [id number]         Pathology:  SPECIMEN   Procedure  Total mastectomy    Specimen Laterality  Right    TUMOR   Tumor Site  Upper outer quadrant      Lower outer quadrant      Upper inner quadrant      Lower inner quadrant      Central    Histologic Type  inflammatory  breast carcinoma    Histologic Grade ([place] Histologic Score)     Glandular (Acinar) / Tubular Differentiation  Score 1    Nuclear Pleomorphism  Score 2    Mitotic Rate  Score 1    Overall Grade  Grade 1 (scores of 3, 4 or 5)    Tumor Size  Greatest dimension of largest invasive focus (Millimeters): Extensive  invasive breast carcinoma involving all four breast quadrants mm   Tumor Focality  Multiple foci of invasive carcinoma    Number of Foci  Cannot be determined         Sizes of Individual Foci (Millimeters)  Multiple foci, largest measures 15 mm   Ductal Carcinoma In Situ (DCIS)  Not identified    Lobular Carcinoma In Situ (LCIS)  Not identified         Tumor Extent  Skin is present and involved    Skin Invasion  Invasive carcinoma directly invades into the dermis or epidermis without skin ulceration (this does not change the T classification)    Skin Satellite Foci  Satellite skin foci of invasive carcinoma are present    Lymphovascular Invasion  Present    Dermal Lymphovascular Invasion  Present    Microcalcifications  Present in invasive carcinoma      Present in non-neoplastic tissue    Treatment Effect in the Breast  No definite response to presurgical therapy in the invasive carcinoma    Treatment Effect in the Lymph Nodes  No definite response to presurgical therapy in metastatic carcinoma    MARGINS   Margin Status for Invasive Carcinoma  All margins negative for invasive carcinoma    Distance from Invasive Carcinoma to Closest Margin  10 mm   Closest Margin(s) to Invasive Carcinoma  Posterior    Distance from Invasive Carcinoma to Inferior Margin  13 mm   REGIONAL LYMPH NODES   Regional Lymph Node Status  Tumor present in regional lymph node(s)    Number of Lymph Nodes with Macrometastases  14    Number of Lymph Nodes with Micrometastases  0    Size of Largest Sigifredo Metastatic Deposit  8 mm   Extranodal Extension  Present, 2 mm or less    Total Number of Lymph Nodes Examined (sentinel and non-sentinel)  19    PATHOLOGIC STAGE CLASSIFICATION (pTNM, AJCC 8th Edition)   Reporting of pT, pN, and (when applicable) pM categories is based on information available to the pathologist at the time the report is issued  As per the AJCC (Chapter 1, 8th Ed ) it is the managing physicians responsibility to establish the final pathologic stage based upon all pertinent information, including but potentially not limited to this pathology report  TNM Descriptors  y (post-treatment)    pT Category  pT4d    Regional Lymph Nodes Modifier  (sn): [place] node(s) evaluated      pN Category  pN3a    SPECIAL STUDIES        Estrogen Receptor (ER) Status  Positive (greater than 10% of cells demonstrate nuclear positivity)    Percentage of Cells with Nuclear Positivity  %         Progesterone Receptor (PgR) Status  Positive Percentage of Cells with Nuclear Positivity  71-80%         HER2 (by immunohistochemistry)  Equivocal (Score 2+)         HER2 (by in situ hybridization)  Negative (not amplified)    Testing Performed on Case Number  [id number]              ASSESSMENT  1  Malignant neoplasm of overlapping sites of right breast in female, estrogen receptor positive ([facility] [address] )     2  Breast cancer metastasized to axillary lymph node, right [facility])  Ambulatory referral to Radiation Oncology     Cancer Staging  No matching staging information was found for the patient  PLAN/DISCUSSION  No orders of the defined types were placed in this encounter  Tony Jiang is a 67y o  year old female with 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 followed by mastectomy  Her tumor is ER/NV positive and HER2 negative  She had 14 lymph nodes out of 19 lymph nodes positive for metastasis  There was also extranodal extension noted  Her margins of resection were negative  She has undergone drain removal 2 days ago with some  reaccumulation in the lateral aspect of her right chest wall  She also has limited extension of her right arm and is scheduled for physical therapy next week  No clinical evidence of lymphedema  I reviewed with her a course of adjuvant radiation therapy to include the right chest wall and regional lymphatics to dose of 5000 cGy  We discussed that in light of her inflammatory histology with dermal invasion bolus will be utilized on a daily basis to ensure adequate skin dosage  This will result in heightened skin reaction during the course of her treatment  Other side effects reviewed with her can include but not limited to fatigue, skin erythema, hyperpigmentation, desquamation, pneumonitis, lymphedema, chest wall fibrosis  Her and her  understand the goal of treatment  We have scheduled her for CT simulation and her treatment will begin shortly thereafter          Melly Arnold Castellanos MD  5/25/2022,1:10 PM      Portions of the record may have been created with voice recognition software  Occasional wrong word or "sound a like" substitutions may have occurred due to the inherent limitations of voice recognition software  Read the chart carefully and recognize, using context, where substitutions have occurred

## 2022-05-27 ENCOUNTER — TELEPHONE (OUTPATIENT)
Dept: SURGICAL ONCOLOGY | Facility: CLINIC | Age: 72
End: 2022-05-27

## 2022-05-31 ENCOUNTER — EVALUATION (OUTPATIENT)
Dept: PHYSICAL THERAPY | Age: 72
End: 2022-05-31
Payer: MEDICARE

## 2022-05-31 DIAGNOSIS — C50.911 BREAST CANCER METASTASIZED TO AXILLARY LYMPH NODE, RIGHT (HCC): ICD-10-CM

## 2022-05-31 DIAGNOSIS — I89.0 LYMPHEDEMA: Primary | ICD-10-CM

## 2022-05-31 DIAGNOSIS — C77.3 BREAST CANCER METASTASIZED TO AXILLARY LYMPH NODE, RIGHT (HCC): ICD-10-CM

## 2022-05-31 PROCEDURE — 97110 THERAPEUTIC EXERCISES: CPT

## 2022-05-31 PROCEDURE — 97162 PT EVAL MOD COMPLEX 30 MIN: CPT

## 2022-06-01 ENCOUNTER — TELEPHONE (OUTPATIENT)
Dept: HEMATOLOGY ONCOLOGY | Facility: CLINIC | Age: 72
End: 2022-06-01

## 2022-06-01 NOTE — PROGRESS NOTES
PT Evaluation     Today's date: 2022  Patient name: Herbert Anthony  : 1950  MRN: [id number]  Referring provider: Loy Hogan MD  Dx:   Encounter Diagnosis     ICD-10-CM    1  Lymphedema  I89 0    2  Breast cancer metastasized to axillary lymph node, right ([facility] [address] )  C50 911 Ambulatory referral to Physical Therapy    C77 3                   Assessment  Impairments: abnormal or restricted ROM, activity intolerance, impaired physical strength, lacks appropriate home exercise program, pain with function, poor posture  and poor body mechanics  Other impairment: scar tissue adherence, lymphedema right ant and lateral chest wall present  pt to be fitted by [address] [place] [address] for compression bra fitting   Functional limitations: decreased tolerance to right ue reaching overhead, lifting, due to pain and rom deficits   Symptom irritability: moderateUnderstanding of Dx/Px/POC: good   Prognosis: good    Goals  STG 1  Independent in there exer program in two weeks           2  Reduction of girth by 2 cm in two weeks      LTG 1 achieve full right shoulder arom in 4 weeks without increased pain           2 The pt shall be independent in donning and doffing compression garments      Plan  Patient would benefit from: PT eval, lymphedema eval and skilled physical therapy  Referral necessary: Yes  Other planned modality interventions: as needed  Planned therapy interventions: manual therapy, massage, neuromuscular re-education, muscle pump exercises, patient education, postural training, strengthening, stretching, compression, therapeutic activities, therapeutic exercise, home exercise program and body mechanics training  Frequency: 2x week  Duration in weeks: 8  Plan of Care beginning date: 2022  Plan of Care expiration date: 2022  Treatment plan discussed with: patient        Subjective Evaluation    History of Present Illness  Onset date: 22 right breast modified radical mastectomy and 19 lymph nodes removed 14 positive    Date of surgery: 2022  Mechanism of injury: surgery          Not a recurrent problem   Quality of life: good    Pain  Current pain ratin  At best pain ratin  At worst pain ratin  Location: right axilla, lateral chest and ant chest wall,   Quality: radiating, dull ache, tight and pulling  Relieving factors: change in position and rest  Aggravating factors: overhead activity and lifting  Progression: improved    Social Support  Steps to enter house: yes (4 steps 2 railings)  Stairs in house: yes (12 steps right railing)   Lives in: multiple-level home  Lives with: spouse    Employment status: not working  Hand dominance: right  Exercise history: enjoys gardening,  babysits grandchildres ages from 3 months to 3years of age ( 11 grandchildren)    Treatments  Current treatment: physical therapy  Patient Goals  Patient goals for therapy: decreased edema, decreased pain, increased strength, increased motion, independence with ADLs/IADLs and return to sport/leisure activities  Patient goal: reduction in lateral and ant chest wall lymphedema in 4 weeks         Objective    Flowsheet Rows    Flowsheet Row Most Recent Value   PT/OT G-Codes    Current Score 66   Projected Score 75   Assessment Type Evaluation   G code set Carrying, Moving & Handling Objects   Carrying, Moving and Handling Objects Current Status () CK   Carrying, Moving and Handling Objects Goal Status () CJ             Precautions: no known allergies      Manuals 22             I eval             Mld massage and soft tissue mobilization right breast and lateral chest wall, scar tissue release in axilla             Self scar massage instruction in future right breast                          Neuro Re-Ed / there exer              Right ue wall slides sh flex, abd 5 reps             Sh backward rolls  10            scap squeezes 5 reps hold 5 sec            Wall pulleys  5 min            Sitting sh rotation stretch on wedge right ue 5 reps hold 10 sec             Supine foam roll self thoracic mobilization              Supine butterflies             Ther Ex, supine sh flex cane stretch             Standing cane arom flex, abd, ext , int rot                                                                                                        Ther Activity                                       Gait Training                                       Modalities

## 2022-06-01 NOTE — TELEPHONE ENCOUNTER
Appointment Cancellation Or Reschedule     Person calling in Patient    Provider Dr Ronaldo Castleman   Office Visit Date and Time 06/01 at 4:00pm    Office Visit Location [place]   Did patient want to reschedule their office appointment? If so, when was it scheduled to? no   Is this patient calling to reschedule an infusion appointment? no   When is their next infusion appointment? n/a   Is this patient a Chemo patient? no   Reason for Cancellation or Reschedule Patient does not feel appt is neccessary      If the patient is a treatment patient, please route this to the office nurse  If the patient is not on treatment, please route to the office MA

## 2022-06-02 ENCOUNTER — APPOINTMENT (OUTPATIENT)
Dept: PHYSICAL THERAPY | Age: 72
End: 2022-06-02
Payer: MEDICARE

## 2022-06-02 ENCOUNTER — OFFICE VISIT (OUTPATIENT)
Dept: HEMATOLOGY ONCOLOGY | Facility: CLINIC | Age: 72
End: 2022-06-02
Payer: MEDICARE

## 2022-06-02 VITALS
HEIGHT: 69 IN | BODY MASS INDEX: 26.36 KG/M2 | HEART RATE: 87 BPM | SYSTOLIC BLOOD PRESSURE: 122 MMHG | RESPIRATION RATE: 18 BRPM | WEIGHT: 178 LBS | TEMPERATURE: 98.6 F | DIASTOLIC BLOOD PRESSURE: 76 MMHG | OXYGEN SATURATION: 98 %

## 2022-06-02 DIAGNOSIS — C50.811 MALIGNANT NEOPLASM OF OVERLAPPING SITES OF RIGHT BREAST IN FEMALE, ESTROGEN RECEPTOR POSITIVE (HCC): Primary | ICD-10-CM

## 2022-06-02 DIAGNOSIS — Z17.0 MALIGNANT NEOPLASM OF OVERLAPPING SITES OF RIGHT BREAST IN FEMALE, ESTROGEN RECEPTOR POSITIVE (HCC): Primary | ICD-10-CM

## 2022-06-02 PROCEDURE — 99215 OFFICE O/P EST HI 40 MIN: CPT | Performed by: INTERNAL MEDICINE

## 2022-06-02 RX ORDER — ANASTROZOLE 1 MG/1
1 TABLET ORAL DAILY
Qty: 90 TABLET | Refills: 1 | Status: SHIPPED | OUTPATIENT
Start: 2022-06-02

## 2022-06-02 NOTE — PROGRESS NOTES
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, NV 70 % positive, HER2 fish negative disease   Based on MRI, she may have 9 mm of tumor with clear skin thickening in her right breast   Inflammatory breast cancer was confirmed by the biopsy with dermal invasion  She underwent neoadjuvant chemotherapy with dose dense AC followed by dose dense paclitaxel, resulting in clinically responding disease  However, when she underwent right mastectomy and axillary lymph node dissection, she had significant amount of residual disease advised 14 positive lymph nodes  Therefore, she has quite high risk disease  She is going to have postmastectomy radiation therapy which I agreed  I recommended her to start adjuvant hormonal therapy with anastrozole 1 mg once a day  Side effects of anastrozole were thoroughly discussed, including but not limited to hot flashes, musculoskeletal symptoms and bone mineral density loss  I recommended her to take calcium and vitamin-D on a regular basis  She may be a candidate for adjuvant abemaciclib  I am going to ask pathology department to evaluate Ki 67 on the biopsy specimen  I will see her again towards the end of 2022 for routine follow-up    She is in agreement with my recommendations         Subjective:      HPI:  A 58-year-old postmenopausal woman noticed red spot on the skin in her right breast in 2021   She brought this to medical attention   Mammography did not show any mass   However, ultrasound shows 6 mm of skin thickening in her right breast at 4:00 a m  position   She was seen by Dr Arline Campos who did a skin biopsy   Skin biopsy was positive for dermal lymphatic invasion of carcinoma, consistent with breast primary   ER, NV and HER2 are pending at this time   She subsequent underwent CT scan of chest abdomen pelvis which showed right breast skin thickening but no evidence the distant metastasis   She presents today with her  and daughter-in-law to discuss the medical treatment for her breast cancer   She has no complaint of pain   She is afebrile  Romario Daniels has no significant past medical history   Her mother  of ovarian cancer at age of 62   Therefore, she underwent prophylactic hysterectomy and bilateral oophorectomy in 45   She also have cholecystectomy as a surgical past history   She currently feels well   She has no complaint of pain   Her weight is stable   She has no respiratory symptoms  Romario Daniels is a lifetime never smoker   Her performance status is normal            Interval History:   A 67year-old postmenopausal woman with inflammatory right breast cancer, grade 2, ER 90 % positive, IA 70 % positive, HER2 fish negative disease   Her right breast mass is not easy to evaluate   Even MRI showed only 9 mm of mass with skin thickening  She underwent neoadjuvant chemotherapy with dose dense AC followed by dose dense paclitaxel with excellent tolerance  Clinically, she had responding disease  She underwent mastectomy and axillary lymph node dissection in 2022 which showed significant amount of multifocal medullary disease with dermal invasion  14/19 axillary lymph nodes were positive for metastatic disease with extranodal extension  She did not have reconstruction  She presents today to discuss further treatment options  She feels well  She has no complaint of pain  She has some mild upper and lower extremity neuropathy  Her weight is stable    Her performance status is normal        Objective:      Primary Diagnosis:     Right inflammatory breast cancer   Grade 2   ER 90% positive, IA 70% positive, HER2 fish negative disease   Diagnosed in 2021      Cancer Staging:  Cancer Staging  No matching staging information was found for the patient         Previous Hematologic/ Oncologic Treatment:       Neoadjuvant chemotherapy with dose dense AC x4 followed by dose dense paclitaxel x4  Completed in April 2022      Current Hematologic/ Oncologic Treatment:       1  Adjuvant hormonal therapy with anastrozole to be started in June 2022       Disease Status:      1  Clinically responding disease  2  Pathologic significant residual disease after neoadjuvant chemotherapy  3  ASHLYN status post mastectomy and at 0 later after dissection      Test Results:     Pathology:     Right breast skin biopsy showed dermal and lymphatic invasion of carcinoma, consistent with breast primary   At least grade 2   ER 90% positive, UT 70% positive, HER2 2+ disease  Lionel Jordan fish was negative for gene application with ratio of 1 2  Mastectomy specimen showed extensive multiple residual disease measuring up to 1 cm with dermal invasion  14/19 axillary lymph nodes were positive for metastatic disease with tumor measuring 8 mm with extranodal extension      Radiology:     CT scan of chest abdomen pelvis showed no evidence of distant metastasis      Breast ultrasound shows 6 mm of skin thickening at 4:00 a m  position with no visible underline mass      Echocardiogram showed ejection fraction 60 percent in December 2021      Laboratory:     See below      Physical Exam:        General Appearance:    Alert, oriented          Eyes:    PERRL   Ears:    Normal external ear canals, both ears   Nose:   Nares normal, septum midline   Throat:   Mucosa moist  Pharynx without injection  Neck:   Supple         Lungs:     Clear to auscultation bilaterally   Chest Wall:    No tenderness or deformity    Heart:    Regular rate and rhythm         Abdomen:     Soft, non-tender, bowel sounds +, no organomegaly               Extremities:   Extremities no cyanosis or edema         Skin:   no rash or icterus      Lymph nodes:   Cervical, supraclavicular, and axillary nodes normal   Neurologic:   CNII-XII intact, normal strength, sensation and reflexes     Throughout             Breast exam: Previous 10 cm area of erythematous change has disappeared     No palpable right axillary adenopathy   Left breast exam is negative  ROS: Review of Systems   All other systems reviewed and are negative  Imaging: No results found  Labs:   Lab Results   Component Value Date    WBC 6 87 04/26/2022    HGB 10 3 (L) 04/26/2022    HCT 32 7 (L) 04/26/2022     (H) 04/26/2022     04/26/2022     Lab Results   Component Value Date     07/03/2015    K 4 8 04/05/2022     (H) 04/05/2022    CO2 26 04/05/2022    ANIONGAP 8 07/03/2015    BUN 9 04/05/2022    CREATININE 0 74 04/05/2022    GLUCOSE 83 07/03/2015    GLUF 102 (H) 04/05/2022    CALCIUM 9 4 04/05/2022    CORRECTEDCA 9 7 03/23/2022    AST 29 04/05/2022    ALT 30 04/05/2022    ALKPHOS 126 (H) 04/05/2022    PROT 7 7 07/03/2015    BILITOT 0 91 07/03/2015    EGFR 80 04/05/2022         Lab Results   Component Value Date    IRON 116 04/11/2016         Current Medications: Reviewed  Allergies: Reviewed  PMH/FH/SH:  Reviewed      Vital Sign:    Body surface area is 1 96 meters squared      Wt Readings from Last 3 Encounters:   06/02/22 80 7 kg (178 lb)   05/25/22 79 4 kg (175 lb)   05/23/22 79 8 kg (176 lb)        Temp Readings from Last 3 Encounters:   06/02/22 98 6 °F (37 °C) (Tympanic)   05/25/22 98 °F (36 7 °C) (Temporal)   05/23/22 (!) 97 2 °F (36 2 °C)        BP Readings from Last 3 Encounters:   06/02/22 122/76   05/25/22 130/82   05/23/22 118/78         Pulse Readings from Last 3 Encounters:   06/02/22 87   05/25/22 75   05/23/22 92     @LASTSAO2(3)@

## 2022-06-08 ENCOUNTER — OFFICE VISIT (OUTPATIENT)
Dept: PHYSICAL THERAPY | Age: 72
End: 2022-06-08
Payer: MEDICARE

## 2022-06-08 DIAGNOSIS — I89.0 LYMPHEDEMA: Primary | ICD-10-CM

## 2022-06-08 DIAGNOSIS — C50.911 BREAST CANCER METASTASIZED TO AXILLARY LYMPH NODE, RIGHT (HCC): ICD-10-CM

## 2022-06-08 DIAGNOSIS — C77.3 BREAST CANCER METASTASIZED TO AXILLARY LYMPH NODE, RIGHT (HCC): ICD-10-CM

## 2022-06-08 PROCEDURE — 97140 MANUAL THERAPY 1/> REGIONS: CPT

## 2022-06-08 PROCEDURE — 97110 THERAPEUTIC EXERCISES: CPT

## 2022-06-08 NOTE — PROGRESS NOTES
Daily Note     Today's date: 2022  Patient name: Alex Freeman  : 1950  MRN: [id number]  Referring provider: Aly Dixon MD  Dx:   Encounter Diagnosis     ICD-10-CM    1  Lymphedema  I89 0    2  Breast cancer metastasized to axillary lymph node, right (HCC)  C50 911     C77 3                   Subjective: "I am using my right arm more and lifting it higher "      Objective: See treatment diary below      Assessment: Tolerated treatment well  Patient would benefit from continued PT      Plan: Continue per plan of care        Precautions: no known allergies      Manuals 22            I eval             Mld massage and soft tissue mobilization right breast and lateral chest wall, scar tissue release in axilla  30 min man           Self scar massage instruction in future right breast  instructed today                        Neuro Re-Ed / there exer              Right ue wall slides sh flex, abd 5 reps  10 reps            Sh backward rolls  10 10           scap squeezes 5 reps hold 5 sec 5           Wall pulleys  5 min 5 min           Sitting sh rotation stretch on wedge right ue 5 reps hold 10 sec             Supine foam roll self thoracic mobilization   5 min           Supine butterflies  20 reps 5 sec hold            Ther Ex, supine sh flex cane stretch  5 reps 10 sec hold            Standing cane arom flex, abd, ext , int rot  10 reps each                                                                                                      Ther Activity                                       Gait Training                                       Modalities

## 2022-06-09 ENCOUNTER — APPOINTMENT (OUTPATIENT)
Dept: RADIATION ONCOLOGY | Facility: HOSPITAL | Age: 72
End: 2022-06-09
Attending: RADIOLOGY
Payer: MEDICARE

## 2022-06-09 PROCEDURE — 77334 RADIATION TREATMENT AID(S): CPT | Performed by: RADIOLOGY

## 2022-06-09 PROCEDURE — 77290 THER RAD SIMULAJ FIELD CPLX: CPT | Performed by: RADIOLOGY

## 2022-06-10 ENCOUNTER — OFFICE VISIT (OUTPATIENT)
Dept: PHYSICAL THERAPY | Age: 72
End: 2022-06-10
Payer: MEDICARE

## 2022-06-10 DIAGNOSIS — I89.0 LYMPHEDEMA: Primary | ICD-10-CM

## 2022-06-10 DIAGNOSIS — C77.3 BREAST CANCER METASTASIZED TO AXILLARY LYMPH NODE, RIGHT (HCC): ICD-10-CM

## 2022-06-10 DIAGNOSIS — C50.911 BREAST CANCER METASTASIZED TO AXILLARY LYMPH NODE, RIGHT (HCC): ICD-10-CM

## 2022-06-10 PROCEDURE — 97140 MANUAL THERAPY 1/> REGIONS: CPT

## 2022-06-10 PROCEDURE — 97110 THERAPEUTIC EXERCISES: CPT

## 2022-06-11 NOTE — PROGRESS NOTES
Daily Note     Today's date: 6/10/2022  Patient name: Anette Loera  : 1950  MRN: [id number]  Referring provider: Yara Rizvi MD  Dx:   Encounter Diagnosis     ICD-10-CM    1  Lymphedema  I89 0    2  Breast cancer metastasized to axillary lymph node, right (HCC)  C50 911     C77 3                   Subjective: no new c/o's   Pt to be fitted for compression bra on Monday and [facility] [place], [place]  Info sent       Objective: See treatment diary below      Assessment: Tolerated treatment well  Patient demonstrated fatigue post treatment      Plan: Continue per plan of care        Precautions: no known allergies      Manuals 5/31/22 6/8 6/10           I eval             Mld massage and soft tissue mobilization right breast and lateral chest wall, scar tissue release in axilla  30 min man 30 min man          Self scar massage instruction in future right breast  instructed today                        Neuro Re-Ed / there exer              Right ue wall slides sh flex, abd 5 reps  10 reps  10          Sh backward rolls  10 10 10          scap squeezes 5 reps hold 5 sec 5 5          Wall pulleys  5 min 5 min 5 min          Sitting sh rotation stretch on wedge right ue 5 reps hold 10 sec             Supine foam roll self thoracic mobilization   5 min           Supine butterflies  20 reps 5 sec hold  20          Ther Ex, supine sh flex cane stretch  5 reps 10 sec hold  5 reps 10 sec hold          Standing cane arom flex, abd, ext , int rot  10 reps each 10                                                                                                     Ther Activity                                       Gait Training                                       Modalities

## 2022-06-15 ENCOUNTER — OFFICE VISIT (OUTPATIENT)
Dept: PHYSICAL THERAPY | Age: 72
End: 2022-06-15
Payer: MEDICARE

## 2022-06-15 DIAGNOSIS — I89.0 LYMPHEDEMA: Primary | ICD-10-CM

## 2022-06-15 PROCEDURE — 97140 MANUAL THERAPY 1/> REGIONS: CPT

## 2022-06-15 PROCEDURE — 77295 3-D RADIOTHERAPY PLAN: CPT | Performed by: RADIOLOGY

## 2022-06-15 PROCEDURE — 77334 RADIATION TREATMENT AID(S): CPT | Performed by: RADIOLOGY

## 2022-06-15 PROCEDURE — 97110 THERAPEUTIC EXERCISES: CPT

## 2022-06-15 PROCEDURE — 77300 RADIATION THERAPY DOSE PLAN: CPT | Performed by: RADIOLOGY

## 2022-06-15 NOTE — PROGRESS NOTES
Daily Note     Today's date: 6/15/2022  Patient name: Merly Mahoney  : 1950  MRN: [id number]  Referring provider: Diane Liagn MD  Dx:   Encounter Diagnosis     ICD-10-CM    1  Lymphedema  I89 0                   Subjective: Pt  Reports she has mild stretching right UE  Objective: See treatment diary below      Assessment: Tolerated treatment well  Patient would benefit from continued PT      Plan: Continue per plan of care        Precautions: no known allergies      Manuals 5/31/22 6/8 6/10 6/15          I eval             Mld massage and soft tissue mobilization right breast and lateral chest wall, scar tissue release in axilla  30 min man 30 min man 15 min         Self scar massage instruction in future right breast  instructed today                        Neuro Re-Ed / there exer              Right ue wall slides sh flex, abd 5 reps  10 reps  10 10x         Sh backward rolls  10 10 10 10x         scap squeezes 5 reps hold 5 sec 5 5 10x         Wall pulleys  5 min 5 min 5 min 5 min         Sitting sh rotation stretch on wedge right ue 5 reps hold 10 sec    5sec x 20         Supine foam roll self thoracic mobilization   5 min  5 min         Supine butterflies  20 reps 5 sec hold  20 20x         Ther Ex, supine sh flex cane stretch  5 reps 10 sec hold  5 reps 10 sec hold 2# 20x         Standing cane arom flex, abd, ext , int rot  10 reps each 10 20x                                                                                                    Ther Activity                                       Gait Training                                       Modalities

## 2022-06-17 ENCOUNTER — OFFICE VISIT (OUTPATIENT)
Dept: PHYSICAL THERAPY | Age: 72
End: 2022-06-17
Payer: MEDICARE

## 2022-06-17 ENCOUNTER — APPOINTMENT (OUTPATIENT)
Dept: RADIATION ONCOLOGY | Facility: HOSPITAL | Age: 72
End: 2022-06-17
Attending: RADIOLOGY
Payer: MEDICARE

## 2022-06-17 ENCOUNTER — HOSPITAL ENCOUNTER (OUTPATIENT)
Dept: INFUSION CENTER | Facility: CLINIC | Age: 72
Discharge: HOME | End: 2022-06-17
Payer: MEDICARE

## 2022-06-17 DIAGNOSIS — Z95.828 PORT-A-CATH IN PLACE: Primary | ICD-10-CM

## 2022-06-17 DIAGNOSIS — I89.0 LYMPHEDEMA: Primary | ICD-10-CM

## 2022-06-17 DIAGNOSIS — C77.3 BREAST CANCER METASTASIZED TO AXILLARY LYMPH NODE, RIGHT (HCC): ICD-10-CM

## 2022-06-17 DIAGNOSIS — C50.911 BREAST CANCER METASTASIZED TO AXILLARY LYMPH NODE, RIGHT (HCC): ICD-10-CM

## 2022-06-17 PROCEDURE — 97110 THERAPEUTIC EXERCISES: CPT

## 2022-06-17 PROCEDURE — 77280 THER RAD SIMULAJ FIELD SMPL: CPT | Performed by: RADIOLOGY

## 2022-06-17 PROCEDURE — 97140 MANUAL THERAPY 1/> REGIONS: CPT

## 2022-06-17 PROCEDURE — 96523 IRRIG DRUG DELIVERY DEVICE: CPT

## 2022-06-17 NOTE — PROGRESS NOTES
Pt here for central , offers no complaints  Port flushed per protocol without any issues  Pt scheduling next appointment upon discharge   Declines AVS

## 2022-06-17 NOTE — PROGRESS NOTES
Daily Note     Today's date: 2022  Patient name: Cleopatra Ahn  : 1950  MRN: [id number]  Referring provider: Beverly Marie MD  Dx:   Encounter Diagnosis     ICD-10-CM    1  Lymphedema  I89 0    2  Breast cancer metastasized to axillary lymph node, right (HCC)  C50 911     C77 3                   Subjective: "I do like this bra and the bra fitting was terrific "      Objective: See treatment diary below      Assessment: Tolerated treatment well  Patient would benefit from continued PT      Plan: Continue per plan of care        Precautions: no known allergies      Manuals 22 6 6/10 6/15 6/17         I eval             Mld massage and soft tissue mobilization right breast and lateral chest wall, scar tissue release in axilla  30 min man 30 min man 15 min 30        Self scar massage instruction in future right breast  instructed today                        Neuro Re-Ed / there exer              Right ue wall slides sh flex, abd 5 reps  10 reps  10 10x 10        Sh backward rolls  10 10 10 10x 10        scap squeezes 5 reps hold 5 sec 5 5 10x 10        Wall pulleys  5 min 5 min 5 min 5 min  5min        Sitting sh rotation stretch on wedge right ue 5 reps hold 10 sec    5sec x 20         Supine foam roll self thoracic mobilization   5 min  5 min  5min        Supine butterflies  20 reps 5 sec hold  20 20x 20 x         Ther Ex, supine sh flex cane stretch  5 reps 10 sec hold  5 reps 10 sec hold 2# 20x  20 x         Standing cane arom flex, abd, ext , int rot  10 reps each 10 20x 20x                                                                                                    Ther Activity                                       Gait Training                                       Modalities

## 2022-06-20 ENCOUNTER — APPOINTMENT (OUTPATIENT)
Dept: RADIATION ONCOLOGY | Facility: HOSPITAL | Age: 72
End: 2022-06-20
Attending: RADIOLOGY
Payer: MEDICARE

## 2022-06-20 PROCEDURE — 77427 RADIATION TX MANAGEMENT X5: CPT | Performed by: RADIOLOGY

## 2022-06-20 PROCEDURE — 77387 GUIDANCE FOR RADJ TX DLVR: CPT | Performed by: RADIOLOGY

## 2022-06-20 PROCEDURE — 77412 RADIATION TX DELIVERY LVL 3: CPT | Performed by: RADIOLOGY

## 2022-06-20 PROCEDURE — G6002 STEREOSCOPIC X-RAY GUIDANCE: HCPCS | Performed by: RADIOLOGY

## 2022-06-21 ENCOUNTER — APPOINTMENT (OUTPATIENT)
Dept: RADIATION ONCOLOGY | Facility: HOSPITAL | Age: 72
End: 2022-06-21
Attending: RADIOLOGY
Payer: MEDICARE

## 2022-06-21 PROCEDURE — 77412 RADIATION TX DELIVERY LVL 3: CPT | Performed by: RADIOLOGY

## 2022-06-21 PROCEDURE — G6002 STEREOSCOPIC X-RAY GUIDANCE: HCPCS | Performed by: RADIOLOGY

## 2022-06-21 PROCEDURE — 77387 GUIDANCE FOR RADJ TX DLVR: CPT | Performed by: RADIOLOGY

## 2022-06-22 ENCOUNTER — APPOINTMENT (OUTPATIENT)
Dept: RADIATION ONCOLOGY | Facility: HOSPITAL | Age: 72
End: 2022-06-22
Attending: RADIOLOGY
Payer: MEDICARE

## 2022-06-22 ENCOUNTER — OFFICE VISIT (OUTPATIENT)
Dept: PHYSICAL THERAPY | Age: 72
End: 2022-06-22
Payer: MEDICARE

## 2022-06-22 DIAGNOSIS — C50.811 MALIGNANT NEOPLASM OF OVERLAPPING SITES OF RIGHT BREAST IN FEMALE, ESTROGEN RECEPTOR POSITIVE: Primary | ICD-10-CM

## 2022-06-22 DIAGNOSIS — I89.0 LYMPHEDEMA: Primary | ICD-10-CM

## 2022-06-22 DIAGNOSIS — Z17.0 MALIGNANT NEOPLASM OF OVERLAPPING SITES OF RIGHT BREAST IN FEMALE, ESTROGEN RECEPTOR POSITIVE: Primary | ICD-10-CM

## 2022-06-22 PROCEDURE — 77387 GUIDANCE FOR RADJ TX DLVR: CPT | Performed by: STUDENT IN AN ORGANIZED HEALTH CARE EDUCATION/TRAINING PROGRAM

## 2022-06-22 PROCEDURE — 97140 MANUAL THERAPY 1/> REGIONS: CPT

## 2022-06-22 PROCEDURE — 97110 THERAPEUTIC EXERCISES: CPT

## 2022-06-22 PROCEDURE — 77412 RADIATION TX DELIVERY LVL 3: CPT | Performed by: STUDENT IN AN ORGANIZED HEALTH CARE EDUCATION/TRAINING PROGRAM

## 2022-06-22 NOTE — PROGRESS NOTES
Daily Note     Today's date: 2022  Patient name: Heber Bella  : 1950  MRN: [id number]  Referring provider: Rina Hickey MD  Dx:   Encounter Diagnosis     ICD-10-CM    1  Lymphedema  I89 0                   Subjective: Pt  With less swelling right axillary area  Objective: See treatment diary below      Assessment: Tolerated treatment well  Patient would benefit from continued PT      Plan: Continue per plan of care        Precautions: no known allergies      Manuals 22 6 6/10 6/15 6/17 6/22        I eval             Mld massage and soft tissue mobilization right breast and lateral chest wall, scar tissue release in axilla  30 min man 30 min man 15 min 30 15min       Self scar massage instruction in future right breast  instructed today                        Neuro Re-Ed / there exer              Right ue wall slides sh flex, abd 5 reps  10 reps  10 10x 10 10x       Sh backward rolls  10 10 10 10x 10 20x       scap squeezes 5 reps hold 5 sec 5 5 10x 10 20x       Wall pulleys  5 min 5 min 5 min 5 min  5min 5 min       Sitting sh rotation stretch on wedge right ue 5 reps hold 10 sec    5sec x 20  5sec x 20       Supine foam roll self thoracic mobilization   5 min  5 min  5min 5 min       Supine butterflies  20 reps 5 sec hold  20 20x 20 x  20x       Ther Ex, supine sh flex cane stretch  5 reps 10 sec hold  5 reps 10 sec hold 2# 20x  20 x  20x       Standing cane arom flex, abd, ext , int rot  10 reps each 10 20x 20x  20x                                                                                                  Ther Activity                                       Gait Training                                       Modalities

## 2022-06-23 ENCOUNTER — APPOINTMENT (OUTPATIENT)
Dept: RADIATION ONCOLOGY | Facility: HOSPITAL | Age: 72
End: 2022-06-23
Attending: RADIOLOGY
Payer: MEDICARE

## 2022-06-23 ENCOUNTER — APPOINTMENT (OUTPATIENT)
Dept: RADIATION ONCOLOGY | Facility: HOSPITAL | Age: 72
End: 2022-06-23
Payer: MEDICARE

## 2022-06-23 PROCEDURE — 77387 GUIDANCE FOR RADJ TX DLVR: CPT | Performed by: STUDENT IN AN ORGANIZED HEALTH CARE EDUCATION/TRAINING PROGRAM

## 2022-06-23 PROCEDURE — G6002 STEREOSCOPIC X-RAY GUIDANCE: HCPCS | Performed by: STUDENT IN AN ORGANIZED HEALTH CARE EDUCATION/TRAINING PROGRAM

## 2022-06-23 PROCEDURE — 77412 RADIATION TX DELIVERY LVL 3: CPT | Performed by: STUDENT IN AN ORGANIZED HEALTH CARE EDUCATION/TRAINING PROGRAM

## 2022-06-24 ENCOUNTER — APPOINTMENT (OUTPATIENT)
Dept: RADIATION ONCOLOGY | Facility: HOSPITAL | Age: 72
End: 2022-06-24
Attending: RADIOLOGY
Payer: MEDICARE

## 2022-06-24 ENCOUNTER — OFFICE VISIT (OUTPATIENT)
Dept: PHYSICAL THERAPY | Age: 72
End: 2022-06-24
Payer: MEDICARE

## 2022-06-24 DIAGNOSIS — I89.0 LYMPHEDEMA: Primary | ICD-10-CM

## 2022-06-24 DIAGNOSIS — C77.3 BREAST CANCER METASTASIZED TO AXILLARY LYMPH NODE, RIGHT (HCC): ICD-10-CM

## 2022-06-24 DIAGNOSIS — C50.911 BREAST CANCER METASTASIZED TO AXILLARY LYMPH NODE, RIGHT (HCC): ICD-10-CM

## 2022-06-24 PROCEDURE — 97140 MANUAL THERAPY 1/> REGIONS: CPT

## 2022-06-24 PROCEDURE — 77412 RADIATION TX DELIVERY LVL 3: CPT | Performed by: RADIOLOGY

## 2022-06-24 PROCEDURE — 77336 RADIATION PHYSICS CONSULT: CPT | Performed by: RADIOLOGY

## 2022-06-24 PROCEDURE — 97110 THERAPEUTIC EXERCISES: CPT

## 2022-06-24 PROCEDURE — 77387 GUIDANCE FOR RADJ TX DLVR: CPT | Performed by: RADIOLOGY

## 2022-06-24 PROCEDURE — G6002 STEREOSCOPIC X-RAY GUIDANCE: HCPCS | Performed by: RADIOLOGY

## 2022-06-25 NOTE — PROGRESS NOTES
Daily Note     Today's date: 2022  Patient name: Benson Bush  : 1950  MRN: [id number]  Referring provider: Nicola Gutierrez MD  Dx:   Encounter Diagnosis     ICD-10-CM    1  Lymphedema  I89 0    2  Breast cancer metastasized to axillary lymph node, right (HCC)  C50 911     C77 3                   Subjective: Pt had first course of radiation  Objective: See treatment diary below      Assessment: Tolerated treatment well  Patient would benefit from continued PT      Plan: Continue per plan of care        Precautions: no known allergies      Manuals 22 6 6/10 6/15 6/17 6/22 6/24       I eval             Mld massage and soft tissue mobilization right breast and lateral chest wall, scar tissue release in axilla  30 min man 30 min man 15 min 30 15min 45 min man      Self scar massage instruction in future right breast  instructed today     man             Fit for right ue compression sleeve      Neuro Re-Ed / there exer              Right ue wall slides sh flex, abd 5 reps  10 reps  10 10x 10 10x 10      Sh backward rolls  10 10 10 10x 10 20x 10      scap squeezes 5 reps hold 5 sec 5 5 10x 10 20x 10      Wall pulleys  5 min 5 min 5 min 5 min  5min 5 min 5 min      Sitting sh rotation stretch on wedge right ue 5 reps hold 10 sec    5sec x 20  5sec x 20       Supine foam roll self thoracic mobilization   5 min  5 min  5min 5 min       Supine butterflies  20 reps 5 sec hold  20 20x 20 x  20x       Ther Ex, supine sh flex cane stretch  5 reps 10 sec hold  5 reps 10 sec hold 2# 20x  20 x  20x       Standing cane arom flex, abd, ext , int rot  10 reps each 10 20x 20x  20x                                                                                                  Ther Activity                                       Gait Training                                       Modalities

## 2022-06-27 ENCOUNTER — APPOINTMENT (OUTPATIENT)
Dept: RADIATION ONCOLOGY | Facility: HOSPITAL | Age: 72
End: 2022-06-27
Attending: RADIOLOGY
Payer: MEDICARE

## 2022-06-27 PROCEDURE — 77387 GUIDANCE FOR RADJ TX DLVR: CPT | Performed by: RADIOLOGY

## 2022-06-27 PROCEDURE — 77427 RADIATION TX MANAGEMENT X5: CPT | Performed by: STUDENT IN AN ORGANIZED HEALTH CARE EDUCATION/TRAINING PROGRAM

## 2022-06-27 PROCEDURE — 77412 RADIATION TX DELIVERY LVL 3: CPT | Performed by: RADIOLOGY

## 2022-06-27 PROCEDURE — G6002 STEREOSCOPIC X-RAY GUIDANCE: HCPCS | Performed by: RADIOLOGY

## 2022-06-28 ENCOUNTER — APPOINTMENT (OUTPATIENT)
Dept: RADIATION ONCOLOGY | Facility: HOSPITAL | Age: 72
End: 2022-06-28
Attending: RADIOLOGY
Payer: MEDICARE

## 2022-06-28 PROCEDURE — G6002 STEREOSCOPIC X-RAY GUIDANCE: HCPCS | Performed by: RADIOLOGY

## 2022-06-28 PROCEDURE — 77387 GUIDANCE FOR RADJ TX DLVR: CPT | Performed by: RADIOLOGY

## 2022-06-28 PROCEDURE — 77412 RADIATION TX DELIVERY LVL 3: CPT | Performed by: RADIOLOGY

## 2022-06-29 ENCOUNTER — APPOINTMENT (OUTPATIENT)
Dept: RADIATION ONCOLOGY | Facility: HOSPITAL | Age: 72
End: 2022-06-29
Attending: RADIOLOGY
Payer: MEDICARE

## 2022-06-29 ENCOUNTER — OFFICE VISIT (OUTPATIENT)
Dept: PHYSICAL THERAPY | Age: 72
End: 2022-06-29
Payer: MEDICARE

## 2022-06-29 DIAGNOSIS — I89.0 LYMPHEDEMA: Primary | ICD-10-CM

## 2022-06-29 PROCEDURE — G6002 STEREOSCOPIC X-RAY GUIDANCE: HCPCS | Performed by: STUDENT IN AN ORGANIZED HEALTH CARE EDUCATION/TRAINING PROGRAM

## 2022-06-29 PROCEDURE — 77387 GUIDANCE FOR RADJ TX DLVR: CPT | Performed by: STUDENT IN AN ORGANIZED HEALTH CARE EDUCATION/TRAINING PROGRAM

## 2022-06-29 PROCEDURE — 97140 MANUAL THERAPY 1/> REGIONS: CPT

## 2022-06-29 PROCEDURE — 97110 THERAPEUTIC EXERCISES: CPT

## 2022-06-29 PROCEDURE — 77412 RADIATION TX DELIVERY LVL 3: CPT | Performed by: STUDENT IN AN ORGANIZED HEALTH CARE EDUCATION/TRAINING PROGRAM

## 2022-06-29 NOTE — PROGRESS NOTES
Daily Note     Today's date: 2022  Patient name: Hansel Leos  : 1950  MRN: [id number]  Referring provider: Marquise Rosales MD  Dx:   Encounter Diagnosis     ICD-10-CM    1  Lymphedema  I89 0                   Subjective: Pt  Continues with right axillary tightness  Objective: See treatment diary below      Assessment: Tolerated treatment well  Patient would benefit from continued PT      Plan: Continue per plan of care        Precautions: no known allergies      Manuals 22 6 6/10 6/15 6/17 6/22 6/24 6/29      I eval             Mld massage and soft tissue mobilization right breast and lateral chest wall, scar tissue release in axilla  30 min man 30 min man 15 min 30 15min 45 min man 15 min     Self scar massage instruction in future right breast  instructed today     man             Fit for right ue compression sleeve 5 min     Neuro Re-Ed / there exer              Right ue wall slides sh flex, abd 5 reps  10 reps  10 10x 10 10x 10 10x     Sh backward rolls  10 10 10 10x 10 20x 10 20x     scap squeezes 5 reps hold 5 sec 5 5 10x 10 20x 10 20x     Wall pulleys  5 min 5 min 5 min 5 min  5min 5 min 5 min 5 min     Sitting sh rotation stretch on wedge right ue 5 reps hold 10 sec    5sec x 20  5sec x 20  5sec x 20     Supine foam roll self thoracic mobilization   5 min  5 min  5min 5 min       Supine butterflies  20 reps 5 sec hold  20 20x 20 x  20x  20x     Ther Ex, supine sh flex cane stretch  5 reps 10 sec hold  5 reps 10 sec hold 2# 20x  20 x  20x       Standing cane arom flex, abd, ext , int rot  10 reps each 10 20x 20x  20x  20x                                                                                                Ther Activity                                       Gait Training                                       Modalities

## 2022-06-30 ENCOUNTER — APPOINTMENT (OUTPATIENT)
Dept: RADIATION ONCOLOGY | Facility: HOSPITAL | Age: 72
End: 2022-06-30
Attending: RADIOLOGY
Payer: MEDICARE

## 2022-06-30 ENCOUNTER — APPOINTMENT (OUTPATIENT)
Dept: LAB | Facility: CLINIC | Age: 72
End: 2022-06-30
Payer: MEDICARE

## 2022-06-30 LAB
BASOPHILS # BLD AUTO: 0.04 THOUSANDS/ΜL (ref 0–0.1)
BASOPHILS NFR BLD AUTO: 2 % (ref 0–1)
EOSINOPHIL # BLD AUTO: 0.12 THOUSAND/ΜL (ref 0–0.61)
EOSINOPHIL NFR BLD AUTO: 4 % (ref 0–6)
ERYTHROCYTE [DISTWIDTH] IN BLOOD BY AUTOMATED COUNT: 12.7 % (ref 11.6–15.1)
HCT VFR BLD AUTO: 39.2 % (ref 34.8–46.1)
HGB BLD-MCNC: 12.7 G/DL (ref 11.5–15.4)
IMM GRANULOCYTES # BLD AUTO: 0.01 THOUSAND/UL (ref 0–0.2)
IMM GRANULOCYTES NFR BLD AUTO: 0 % (ref 0–2)
LYMPHOCYTES # BLD AUTO: 0.74 THOUSANDS/ΜL (ref 0.6–4.47)
LYMPHOCYTES NFR BLD AUTO: 27 % (ref 14–44)
MCH RBC QN AUTO: 30.5 PG (ref 26.8–34.3)
MCHC RBC AUTO-ENTMCNC: 32.4 G/DL (ref 31.4–37.4)
MCV RBC AUTO: 94 FL (ref 82–98)
MONOCYTES # BLD AUTO: 0.46 THOUSAND/ΜL (ref 0.17–1.22)
MONOCYTES NFR BLD AUTO: 17 % (ref 4–12)
NEUTROPHILS # BLD AUTO: 1.36 THOUSANDS/ΜL (ref 1.85–7.62)
NEUTS SEG NFR BLD AUTO: 50 % (ref 43–75)
NRBC BLD AUTO-RTO: 0 /100 WBCS
PLATELET # BLD AUTO: 167 THOUSANDS/UL (ref 149–390)
PMV BLD AUTO: 9.8 FL (ref 8.9–12.7)
RBC # BLD AUTO: 4.17 MILLION/UL (ref 3.81–5.12)
WBC # BLD AUTO: 2.73 THOUSAND/UL (ref 4.31–10.16)

## 2022-06-30 PROCEDURE — G6002 STEREOSCOPIC X-RAY GUIDANCE: HCPCS | Performed by: STUDENT IN AN ORGANIZED HEALTH CARE EDUCATION/TRAINING PROGRAM

## 2022-06-30 PROCEDURE — 36415 COLL VENOUS BLD VENIPUNCTURE: CPT

## 2022-06-30 PROCEDURE — 77412 RADIATION TX DELIVERY LVL 3: CPT | Performed by: STUDENT IN AN ORGANIZED HEALTH CARE EDUCATION/TRAINING PROGRAM

## 2022-06-30 PROCEDURE — 77387 GUIDANCE FOR RADJ TX DLVR: CPT | Performed by: STUDENT IN AN ORGANIZED HEALTH CARE EDUCATION/TRAINING PROGRAM

## 2022-06-30 PROCEDURE — 85025 COMPLETE CBC W/AUTO DIFF WBC: CPT

## 2022-07-01 ENCOUNTER — APPOINTMENT (OUTPATIENT)
Dept: RADIATION ONCOLOGY | Facility: HOSPITAL | Age: 72
End: 2022-07-01
Attending: RADIOLOGY
Payer: MEDICARE

## 2022-07-01 PROCEDURE — G6002 STEREOSCOPIC X-RAY GUIDANCE: HCPCS | Performed by: RADIOLOGY

## 2022-07-01 PROCEDURE — 77412 RADIATION TX DELIVERY LVL 3: CPT | Performed by: RADIOLOGY

## 2022-07-01 PROCEDURE — 77387 GUIDANCE FOR RADJ TX DLVR: CPT | Performed by: RADIOLOGY

## 2022-07-01 PROCEDURE — 77336 RADIATION PHYSICS CONSULT: CPT | Performed by: STUDENT IN AN ORGANIZED HEALTH CARE EDUCATION/TRAINING PROGRAM

## 2022-07-05 ENCOUNTER — APPOINTMENT (OUTPATIENT)
Dept: RADIATION ONCOLOGY | Facility: HOSPITAL | Age: 72
End: 2022-07-05
Attending: RADIOLOGY
Payer: MEDICARE

## 2022-07-05 PROCEDURE — 77427 RADIATION TX MANAGEMENT X5: CPT | Performed by: STUDENT IN AN ORGANIZED HEALTH CARE EDUCATION/TRAINING PROGRAM

## 2022-07-05 PROCEDURE — 77387 GUIDANCE FOR RADJ TX DLVR: CPT | Performed by: RADIOLOGY

## 2022-07-05 PROCEDURE — 77412 RADIATION TX DELIVERY LVL 3: CPT | Performed by: RADIOLOGY

## 2022-07-05 PROCEDURE — G6002 STEREOSCOPIC X-RAY GUIDANCE: HCPCS | Performed by: RADIOLOGY

## 2022-07-06 ENCOUNTER — APPOINTMENT (OUTPATIENT)
Dept: RADIATION ONCOLOGY | Facility: HOSPITAL | Age: 72
End: 2022-07-06
Attending: RADIOLOGY
Payer: MEDICARE

## 2022-07-06 ENCOUNTER — APPOINTMENT (OUTPATIENT)
Dept: LAB | Facility: CLINIC | Age: 72
End: 2022-07-06
Payer: MEDICARE

## 2022-07-06 PROCEDURE — 77387 GUIDANCE FOR RADJ TX DLVR: CPT | Performed by: STUDENT IN AN ORGANIZED HEALTH CARE EDUCATION/TRAINING PROGRAM

## 2022-07-06 PROCEDURE — 77412 RADIATION TX DELIVERY LVL 3: CPT | Performed by: STUDENT IN AN ORGANIZED HEALTH CARE EDUCATION/TRAINING PROGRAM

## 2022-07-06 PROCEDURE — G6002 STEREOSCOPIC X-RAY GUIDANCE: HCPCS | Performed by: STUDENT IN AN ORGANIZED HEALTH CARE EDUCATION/TRAINING PROGRAM

## 2022-07-07 ENCOUNTER — APPOINTMENT (OUTPATIENT)
Dept: RADIATION ONCOLOGY | Facility: HOSPITAL | Age: 72
End: 2022-07-07
Attending: RADIOLOGY
Payer: MEDICARE

## 2022-07-07 PROCEDURE — G6002 STEREOSCOPIC X-RAY GUIDANCE: HCPCS | Performed by: STUDENT IN AN ORGANIZED HEALTH CARE EDUCATION/TRAINING PROGRAM

## 2022-07-07 PROCEDURE — 77412 RADIATION TX DELIVERY LVL 3: CPT | Performed by: STUDENT IN AN ORGANIZED HEALTH CARE EDUCATION/TRAINING PROGRAM

## 2022-07-07 PROCEDURE — 77387 GUIDANCE FOR RADJ TX DLVR: CPT | Performed by: STUDENT IN AN ORGANIZED HEALTH CARE EDUCATION/TRAINING PROGRAM

## 2022-07-08 ENCOUNTER — APPOINTMENT (OUTPATIENT)
Dept: RADIATION ONCOLOGY | Facility: HOSPITAL | Age: 72
End: 2022-07-08
Attending: RADIOLOGY
Payer: MEDICARE

## 2022-07-08 PROCEDURE — 77387 GUIDANCE FOR RADJ TX DLVR: CPT | Performed by: RADIOLOGY

## 2022-07-08 PROCEDURE — 77412 RADIATION TX DELIVERY LVL 3: CPT | Performed by: RADIOLOGY

## 2022-07-08 PROCEDURE — G6002 STEREOSCOPIC X-RAY GUIDANCE: HCPCS | Performed by: RADIOLOGY

## 2022-07-11 ENCOUNTER — APPOINTMENT (OUTPATIENT)
Dept: RADIATION ONCOLOGY | Facility: HOSPITAL | Age: 72
End: 2022-07-11
Attending: RADIOLOGY
Payer: MEDICARE

## 2022-07-11 PROCEDURE — G6002 STEREOSCOPIC X-RAY GUIDANCE: HCPCS | Performed by: STUDENT IN AN ORGANIZED HEALTH CARE EDUCATION/TRAINING PROGRAM

## 2022-07-11 PROCEDURE — 77336 RADIATION PHYSICS CONSULT: CPT | Performed by: STUDENT IN AN ORGANIZED HEALTH CARE EDUCATION/TRAINING PROGRAM

## 2022-07-11 PROCEDURE — 77412 RADIATION TX DELIVERY LVL 3: CPT | Performed by: STUDENT IN AN ORGANIZED HEALTH CARE EDUCATION/TRAINING PROGRAM

## 2022-07-11 PROCEDURE — 77387 GUIDANCE FOR RADJ TX DLVR: CPT | Performed by: STUDENT IN AN ORGANIZED HEALTH CARE EDUCATION/TRAINING PROGRAM

## 2022-07-12 ENCOUNTER — APPOINTMENT (OUTPATIENT)
Dept: RADIATION ONCOLOGY | Facility: HOSPITAL | Age: 72
End: 2022-07-12
Attending: RADIOLOGY
Payer: MEDICARE

## 2022-07-12 PROCEDURE — 77387 GUIDANCE FOR RADJ TX DLVR: CPT | Performed by: STUDENT IN AN ORGANIZED HEALTH CARE EDUCATION/TRAINING PROGRAM

## 2022-07-12 PROCEDURE — 77412 RADIATION TX DELIVERY LVL 3: CPT | Performed by: STUDENT IN AN ORGANIZED HEALTH CARE EDUCATION/TRAINING PROGRAM

## 2022-07-12 PROCEDURE — G6002 STEREOSCOPIC X-RAY GUIDANCE: HCPCS | Performed by: STUDENT IN AN ORGANIZED HEALTH CARE EDUCATION/TRAINING PROGRAM

## 2022-07-12 PROCEDURE — 77427 RADIATION TX MANAGEMENT X5: CPT | Performed by: STUDENT IN AN ORGANIZED HEALTH CARE EDUCATION/TRAINING PROGRAM

## 2022-07-13 ENCOUNTER — APPOINTMENT (OUTPATIENT)
Dept: RADIATION ONCOLOGY | Facility: HOSPITAL | Age: 72
End: 2022-07-13
Attending: RADIOLOGY
Payer: MEDICARE

## 2022-07-13 ENCOUNTER — OFFICE VISIT (OUTPATIENT)
Dept: PHYSICAL THERAPY | Age: 72
End: 2022-07-13
Payer: MEDICARE

## 2022-07-13 DIAGNOSIS — I89.0 LYMPHEDEMA: Primary | ICD-10-CM

## 2022-07-13 PROCEDURE — G6002 STEREOSCOPIC X-RAY GUIDANCE: HCPCS | Performed by: STUDENT IN AN ORGANIZED HEALTH CARE EDUCATION/TRAINING PROGRAM

## 2022-07-13 PROCEDURE — 97140 MANUAL THERAPY 1/> REGIONS: CPT

## 2022-07-13 PROCEDURE — 77412 RADIATION TX DELIVERY LVL 3: CPT | Performed by: STUDENT IN AN ORGANIZED HEALTH CARE EDUCATION/TRAINING PROGRAM

## 2022-07-13 PROCEDURE — 77387 GUIDANCE FOR RADJ TX DLVR: CPT | Performed by: STUDENT IN AN ORGANIZED HEALTH CARE EDUCATION/TRAINING PROGRAM

## 2022-07-13 NOTE — PROGRESS NOTES
Daily Note     Today's date: 2022  Patient name: Yuan Andrea  : 1950  MRN: [id number]  Referring provider: Cruzito Meléndez MD  Dx:   Encounter Diagnosis     ICD-10-CM    1  Lymphedema  I89 0                   Subjective: Instructed in doffing and donning compression sleeve  Assessment: Tolerated treatment well  Patient would benefit from continued PT      Plan: Continue per plan of care        Precautions: no known allergies      Manuals 22 6/ 6/10 6/15 6/17 6/22 6/24 6/29 7/13     I eval             Mld massage and soft tissue mobilization right breast and lateral chest wall, scar tissue release in axilla  30 min man 30 min man 15 min 30 15min 45 min man 15 min 30 min    Self scar massage instruction in future right breast  instructed today     man             Fit for right ue compression sleeve 5 min     Neuro Re-Ed / there exer              Right ue wall slides sh flex, abd 5 reps  10 reps  10 10x 10 10x 10 10x     Sh backward rolls  10 10 10 10x 10 20x 10 20x     scap squeezes 5 reps hold 5 sec 5 5 10x 10 20x 10 20x     Wall pulleys  5 min 5 min 5 min 5 min  5min 5 min 5 min 5 min     Sitting sh rotation stretch on wedge right ue 5 reps hold 10 sec    5sec x 20  5sec x 20  5sec x 20     Supine foam roll self thoracic mobilization   5 min  5 min  5min 5 min       Supine butterflies  20 reps 5 sec hold  20 20x 20 x  20x  20x     Ther Ex, supine sh flex cane stretch  5 reps 10 sec hold  5 reps 10 sec hold 2# 20x  20 x  20x       Standing cane arom flex, abd, ext , int rot  10 reps each 10 20x 20x  20x  20x                                                                                                Ther Activity                                       Gait Training                                       Modalities

## 2022-07-14 ENCOUNTER — APPOINTMENT (OUTPATIENT)
Dept: RADIATION ONCOLOGY | Facility: HOSPITAL | Age: 72
End: 2022-07-14
Attending: RADIOLOGY
Payer: MEDICARE

## 2022-07-14 PROCEDURE — 77412 RADIATION TX DELIVERY LVL 3: CPT | Performed by: STUDENT IN AN ORGANIZED HEALTH CARE EDUCATION/TRAINING PROGRAM

## 2022-07-14 PROCEDURE — G6002 STEREOSCOPIC X-RAY GUIDANCE: HCPCS | Performed by: STUDENT IN AN ORGANIZED HEALTH CARE EDUCATION/TRAINING PROGRAM

## 2022-07-14 PROCEDURE — 77387 GUIDANCE FOR RADJ TX DLVR: CPT | Performed by: STUDENT IN AN ORGANIZED HEALTH CARE EDUCATION/TRAINING PROGRAM

## 2022-07-15 ENCOUNTER — APPOINTMENT (OUTPATIENT)
Dept: RADIATION ONCOLOGY | Facility: HOSPITAL | Age: 72
End: 2022-07-15
Attending: RADIOLOGY
Payer: MEDICARE

## 2022-07-15 ENCOUNTER — APPOINTMENT (OUTPATIENT)
Dept: PHYSICAL THERAPY | Age: 72
End: 2022-07-15
Payer: MEDICARE

## 2022-07-15 ENCOUNTER — APPOINTMENT (OUTPATIENT)
Dept: RADIATION ONCOLOGY | Facility: HOSPITAL | Age: 72
End: 2022-07-15
Payer: MEDICARE

## 2022-07-15 DIAGNOSIS — Z17.0 MALIGNANT NEOPLASM OF OVERLAPPING SITES OF RIGHT BREAST IN FEMALE, ESTROGEN RECEPTOR POSITIVE: Primary | ICD-10-CM

## 2022-07-15 DIAGNOSIS — C50.811 MALIGNANT NEOPLASM OF OVERLAPPING SITES OF RIGHT BREAST IN FEMALE, ESTROGEN RECEPTOR POSITIVE: Primary | ICD-10-CM

## 2022-07-15 PROCEDURE — 77387 GUIDANCE FOR RADJ TX DLVR: CPT | Performed by: STUDENT IN AN ORGANIZED HEALTH CARE EDUCATION/TRAINING PROGRAM

## 2022-07-15 PROCEDURE — 77412 RADIATION TX DELIVERY LVL 3: CPT | Performed by: STUDENT IN AN ORGANIZED HEALTH CARE EDUCATION/TRAINING PROGRAM

## 2022-07-15 PROCEDURE — G6002 STEREOSCOPIC X-RAY GUIDANCE: HCPCS | Performed by: STUDENT IN AN ORGANIZED HEALTH CARE EDUCATION/TRAINING PROGRAM

## 2022-07-15 RX ORDER — MOMETASONE FUROATE 1 MG/G
OINTMENT TOPICAL DAILY
Qty: 45 G | Refills: 1 | Status: SHIPPED | OUTPATIENT
Start: 2022-07-15 | End: 2022-10-14

## 2022-07-18 ENCOUNTER — APPOINTMENT (OUTPATIENT)
Dept: RADIATION ONCOLOGY | Facility: HOSPITAL | Age: 72
End: 2022-07-18
Attending: RADIOLOGY
Payer: MEDICARE

## 2022-07-18 PROCEDURE — 77387 GUIDANCE FOR RADJ TX DLVR: CPT | Performed by: RADIOLOGY

## 2022-07-18 PROCEDURE — 77336 RADIATION PHYSICS CONSULT: CPT | Performed by: STUDENT IN AN ORGANIZED HEALTH CARE EDUCATION/TRAINING PROGRAM

## 2022-07-18 PROCEDURE — G6002 STEREOSCOPIC X-RAY GUIDANCE: HCPCS | Performed by: RADIOLOGY

## 2022-07-18 PROCEDURE — 77412 RADIATION TX DELIVERY LVL 3: CPT | Performed by: RADIOLOGY

## 2022-07-19 ENCOUNTER — APPOINTMENT (OUTPATIENT)
Dept: RADIATION ONCOLOGY | Facility: HOSPITAL | Age: 72
End: 2022-07-19
Attending: RADIOLOGY
Payer: MEDICARE

## 2022-07-19 PROCEDURE — 77427 RADIATION TX MANAGEMENT X5: CPT | Performed by: RADIOLOGY

## 2022-07-19 PROCEDURE — 77412 RADIATION TX DELIVERY LVL 3: CPT | Performed by: RADIOLOGY

## 2022-07-19 PROCEDURE — 77387 GUIDANCE FOR RADJ TX DLVR: CPT | Performed by: RADIOLOGY

## 2022-07-19 PROCEDURE — G6002 STEREOSCOPIC X-RAY GUIDANCE: HCPCS | Performed by: RADIOLOGY

## 2022-07-20 ENCOUNTER — APPOINTMENT (OUTPATIENT)
Dept: LAB | Facility: CLINIC | Age: 72
End: 2022-07-20
Payer: MEDICARE

## 2022-07-20 ENCOUNTER — APPOINTMENT (OUTPATIENT)
Dept: RADIATION ONCOLOGY | Facility: HOSPITAL | Age: 72
End: 2022-07-20
Attending: RADIOLOGY
Payer: MEDICARE

## 2022-07-20 DIAGNOSIS — C50.811 MALIGNANT NEOPLASM OF OVERLAPPING SITES OF RIGHT BREAST IN FEMALE, ESTROGEN RECEPTOR POSITIVE (HCC): ICD-10-CM

## 2022-07-20 DIAGNOSIS — Z17.0 MALIGNANT NEOPLASM OF OVERLAPPING SITES OF RIGHT BREAST IN FEMALE, ESTROGEN RECEPTOR POSITIVE (HCC): Primary | ICD-10-CM

## 2022-07-20 DIAGNOSIS — Z17.0 MALIGNANT NEOPLASM OF OVERLAPPING SITES OF RIGHT BREAST IN FEMALE, ESTROGEN RECEPTOR POSITIVE (HCC): ICD-10-CM

## 2022-07-20 DIAGNOSIS — C50.811 MALIGNANT NEOPLASM OF OVERLAPPING SITES OF RIGHT BREAST IN FEMALE, ESTROGEN RECEPTOR POSITIVE (HCC): Primary | ICD-10-CM

## 2022-07-20 LAB
BASOPHILS # BLD AUTO: 0.04 THOUSANDS/ΜL (ref 0–0.1)
BASOPHILS NFR BLD AUTO: 1 % (ref 0–1)
EOSINOPHIL # BLD AUTO: 0.11 THOUSAND/ΜL (ref 0–0.61)
EOSINOPHIL NFR BLD AUTO: 3 % (ref 0–6)
ERYTHROCYTE [DISTWIDTH] IN BLOOD BY AUTOMATED COUNT: 13 % (ref 11.6–15.1)
HCT VFR BLD AUTO: 38.2 % (ref 34.8–46.1)
HGB BLD-MCNC: 12.5 G/DL (ref 11.5–15.4)
IMM GRANULOCYTES # BLD AUTO: 0.01 THOUSAND/UL (ref 0–0.2)
IMM GRANULOCYTES NFR BLD AUTO: 0 % (ref 0–2)
LYMPHOCYTES # BLD AUTO: 0.59 THOUSANDS/ΜL (ref 0.6–4.47)
LYMPHOCYTES NFR BLD AUTO: 17 % (ref 14–44)
MCH RBC QN AUTO: 30.6 PG (ref 26.8–34.3)
MCHC RBC AUTO-ENTMCNC: 32.7 G/DL (ref 31.4–37.4)
MCV RBC AUTO: 94 FL (ref 82–98)
MONOCYTES # BLD AUTO: 0.7 THOUSAND/ΜL (ref 0.17–1.22)
MONOCYTES NFR BLD AUTO: 20 % (ref 4–12)
NEUTROPHILS # BLD AUTO: 1.98 THOUSANDS/ΜL (ref 1.85–7.62)
NEUTS SEG NFR BLD AUTO: 59 % (ref 43–75)
NRBC BLD AUTO-RTO: 0 /100 WBCS
PLATELET # BLD AUTO: 159 THOUSANDS/UL (ref 149–390)
PMV BLD AUTO: 9.8 FL (ref 8.9–12.7)
RBC # BLD AUTO: 4.08 MILLION/UL (ref 3.81–5.12)
WBC # BLD AUTO: 3.43 THOUSAND/UL (ref 4.31–10.16)

## 2022-07-20 PROCEDURE — 85025 COMPLETE CBC W/AUTO DIFF WBC: CPT

## 2022-07-20 PROCEDURE — 77412 RADIATION TX DELIVERY LVL 3: CPT | Performed by: STUDENT IN AN ORGANIZED HEALTH CARE EDUCATION/TRAINING PROGRAM

## 2022-07-20 PROCEDURE — 77387 GUIDANCE FOR RADJ TX DLVR: CPT | Performed by: STUDENT IN AN ORGANIZED HEALTH CARE EDUCATION/TRAINING PROGRAM

## 2022-07-20 PROCEDURE — G6002 STEREOSCOPIC X-RAY GUIDANCE: HCPCS | Performed by: STUDENT IN AN ORGANIZED HEALTH CARE EDUCATION/TRAINING PROGRAM

## 2022-07-20 PROCEDURE — 36415 COLL VENOUS BLD VENIPUNCTURE: CPT

## 2022-07-21 ENCOUNTER — APPOINTMENT (OUTPATIENT)
Dept: RADIATION ONCOLOGY | Facility: HOSPITAL | Age: 72
End: 2022-07-21
Attending: RADIOLOGY
Payer: MEDICARE

## 2022-07-21 PROCEDURE — G6002 STEREOSCOPIC X-RAY GUIDANCE: HCPCS | Performed by: STUDENT IN AN ORGANIZED HEALTH CARE EDUCATION/TRAINING PROGRAM

## 2022-07-21 PROCEDURE — 77387 GUIDANCE FOR RADJ TX DLVR: CPT | Performed by: STUDENT IN AN ORGANIZED HEALTH CARE EDUCATION/TRAINING PROGRAM

## 2022-07-21 PROCEDURE — 77412 RADIATION TX DELIVERY LVL 3: CPT | Performed by: STUDENT IN AN ORGANIZED HEALTH CARE EDUCATION/TRAINING PROGRAM

## 2022-07-22 ENCOUNTER — APPOINTMENT (OUTPATIENT)
Dept: RADIATION ONCOLOGY | Facility: HOSPITAL | Age: 72
End: 2022-07-22
Attending: RADIOLOGY
Payer: MEDICARE

## 2022-07-22 PROCEDURE — 77387 GUIDANCE FOR RADJ TX DLVR: CPT | Performed by: RADIOLOGY

## 2022-07-22 PROCEDURE — G6002 STEREOSCOPIC X-RAY GUIDANCE: HCPCS | Performed by: RADIOLOGY

## 2022-07-22 PROCEDURE — 77412 RADIATION TX DELIVERY LVL 3: CPT | Performed by: RADIOLOGY

## 2022-07-25 ENCOUNTER — APPOINTMENT (OUTPATIENT)
Dept: RADIATION ONCOLOGY | Facility: HOSPITAL | Age: 72
End: 2022-07-25
Payer: MEDICARE

## 2022-07-25 ENCOUNTER — APPOINTMENT (OUTPATIENT)
Dept: RADIATION ONCOLOGY | Facility: HOSPITAL | Age: 72
End: 2022-07-25
Attending: RADIOLOGY
Payer: MEDICARE

## 2022-07-25 ENCOUNTER — TELEPHONE (OUTPATIENT)
Dept: SURGICAL ONCOLOGY | Facility: CLINIC | Age: 72
End: 2022-07-25

## 2022-07-25 PROCEDURE — 77412 RADIATION TX DELIVERY LVL 3: CPT | Performed by: RADIOLOGY

## 2022-07-25 PROCEDURE — G6002 STEREOSCOPIC X-RAY GUIDANCE: HCPCS | Performed by: RADIOLOGY

## 2022-07-25 PROCEDURE — 77336 RADIATION PHYSICS CONSULT: CPT | Performed by: RADIOLOGY

## 2022-07-25 PROCEDURE — 77387 GUIDANCE FOR RADJ TX DLVR: CPT | Performed by: RADIOLOGY

## 2022-07-25 NOTE — TELEPHONE ENCOUNTER
Called patient to re-time her appointment with Dr Lj Goncalves on 8/18 due to him being in the OR earlier in the day  Patient was agreeable to move her appointment to 2:00 and verified appointment details  Patient reports that she is scheduled for her last radiation treatment today and had a red area on the underside of her right mastectomy site that opened up and drained "a lot of pus" last night  Patient states that this area has been draining pus on and off since her radiation started but yesterday, the amount that came out was significant  Patient reports that the site feels "much better" now and denies any additional symptoms  Encouraged patient to continue to monitor the site and to contact the office if it recurs  Patient was evaluated by Dr Eduin Chand for the same finding last week and was instructed to use warm compresses over the area

## 2022-07-28 ENCOUNTER — OFFICE VISIT (OUTPATIENT)
Dept: HEMATOLOGY ONCOLOGY | Facility: CLINIC | Age: 72
End: 2022-07-28
Payer: MEDICARE

## 2022-07-28 ENCOUNTER — TELEPHONE (OUTPATIENT)
Dept: RADIATION ONCOLOGY | Facility: HOSPITAL | Age: 72
End: 2022-07-28

## 2022-07-28 VITALS
DIASTOLIC BLOOD PRESSURE: 70 MMHG | RESPIRATION RATE: 18 BRPM | TEMPERATURE: 96.9 F | HEIGHT: 69 IN | BODY MASS INDEX: 25.25 KG/M2 | OXYGEN SATURATION: 96 % | SYSTOLIC BLOOD PRESSURE: 122 MMHG | WEIGHT: 170.5 LBS | HEART RATE: 71 BPM

## 2022-07-28 DIAGNOSIS — Z17.0 MALIGNANT NEOPLASM OF OVERLAPPING SITES OF RIGHT BREAST IN FEMALE, ESTROGEN RECEPTOR POSITIVE (HCC): Primary | ICD-10-CM

## 2022-07-28 DIAGNOSIS — C50.811 MALIGNANT NEOPLASM OF OVERLAPPING SITES OF RIGHT BREAST IN FEMALE, ESTROGEN RECEPTOR POSITIVE (HCC): Primary | ICD-10-CM

## 2022-07-28 PROCEDURE — 99214 OFFICE O/P EST MOD 30 MIN: CPT | Performed by: INTERNAL MEDICINE

## 2022-07-28 NOTE — TELEPHONE ENCOUNTER
Patient informed Dr Darlyn Ruvalcaba can see her tomorrow at the [facility] radiation oncology office for skin check to recent RT site  Patient agreeable  Office directions given

## 2022-07-28 NOTE — PROGRESS NOTES
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, ND 70 % positive, HER2 fish negative disease   Based on MRI, she may have 9 mm of tumor with clear skin thickening in her right breast   Inflammatory breast cancer was confirmed by the biopsy with dermal invasion  She underwent neoadjuvant chemotherapy with dose dense AC followed by dose dense paclitaxel, resulting in clinically responding disease  However, when she underwent right mastectomy and axillary lymph node dissection, she had significant amount of residual disease advised 14 positive lymph nodes  Therefore, she has quite high risk disease  She completed postmastectomy radiation therapy  She is currently on adjuvant hormonal therapy with anastrozole since June 2022 with minimal toxicity  Clinically, she has no evidence recurrent disease  Since her tumor had less than 10% of Ki 67, adjuvant abemaciclib is not indicated  I recommended her to continue anastrozole 1 mg once a day  I will see her again in 6 months for routine follow-up    She is in agreement with my recommendations             Subjective:      HPI:  A 65-year-old postmenopausal woman noticed red spot on the skin in her right breast in October 2021   She brought this to medical attention   Mammography did not show any mass   However, ultrasound shows 6 mm of skin thickening in her right breast at 4:00 a m  position   She was seen by Dr Yaakov Cobb who did a skin biopsy   Skin biopsy was positive for dermal lymphatic invasion of carcinoma, consistent with breast primary   ER, ND and HER2 are pending at this time   She subsequent underwent CT scan of chest abdomen pelvis which showed right breast skin thickening but no evidence the distant metastasis   She presents today with her  and daughter-in-law to discuss the medical treatment for her breast cancer  Nick Goldsmith has no complaint of pain   She is afebrile  Nick Goldsmith has no significant past medical history   Her mother  of ovarian cancer at age of 62   Therefore, she underwent prophylactic hysterectomy and bilateral oophorectomy in 45   She also have cholecystectomy as a surgical past history   She currently feels well   She has no complaint of pain   Her weight is stable   She has no respiratory symptoms  Nick Goldsmith is a lifetime never smoker   Her performance status is normal            Interval History:   A 67year-old postmenopausal woman with inflammatory right breast cancer, grade 2, ER 90 % positive, MT 70 % positive, HER2 fish negative disease   Her right breast mass is not easy to evaluate   Even MRI showed only 9 mm of mass with skin thickening  She underwent neoadjuvant chemotherapy with dose dense AC followed by dose dense paclitaxel with excellent tolerance  Clinically, she had responding disease  She underwent mastectomy and axillary lymph node dissection in 2022 which showed significant amount of multifocal medullary disease with dermal invasion  14/ axillary lymph nodes were positive for metastatic disease with extranodal extension  She did not have reconstruction  Since 2022, she has been on adjuvant hormonal therapy  She completed postmastectomy radiation therapy earlier this week  She had moderate skin toxicity  She presents today for follow-up  She feels well  She has no complaint of pain  Her weight is stable  She has no respiratory symptoms  Her performance status is normal         Objective:      Primary Diagnosis:     Right inflammatory breast cancer   Grade 2   ER 90% positive, MT 70% positive, HER2 fish negative disease  Ki 67 less than 10%    Diagnosed in 2021      Cancer Staging:  Cancer Staging  No matching staging information was found for the patient         Previous Hematologic/ Oncologic Treatment:       Neoadjuvant chemotherapy with dose dense AC x4 followed by dose dense paclitaxel x4  Completed in April 2022      Current Hematologic/ Oncologic Treatment:       1  Adjuvant hormonal therapy with anastrozole since June 2022       Disease Status:      1  Clinically responding disease      2  Pathologic significant residual disease after neoadjuvant chemotherapy      3  ASHLYN status post mastectomy and at 0 later after dissection      Test Results:     Pathology:     Right breast skin biopsy showed dermal and lymphatic invasion of carcinoma, consistent with breast primary   At least grade 2   ER 90% positive, AR 70% positive, HER2 2+ disease  Debbie Deleon fish was negative for gene application with ratio of 1 2      Mastectomy specimen showed extensive multiple residual disease measuring up to 1 cm with dermal invasion  14/19 axillary lymph nodes were positive for metastatic disease with tumor measuring 8 mm with extranodal extension  Ki 67 <10%     Radiology:     CT scan of chest abdomen pelvis showed no evidence of distant metastasis      Breast ultrasound shows 6 mm of skin thickening at 4:00 a m  position with no visible underline mass      Echocardiogram showed ejection fraction 60 % in December 2021      Laboratory:     See below      Physical Exam:        General Appearance:    Alert, oriented          Eyes:    PERRL   Ears:    Normal external ear canals, both ears   Nose:   Nares normal, septum midline   Throat:   Mucosa moist  Pharynx without injection  Neck:   Supple         Lungs:     Clear to auscultation bilaterally   Chest Wall:    No tenderness or deformity    Heart:    Regular rate and rhythm         Abdomen:     Soft, non-tender, bowel sounds +, no organomegaly               Extremities:   Extremities no cyanosis or edema         Skin:   no rash or icterus      Lymph nodes:   Cervical, supraclavicular, and axillary nodes normal   Neurologic:   CNII-XII intact, normal strength, sensation and reflexes     Throughout             Breast exam: Status post right mastectomy without reconstruction  No palpable abnormality in her right chest wall  No palpable right axillary adenopathy   Left breast exam is negative  ROS: Review of Systems   All other systems reviewed and are negative  Imaging: No results found  Labs:   Lab Results   Component Value Date    WBC 3 43 (L) 07/20/2022    HGB 12 5 07/20/2022    HCT 38 2 07/20/2022    MCV 94 07/20/2022     07/20/2022     Lab Results   Component Value Date     07/03/2015    K 4 8 04/05/2022     (H) 04/05/2022    CO2 26 04/05/2022    ANIONGAP 8 07/03/2015    BUN 9 04/05/2022    CREATININE 0 74 04/05/2022    GLUCOSE 83 07/03/2015    GLUF 102 (H) 04/05/2022    CALCIUM 9 4 04/05/2022    CORRECTEDCA 9 7 03/23/2022    AST 29 04/05/2022    ALT 30 04/05/2022    ALKPHOS 126 (H) 04/05/2022    PROT 7 7 07/03/2015    BILITOT 0 91 07/03/2015    EGFR 80 04/05/2022         Lab Results   Component Value Date    IRON 116 04/11/2016         Current Medications: Reviewed  Allergies: Reviewed  PMH/FH/SH:  Reviewed      Vital Sign:    Body surface area is 1 92 meters squared      Wt Readings from Last 3 Encounters:   07/28/22 77 3 kg (170 lb 8 oz)   06/02/22 80 7 kg (178 lb)   05/25/22 79 4 kg (175 lb)        Temp Readings from Last 3 Encounters:   07/28/22 (!) 96 9 °F (36 1 °C) (Tympanic)   06/02/22 98 6 °F (37 °C) (Tympanic)   05/25/22 98 °F (36 7 °C) (Temporal)        BP Readings from Last 3 Encounters:   07/28/22 122/70   06/02/22 122/76   05/25/22 130/82         Pulse Readings from Last 3 Encounters:   07/28/22 71   06/02/22 87   05/25/22 75     @LASTSAO2(3)@

## 2022-07-28 NOTE — TELEPHONE ENCOUNTER
Patient called, she finished treatment on Monday and she had developed a small spot under her arm that has pus coming out  She would like to know what she should be doing?

## 2022-07-28 NOTE — TELEPHONE ENCOUNTER
Patient reports that she has pus coming from the right axilla for the past couple days  She reports an area of peeling skin about 2 inches in the right axilla and within that area is a white area about the size of a piece of rice where she has white creamy drainage  She notes that she saw greenish colored drainage on her clothing for the past couple days  She has been applying Neosporin BID to area  She states her daughter who is a NP, looked at the area yesterday and told her that the drainage was pus  She denies fevers/chills  She is using mometasone BID to areas that itch, she states the itching is tolerable  She is using Remedy moisturizer to the rest of the RT field BID  She was using warm compresses to a hard nodule/cyst in the lower central region of the right chest, that was very tender with palpation  She states that cyst opened and drained, it's still present but smaller and less tender  Patient informed that Dr Geovanny Hahn would be notified of above

## 2022-07-29 ENCOUNTER — HOSPITAL ENCOUNTER (OUTPATIENT)
Dept: INFUSION CENTER | Facility: CLINIC | Age: 72
Discharge: HOME | End: 2022-07-29
Payer: MEDICARE

## 2022-07-29 DIAGNOSIS — Z17.0 MALIGNANT NEOPLASM OF OVERLAPPING SITES OF RIGHT BREAST IN FEMALE, ESTROGEN RECEPTOR POSITIVE (HCC): Primary | ICD-10-CM

## 2022-07-29 DIAGNOSIS — Z95.828 PORT-A-CATH IN PLACE: Primary | ICD-10-CM

## 2022-07-29 DIAGNOSIS — C50.811 MALIGNANT NEOPLASM OF OVERLAPPING SITES OF RIGHT BREAST IN FEMALE, ESTROGEN RECEPTOR POSITIVE (HCC): Primary | ICD-10-CM

## 2022-07-29 PROCEDURE — 96523 IRRIG DRUG DELIVERY DEVICE: CPT

## 2022-07-29 NOTE — PROGRESS NOTES
Pt to clinic for port flush, pt tolerated without complications, making next appointment when exiting, declined avs

## 2022-08-03 ENCOUNTER — OFFICE VISIT (OUTPATIENT)
Dept: SURGICAL ONCOLOGY | Facility: CLINIC | Age: 72
End: 2022-08-03
Payer: MEDICARE

## 2022-08-03 VITALS
HEART RATE: 96 BPM | OXYGEN SATURATION: 98 % | RESPIRATION RATE: 17 BRPM | TEMPERATURE: 97.8 F | HEIGHT: 69 IN | DIASTOLIC BLOOD PRESSURE: 74 MMHG | SYSTOLIC BLOOD PRESSURE: 120 MMHG | BODY MASS INDEX: 26 KG/M2 | WEIGHT: 175.5 LBS

## 2022-08-03 DIAGNOSIS — N60.81 SEBACEOUS CYST OF BREAST, RIGHT: Primary | ICD-10-CM

## 2022-08-03 PROCEDURE — 99214 OFFICE O/P EST MOD 30 MIN: CPT | Performed by: SURGERY

## 2022-08-03 RX ORDER — CEPHALEXIN 500 MG/1
500 CAPSULE ORAL EVERY 6 HOURS SCHEDULED
Qty: 28 CAPSULE | Refills: 0 | Status: SHIPPED | OUTPATIENT
Start: 2022-08-03 | End: 2022-08-10

## 2022-08-03 NOTE — PROGRESS NOTES
Surgical Oncology Follow Up       [address]  [facility]  [address]  [place] [address]    Allison Avelina  1950  [id number]  [address] [facility] [facility]  [facility] [place]  [address] [address]    Chief Complaint   Patient presents with    Follow-up          Assessment & Plan:   Patient presents for a early three-month follow-up visit because she had had an abnormality in the lower aspect of her mastectomy site  The patient informs me she has had mass in this area for approximately 10 years is been relatively stable though it seemed to become inflamed  It ultimately opened up during radiation therapy and some material came out of it  It is now significantly receded  She does have significant radiation damage currently over this area as well as and multiple other areas  I think this is most likely sebaceous cyst that opened up the skin is now intact  I have recommended no intervention at this point in time  She is going to go on vacation for a week I will give her a prescription for Keflex in the event that it does cause her some issues while she is on vacation  We had a long conversation regarding her treatment and prognosis as well as with her daughter  All questions were answered their satisfaction  I will see her back in 6 months though they are going to stagger this with Dr Claude Junes six-month follow-up so that she is being evaluated q 3 months at their request     Cancer History:     Oncology History   Malignant neoplasm of overlapping sites of right breast in female, estrogen receptor positive ([facility] [address] )   12/6/2021 Biopsy    Punch biopsy of right breast  Dermal lymphovascular invasion, immunoprofile consistent with breast primary  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)  Grade 2  ER 90; HI 70;  HER2 2+, FISH negative     12/13/2021 Observation    Bilateral breast MRI - findings suspicious for multicentric right breast cancer; 2 biopsies recommended at areas of concern (never done); left breast clear; no right axillary adenopathy       12/30/2021 -  Chemotherapy    DOXOrubicin (ADRIAMYCIN), 122 mg, Intravenous, Once, 4 of 4 cycles  Administration: 120 mg (12/30/2021), 122 mg (1/13/2022), 122 mg (1/27/2022), 122 mg (2/10/2022)  palonosetron (ALOXI), 0 25 mg, Intravenous, Once, 2 of 2 cycles  Administration: 0 25 mg (3/24/2022), 0 25 mg (4/7/2022)  pegfilgrastim (NEULASTA), 4 mg (100 % of original dose 4 mg), Subcutaneous, Once, 3 of 3 cycles  Dose modification: 4 mg (original dose 4 mg, Cycle 6)  Administration: 4 mg (3/11/2022), 4 mg (3/25/2022), 4 mg (4/8/2022)  pegfilgrastim (Viva Po), 6 mg, Subcutaneous, Once, 5 of 5 cycles  Administration: 6 mg (12/30/2021), 6 mg (1/13/2022), 6 mg (2/24/2022), 6 mg (1/27/2022), 6 mg (2/10/2022)  cyclophosphamide (CYTOXAN) IVPB, 600 mg/m2 = 1,218 mg, Intravenous, Once, 4 of 4 cycles  Administration: 1,218 mg (12/30/2021), 1,218 mg (1/13/2022), 1,218 mg (1/27/2022), 1,218 mg (2/10/2022)  fosaprepitant (EMEND) IVPB, 150 mg, Intravenous, Once, 4 of 4 cycles  Administration: 150 mg (12/30/2021), 150 mg (1/13/2022), 150 mg (1/27/2022), 150 mg (2/10/2022)  PACLItaxel (TAXOL) chemo IVPB, 175 mg/m2 = 355 2 mg, Intravenous, Once, 4 of 4 cycles  Administration: 355 2 mg (2/24/2022), 355 2 mg (3/10/2022), 355 2 mg (3/24/2022), 355 2 mg (4/7/2022)     4/26/2022 Surgery    Right breast modified radical mastectomy  Inflammatory breast cancer  Grade 1  1 5 cm (multiple foci involving all four quadrants)  Lymphovascular invasion present  Margins negative  14/19 Lymph nodes with macro-metastases  Anatomic Stage IIIC  Prognostic Stage IIIA        Procedure  Total mastectomy    Specimen Laterality  Right    TUMOR   Tumor Site  Upper outer quadrant      Lower outer quadrant      Upper inner quadrant      Lower inner quadrant      Central    Histologic Type  inflammatory  breast carcinoma    Histologic Grade (Earl Histologic Score)     Glandular (Acinar) / Tubular Differentiation  Score 1    Nuclear Pleomorphism  Score 2    Mitotic Rate  Score 1    Overall Grade  Grade 1 (scores of 3, 4 or 5)    Tumor Size  Greatest dimension of largest invasive focus (Millimeters): Extensive  invasive breast carcinoma involving all four breast quadrants mm   Tumor Focality  Multiple foci of invasive carcinoma    Number of Foci  Cannot be determined         Sizes of Individual Foci (Millimeters)  Multiple foci, largest measures 15 mm   Ductal Carcinoma In Situ (DCIS)  Not identified    Lobular Carcinoma In Situ (LCIS)  Not identified         Tumor Extent  Skin is present and involved    Skin Invasion  Invasive carcinoma directly invades into the dermis or epidermis without skin ulceration (this does not change the T classification)    Skin Satellite Foci  Satellite skin foci of invasive carcinoma are present    Lymphovascular Invasion  Present    Dermal Lymphovascular Invasion  Present    Microcalcifications  Present in invasive carcinoma      Present in non-neoplastic tissue    Treatment Effect in the Breast  No definite response to presurgical therapy in the invasive carcinoma    Treatment Effect in the Lymph Nodes  No definite response to presurgical therapy in metastatic carcinoma    MARGINS   Margin Status for Invasive Carcinoma  All margins negative for invasive carcinoma    Distance from Invasive Carcinoma to Closest Margin  10 mm   Closest Margin(s) to Invasive Carcinoma  Posterior    Distance from Invasive Carcinoma to Inferior Margin  13 mm   REGIONAL LYMPH NODES   Regional Lymph Node Status  Tumor present in regional lymph node(s)    Number of Lymph Nodes with Macrometastases  14    Number of Lymph Nodes with Micrometastases  0    Size of Largest Sigifredo Metastatic Deposit  8 mm   Extranodal Extension  Present, 2 mm or less    Total Number of Lymph Nodes Examined (sentinel and non-sentinel)  19         6/2022 -  Hormone Therapy    Anastrozole      6/17/2022 - 7/25/2022 Radiation                   Interval History:   Patient is finished her radiation therapy  She still has considerable radiation damage  She has finished her chemotherapy  She is not a candidate for additional therapy at this point in time  She has multiple questions regarding screening follow-up and risk of recurrence  All of which were answered  Review of Systems:   Review of Systems   All other systems reviewed and are negative  Past Medical History     Patient Active Problem List   Diagnosis    Varicose vein of leg    Anxiety    Arthritis    Depression    Hyperbilirubinemia    Hyperlipidemia, mild    Over weight    Vitamin D deficiency    Digital mucinous cyst of finger of left hand    Acute midline low back pain with left-sided sciatica    Closed fracture of distal end of radius    Malignant neoplasm of overlapping sites of right breast in female, estrogen receptor positive ([address] )    Chemotherapy induced neutropenia ([facility] [address] )    Palliative care patient    Medical marijuana use    Cancer related pain    Chemotherapy-induced nausea    Insomnia due to medical condition    Anorexia    Port-A-Cath in place     Past Medical History:   Diagnosis Date    Anxiety     Cancer [facility])     Right breast cancer    Depression     Fracture of radius, distal, closed     Last Assessed:  6/10/14    Thrombophlebitis     Vitamin D deficiency      Past Surgical History:   Procedure Laterality Date    ANKLE SURGERY      ANKLE SURGERY      BREAST BIOPSY Left 2001    neg    GALLBLADDER SURGERY      HYSTERECTOMY      IR PORT PLACEMENT  12/29/2021    UT MASTECTOMY, MODIFIED RADICAL Right 4/26/2022    Procedure: BREAST MODIFIED RADICAL MASTECTOMY WITH RIGHT LYMPHATIC MAPPING WITH BLUE AND RADIOACTIVE DYE (INJECT AT 0900 BY DR DEJESUS IN THE OR);   Surgeon: Danita Torres MD;  Location: [facility]; Service: Surgical Oncology    WISDOM TOOTH EXTRACTION       Family History   Problem Relation Age of Onset    Ovarian cancer Mother 64    Heart disease Father     Leukemia Brother 62    Heart disease Brother     Heart disease Brother     Cancer Maternal Grandmother         Unknown primary; mid 52's    Stomach cancer Maternal Aunt         60's     Social History     Socioeconomic History    Marital status: /Civil Union     Spouse name: Not on file    Number of children: Not on file    Years of education: Not on file    Highest education level: Not on file   Occupational History    Not on file   Tobacco Use    Smoking status: Never Smoker    Smokeless tobacco: Never Used   Vaping Use    Vaping Use: Never used   Substance and Sexual Activity    Alcohol use: Yes     Comment: rarely    Drug use: Yes     Types: Marijuana     Comment: not helping stopped    Sexual activity: Not Currently     Partners: Male     Birth control/protection: Female Sterilization   Other Topics Concern    Not on file   Social History Narrative     to Yamilet Lawrence  Social Determinants of Health     Financial Resource Strain: Not on file   Food Insecurity: Not on file   Transportation Needs: Not on file   Physical Activity: Not on file   Stress: Not on file   Social Connections: Not on file   Intimate Partner Violence: Not on file   Housing Stability: Not on file       Current Outpatient Medications:     acetaminophen (TYLENOL) 500 mg tablet, Take 2 tablets (1,000 mg total) by mouth 3 (three) times a day, Disp: 180 tablet, Rfl: 2    ALPRAZolam (XANAX) 0 25 mg tablet, Take 1/2 to 1 pill Q 8 hours p r n   For anxiety, Disp: 30 tablet, Rfl: 0    anastrozole (ARIMIDEX) 1 mg tablet, Take 1 tablet (1 mg total) by mouth daily, Disp: 90 tablet, Rfl: 1    Calcium 250 MG CAPS, Take 250 mg by mouth in the morning  , Disp: , Rfl:     cholecalciferol (VITAMIN D3) 1,000 units tablet, Take 2 capsules by mouth daily, Disp: , Rfl:     escitalopram (LEXAPRO) 20 mg tablet, Take 1 tablet (20 mg total) by mouth daily, Disp: 90 tablet, Rfl: 2    Fish Oil-Krill Oil (KRILL OIL PLUS) CAPS, Take by mouth in the morning  , Disp: , Rfl:     fluocinonide (LIDEX) 0 05 % external solution, APPLY TO SCALP ONCE DAILY AS NEEDED FOR ITCH, Disp: , Rfl: 2    ketoconazole (NIZORAL) 2 % shampoo, Apply 1 application topically if needed  , Disp: , Rfl: 5    lidocaine-prilocaine (EMLA) cream, Apply topically as needed for mild pain, Disp: 30 g, Rfl: 1    mometasone (ELOCON) 0 1 % ointment, Apply topically daily, Disp: 45 g, Rfl: 1    ondansetron (ZOFRAN) 8 mg tablet, Take 1 tablet (8 mg total) by mouth every 8 (eight) hours as needed for nausea or vomiting, Disp: 40 tablet, Rfl: 2    oxyCODONE-acetaminophen (Percocet) 5-325 mg per tablet, Take 1 tablet by mouth every 6 (six) hours as needed for moderate pain Max Daily Amount: 4 tablets, Disp: 20 tablet, Rfl: 0    silver sulfadiazine (SILVADENE,SSD) 1 % cream, Apply topically 2 (two) times a day Apply to affected area(s) as directed, Disp: 400 g, Rfl: 1    al mag oxide-diphenhydramine-lidocaine viscous (MAGIC MOUTHWASH) 1:1:1 suspension, Swish and spit 10 mL every 4 (four) hours as needed for mouth pain or discomfort, Disp: 90 mL, Rfl: 1    CRANIAL PROSTHESIS, RX,, One wig as needed, Disp: 1 each, Rfl: 0  No Known Allergies    Physical Exam:     Vitals:    08/03/22 1108   BP: 120/74   Pulse: 96   Resp: 17   Temp: 97 8 °F (36 6 °C)   SpO2: 98%     Physical Exam  Vitals reviewed  Constitutional:       Appearance: She is well-developed  HENT:      Head: Normocephalic and atraumatic  Eyes:      Pupils: Pupils are equal, round, and reactive to light  Neck:      Thyroid: No thyromegaly  Vascular: No JVD  Trachea: No tracheal deviation  Cardiovascular:      Rate and Rhythm: Normal rate and regular rhythm  Heart sounds: Normal heart sounds  No murmur heard  No friction rub   No gallop  Pulmonary:      Effort: Pulmonary effort is normal  No respiratory distress  Breath sounds: Normal breath sounds  No wheezing or rales  Chest:      Comments: Examination the right mastectomy site demonstrates relatively significant excoriation though it is starting to heal in the kelechi-incisional region  She still has considerable red skin in the axilla superior chest as well as inferior area over most likely a sebaceous cyst that is eroded the skin is now completely intact  There is no cellulitis but there does appear to be some additional radiation skin changes there  Abdominal:      General: There is no distension  Palpations: Abdomen is soft  There is no hepatomegaly or mass  Tenderness: There is no abdominal tenderness  There is no guarding or rebound  Musculoskeletal:         General: No tenderness  Normal range of motion  Cervical back: Normal range of motion and neck supple  Lymphadenopathy:      Cervical: No cervical adenopathy  Skin:     General: Skin is warm and dry  Findings: No erythema or rash  Neurological:      Mental Status: She is alert and oriented to person, place, and time  Cranial Nerves: No cranial nerve deficit  Psychiatric:         Behavior: Behavior normal            Results & Discussion:   Patient is free of any evidence of local regional distant recurrence disease  She presented early for her 3 month follow-up appointment because of the most likely ruptured sebaceous cyst that is now significantly healed in receded  It is no longer a palpable nodule  I do not think this area is infected though I explained that they can become infected and consequently I have recommended since she is going on vacation a prescription for Keflex  She will call us if she has issues and I will initiate treatment at that time telephonically  I will see the patient back as outlined above        Advance Care Planning/Advance Directives:  I discussed the disease status, treatment plans and follow-up with the patient

## 2022-09-01 ENCOUNTER — TELEMEDICINE (OUTPATIENT)
Dept: RADIATION ONCOLOGY | Facility: HOSPITAL | Age: 72
End: 2022-09-01
Attending: RADIOLOGY

## 2022-09-01 DIAGNOSIS — C50.811 MALIGNANT NEOPLASM OF OVERLAPPING SITES OF RIGHT BREAST IN FEMALE, ESTROGEN RECEPTOR POSITIVE (HCC): Primary | ICD-10-CM

## 2022-09-01 DIAGNOSIS — Z17.0 MALIGNANT NEOPLASM OF OVERLAPPING SITES OF RIGHT BREAST IN FEMALE, ESTROGEN RECEPTOR POSITIVE (HCC): Primary | ICD-10-CM

## 2022-09-01 PROCEDURE — 99024 POSTOP FOLLOW-UP VISIT: CPT | Performed by: RADIOLOGY

## 2022-09-01 NOTE — PROGRESS NOTES
Virtual Brief Visit    Patient is located in the following state in which I hold an active license [place]      Assessment/Plan:  The patient is 1 month post adjuvant radiation therapy to the chest wall and regional lymph nodes  She states that she had opening of her sebaceous cyst after utilizing warm compresses and this is healed completely at this point  She has some desquamation which was primarily dry towards the axilla  She is utilizing Silvadene cream   We discussed range-of-motion exercises  At this point she can transition to moisturizer which was discussed  She will follow-up in 6 months  Problem List Items Addressed This Visit        Other    Malignant neoplasm of overlapping sites of right breast in female, estrogen receptor positive ([facility] [address] ) - Primary          Recent Visits  No visits were found meeting these conditions  Showing recent visits within past 7 days and meeting all other requirements  Future Appointments  No visits were found meeting these conditions    Showing future appointments within next 150 days and meeting all other requirements         I spent 15 minutes directly with the patient during this visit

## 2022-09-09 ENCOUNTER — HOSPITAL ENCOUNTER (OUTPATIENT)
Dept: INFUSION CENTER | Facility: CLINIC | Age: 72
Discharge: HOME | End: 2022-09-09
Payer: MEDICARE

## 2022-09-09 DIAGNOSIS — Z95.828 PORT-A-CATH IN PLACE: Primary | ICD-10-CM

## 2022-09-09 PROCEDURE — 96523 IRRIG DRUG DELIVERY DEVICE: CPT

## 2022-09-09 NOTE — PROGRESS NOTES
Pt resting with no complaints  Port accessed, flushed, saline-locked and deaccessed without issue  Pt declined AVS  Villa Hickey to call to schedule pt's next appt as she doesn't have any more flushes scheduled

## 2022-09-30 ENCOUNTER — TELEPHONE (OUTPATIENT)
Dept: INTERNAL MEDICINE CLINIC | Facility: CLINIC | Age: 72
End: 2022-09-30

## 2022-09-30 DIAGNOSIS — R42 VERTIGO: Primary | ICD-10-CM

## 2022-09-30 RX ORDER — MECLIZINE HYDROCHLORIDE 25 MG/1
25 TABLET ORAL EVERY 8 HOURS SCHEDULED
Qty: 30 TABLET | Refills: 0 | Status: SHIPPED | OUTPATIENT
Start: 2022-09-30

## 2022-09-30 NOTE — PROGRESS NOTES
Patient called with vertigo type symptoms will initiate meclizine 25 mg q 8 hours recommend hydration and rest follow-up if symptoms persist

## 2022-09-30 NOTE — TELEPHONE ENCOUNTER
Marisa Mccloud is having some vertigo since Monday  Whenever she bends down she gets up dizzy  She is taking Zyrtec and feeling a little stuffy  She is scheduled in 2 weeks for a wellness visit  Can you suggest anything or does she need an appt?

## 2022-10-14 ENCOUNTER — OFFICE VISIT (OUTPATIENT)
Dept: INTERNAL MEDICINE CLINIC | Facility: CLINIC | Age: 72
End: 2022-10-14
Payer: MEDICARE

## 2022-10-14 VITALS
HEART RATE: 76 BPM | BODY MASS INDEX: 28.55 KG/M2 | HEIGHT: 68 IN | DIASTOLIC BLOOD PRESSURE: 60 MMHG | OXYGEN SATURATION: 98 % | TEMPERATURE: 96.8 F | WEIGHT: 188.4 LBS | SYSTOLIC BLOOD PRESSURE: 118 MMHG

## 2022-10-14 DIAGNOSIS — C50.811 MALIGNANT NEOPLASM OF OVERLAPPING SITES OF RIGHT BREAST IN FEMALE, ESTROGEN RECEPTOR POSITIVE (HCC): Primary | ICD-10-CM

## 2022-10-14 DIAGNOSIS — E78.5 HYPERLIPIDEMIA, MILD: ICD-10-CM

## 2022-10-14 DIAGNOSIS — M81.0 AGE RELATED OSTEOPOROSIS, UNSPECIFIED PATHOLOGICAL FRACTURE PRESENCE: ICD-10-CM

## 2022-10-14 DIAGNOSIS — E80.6 HYPERBILIRUBINEMIA: ICD-10-CM

## 2022-10-14 DIAGNOSIS — R39.9 UTI SYMPTOMS: ICD-10-CM

## 2022-10-14 DIAGNOSIS — E55.9 VITAMIN D DEFICIENCY: ICD-10-CM

## 2022-10-14 DIAGNOSIS — Z17.0 MALIGNANT NEOPLASM OF OVERLAPPING SITES OF RIGHT BREAST IN FEMALE, ESTROGEN RECEPTOR POSITIVE (HCC): Primary | ICD-10-CM

## 2022-10-14 DIAGNOSIS — Z13.0 SCREENING FOR DEFICIENCY ANEMIA: ICD-10-CM

## 2022-10-14 DIAGNOSIS — F32.A DEPRESSION, UNSPECIFIED DEPRESSION TYPE: ICD-10-CM

## 2022-10-14 DIAGNOSIS — Z00.00 HEALTHCARE MAINTENANCE: ICD-10-CM

## 2022-10-14 PROBLEM — Z51.5 PALLIATIVE CARE PATIENT: Status: RESOLVED | Noted: 2022-02-16 | Resolved: 2022-10-14

## 2022-10-14 PROBLEM — G89.3 CANCER RELATED PAIN: Status: RESOLVED | Noted: 2022-02-16 | Resolved: 2022-10-14

## 2022-10-14 PROBLEM — M67.442 DIGITAL MUCINOUS CYST OF FINGER OF LEFT HAND: Status: RESOLVED | Noted: 2021-06-10 | Resolved: 2022-10-14

## 2022-10-14 PROBLEM — T45.1X5A CHEMOTHERAPY-INDUCED NAUSEA: Status: RESOLVED | Noted: 2022-02-16 | Resolved: 2022-10-14

## 2022-10-14 PROBLEM — M54.42 ACUTE MIDLINE LOW BACK PAIN WITH LEFT-SIDED SCIATICA: Status: RESOLVED | Noted: 2021-08-20 | Resolved: 2022-10-14

## 2022-10-14 PROBLEM — R11.0 CHEMOTHERAPY-INDUCED NAUSEA: Status: RESOLVED | Noted: 2022-02-16 | Resolved: 2022-10-14

## 2022-10-14 PROBLEM — R63.0 ANOREXIA: Status: RESOLVED | Noted: 2022-02-16 | Resolved: 2022-10-14

## 2022-10-14 PROCEDURE — 99215 OFFICE O/P EST HI 40 MIN: CPT | Performed by: INTERNAL MEDICINE

## 2022-10-14 PROCEDURE — G0439 PPPS, SUBSEQ VISIT: HCPCS | Performed by: INTERNAL MEDICINE

## 2022-10-14 RX ORDER — ESCITALOPRAM OXALATE 20 MG/1
20 TABLET ORAL DAILY
Qty: 90 TABLET | Refills: 2 | Status: SHIPPED | OUTPATIENT
Start: 2022-10-14

## 2022-10-14 NOTE — ASSESSMENT & PLAN NOTE
I have reviewed the patient's past several months of care regarding her right breast cancer  She has undergone surgery radiation and chemotherapy  She indicates that her doctors have given her a 48 50 chance of recurrence of her cancer given the multiple lymph node involvement    She is currently on an ascus all with no evidence of active disease at this time

## 2022-10-14 NOTE — PROGRESS NOTES
Assessment and Plan:     Problem List Items Addressed This Visit        Musculoskeletal and Integument    Age related osteoporosis - Primary     I have asked the patient to have an updated DEXA scan since she will be on the and asked his all medication for a projected 5 years  Last study was approximately 4 years ago and did confirm the presence of mild osteopenia  Will review the results of this study with the patient upon its completion  Relevant Orders    DXA bone density spine hip and pelvis       Other    Depression     Patient is displaying no signs of depression at this time she continues on her escitalopram at 20 mg daily no apparent side effects continue current therapy  Relevant Medications    escitalopram (LEXAPRO) 20 mg tablet    Hyperbilirubinemia    Relevant Orders    Comprehensive metabolic panel    Hyperlipidemia, mild     An updated lipid profile has been requested for this patient who has a history of mild hyperlipidemia  Will review the results of her studies with her upon completion  Relevant Orders    Lipid panel    Vitamin D deficiency     The patient has a prior history of vitamin-D deficiency since she is on an asked his all for her breast cancer I have advised that we get an updated vitamin-D level to evaluate for future replacement or supplementation  Relevant Orders    Vitamin D 25 hydroxy    Malignant neoplasm of overlapping sites of right breast in female, estrogen receptor positive ([facility] [address] )     I have reviewed the patient's past several months of care regarding her right breast cancer  She has undergone surgery radiation and chemotherapy  She indicates that her doctors have given her a 48 50 chance of recurrence of her cancer given the multiple lymph node involvement    She is currently on an ascus all with no evidence of active disease at this time           Other Visit Diagnoses     Screening for deficiency anemia        Relevant Orders    CBC and differential    UTI symptoms        Relevant Orders    UA w Reflex to Microscopic w Reflex to Culture -Lab Collect        BMI Counseling: Body mass index is 28 44 kg/m²  Follow-up plan was not completed due to patient being in urgent or emergent medical situation  Preventive health issues were discussed with patient, and age appropriate screening tests were ordered as noted in patient's After Visit Summary  Personalized health advice and appropriate referrals for health education or preventive services given if needed, as noted in patient's After Visit Summary  History of Present Illness:     Patient presents for a Medicare Wellness Visit    This 78-year-old female patient presents today for an annual physical examination  She was provided with request for routine blood work to complement today's examination  Over the past several months the patient has been diagnosed with breast cancer and has undergone chemotherapy radiation treatment and surgical excision  She is now on and ask does all 1 mg p o  Daily  She is recovering nicely from her prolonged period of treatment  She indicates that her energy level is not completely back to normal but is definitely improving  Her appetite has improved and her weight has increased  I indicated that we would prefer not to see her gain much more weight than she is at presently  She still has a port in her chest      Patient Care Team:  Tracy Fountain MD as PCP - General  MD Darby Castorena MD Bettye Banker, MD as Surgeon (Surgical Oncology)  Josee Muñoz MD as Medical Oncologist (Hematology)  Erma Yeboah MD (Radiation Oncology)     Review of Systems:     Review of Systems   Constitutional: Positive for fatigue  All other systems reviewed and are negative         Problem List:     Patient Active Problem List   Diagnosis   • Varicose vein of leg   • Anxiety   • Arthritis   • Depression   • Hyperbilirubinemia   • Hyperlipidemia, mild   • Over weight   • Vitamin D deficiency   • Malignant neoplasm of overlapping sites of right breast in female, estrogen receptor positive ([facility] [address] )   • Chemotherapy induced neutropenia (HCC)   • Medical marijuana use   • Insomnia due to medical condition   • Port-A-Cath in place   • Age related osteoporosis      Past Medical and Surgical History:     Past Medical History:   Diagnosis Date   • Anxiety    • Cancer [facility])     Right breast cancer   • Depression    • Fracture of radius, distal, closed     Last Assessed:  6/10/14   • Thrombophlebitis    • Vitamin D deficiency      Past Surgical History:   Procedure Laterality Date   • ANKLE SURGERY     • ANKLE SURGERY     • BREAST BIOPSY Left 2001    neg   • GALLBLADDER SURGERY     • HYSTERECTOMY     • IR PORT PLACEMENT  12/29/2021   • CA MASTECTOMY, MODIFIED RADICAL Right 4/26/2022    Procedure: BREAST MODIFIED RADICAL MASTECTOMY WITH RIGHT LYMPHATIC MAPPING WITH BLUE AND RADIOACTIVE DYE (INJECT AT 0900 BY DR DEJESUS IN THE OR);   Surgeon: Ramone Chandra MD;  Location: [facility];  Service: Surgical Oncology   • WISDOM TOOTH EXTRACTION        Family History:     Family History   Problem Relation Age of Onset   • Ovarian cancer Mother 64   • Heart disease Father    • Leukemia Brother 62   • Heart disease Brother    • Heart disease Brother    • Cancer Maternal Grandmother         Unknown primary; mid 52's   • Stomach cancer Maternal Aunt         63's      Social History:     Social History     Socioeconomic History   • Marital status: /Civil Union     Spouse name: None   • Number of children: None   • Years of education: None   • Highest education level: None   Occupational History   • None   Tobacco Use   • Smoking status: Never Smoker   • Smokeless tobacco: Never Used   Vaping Use   • Vaping Use: Never used   Substance and Sexual Activity   • Alcohol use: Yes     Comment: rarely   • Drug use: Yes     Types: Marijuana     Comment: not helping stopped   • Sexual activity: Not Currently     Partners: Male     Birth control/protection: Female Sterilization   Other Topics Concern   • None   Social History Narrative     to Saulo Noonan  Social Determinants of Health     Financial Resource Strain: Low Risk    • Difficulty of Paying Living Expenses: Not hard at all   Food Insecurity: Not on file   Transportation Needs: No Transportation Needs   • Lack of Transportation (Medical): No   • Lack of Transportation (Non-Medical): No   Physical Activity: Not on file   Stress: Not on file   Social Connections: Not on file   Intimate Partner Violence: Not on file   Housing Stability: Not on file      Medications and Allergies:     Current Outpatient Medications   Medication Sig Dispense Refill   • ALPRAZolam (XANAX) 0 25 mg tablet Take 1/2 to 1 pill Q 8 hours p r n  For anxiety 30 tablet 0   • anastrozole (ARIMIDEX) 1 mg tablet Take 1 tablet (1 mg total) by mouth daily 90 tablet 1   • Calcium 250 MG CAPS Take 250 mg by mouth in the morning       • cholecalciferol (VITAMIN D3) 1,000 units tablet Take 2 capsules by mouth daily     • escitalopram (LEXAPRO) 20 mg tablet Take 1 tablet (20 mg total) by mouth daily 90 tablet 2   • Fish Oil-Krill Oil (KRILL OIL PLUS) CAPS Take by mouth in the morning       • fluocinonide (LIDEX) 0 05 % external solution APPLY TO SCALP ONCE DAILY AS NEEDED FOR ITCH  2   • ketoconazole (NIZORAL) 2 % shampoo Apply 1 application topically if needed    5   • meclizine (ANTIVERT) 25 mg tablet Take 1 tablet (25 mg total) by mouth every 8 (eight) hours 30 tablet 0     No current facility-administered medications for this visit       No Known Allergies   Immunizations:     Immunization History   Administered Date(s) Administered   • COVID-19 MODERNA VACC 0 5 ML IM 01/25/2021, 01/25/2021, 02/22/2021   • INFLUENZA 10/22/2018, 10/27/2021   • Influenza Split High Dose Preservative Free IM 12/18/2015, 10/17/2017, 11/10/2018   • Influenza, high dose seasonal 0 7 mL 11/18/2019, 10/26/2020   • Pneumococcal Conjugate 13-Valent 02/11/2019   • Pneumococcal Polysaccharide PPV23 07/30/2020   • Tdap 05/18/2008, 01/18/2018   • Zoster Vaccine Recombinant 02/24/2020, 06/29/2020      Health Maintenance:         Topic Date Due   • Hepatitis C Screening  Never done   • Colorectal Cancer Screening  01/19/2021   • Breast Cancer Screening: Mammogram  12/02/2022         Topic Date Due   • COVID-19 Vaccine (3 - Moderna risk series) 03/22/2021      Medicare Screening Tests and Risk Assessments:     Antonio Pérez is here for her Subsequent Wellness visit  Health Risk Assessment:   Patient rates overall health as good  Patient feels that their physical health rating is same  Patient is satisfied with their life  Eyesight was rated as same  Hearing was rated as same  Patient feels that their emotional and mental health rating is slightly worse  Patients states they are never, rarely angry  Patient states they are often unusually tired/fatigued  Pain experienced in the last 7 days has been none  Patient states that she has experienced weight loss or gain in last 6 months  Depression Screening:   PHQ-9 Score: 1      Fall Risk Screening: In the past year, patient has experienced: no history of falling in past year      Urinary Incontinence Screening:   Patient has leaked urine accidently in the last six months  Home Safety:  Patient does not have trouble with stairs inside or outside of their home  Patient has working smoke alarms and has working carbon monoxide detector  Home safety hazards include: none  Nutrition:   Current diet is Regular  Medications:   Patient is currently taking over-the-counter supplements  OTC medications include: see medication list  Patient is able to manage medications       Activities of Daily Living (ADLs)/Instrumental Activities of Daily Living (IADLs):   Walk and transfer into and out of bed and chair?: Yes  Dress and groom yourself?: Yes    Bathe or shower yourself?: Yes    Feed yourself? Yes  Do your laundry/housekeeping?: Yes  Manage your money, pay your bills and track your expenses?: No  Make your own meals?: Yes    Do your own shopping?: Yes    Previous Hospitalizations:   Any hospitalizations or ED visits within the last 12 months?: Yes    How many hospitalizations have you had in the last year?: 1-2    Advance Care Planning:   Living will: Yes    Durable POA for healthcare: Yes    Advanced directive: Yes      PREVENTIVE SCREENINGS      Cardiovascular Screening:    General: Screening Not Indicated and History Lipid Disorder      Diabetes Screening:     General: Screening Current      Colorectal Cancer Screening:     General: Screening Current      Breast Cancer Screening:     General: History Breast Cancer      Cervical Cancer Screening:    General: Screening Not Indicated      Osteoporosis Screening:    General: Screening Not Indicated and History Osteoporosis      Abdominal Aortic Aneurysm (AAA) Screening:        General: Screening Not Indicated and Screening Current      Lung Cancer Screening:     General: Screening Not Indicated      Hepatitis C Screening:    General: Screening Not Indicated    Screening, Brief Intervention, and Referral to Treatment (SBIRT)    Screening    Typical number of drinks in a week: 0    Single Item Drug Screening:  How often have you used an illegal drug (including marijuana) or a prescription medication for non-medical reasons in the past year? never    Single Item Drug Screen Score: 0  Interpretation: Negative screen for possible drug use disorder    No exam data present     Physical Exam:     /60   Pulse 76   Temp (!) 96 8 °F (36 °C)   Ht 5' 8 25" (1 734 m)   Wt 85 5 kg (188 lb 6 4 oz)   LMP  (LMP Unknown)   SpO2 98%   BMI 28 44 kg/m²     Physical Exam  Vitals and nursing note reviewed  Constitutional:       General: She is not in acute distress  Appearance: She is well-developed  She is not ill-appearing  HENT:      Head: Normocephalic and atraumatic  Right Ear: Tympanic membrane, ear canal and external ear normal       Left Ear: Tympanic membrane, ear canal and external ear normal       Nose: Nose normal       Mouth/Throat:      Mouth: Mucous membranes are moist       Pharynx: Oropharynx is clear  Eyes:      General:         Right eye: No discharge  Left eye: No discharge  Extraocular Movements: Extraocular movements intact  Conjunctiva/sclera: Conjunctivae normal       Pupils: Pupils are equal, round, and reactive to light  Neck:      Vascular: No carotid bruit  Cardiovascular:      Rate and Rhythm: Normal rate and regular rhythm  Heart sounds: No murmur heard  Pulmonary:      Effort: Pulmonary effort is normal  No respiratory distress  Breath sounds: Normal breath sounds  No wheezing, rhonchi or rales  Abdominal:      General: Abdomen is flat  Bowel sounds are normal  There is no distension  Palpations: Abdomen is soft  There is no mass  Tenderness: There is no abdominal tenderness  There is no guarding  Musculoskeletal:         General: No swelling or tenderness  Cervical back: Normal range of motion and neck supple  No rigidity or tenderness  Right lower leg: No edema  Left lower leg: No edema  Lymphadenopathy:      Cervical: No cervical adenopathy  Skin:     General: Skin is warm and dry  Coloration: Skin is not jaundiced or pale  Neurological:      Mental Status: She is alert  Mental status is at baseline  Psychiatric:         Mood and Affect: Mood normal          Behavior: Behavior normal          Thought Content:  Thought content normal          Judgment: Judgment normal           Usha Christiansen MD

## 2022-10-14 NOTE — ASSESSMENT & PLAN NOTE
I have asked the patient to have an updated DEXA scan since she will be on the and asked his all medication for a projected 5 years  Last study was approximately 4 years ago and did confirm the presence of mild osteopenia  Will review the results of this study with the patient upon its completion

## 2022-10-14 NOTE — ASSESSMENT & PLAN NOTE
Patient is displaying no signs of depression at this time she continues on her escitalopram at 20 mg daily no apparent side effects continue current therapy

## 2022-10-14 NOTE — ASSESSMENT & PLAN NOTE
The patient has a prior history of vitamin-D deficiency since she is on an asked his all for her breast cancer I have advised that we get an updated vitamin-D level to evaluate for future replacement or supplementation

## 2022-10-14 NOTE — ASSESSMENT & PLAN NOTE
An updated lipid profile has been requested for this patient who has a history of mild hyperlipidemia  Will review the results of her studies with her upon completion

## 2022-10-25 ENCOUNTER — APPOINTMENT (OUTPATIENT)
Dept: LAB | Facility: CLINIC | Age: 72
End: 2022-10-25
Payer: MEDICARE

## 2022-10-25 DIAGNOSIS — E80.6 HYPERBILIRUBINEMIA: ICD-10-CM

## 2022-10-25 DIAGNOSIS — R39.9 UTI SYMPTOMS: ICD-10-CM

## 2022-10-25 DIAGNOSIS — E78.5 HYPERLIPIDEMIA, MILD: ICD-10-CM

## 2022-10-25 DIAGNOSIS — Z13.0 SCREENING FOR DEFICIENCY ANEMIA: ICD-10-CM

## 2022-10-25 DIAGNOSIS — E55.9 VITAMIN D DEFICIENCY: ICD-10-CM

## 2022-10-25 PROBLEM — Z79.811 USE OF ANASTROZOLE (ARIMIDEX): Status: ACTIVE | Noted: 2022-10-25

## 2022-10-25 LAB
25(OH)D3 SERPL-MCNC: 35.9 NG/ML (ref 30–100)
ALBUMIN SERPL BCP-MCNC: 3.6 G/DL (ref 3.5–5)
ALP SERPL-CCNC: 84 U/L (ref 46–116)
ALT SERPL W P-5'-P-CCNC: 23 U/L (ref 12–78)
ANION GAP SERPL CALCULATED.3IONS-SCNC: 5 MMOL/L (ref 4–13)
AST SERPL W P-5'-P-CCNC: 20 U/L (ref 5–45)
BASOPHILS # BLD AUTO: 0.04 THOUSANDS/ÂΜL (ref 0–0.1)
BASOPHILS NFR BLD AUTO: 1 % (ref 0–1)
BILIRUB SERPL-MCNC: 0.89 MG/DL (ref 0.2–1)
BUN SERPL-MCNC: 19 MG/DL (ref 5–25)
CALCIUM SERPL-MCNC: 9.4 MG/DL (ref 8.3–10.1)
CHLORIDE SERPL-SCNC: 106 MMOL/L (ref 96–108)
CHOLEST SERPL-MCNC: 180 MG/DL
CO2 SERPL-SCNC: 27 MMOL/L (ref 21–32)
CREAT SERPL-MCNC: 0.9 MG/DL (ref 0.6–1.3)
EOSINOPHIL # BLD AUTO: 0.11 THOUSAND/ÂΜL (ref 0–0.61)
EOSINOPHIL NFR BLD AUTO: 3 % (ref 0–6)
ERYTHROCYTE [DISTWIDTH] IN BLOOD BY AUTOMATED COUNT: 12.2 % (ref 11.6–15.1)
GFR SERPL CREATININE-BSD FRML MDRD: 63 ML/MIN/1.73SQ M
GLUCOSE P FAST SERPL-MCNC: 91 MG/DL (ref 65–99)
HCT VFR BLD AUTO: 40.6 % (ref 34.8–46.1)
HDLC SERPL-MCNC: 47 MG/DL
HGB BLD-MCNC: 13.1 G/DL (ref 11.5–15.4)
IMM GRANULOCYTES # BLD AUTO: 0.01 THOUSAND/UL (ref 0–0.2)
IMM GRANULOCYTES NFR BLD AUTO: 0 % (ref 0–2)
LDLC SERPL CALC-MCNC: 112 MG/DL (ref 0–100)
LYMPHOCYTES # BLD AUTO: 1.11 THOUSANDS/ÂΜL (ref 0.6–4.47)
LYMPHOCYTES NFR BLD AUTO: 31 % (ref 14–44)
MCH RBC QN AUTO: 30.8 PG (ref 26.8–34.3)
MCHC RBC AUTO-ENTMCNC: 32.3 G/DL (ref 31.4–37.4)
MCV RBC AUTO: 96 FL (ref 82–98)
MONOCYTES # BLD AUTO: 0.59 THOUSAND/ÂΜL (ref 0.17–1.22)
MONOCYTES NFR BLD AUTO: 16 % (ref 4–12)
NEUTROPHILS # BLD AUTO: 1.76 THOUSANDS/ÂΜL (ref 1.85–7.62)
NEUTS SEG NFR BLD AUTO: 49 % (ref 43–75)
NONHDLC SERPL-MCNC: 133 MG/DL
NRBC BLD AUTO-RTO: 0 /100 WBCS
PLATELET # BLD AUTO: 189 THOUSANDS/UL (ref 149–390)
PMV BLD AUTO: 9.7 FL (ref 8.9–12.7)
POTASSIUM SERPL-SCNC: 4.2 MMOL/L (ref 3.5–5.3)
PROT SERPL-MCNC: 7.4 G/DL (ref 6.4–8.4)
RBC # BLD AUTO: 4.25 MILLION/UL (ref 3.81–5.12)
SODIUM SERPL-SCNC: 138 MMOL/L (ref 135–147)
TRIGL SERPL-MCNC: 104 MG/DL
WBC # BLD AUTO: 3.62 THOUSAND/UL (ref 4.31–10.16)

## 2022-10-25 PROCEDURE — 36415 COLL VENOUS BLD VENIPUNCTURE: CPT

## 2022-10-25 PROCEDURE — 80061 LIPID PANEL: CPT

## 2022-10-25 PROCEDURE — 80053 COMPREHEN METABOLIC PANEL: CPT

## 2022-10-25 PROCEDURE — 85025 COMPLETE CBC W/AUTO DIFF WBC: CPT

## 2022-10-25 PROCEDURE — 82306 VITAMIN D 25 HYDROXY: CPT

## 2022-10-26 ENCOUNTER — OFFICE VISIT (OUTPATIENT)
Dept: SURGICAL ONCOLOGY | Facility: CLINIC | Age: 72
End: 2022-10-26
Payer: MEDICARE

## 2022-10-26 VITALS
DIASTOLIC BLOOD PRESSURE: 84 MMHG | SYSTOLIC BLOOD PRESSURE: 136 MMHG | RESPIRATION RATE: 16 BRPM | TEMPERATURE: 98 F | OXYGEN SATURATION: 97 % | HEART RATE: 78 BPM | BODY MASS INDEX: 28.34 KG/M2 | HEIGHT: 68 IN | WEIGHT: 187 LBS

## 2022-10-26 DIAGNOSIS — Z17.0 MALIGNANT NEOPLASM OF OVERLAPPING SITES OF RIGHT BREAST IN FEMALE, ESTROGEN RECEPTOR POSITIVE (HCC): Primary | ICD-10-CM

## 2022-10-26 DIAGNOSIS — C50.811 MALIGNANT NEOPLASM OF OVERLAPPING SITES OF RIGHT BREAST IN FEMALE, ESTROGEN RECEPTOR POSITIVE (HCC): Primary | ICD-10-CM

## 2022-10-26 PROCEDURE — 99214 OFFICE O/P EST MOD 30 MIN: CPT | Performed by: SURGERY

## 2022-10-26 NOTE — PROGRESS NOTES
Surgical Oncology Follow Up       [address]  [facility]  [address] [facility] [address]    Munira Breen  1950  [id number]  [address] [facility] [facility]  [facility]  [address] [address]    Chief Complaint   Patient presents with   • Follow-up          Assessment & Plan:   Maurice Loo presents for three-month follow-up visit for her right breast inflammatory cancer treated with modified radical mastectomy she is now completed her radiation therapy  She is on anastrozole with Dr Keven Sánchez  She is  tolerating that well  Cancer History:     Oncology History Overview Note   Munira Breen is a 67year old female with 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 followed by mastectomy  Her tumor is ER/RI positive and HER2 negative  She had 14 lymph nodes out of 19 lymph nodes positive for metastasis  There was also extranodal extension noted  Her margins of resection were negative  She was started on adjuvant hormonal therapy with anastrozole 1 mg daily in 2022  She completed a course of adjuvant radiation therapy to the right chest wall and regional lymph nodes on 2022  At completion of treatment she had +2 skin erythema  Small area of desquamation near the axilla  She was utilizing Neosporin to this site  She also experienced pruritus primarily in the upper inner quadrant for which mometasone was prescribed  Utilizing remedy cream for moisturization  22 Hem Onc, Dr Keven Sánchez  Clinically no evidence recurrent disease  Continue anastrozole 1 mg once a day        8/3/22 Surg Onc, Dr Sajan Paredes  Considerable radiation changes to skin  Plan to alternate visits with Dr Keven Sánchez, so she is evaluated every 3 months      Upcomin22 Surg Kristan Maravilla  23 Hem Aubrey Genao       Malignant neoplasm of overlapping sites of right breast in female, estrogen receptor positive ([facility] [address] )   12/6/2021 Biopsy    Punch biopsy of right breast  Dermal lymphovascular invasion, immunoprofile consistent with breast primary  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)  Grade 2  ER 90; AK 70; HER2 2+, FISH negative     12/13/2021 Observation    Bilateral breast MRI - findings suspicious for multicentric right breast cancer; 2 biopsies recommended at areas of concern (never done); left breast clear; no right axillary adenopathy       12/30/2021 -  Chemotherapy    DOXOrubicin (ADRIAMYCIN), 122 mg, Intravenous, Once, 4 of 4 cycles  Administration: 120 mg (12/30/2021), 122 mg (1/13/2022), 122 mg (1/27/2022), 122 mg (2/10/2022)  palonosetron (ALOXI), 0 25 mg, Intravenous, Once, 2 of 2 cycles  Administration: 0 25 mg (3/24/2022), 0 25 mg (4/7/2022)  pegfilgrastim (NEULASTA), 4 mg (100 % of original dose 4 mg), Subcutaneous, Once, 3 of 3 cycles  Dose modification: 4 mg (original dose 4 mg, Cycle 6)  Administration: 4 mg (3/11/2022), 4 mg (3/25/2022), 4 mg (4/8/2022)  pegfilgrastim (NEULASTA ONPRO), 6 mg, Subcutaneous, Once, 5 of 5 cycles  Administration: 6 mg (12/30/2021), 6 mg (1/13/2022), 6 mg (2/24/2022), 6 mg (1/27/2022), 6 mg (2/10/2022)  cyclophosphamide (CYTOXAN) IVPB, 600 mg/m2 = 1,218 mg, Intravenous, Once, 4 of 4 cycles  Administration: 1,218 mg (12/30/2021), 1,218 mg (1/13/2022), 1,218 mg (1/27/2022), 1,218 mg (2/10/2022)  fosaprepitant (EMEND) IVPB, 150 mg, Intravenous, Once, 4 of 4 cycles  Administration: 150 mg (12/30/2021), 150 mg (1/13/2022), 150 mg (1/27/2022), 150 mg (2/10/2022)  PACLItaxel (TAXOL) chemo IVPB, 175 mg/m2 = 355 2 mg, Intravenous, Once, 4 of 4 cycles  Administration: 355 2 mg (2/24/2022), 355 2 mg (3/10/2022), 355 2 mg (3/24/2022), 355 2 mg (4/7/2022)     4/26/2022 Surgery    Right breast modified radical mastectomy  Inflammatory breast cancer  Grade 1  1 5 cm (multiple foci involving all four quadrants)  Lymphovascular invasion present  Margins negative  14/19 Lymph nodes with macro-metastases  Anatomic Stage IIIC  Prognostic Stage IIIA        Procedure  Total mastectomy    Specimen Laterality  Right    TUMOR   Tumor Site  Upper outer quadrant      Lower outer quadrant      Upper inner quadrant      Lower inner quadrant      Central    Histologic Type  inflammatory  breast carcinoma    Histologic Grade ([place] Histologic Score)     Glandular (Acinar) / Tubular Differentiation  Score 1    Nuclear Pleomorphism  Score 2    Mitotic Rate  Score 1    Overall Grade  Grade 1 (scores of 3, 4 or 5)    Tumor Size  Greatest dimension of largest invasive focus (Millimeters): Extensive  invasive breast carcinoma involving all four breast quadrants mm   Tumor Focality  Multiple foci of invasive carcinoma    Number of Foci  Cannot be determined         Sizes of Individual Foci (Millimeters)  Multiple foci, largest measures 15 mm   Ductal Carcinoma In Situ (DCIS)  Not identified    Lobular Carcinoma In Situ (LCIS)  Not identified         Tumor Extent  Skin is present and involved    Skin Invasion  Invasive carcinoma directly invades into the dermis or epidermis without skin ulceration (this does not change the T classification)    Skin Satellite Foci  Satellite skin foci of invasive carcinoma are present    Lymphovascular Invasion  Present    Dermal Lymphovascular Invasion  Present    Microcalcifications  Present in invasive carcinoma      Present in non-neoplastic tissue    Treatment Effect in the Breast  No definite response to presurgical therapy in the invasive carcinoma    Treatment Effect in the Lymph Nodes  No definite response to presurgical therapy in metastatic carcinoma    MARGINS   Margin Status for Invasive Carcinoma  All margins negative for invasive carcinoma    Distance from Invasive Carcinoma to Closest Margin  10 mm   Closest Margin(s) to Invasive Carcinoma  Posterior    Distance from Invasive Carcinoma to Inferior Margin  13 mm   REGIONAL LYMPH NODES   Regional Lymph Node Status  Tumor present in regional lymph node(s)    Number of Lymph Nodes with Macrometastases  14    Number of Lymph Nodes with Micrometastases  0    Size of Largest Sigifredo Metastatic Deposit  8 mm   Extranodal Extension  Present, 2 mm or less    Total Number of Lymph Nodes Examined (sentinel and non-sentinel)  19         6/2022 -  Hormone Therapy    Anastrozole      6/20/2022 - 7/25/2022 Radiation    Plan ID Energy Fractions Dose per Fraction (cGy) Dose Correction (cGy) Total Dose Delivered (cGy) Elapsed Days   R CW 6X 25 / 25 200 0 5,000 35   R PAB 6X 25 / 25 51 0 1,275 35   R Sclav 6X 25 / 25 200 0 5,000 35      Treatment Dates:  6/20/2022 - 7/25/2022  Dr Mango Trotter               Interval History:   Patient has completed her therapy  She is now on anastrozole  She is due for mammogram in December  Review of Systems:   Review of Systems   Constitutional: Negative for activity change, appetite change and fatigue  HENT: Negative  Eyes: Negative  Respiratory: Negative for cough, shortness of breath and wheezing  Cardiovascular: Negative for chest pain and leg swelling  Gastrointestinal: Negative  Endocrine: Negative  Genitourinary: Negative  Musculoskeletal:        No new changes or complaints of bone pain   Skin: Negative  Allergic/Immunologic: Negative  Neurological: Negative  Hematological: Negative  Psychiatric/Behavioral: Negative          Past Medical History     Patient Active Problem List   Diagnosis   • Varicose vein of leg   • Anxiety   • Arthritis   • Depression   • Hyperbilirubinemia   • Hyperlipidemia, mild   • Over weight   • Vitamin D deficiency   • Malignant neoplasm of overlapping sites of right breast in female, estrogen receptor positive ([address] )   • Chemotherapy induced neutropenia ([address] )   • Medical marijuana use   • Insomnia due to medical condition   • Port-A-Cath in place   • Age related osteoporosis   • Use of anastrozole (Arimidex)     Past Medical History:   Diagnosis Date   • Anxiety    • Cancer [facility])     Right breast cancer   • Depression    • Fracture of radius, distal, closed     Last Assessed:  6/10/14   • Thrombophlebitis    • Vitamin D deficiency      Past Surgical History:   Procedure Laterality Date   • ANKLE SURGERY     • ANKLE SURGERY     • BREAST BIOPSY Left 2001    neg   • GALLBLADDER SURGERY     • HYSTERECTOMY     • IR PORT PLACEMENT  12/29/2021   • MO MASTECTOMY, MODIFIED RADICAL Right 4/26/2022    Procedure: BREAST MODIFIED RADICAL MASTECTOMY WITH RIGHT LYMPHATIC MAPPING WITH BLUE AND RADIOACTIVE DYE (INJECT AT 0900 BY DR DEJESUS IN THE OR); Surgeon: Aleksander Bojorquez MD;  Location: [facility];  Service: Surgical Oncology   • WISDOM TOOTH EXTRACTION       Family History   Problem Relation Age of Onset   • Ovarian cancer Mother 64   • Heart disease Father    • Leukemia Brother 62   • Heart disease Brother    • Heart disease Brother    • Cancer Maternal Grandmother         Unknown primary; mid 52's   • Stomach cancer Maternal Aunt         63's     Social History     Socioeconomic History   • Marital status: /Civil Union     Spouse name: Not on file   • Number of children: Not on file   • Years of education: Not on file   • Highest education level: Not on file   Occupational History   • Not on file   Tobacco Use   • Smoking status: Never Smoker   • Smokeless tobacco: Never Used   Vaping Use   • Vaping Use: Never used   Substance and Sexual Activity   • Alcohol use: Yes     Comment: rarely   • Drug use: Yes     Types: Marijuana     Comment: not helping stopped   • Sexual activity: Not Currently     Partners: Male     Birth control/protection: Female Sterilization   Other Topics Concern   • Not on file   Social History Narrative     to [facility]       Social Determinants of Health     Financial Resource Strain: Low Risk    • Difficulty of Paying Living Expenses: Not hard at all   Food Insecurity: Not on file   Transportation Needs: No Transportation Needs   • Lack of Transportation (Medical): No   • Lack of Transportation (Non-Medical): No   Physical Activity: Not on file   Stress: Not on file   Social Connections: Not on file   Intimate Partner Violence: Not on file   Housing Stability: Not on file       Current Outpatient Medications:   •  ALPRAZolam (XANAX) 0 25 mg tablet, Take 1/2 to 1 pill Q 8 hours p r n  For anxiety, Disp: 30 tablet, Rfl: 0  •  anastrozole (ARIMIDEX) 1 mg tablet, Take 1 tablet (1 mg total) by mouth daily, Disp: 90 tablet, Rfl: 1  •  Calcium 250 MG CAPS, Take 250 mg by mouth in the morning  , Disp: , Rfl:   •  cholecalciferol (VITAMIN D3) 1,000 units tablet, Take 2 capsules by mouth daily, Disp: , Rfl:   •  escitalopram (LEXAPRO) 20 mg tablet, Take 1 tablet (20 mg total) by mouth daily, Disp: 90 tablet, Rfl: 2  •  Fish Oil-Krill Oil (KRILL OIL PLUS) CAPS, Take by mouth in the morning  , Disp: , Rfl:   •  fluocinonide (LIDEX) 0 05 % external solution, APPLY TO SCALP ONCE DAILY AS NEEDED FOR ITCH, Disp: , Rfl: 2  •  ketoconazole (NIZORAL) 2 % shampoo, Apply 1 application topically if needed  , Disp: , Rfl: 5  •  meclizine (ANTIVERT) 25 mg tablet, Take 1 tablet (25 mg total) by mouth every 8 (eight) hours, Disp: 30 tablet, Rfl: 0  No Known Allergies    Physical Exam:     Vitals:    10/26/22 1248   BP: 136/84   Pulse: 78   Resp: 16   Temp: 98 °F (36 7 °C)   SpO2: 97%     Physical Exam  Vitals reviewed  Constitutional:       Appearance: She is well-developed  HENT:      Head: Normocephalic and atraumatic  Eyes:      Pupils: Pupils are equal, round, and reactive to light  Neck:      Thyroid: No thyromegaly  Vascular: No JVD  Trachea: No tracheal deviation  Cardiovascular:      Rate and Rhythm: Normal rate and regular rhythm  Heart sounds: Normal heart sounds  No murmur heard  No friction rub  No gallop     Pulmonary:      Effort: Pulmonary effort is normal  No respiratory distress  Breath sounds: Normal breath sounds  No wheezing or rales  Chest:      Comments: Examination the right mastectomy site demonstrates post radiation changes which resolving  There is no evidence of infection recurrent mass or axillary adenopathy    The left breast was examined in the sitting and supine position  There are no worrisome skin changes, tenderness, inverted nipple, nipple discharge, swelling, bleeding or evidence of a mass in any quadrant  Sigifredo survey demonstrated no evidence of any clinically suspicious axillary, pectoral or paraclavicular lymph nodes  Abdominal:      General: There is no distension  Palpations: Abdomen is soft  There is no hepatomegaly or mass  Tenderness: There is no abdominal tenderness  There is no guarding or rebound  Musculoskeletal:         General: No tenderness  Normal range of motion  Cervical back: Normal range of motion and neck supple  Lymphadenopathy:      Cervical: No cervical adenopathy  Skin:     General: Skin is warm and dry  Findings: No erythema or rash  Neurological:      Mental Status: She is alert and oriented to person, place, and time  Cranial Nerves: No cranial nerve deficit  Psychiatric:         Behavior: Behavior normal            Results & Discussion:   The patient will remain on her anastrozole  We will coordinate mammograms for December  She is free of any evidence of local regional or distant recurrence disease  I will see her back in 6 months  Advance Care Planning/Advance Directives:  I discussed the disease status, treatment plans and follow-up with the patient

## 2022-10-29 DIAGNOSIS — C50.811 MALIGNANT NEOPLASM OF OVERLAPPING SITES OF RIGHT BREAST IN FEMALE, ESTROGEN RECEPTOR POSITIVE (HCC): ICD-10-CM

## 2022-10-29 DIAGNOSIS — Z17.0 MALIGNANT NEOPLASM OF OVERLAPPING SITES OF RIGHT BREAST IN FEMALE, ESTROGEN RECEPTOR POSITIVE (HCC): ICD-10-CM

## 2022-10-29 NOTE — TELEPHONE ENCOUNTER
Patient had previously called and left a message noting concerns about "fluid build up" at her incision site  Called the patient back to further discuss this but there was no answer  Message was left with direct call back number provided 
Patient returned the phone call  States that when she saw Dr Jonny North on 5/25, she was informed that there was fluid at her mastectomy site  Patient reports that now, there is more fluid at that area than previously  Denies any redness, bruising, pain, or discomfort  Notes that the swelling goes from underneath her incision across her chest to under her arm  Instructed patient to use OTC pain medication if needed as well as warm/cool compresses on the area as tolerated  An appointment was scheduled with Dr Aman Bond on 6/1 at 4:00; patient verbalized understanding of appointment details  She was instructed to call on Tuesday morning if her symptoms worsened 
(1) Outpatient Area

## 2022-10-31 ENCOUNTER — HOSPITAL ENCOUNTER (OUTPATIENT)
Dept: INFUSION CENTER | Facility: CLINIC | Age: 72
Discharge: HOME | End: 2022-10-31

## 2022-10-31 DIAGNOSIS — Z95.828 PORT-A-CATH IN PLACE: Primary | ICD-10-CM

## 2022-10-31 RX ORDER — ANASTROZOLE 1 MG/1
TABLET ORAL
Qty: 90 TABLET | Refills: 1 | Status: SHIPPED | OUTPATIENT
Start: 2022-10-31

## 2022-10-31 RX ORDER — ANASTROZOLE 1 MG/1
1 TABLET ORAL DAILY
Qty: 90 TABLET | Refills: 0 | Status: SHIPPED | OUTPATIENT
Start: 2022-10-31

## 2022-10-31 NOTE — PROGRESS NOTES
Patients port flushed per protocol  Excellent blood return noted  Patient aware to schedule next appointment at  on the way out  AVS declined

## 2022-11-16 ENCOUNTER — HOSPITAL ENCOUNTER (OUTPATIENT)
Dept: BONE DENSITY | Facility: IMAGING CENTER | Age: 72
Discharge: HOME | End: 2022-11-16

## 2022-11-16 DIAGNOSIS — M81.0 AGE RELATED OSTEOPOROSIS, UNSPECIFIED PATHOLOGICAL FRACTURE PRESENCE: ICD-10-CM

## 2022-12-12 ENCOUNTER — HOSPITAL ENCOUNTER (OUTPATIENT)
Dept: INFUSION CENTER | Facility: CLINIC | Age: 72
Discharge: HOME | End: 2022-12-12

## 2022-12-12 ENCOUNTER — HOSPITAL ENCOUNTER (OUTPATIENT)
Dept: MAMMOGRAPHY | Facility: CLINIC | Age: 72
Discharge: HOME | End: 2022-12-12

## 2022-12-12 VITALS — WEIGHT: 187.39 LBS | BODY MASS INDEX: 28.4 KG/M2 | HEIGHT: 68 IN

## 2022-12-12 DIAGNOSIS — C50.811 MALIGNANT NEOPLASM OF OVERLAPPING SITES OF RIGHT BREAST IN FEMALE, ESTROGEN RECEPTOR POSITIVE (HCC): ICD-10-CM

## 2022-12-12 DIAGNOSIS — Z95.828 PORT-A-CATH IN PLACE: Primary | ICD-10-CM

## 2022-12-12 DIAGNOSIS — Z17.0 MALIGNANT NEOPLASM OF OVERLAPPING SITES OF RIGHT BREAST IN FEMALE, ESTROGEN RECEPTOR POSITIVE (HCC): ICD-10-CM

## 2022-12-12 NOTE — PROGRESS NOTES
Patient presents today for catheter maintenance/port flush  Patient offers no complaints  Port accessed without incident with excellent blood return noted  Port flushed and de-accessed as per protocol  Patient to make additional appointments upon exit  AVS offered and declined

## 2022-12-30 ENCOUNTER — OFFICE VISIT (OUTPATIENT)
Dept: INTERNAL MEDICINE CLINIC | Facility: CLINIC | Age: 72
End: 2022-12-30

## 2022-12-30 VITALS
HEART RATE: 81 BPM | HEIGHT: 68 IN | RESPIRATION RATE: 16 BRPM | TEMPERATURE: 97.2 F | OXYGEN SATURATION: 98 % | BODY MASS INDEX: 28.76 KG/M2 | SYSTOLIC BLOOD PRESSURE: 132 MMHG | WEIGHT: 189.8 LBS | DIASTOLIC BLOOD PRESSURE: 74 MMHG

## 2022-12-30 DIAGNOSIS — J01.90 ACUTE BACTERIAL SINUSITIS: Primary | ICD-10-CM

## 2022-12-30 DIAGNOSIS — B96.89 ACUTE BACTERIAL SINUSITIS: Primary | ICD-10-CM

## 2022-12-30 RX ORDER — AMOXICILLIN AND CLAVULANATE POTASSIUM 875; 125 MG/1; MG/1
1 TABLET, FILM COATED ORAL EVERY 12 HOURS SCHEDULED
Qty: 20 TABLET | Refills: 0 | Status: SHIPPED | OUTPATIENT
Start: 2022-12-30 | End: 2023-01-09

## 2022-12-30 NOTE — PROGRESS NOTES
INTERNAL MEDICINE OFFICE VISIT       NAME: Danae Toribio  AGE: 67 y o  SEX: female       : 1950        MRN: [id number]    DATE: 2022  TIME: 3:07 PM    Assessment and Plan   1  Acute bacterial sinusitis  -     amoxicillin-clavulanate (Augmentin) 875-125 mg per tablet; Take 1 tablet by mouth every 12 (twelve) hours for 10 days         There are no Patient Instructions on file for this visit  Chief Complaint   Sinus congestion, postnasal drainage and sore throat    History of Present Illness   Danae Toribio is a 67y o -year-old female who has had the above symptoms for several days  Benjy Carr has 4 grandchildren who have been ill  At least one of her grandchildren has strep throat  We discussed that strep throat does not occur in adults over 3 because of resorption of receptors in the throat which allow strep binding  There have been occasional chills, slight fullness in the ears, no GI symptoms, no cough or dyspnea  Benjy Carr is doing well with a history of breast cancer  There is no renal insufficiency  There is mild chemotherapy-induced neutropenia  Most recent CBC was reviewed from   Absolute neutrophil count was 1 76  Discussed empiric antibiotic therapy for frontal sinusitis and Augmentin was started  Benefits, side effects and proper administration were discussed  Follow-up as needed was encouraged      Review of Systems   Review of Systems as above    Active Problem List     Patient Active Problem List   Diagnosis   • Varicose vein of leg   • Anxiety   • Arthritis   • Depression   • Hyperbilirubinemia   • Hyperlipidemia, mild   • Over weight   • Vitamin D deficiency   • Malignant neoplasm of overlapping sites of right breast in female, estrogen receptor positive ([facility] [address] )   • Chemotherapy induced neutropenia ([facility])   • Medical marijuana use   • Insomnia due to medical condition   • Port-A-Cath in place   • Age related osteoporosis   • Use of anastrozole (Arimidex)       The following portions of the patient's history were reviewed and updated as appropriate: allergies, current medications, past family history, past medical history, past social history, past surgical history, and problem list     Objective     Vitals:    12/30/22 1448   BP: 132/74   Pulse: 81   Resp: 16   Temp: (!) 97 2 °F (36 2 °C)   SpO2: 98%     Wt Readings from Last 3 Encounters:   12/30/22 86 1 kg (189 lb 12 8 oz)   12/12/22 85 kg (187 lb 6 3 oz)   10/26/22 84 8 kg (187 lb)       Physical Exam    Vital signs stable, alert and oriented in no distress  Afebrile  Eyes: No iritis or conjunctivitis  Exam of ears is normal   There is slight fullness over the frontal sinuses  Nasal passages notable for thick mucus congestion  Oral cavity throat notable for mild posterior pharyngeal erythema with gray thick postnasal drainage  There are no hemorrhages or exudates  There is no cervical adenopathy  Neck supple  No JVD  Lungs clear and the cardiac exam is normal on auscultation  ALLERGIES:  No Known Allergies    Current Medications     Current Outpatient Medications   Medication Sig Dispense Refill   • ALPRAZolam (XANAX) 0 25 mg tablet Take 1/2 to 1 pill Q 8 hours p r n   For anxiety 30 tablet 0   • amoxicillin-clavulanate (Augmentin) 875-125 mg per tablet Take 1 tablet by mouth every 12 (twelve) hours for 10 days 20 tablet 0   • anastrozole (ARIMIDEX) 1 mg tablet TAKE 1 TABLET BY MOUTH EVERY DAY 90 tablet 1   • anastrozole (ARIMIDEX) 1 mg tablet Take 1 tablet (1 mg total) by mouth daily 90 tablet 0   • Calcium 250 MG CAPS Take 250 mg by mouth in the morning       • cholecalciferol (VITAMIN D3) 1,000 units tablet Take 2 capsules by mouth daily     • escitalopram (LEXAPRO) 20 mg tablet Take 1 tablet (20 mg total) by mouth daily 90 tablet 2   • Fish Oil-Krill Oil (KRILL OIL PLUS) CAPS Take by mouth in the morning       • fluocinonide (LIDEX) 0 05 % external solution APPLY TO SCALP ONCE DAILY AS NEEDED FOR ITCH  2   • ketoconazole (NIZORAL) 2 % shampoo Apply 1 application topically if needed    5   • meclizine (ANTIVERT) 25 mg tablet Take 1 tablet (25 mg total) by mouth every 8 (eight) hours 30 tablet 0     No current facility-administered medications for this visit           Mickey Santiago MD Curettage Text: The wound bed was treated with curettage after the biopsy was performed.

## 2023-01-23 ENCOUNTER — HOSPITAL ENCOUNTER (OUTPATIENT)
Dept: INFUSION CENTER | Facility: CLINIC | Age: 73
Discharge: HOME | End: 2023-01-23

## 2023-01-23 DIAGNOSIS — Z95.828 PORT-A-CATH IN PLACE: Primary | ICD-10-CM

## 2023-01-23 NOTE — PROGRESS NOTES
Patient to Casimiro for [facility] Maintenance: Offers no complaints at present time: Left PAC accessed without difficulty: Good blood return noted

## 2023-01-23 NOTE — PROGRESS NOTES
Tolerated procedure without incident: No adverse reactions noted: No further appt's scheduled:  Will make upon discharge: AVS offered and declined

## 2023-01-31 ENCOUNTER — OFFICE VISIT (OUTPATIENT)
Dept: HEMATOLOGY ONCOLOGY | Facility: CLINIC | Age: 73
End: 2023-01-31

## 2023-01-31 VITALS
DIASTOLIC BLOOD PRESSURE: 84 MMHG | OXYGEN SATURATION: 98 % | WEIGHT: 191 LBS | RESPIRATION RATE: 16 BRPM | BODY MASS INDEX: 28.95 KG/M2 | HEIGHT: 68 IN | HEART RATE: 79 BPM | SYSTOLIC BLOOD PRESSURE: 140 MMHG | TEMPERATURE: 97.3 F

## 2023-01-31 DIAGNOSIS — Z17.0 MALIGNANT NEOPLASM OF OVERLAPPING SITES OF RIGHT BREAST IN FEMALE, ESTROGEN RECEPTOR POSITIVE (HCC): Primary | ICD-10-CM

## 2023-01-31 DIAGNOSIS — C50.811 MALIGNANT NEOPLASM OF OVERLAPPING SITES OF RIGHT BREAST IN FEMALE, ESTROGEN RECEPTOR POSITIVE (HCC): Primary | ICD-10-CM

## 2023-01-31 NOTE — PROGRESS NOTES
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, MA 70 % positive, HER2 fish negative disease   Based on MRI, she may have 9 mm of tumor with clear skin thickening in her right breast   Inflammatory breast cancer was confirmed by the biopsy with dermal invasion  She underwent neoadjuvant chemotherapy with dose dense AC followed by dose dense paclitaxel, resulting in clinically responding disease  However, when she underwent right mastectomy and axillary lymph node dissection, she had significant amount of residual disease with 14 positive lymph nodes  Therefore, she has quite high risk disease  She completed postmastectomy radiation therapy  She is currently on adjuvant hormonal therapy with anastrozole since June 2022 with minimal toxicity  She has no evidence of recurrent disease, based on her symptoms and physical examinations  I recommended her to continue anastrozole 1 mg once a day  She is in agreement with my recommendation    I will see her again in a year for routine follow-up           Subjective:      HPI:  A 23-year-old postmenopausal woman noticed red spot on the skin in her right breast in October 2021   She brought this to medical attention   Mammography did not show any mass   However, ultrasound shows 6 mm of skin thickening in her right breast at 4:00 a m  position   She was seen by Dr Christian Barros who did a skin biopsy   Skin biopsy was positive for dermal lymphatic invasion of carcinoma, consistent with breast primary   ER, MA and HER2 are pending at this time   She subsequent underwent CT scan of chest abdomen pelvis which showed right breast skin thickening but no evidence the distant metastasis   She presents today with her  and daughter-in-law to discuss the medical treatment for her breast cancer   She has no complaint of pain   She is afebrile   She has no significant past medical history   Her mother  of ovarian cancer at age of 62   Therefore, she underwent prophylactic hysterectomy and bilateral oophorectomy in 45   She also have cholecystectomy as a surgical past history   She currently feels well   She has no complaint of pain   Her weight is stable   She has no respiratory symptoms  Radha Peters is a lifetime never smoker   Her performance status is normal            Interval History:   A 67year-old postmenopausal woman with inflammatory right breast cancer, grade 2, ER 90 % positive, NC 70 % positive, HER2 fish negative disease   Her right breast mass is not easy to evaluate   Even MRI showed only 9 mm of mass with skin thickening  She underwent neoadjuvant chemotherapy with dose dense AC followed by dose dense paclitaxel with excellent tolerance  Clinically, she had responding disease  She underwent mastectomy and axillary lymph node dissection in 2022 which showed significant amount of multifocal medullary disease with dermal invasion  14 axillary lymph nodes were positive for metastatic disease with extranodal extension  She did not have reconstruction  Since 2022, she has been on adjuvant hormonal therapy  She completed postmastectomy radiation therapy in summer 2022  She presents today for routine follow-up  She is tolerating anastrozole well  She has mild hot flashes  She denied musculoskeletal symptoms  She has no complaint of bone pain  Her weight is stable  She has no respiratory symptoms  Her performance status is normal       Objective:      Primary Diagnosis:     Right inflammatory breast cancer   Grade 2   ER 90% positive, NC 70% positive, HER2 fish negative disease  Ki 67 less than 10%    Diagnosed in 2021      Cancer Staging:  Cancer Staging  No matching staging information was found for the patient         Previous Hematologic/ Oncologic Treatment:       Neoadjuvant chemotherapy with dose dense AC x4 followed by dose dense paclitaxel x4  Completed in April 2022      Current Hematologic/ Oncologic Treatment:       1  Adjuvant hormonal therapy with anastrozole since June 2022       Disease Status:      1  Clinically responding disease      2  Pathologic significant residual disease after neoadjuvant chemotherapy      3  ASHLYN status post mastectomy and at 0 later after dissection      Test Results:     Pathology:     Right breast skin biopsy showed dermal and lymphatic invasion of carcinoma, consistent with breast primary   At least grade 2   ER 90% positive, WI 70% positive, HER2 2+ disease  Tarsha Bad fish was negative for gene application with ratio of 1 2      Mastectomy specimen showed extensive multiple residual disease measuring up to 1 cm with dermal invasion  14/19 axillary lymph nodes were positive for metastatic disease with tumor measuring 8 mm with extranodal extension  Ki 67 <10%     Radiology:     CT scan of chest abdomen pelvis showed no evidence of distant metastasis      Breast ultrasound shows 6 mm of skin thickening at 4:00 a m  position with no visible underline mass      Echocardiogram showed ejection fraction 60 % in December 2021      Laboratory:     See below      Physical Exam:        General Appearance:    Alert, oriented          Eyes:    PERRL   Ears:    Normal external ear canals, both ears   Nose:   Nares normal, septum midline   Throat:   Mucosa moist  Pharynx without injection  Neck:   Supple         Lungs:     Clear to auscultation bilaterally   Chest Wall:    No tenderness or deformity    Heart:    Regular rate and rhythm         Abdomen:     Soft, non-tender, bowel sounds +, no organomegaly               Extremities:   Extremities no cyanosis or edema         Skin:   no rash or icterus      Lymph nodes:   Cervical, supraclavicular, and axillary nodes normal Neurologic:   CNII-XII intact, normal strength, sensation and reflexes     Throughout             Breast exam: Status post right mastectomy without reconstruction  No palpable abnormality in her right chest wall  No palpable right axillary adenopathy   Left breast exam is negative  ROS: Review of Systems   All other systems reviewed and are negative  Imaging: No results found  Labs:   Lab Results   Component Value Date    WBC 3 62 (L) 10/25/2022    HGB 13 1 10/25/2022    HCT 40 6 10/25/2022    MCV 96 10/25/2022     10/25/2022     Lab Results   Component Value Date     07/03/2015    K 4 2 10/25/2022     10/25/2022    CO2 27 10/25/2022    ANIONGAP 8 07/03/2015    BUN 19 10/25/2022    CREATININE 0 90 10/25/2022    GLUCOSE 83 07/03/2015    GLUF 91 10/25/2022    CALCIUM 9 4 10/25/2022    CORRECTEDCA 9 7 03/23/2022    AST 20 10/25/2022    ALT 23 10/25/2022    ALKPHOS 84 10/25/2022    PROT 7 7 07/03/2015    BILITOT 0 91 07/03/2015    EGFR 63 10/25/2022         Lab Results   Component Value Date    IRON 116 04/11/2016         Current Medications: Reviewed  Allergies: Reviewed  PMH/FH/SH:  Reviewed      Vital Sign:    Body surface area is 2 meters squared      Wt Readings from Last 3 Encounters:   01/31/23 86 6 kg (191 lb)   12/30/22 86 1 kg (189 lb 12 8 oz)   12/12/22 85 kg (187 lb 6 3 oz)        Temp Readings from Last 3 Encounters:   01/31/23 (!) 97 3 °F (36 3 °C) (Temporal)   12/30/22 (!) 97 2 °F (36 2 °C)   10/26/22 98 °F (36 7 °C) (Temporal)        BP Readings from Last 3 Encounters:   01/31/23 140/84   12/30/22 132/74   10/26/22 136/84         Pulse Readings from Last 3 Encounters:   01/31/23 79   12/30/22 81   10/26/22 78     @LASTSAO2(3)@

## 2023-02-07 ENCOUNTER — RA CDI HCC (OUTPATIENT)
Dept: OTHER | Facility: HOSPITAL | Age: 73
End: 2023-02-07

## 2023-02-07 NOTE — PROGRESS NOTES
[facility]  coding opportunities          Chart Reviewed number of suggestions sent to Provider: 1     Patients Insurance     Medicare Insurance: Medicare        [id number] Depression list sent to provider

## 2023-02-14 ENCOUNTER — TELEPHONE (OUTPATIENT)
Dept: RADIOLOGY | Facility: HOSPITAL | Age: 73
End: 2023-02-14

## 2023-02-14 ENCOUNTER — OFFICE VISIT (OUTPATIENT)
Dept: INTERNAL MEDICINE CLINIC | Facility: CLINIC | Age: 73
End: 2023-02-14

## 2023-02-14 VITALS
HEART RATE: 99 BPM | BODY MASS INDEX: 28.92 KG/M2 | HEIGHT: 68 IN | OXYGEN SATURATION: 97 % | TEMPERATURE: 97.3 F | WEIGHT: 190.8 LBS | SYSTOLIC BLOOD PRESSURE: 120 MMHG | DIASTOLIC BLOOD PRESSURE: 72 MMHG

## 2023-02-14 DIAGNOSIS — F41.9 ANXIETY: ICD-10-CM

## 2023-02-14 DIAGNOSIS — M85.852 OSTEOPENIA OF BOTH HIPS: ICD-10-CM

## 2023-02-14 DIAGNOSIS — M19.90 ARTHRITIS: ICD-10-CM

## 2023-02-14 DIAGNOSIS — Z17.0 MALIGNANT NEOPLASM OF OVERLAPPING SITES OF RIGHT BREAST IN FEMALE, ESTROGEN RECEPTOR POSITIVE (HCC): Primary | ICD-10-CM

## 2023-02-14 DIAGNOSIS — M85.851 OSTEOPENIA OF BOTH HIPS: ICD-10-CM

## 2023-02-14 DIAGNOSIS — C50.811 MALIGNANT NEOPLASM OF OVERLAPPING SITES OF RIGHT BREAST IN FEMALE, ESTROGEN RECEPTOR POSITIVE (HCC): Primary | ICD-10-CM

## 2023-02-14 DIAGNOSIS — E55.9 VITAMIN D DEFICIENCY: ICD-10-CM

## 2023-02-14 PROBLEM — Z79.899 MEDICAL MARIJUANA USE: Status: RESOLVED | Noted: 2022-02-16 | Resolved: 2023-02-14

## 2023-02-14 RX ORDER — TOBRAMYCIN 3 MG/ML
SOLUTION/ DROPS OPHTHALMIC
COMMUNITY
Start: 2023-01-31

## 2023-02-14 RX ORDER — SODIUM CHLORIDE 9 MG/ML
75 INJECTION, SOLUTION INTRAVENOUS CONTINUOUS
Status: CANCELLED | OUTPATIENT
Start: 2023-02-14

## 2023-02-14 NOTE — ASSESSMENT & PLAN NOTE
Currently on 20 mg of Lexapro daily patient is doing quite nicely when she returns in 4 months may consider decreasing the Lexapro down to 10 mg daily

## 2023-02-14 NOTE — PROGRESS NOTES
Name: Ehsan Pearson      : 1950      MRN: [id number]  Encounter Provider: eNw Salgado MD  Encounter Date: 2023   Encounter department: [address]     1  Malignant neoplasm of overlapping sites of right breast in female, estrogen receptor positive ([address] )  Assessment & Plan:  I reviewed the notes from the patient's visit with her oncologist she appears to have no sign of recurrent disease at this time she continues on Arimidex with a high level of functionality  2  Osteopenia of both hips  Assessment & Plan:  During today's visit with the patient I reviewed her most recent DEXA scan from the fall of last year  Shows osteopenia but no osteoporosis  Risk for hip fracture is less than 3%  Recommend weightbearing exercise calcium supplementation of 1000 mg calcium daily and vitamin D 2000 units daily given the fact that she is on Arimidex I recommended that she have a follow-up DEXA scan this fall  3  Arthritis  Assessment & Plan:  Early arthritis symptoms in the knees bilaterally recommend as needed use of over-the-counter anti-inflammatory such as Advil or Aleve to be taken as per the product insert      4  Vitamin D deficiency  Assessment & Plan:  Previous vitamin D deficiencies were reviewed with an updated vitamin D level it confirms that she is now in the range of 35 which is normal for vitamin D level recommend continuation of 2000 units of vitamin D daily      5  Anxiety  Assessment & Plan:  Currently on 20 mg of Lexapro daily patient is doing quite nicely when she returns in 4 months may consider decreasing the Lexapro down to 10 mg daily           Subjective      This 26-year-old female patient presents today for a routine follow-up visit  She has a history of breast cancer status post radiation chemotherapy and excision    She is on Arimidex by her oncologist   I reviewed her most recent consultation note with her oncologist  She continues to function at a high level with a very positive outlook on life  Her energy level has improved over the past several months since completing her therapy for her breast cancer  She is enjoying her time with her grandchildren and has been walking up to 2 miles daily  She does have some mild discomfort in her knees which have been x-rayed previously and show very mild arthritic changes  She can certainly use either Advil or Aleve on an as-needed basis to remedy any discomfort in the knees  During today's visit with the patient I have reviewed her DEXA scan which shows low risk for fracture with a percentage under 3% and osteopenic changes  In view of the fact that she will be on Arimidex for several years I have recommended another DEXA scan this fall to check for any pression of osteopenia  Currently we are recommending a combination of calcium supplementation at 1000 mg daily 2000 units of vitamin D daily and regular weightbearing exercise  Review of Systems   Musculoskeletal: Positive for arthralgias  All other systems reviewed and are negative  Current Outpatient Medications on File Prior to Visit   Medication Sig   • ALPRAZolam (XANAX) 0 25 mg tablet Take 1/2 to 1 pill Q 8 hours p r n   For anxiety   • anastrozole (ARIMIDEX) 1 mg tablet TAKE 1 TABLET BY MOUTH EVERY DAY   • anastrozole (ARIMIDEX) 1 mg tablet Take 1 tablet (1 mg total) by mouth daily   • Calcium 250 MG CAPS Take 250 mg by mouth in the morning     • cholecalciferol (VITAMIN D3) 1,000 units tablet Take 2 capsules by mouth daily   • escitalopram (LEXAPRO) 20 mg tablet Take 1 tablet (20 mg total) by mouth daily   • Fish Oil-Krill Oil (KRILL OIL PLUS) CAPS Take by mouth in the morning     • fluocinonide (LIDEX) 0 05 % external solution APPLY TO SCALP ONCE DAILY AS NEEDED FOR ITCH   • ketoconazole (NIZORAL) 2 % shampoo Apply 1 application topically if needed     • meclizine (ANTIVERT) 25 mg tablet Take 1 tablet (25 mg total) by mouth every 8 (eight) hours   • tobramycin (TOBREX) 0 3 % SOLN INSERT 1 DROP IN EACH EYE FOUR TIMES DAILY FOR 7 DAYS       Objective     /72   Pulse 99   Temp (!) 97 3 °F (36 3 °C)   Ht 5' 8" (1 727 m)   Wt 86 5 kg (190 lb 12 8 oz)   LMP  (LMP Unknown)   SpO2 97%   BMI 29 01 kg/m²     Physical Exam  Vitals reviewed  Constitutional:       General: She is not in acute distress  Appearance: Normal appearance  She is well-developed  She is not ill-appearing  HENT:      Head: Normocephalic  Right Ear: Hearing and external ear normal       Left Ear: Hearing and external ear normal       Nose: Nose normal  No mucosal edema  Mouth/Throat:      Pharynx: Uvula midline  Eyes:      General: Lids are normal       Conjunctiva/sclera: Conjunctivae normal       Pupils: Pupils are equal, round, and reactive to light  Neck:      Thyroid: No thyromegaly  Vascular: No carotid bruit or JVD  Cardiovascular:      Rate and Rhythm: Normal rate and regular rhythm  Heart sounds: Normal heart sounds  No murmur heard  Pulmonary:      Effort: Pulmonary effort is normal  No respiratory distress  Breath sounds: Normal breath sounds  No wheezing, rhonchi or rales  Abdominal:      General: Bowel sounds are normal       Palpations: Abdomen is soft  Musculoskeletal:         General: Normal range of motion  Lymphadenopathy:      Cervical: No cervical adenopathy  Skin:     General: Skin is warm and dry  Neurological:      Mental Status: She is alert and oriented to person, place, and time  Deep Tendon Reflexes: Reflexes are normal and symmetric  Psychiatric:         Speech: Speech normal          Behavior: Behavior normal  Behavior is cooperative  Thought Content:  Thought content normal          Judgment: Judgment normal        Britney Pickens MD

## 2023-02-14 NOTE — ASSESSMENT & PLAN NOTE
I reviewed the notes from the patient's visit with her oncologist she appears to have no sign of recurrent disease at this time she continues on Arimidex with a high level of functionality

## 2023-02-14 NOTE — ASSESSMENT & PLAN NOTE
Early arthritis symptoms in the knees bilaterally recommend as needed use of over-the-counter anti-inflammatory such as Advil or Aleve to be taken as per the product insert

## 2023-02-14 NOTE — ASSESSMENT & PLAN NOTE
Previous vitamin D deficiencies were reviewed with an updated vitamin D level it confirms that she is now in the range of 35 which is normal for vitamin D level recommend continuation of 2000 units of vitamin D daily

## 2023-02-14 NOTE — ASSESSMENT & PLAN NOTE
During today's visit with the patient I reviewed her most recent DEXA scan from the fall of last year  Shows osteopenia but no osteoporosis  Risk for hip fracture is less than 3%  Recommend weightbearing exercise calcium supplementation of 1000 mg calcium daily and vitamin D 2000 units daily given the fact that she is on Arimidex I recommended that she have a follow-up DEXA scan this fall

## 2023-02-15 ENCOUNTER — TELEPHONE (OUTPATIENT)
Dept: INTERVENTIONAL RADIOLOGY/VASCULAR | Facility: HOSPITAL | Age: 73
End: 2023-02-15

## 2023-02-15 NOTE — PRE-PROCEDURE INSTRUCTIONS
Pre-procedure Instructions for Interventional Radiology  [address]  [address] [phone]    You are scheduled for a/an Left Chest Port Removal     On Tuesday 2/21/23  Your tentative arrival time is 1000  Short stay will notify you the day before your procedure with the exact arrival time and the location to arrive  To prepare for your procedure:  1  Please arrange for someone to drive you home after the procedure and stay with you until the next morning if you are instructed to do so  This is typically for patients receiving some type of sedative or anesthetic for the procedure  2  DO NOT EAT OR DRINK ANYTHING after midnight on the evening before your procedure including candy & gum   3  ONLY SIPS OF WATER with your medications are allowed on the morning of your procedure  4  TAKE ALL OF YOUR REGULAR MEDICATIONS THE MORNING OF YOUR PROCEDURE with sips of water! We may call you to stop some of your blood sugar, blood pressure and blood thinning medications depending on the procedure  Please take all of these medications unless we instruct you to stop them  5  If you have an allergy to x-ray dye, please contact Interventional Radiology for an x-ray dye preparation which usually consists of an oral steroid and Benadryl  The day of your procedure:  1  Bring a list of the medications you take at home  2  Bring medications you take for breathing problems (such as inhalers), medications for chest pain, or both  3  Bring a case for your glasses or contacts  4  Bring your insurance card and a form of photo ID   5  Please leave all valuables such as credit cards and jewelry at home  6  Report to the registration desk in the main lobby at the [facility]  Ask to be directed to [facility]    7  While your procedure is being performed, your family may wait in the Radiology Waiting Room on the 1st floor in Radiology  if they need to leave, they may provide a number to be called following the procedure  8  Be prepared to stay overnight just in case  Sometimes procedures will indicate the need for further observation or treatment  9  If you are scheduled for a follow-up visit with the Interventional Radiologist after your procedure, you will be called with a date and time      Special Instructions (Medications to stop taking before your procedure etc ):

## 2023-02-21 ENCOUNTER — HOSPITAL ENCOUNTER (OUTPATIENT)
Dept: RADIOLOGY | Facility: HOSPITAL | Age: 73
Discharge: HOME | End: 2023-02-21
Attending: INTERNAL MEDICINE

## 2023-02-21 VITALS
WEIGHT: 187 LBS | RESPIRATION RATE: 16 BRPM | BODY MASS INDEX: 27.7 KG/M2 | HEART RATE: 57 BPM | OXYGEN SATURATION: 97 % | DIASTOLIC BLOOD PRESSURE: 70 MMHG | SYSTOLIC BLOOD PRESSURE: 144 MMHG | TEMPERATURE: 97.6 F | HEIGHT: 69 IN

## 2023-02-21 DIAGNOSIS — C50.811 MALIGNANT NEOPLASM OF OVERLAPPING SITES OF RIGHT BREAST IN FEMALE, ESTROGEN RECEPTOR POSITIVE (HCC): ICD-10-CM

## 2023-02-21 DIAGNOSIS — Z17.0 MALIGNANT NEOPLASM OF OVERLAPPING SITES OF RIGHT BREAST IN FEMALE, ESTROGEN RECEPTOR POSITIVE (HCC): ICD-10-CM

## 2023-02-21 LAB
ANION GAP SERPL CALCULATED.3IONS-SCNC: 4 MMOL/L (ref 4–13)
BUN SERPL-MCNC: 19 MG/DL (ref 5–25)
CALCIUM SERPL-MCNC: 9.3 MG/DL (ref 8.3–10.1)
CHLORIDE SERPL-SCNC: 109 MMOL/L (ref 96–108)
CO2 SERPL-SCNC: 25 MMOL/L (ref 21–32)
CREAT SERPL-MCNC: 0.7 MG/DL (ref 0.6–1.3)
ERYTHROCYTE [DISTWIDTH] IN BLOOD BY AUTOMATED COUNT: 12 % (ref 11.6–15.1)
GFR SERPL CREATININE-BSD FRML MDRD: 86 ML/MIN/1.73SQ M
GLUCOSE P FAST SERPL-MCNC: 92 MG/DL (ref 65–99)
GLUCOSE SERPL-MCNC: 92 MG/DL (ref 65–140)
HCT VFR BLD AUTO: 41.2 % (ref 34.8–46.1)
HGB BLD-MCNC: 14 G/DL (ref 11.5–15.4)
MCH RBC QN AUTO: 32 PG (ref 26.8–34.3)
MCHC RBC AUTO-ENTMCNC: 34 G/DL (ref 31.4–37.4)
MCV RBC AUTO: 94 FL (ref 82–98)
PLATELET # BLD AUTO: 188 THOUSANDS/UL (ref 149–390)
PMV BLD AUTO: 9.6 FL (ref 8.9–12.7)
POTASSIUM SERPL-SCNC: 3.9 MMOL/L (ref 3.5–5.3)
RBC # BLD AUTO: 4.37 MILLION/UL (ref 3.81–5.12)
SODIUM SERPL-SCNC: 138 MMOL/L (ref 135–147)
WBC # BLD AUTO: 3.23 THOUSAND/UL (ref 4.31–10.16)

## 2023-02-21 RX ORDER — SODIUM CHLORIDE 9 MG/ML
75 INJECTION, SOLUTION INTRAVENOUS CONTINUOUS
Status: DISCONTINUED | OUTPATIENT
Start: 2023-02-21 | End: 2023-02-22 | Stop reason: HOSPADM

## 2023-02-21 RX ORDER — MIDAZOLAM HYDROCHLORIDE 2 MG/2ML
INJECTION, SOLUTION INTRAMUSCULAR; INTRAVENOUS AS NEEDED
Status: COMPLETED | OUTPATIENT
Start: 2023-02-21 | End: 2023-02-21

## 2023-02-21 RX ORDER — LIDOCAINE HYDROCHLORIDE AND EPINEPHRINE 10; 10 MG/ML; UG/ML
INJECTION, SOLUTION INFILTRATION; PERINEURAL AS NEEDED
Status: COMPLETED | OUTPATIENT
Start: 2023-02-21 | End: 2023-02-21

## 2023-02-21 RX ORDER — FENTANYL CITRATE 50 UG/ML
INJECTION, SOLUTION INTRAMUSCULAR; INTRAVENOUS AS NEEDED
Status: COMPLETED | OUTPATIENT
Start: 2023-02-21 | End: 2023-02-21

## 2023-02-21 RX ADMIN — LIDOCAINE HYDROCHLORIDE,EPINEPHRINE BITARTRATE 10 ML: 10; .01 INJECTION, SOLUTION INFILTRATION; PERINEURAL at 12:30

## 2023-02-21 RX ADMIN — FENTANYL CITRATE 50 MCG: 50 INJECTION, SOLUTION INTRAMUSCULAR; INTRAVENOUS at 12:26

## 2023-02-21 RX ADMIN — FENTANYL CITRATE 25 MCG: 50 INJECTION, SOLUTION INTRAMUSCULAR; INTRAVENOUS at 12:32

## 2023-02-21 RX ADMIN — MIDAZOLAM 1 MG: 1 INJECTION INTRAMUSCULAR; INTRAVENOUS at 12:25

## 2023-02-21 RX ADMIN — MIDAZOLAM 0.5 MG: 1 INJECTION INTRAMUSCULAR; INTRAVENOUS at 12:32

## 2023-02-21 NOTE — BRIEF OP NOTE (RAD/CATH)
INTERVENTIONAL RADIOLOGY PROCEDURE NOTE    Date: 2/21/2023    Procedure:   Procedure Summary     Date: 02/21/23 Room / Location: [address] [place] [address] Interventional Radiology    Anesthesia Start:  Anesthesia Stop:     Procedure: IR PORT REMOVAL Diagnosis:       Malignant neoplasm of overlapping sites of right breast in female, estrogen receptor positive ([facility] )      (Malignant neoplasm of overlapping sites of right breast in female, estrogen receptor positive ([facility] ))    Scheduled Providers: Caryl Powell MD Responsible Provider:     Anesthesia Type: Not recorded ASA Status: Not recorded          Preoperative diagnosis:   1  Malignant neoplasm of overlapping sites of right breast in female, estrogen receptor positive ([facility] [address] )         Postoperative diagnosis: Same  Surgeon: Caryl Powell MD     Assistant: None  No qualified resident was available  Blood loss: Minimal    Specimens: None     Findings:     Left chest port removal in its entirety  Incision close with absorbable sutures + glue  Complications: None immediate      Anesthesia: conscious sedation

## 2023-02-21 NOTE — DISCHARGE INSTRUCTIONS
Implanted Venous Access Port Removal    WHAT YOU NEED TO KNOW:   An implanted venous access port is a device used to give treatments and take blood  It may also be called a central venous access device (CVAD)  The port is a small container that is placed under your skin, usually in your upper chest  A port can also be placed in your arm or abdomen  The port is attached to a catheter that enters a large vein  DISCHARGE INSTRUCTIONS:   Resume your normal diet  Small sips of flat soda will help with mild nausea  Prevent an infection:     Wash your hands often  Use soap and water  Clean your hands before and  after you care for your incision  Check your skin for infection every day  Look for redness, swelling, or fluid oozing from the incision site  Dressing may come off in 24 hours  Medical glue will peel off on its own in 5 to 10 days  You may shower 24 hours after procedure  Follow up with your healthcare provider as directed  Write down your questions so you remember to ask them during your visits  Activity:  You may return to your daily activities when the area heals  Contact Interventional Radiology at [phone] [place] PATIENTS: Contact Interventional Radiology at [phone]) George Noel PATIENTS: Contact Interventional Radiology at [phone]) if:     You have a fever  You have persistent nausea  Your inciscion site is red, swollen, or draining pus  You have questions or concerns about your condition or care  Seek care immediately or call 911 if:  Blood soaks through your bandage  The skin over or around your incision breaks open  Your heart is jumping or fluttering  You have a headache, blurred vision, and feel confused  You have pain in your arm, neck, shoulder, or chest     You have trouble breathing that is getting worse over time

## 2023-02-21 NOTE — H&P
Interventional Radiology  History and Physical 2/21/2023     Nick Dodge   1950   [id number]    Assessment/Plan:    67year old female with a left chest port that is no longer being needed for treatment  Plan for port removal      Procedure, risks, benefits and alternatives were discussed with the patient  Consent obtained at bedside  /78 (BP Location: Left arm)   Pulse 62   Temp 97 6 °F (36 4 °C) (Oral)   Resp 18   Ht 5' 9" (1 753 m)   Wt 84 8 kg (187 lb)   LMP  (LMP Unknown)   SpO2 97%   BMI 27 62 kg/m²     Problem List Items Addressed This Visit        Other    Malignant neoplasm of overlapping sites of right breast in female, estrogen receptor positive ([facility] [address] )    Relevant Orders    IR port removal          Subjective: Normal state of health, no acute complaints  Patient ID: Nick Dodge is a 67 y o  female  History of Present Illness  See A/P above  Review of Systems   Respiratory: Negative  Cardiovascular: Negative  Past Medical History:   Diagnosis Date   • Anxiety    • Breast cancer ([facility] [address] ) 12/06/2021   • Cancer ([facility] [address] )     Right breast cancer   • Depression    • Fracture of radius, distal, closed     Last Assessed:  6/10/14   • Thrombophlebitis    • Vitamin D deficiency         Past Surgical History:   Procedure Laterality Date   • ANKLE SURGERY     • ANKLE SURGERY     • BREAST BIOPSY Left 2001    neg   • BREAST BIOPSY Right 12/06/2021    punch bx- ca   • GALLBLADDER SURGERY     • HYSTERECTOMY     • IR PORT PLACEMENT  12/29/2021   • MASTECTOMY Right 04/26/2022   • OK MAST MODF RAD W/AX LYMPH NOD W/WO PECT/DALTON MIN Right 04/26/2022    Procedure: BREAST MODIFIED RADICAL MASTECTOMY WITH RIGHT LYMPHATIC MAPPING WITH BLUE AND RADIOACTIVE DYE (INJECT AT 0900 BY DR DEJESUS IN THE OR);   Surgeon: Laura Jauregui MD;  [facility]: [facility];  Service: Surgical Oncology   • WISDOM TOOTH EXTRACTION          Social History     Tobacco Use   Smoking Status Never   Smokeless Tobacco Never        Social History     Substance and Sexual Activity   Alcohol Use Yes    Comment: rarely        Social History     Substance and Sexual Activity   Drug Use Yes   • Types: Marijuana    Comment: not helping stopped        No Known Allergies    Current Outpatient Medications   Medication Sig Dispense Refill   • anastrozole (ARIMIDEX) 1 mg tablet Take 1 tablet (1 mg total) by mouth daily 90 tablet 0   • Calcium 250 MG CAPS Take 250 mg by mouth in the morning       • cholecalciferol (VITAMIN D3) 1,000 units tablet Take 2 capsules by mouth daily     • escitalopram (LEXAPRO) 20 mg tablet Take 1 tablet (20 mg total) by mouth daily 90 tablet 2   • Fish Oil-Krill Oil (KRILL OIL PLUS) CAPS Take by mouth in the morning       • ALPRAZolam (XANAX) 0 25 mg tablet Take 1/2 to 1 pill Q 8 hours p r n  For anxiety 30 tablet 0   • anastrozole (ARIMIDEX) 1 mg tablet TAKE 1 TABLET BY MOUTH EVERY DAY 90 tablet 1   • fluocinonide (LIDEX) 0 05 % external solution APPLY TO SCALP ONCE DAILY AS NEEDED FOR ITCH  2   • ketoconazole (NIZORAL) 2 % shampoo Apply 1 application topically if needed    5   • meclizine (ANTIVERT) 25 mg tablet Take 1 tablet (25 mg total) by mouth every 8 (eight) hours 30 tablet 0   • tobramycin (TOBREX) 0 3 % SOLN INSERT 1 DROP IN EACH EYE FOUR TIMES DAILY FOR 7 DAYS       Current Facility-Administered Medications   Medication Dose Route Frequency Provider Last Rate Last Admin   • sodium chloride 0 9 % infusion  75 mL/hr Intravenous Continuous Marybel Valentine MD              Objective:    Vitals:    02/21/23 1100   BP: 143/78   BP Location: Left arm   Pulse: 62   Resp: 18   Temp: 97 6 °F (36 4 °C)   TempSrc: Oral   SpO2: 97%   Weight: 84 8 kg (187 lb)   Height: 5' 9" (1 753 m)        Physical Exam  Cardiovascular:      Rate and Rhythm: Normal rate and regular rhythm  Pulses: Normal pulses             No results found for: BNP   Lab Results   Component Value Date    WBC 3 23 (L) 02/21/2023 HGB 14 0 02/21/2023    HCT 41 2 02/21/2023    MCV 94 02/21/2023     02/21/2023     No results found for: INR, PROTIME  No results found for: PTT      I have personally reviewed pertinent imaging and laboratory results  Code Status: No Order  Advance Directive and Living Will:      Power of :    POLST:      This text is generated with voice recognition software  There may be translation, syntax,  or grammatical errors  If you have any questions, please contact the dictating provider

## 2023-02-21 NOTE — SEDATION DOCUMENTATION
Port removal performed by Dr Mikey Jensen  Procedure tolerated well, VSS  IR Procedure Bedrest Start Time is 1250

## 2023-03-02 ENCOUNTER — RADIATION ONCOLOGY FOLLOW-UP (OUTPATIENT)
Dept: RADIATION ONCOLOGY | Facility: HOSPITAL | Age: 73
End: 2023-03-02
Attending: RADIOLOGY

## 2023-03-02 ENCOUNTER — APPOINTMENT (OUTPATIENT)
Dept: RADIATION ONCOLOGY | Facility: HOSPITAL | Age: 73
End: 2023-03-02
Attending: RADIOLOGY

## 2023-03-02 VITALS
OXYGEN SATURATION: 98 % | WEIGHT: 187 LBS | DIASTOLIC BLOOD PRESSURE: 80 MMHG | HEART RATE: 72 BPM | BODY MASS INDEX: 27.62 KG/M2 | TEMPERATURE: 97.9 F | RESPIRATION RATE: 16 BRPM | SYSTOLIC BLOOD PRESSURE: 140 MMHG

## 2023-03-02 DIAGNOSIS — C50.811 MALIGNANT NEOPLASM OF OVERLAPPING SITES OF RIGHT BREAST IN FEMALE, ESTROGEN RECEPTOR POSITIVE (HCC): Primary | ICD-10-CM

## 2023-03-02 DIAGNOSIS — Z17.0 MALIGNANT NEOPLASM OF OVERLAPPING SITES OF RIGHT BREAST IN FEMALE, ESTROGEN RECEPTOR POSITIVE (HCC): Primary | ICD-10-CM

## 2023-03-02 NOTE — PROGRESS NOTES
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 followed by mastectomy   Her tumor is ER/MA positive and HER2 negative   She had 14 lymph nodes out of 19 lymph nodes positive for metastasis   There was also extranodal extension noted   Her margins of resection were negative  She was started on adjuvant hormonal therapy with anastrozole 1 mg daily in June 2022       She completed a course of adjuvant radiation therapy to the right chest wall and regional lymph nodes on 7/25/2022  The pt was last seen in radiation on 09/01/22 via telemedicine visit  She returns today for her follow up      10/26/22 - Surg Onc, Martha Rdz  Pt doing well post radiation  Remains on Anastrozole - tolerating it well        11/16/22 - DXA bone density spine hip and pelvis  IMPRESSION:  1   Low bone mass (osteopenia)  2   The 10 year risk of hip fracture is 2 6% with the 10 year risk of major osteoporotic fracture being 16% as calculated by the [facility] fracture risk assessment tool (FRAX, which is based on data generated by the [address] [place] [address]   for Metabolic Bone Diseases)  3   The current NOF guidelines recommend treating patients with a T-score of -2 5 or less in the lumbar spine or hips, or in post-menopausal women and men over the age of 48 with low bone mass (osteopenia) and a FRAX 10 year risk score of >3% for hip   fracture and/or >20% for major osteoporotic fracture  4   The NOF recommends follow-up DXA in 1-2 years after initiating therapy for osteoporosis and every 2 years thereafter   More frequent evaluation is appropriate for patients with conditions associated with rapid bone loss, such as glucocorticoid   therapy  The interval between DXA screenings may be longer for individuals without major risk factors and initial T-score in the normal or upper low bone mass range      22 - Mammo diagnostic left w 3d & cad  FINDINGS:   There are no suspicious masses, grouped microcalcifications or areas of unexplained architectural distortion  The skin and nipple areolar complex are unremarkable  Biopsy clips are again seen  [place] are a few stable small calcifications noted  IMPRESSION:   Stable findings   Recommend diagnostic mammogram left breast 1 year  ASSESSMENT/BI-RADS CATEGORY:  Left: 2 - Benign  Overall: 2 - Benign    23 - Hem OncNancie  Pt currently on Anastrozole   No evidence of recurrent disease  Follow up in 1 year      Upcomin23 - Surg OncPema  24 - Hem Paul Aparicio       Historical Information   Oncology History   Malignant neoplasm of overlapping sites of right breast in female, estrogen receptor positive ([facility] [address] )   2021 Biopsy    Punch biopsy of right breast  Dermal lymphovascular invasion, immunoprofile consistent with breast primary  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)  Grade 2  ER 90; IL 70; HER2 2+, FISH negative     2021 Observation    Bilateral breast MRI - findings suspicious for multicentric right breast cancer; 2 biopsies recommended at areas of concern (never done); left breast clear; no right axillary adenopathy       2021 - 2022 Chemotherapy    DOXOrubicin (ADRIAMYCIN), 122 mg, Intravenous, Once, 4 of 4 cycles  Administration: 120 mg (2021), 122 mg (2022), 122 mg (2022), 122 mg (2/10/2022)  palonosetron (ALOXI), 0 25 mg, Intravenous, Once, 2 of 2 cycles  Administration: 0 25 mg (3/24/2022), 0 25 mg (2022)  pegfilgrastim (NEULASTA), 4 mg (100 % of original dose 4 mg), Subcutaneous, Once, 3 of 3 cycles  Dose modification: 4 mg (original dose 4 mg, Cycle 6)  Administration: 4 mg (3/11/2022), 4 mg (3/25/2022), 4 mg (4/8/2022)  pegfilgrastim (NEULASTA ONPRO), 6 mg, Subcutaneous, Once, 5 of 5 cycles  Administration: 6 mg (12/30/2021), 6 mg (1/13/2022), 6 mg (2/24/2022), 6 mg (1/27/2022), 6 mg (2/10/2022)  cyclophosphamide (CYTOXAN) IVPB, 600 mg/m2 = 1,218 mg, Intravenous, Once, 4 of 4 cycles  Administration: 1,218 mg (12/30/2021), 1,218 mg (1/13/2022), 1,218 mg (1/27/2022), 1,218 mg (2/10/2022)  fosaprepitant (EMEND) IVPB, 150 mg, Intravenous, Once, 4 of 4 cycles  Administration: 150 mg (12/30/2021), 150 mg (1/13/2022), 150 mg (1/27/2022), 150 mg (2/10/2022)  PACLItaxel (TAXOL) chemo IVPB, 175 mg/m2 = 355 2 mg, Intravenous, Once, 4 of 4 cycles  Administration: 355 2 mg (2/24/2022), 355 2 mg (3/10/2022), 355 2 mg (3/24/2022), 355 2 mg (4/7/2022)     4/26/2022 Surgery    Right breast modified radical mastectomy  Inflammatory breast cancer  Grade 1  1 5 cm (multiple foci involving all four quadrants)  Lymphovascular invasion present  Margins negative  14/19 Lymph nodes with macro-metastases  Anatomic Stage IIIC  Prognostic Stage IIIA        Procedure  Total mastectomy    Specimen Laterality  Right    TUMOR   Tumor Site  Upper outer quadrant      Lower outer quadrant      Upper inner quadrant      Lower inner quadrant      Central    Histologic Type  inflammatory  breast carcinoma    Histologic Grade (Earl Histologic Score)     Glandular (Acinar) / Tubular Differentiation  Score 1    Nuclear Pleomorphism  Score 2    Mitotic Rate  Score 1    Overall Grade  Grade 1 (scores of 3, 4 or 5)    Tumor Size  Greatest dimension of largest invasive focus (Millimeters): Extensive  invasive breast carcinoma involving all four breast quadrants mm   Tumor Focality  Multiple foci of invasive carcinoma    Number of Foci  Cannot be determined         Sizes of Individual Foci (Millimeters)  Multiple foci, largest measures 15 mm   Ductal Carcinoma In Situ (DCIS)  Not identified Lobular Carcinoma In Situ (LCIS)  Not identified         Tumor Extent  Skin is present and involved    Skin Invasion  Invasive carcinoma directly invades into the dermis or epidermis without skin ulceration (this does not change the T classification)    Skin Satellite Foci  Satellite skin foci of invasive carcinoma are present    Lymphovascular Invasion  Present    Dermal Lymphovascular Invasion  Present    Microcalcifications  Present in invasive carcinoma      Present in non-neoplastic tissue    Treatment Effect in the Breast  No definite response to presurgical therapy in the invasive carcinoma    Treatment Effect in the Lymph Nodes  No definite response to presurgical therapy in metastatic carcinoma    MARGINS   Margin Status for Invasive Carcinoma  All margins negative for invasive carcinoma    Distance from Invasive Carcinoma to Closest Margin  10 mm   Closest Margin(s) to Invasive Carcinoma  Posterior    Distance from Invasive Carcinoma to Inferior Margin  13 mm   REGIONAL LYMPH NODES   Regional Lymph Node Status  Tumor present in regional lymph node(s)    Number of Lymph Nodes with Macrometastases  14    Number of Lymph Nodes with Micrometastases  0    Size of Largest Sigifredo Metastatic Deposit  8 mm   Extranodal Extension  Present, 2 mm or less    Total Number of Lymph Nodes Examined (sentinel and non-sentinel)  19         6/2022 -  Hormone Therapy    Anastrozole      6/20/2022 - 7/25/2022 Radiation    Plan ID Energy Fractions Dose per Fraction (cGy) Dose Correction (cGy) Total Dose Delivered (cGy) Elapsed Days   R CW 6X 25 / 25 200 0 5,000 35   R PAB 6X 25 / 25 51 0 1,275 35   R Sclav 6X 25 / 25 200 0 5,000 35      Treatment Dates:  6/20/2022 - 7/25/2022     Dr Chloe Boston             Past Medical History:   Diagnosis Date   • Anxiety    • Breast cancer ([facility] [address] ) 12/06/2021   • Cancer [facility])     Right breast cancer   • Depression    • Fracture of radius, distal, closed     Last Assessed:  6/10/14   • Thrombophlebitis • Vitamin D deficiency      Past Surgical History:   Procedure Laterality Date   • ANKLE SURGERY     • ANKLE SURGERY     • BREAST BIOPSY Left 2001    neg   • BREAST BIOPSY Right 12/06/2021    punch bx- ca   • GALLBLADDER SURGERY     • HYSTERECTOMY     • IR PORT PLACEMENT  12/29/2021   • IR PORT REMOVAL  2/21/2023   • MASTECTOMY Right 04/26/2022   • OH MAST MODF RAD W/AX LYMPH NOD W/WO PECT/DALTON MIN Right 04/26/2022    Procedure: BREAST MODIFIED RADICAL MASTECTOMY WITH RIGHT LYMPHATIC MAPPING WITH BLUE AND RADIOACTIVE DYE (INJECT AT 0900 BY DR DEJESUS IN THE OR); Surgeon: Danielle Tompkins MD;  Location: [facility];  Service: Surgical Oncology   • WISDOM TOOTH EXTRACTION         Social History   Social History     Substance and Sexual Activity   Alcohol Use Yes    Comment: rarely     Social History     Substance and Sexual Activity   Drug Use Yes   • Types: Marijuana    Comment: not helping stopped     Social History     Tobacco Use   Smoking Status Never   Smokeless Tobacco Never         Meds/Allergies     Current Outpatient Medications:   •  ALPRAZolam (XANAX) 0 25 mg tablet, Take 1/2 to 1 pill Q 8 hours p r n   For anxiety, Disp: 30 tablet, Rfl: 0  •  anastrozole (ARIMIDEX) 1 mg tablet, TAKE 1 TABLET BY MOUTH EVERY DAY, Disp: 90 tablet, Rfl: 1  •  anastrozole (ARIMIDEX) 1 mg tablet, Take 1 tablet (1 mg total) by mouth daily, Disp: 90 tablet, Rfl: 0  •  Calcium 250 MG CAPS, Take 250 mg by mouth in the morning  , Disp: , Rfl:   •  cholecalciferol (VITAMIN D3) 1,000 units tablet, Take 2 capsules by mouth daily, Disp: , Rfl:   •  escitalopram (LEXAPRO) 20 mg tablet, Take 1 tablet (20 mg total) by mouth daily, Disp: 90 tablet, Rfl: 2  •  Fish Oil-Krill Oil (KRILL OIL PLUS) CAPS, Take by mouth in the morning  , Disp: , Rfl:   •  fluocinonide (LIDEX) 0 05 % external solution, APPLY TO SCALP ONCE DAILY AS NEEDED FOR ITCH, Disp: , Rfl: 2  •  ketoconazole (NIZORAL) 2 % shampoo, Apply 1 application topically if needed  , Disp: , Rfl: 5  •  meclizine (ANTIVERT) 25 mg tablet, Take 1 tablet (25 mg total) by mouth every 8 (eight) hours, Disp: 30 tablet, Rfl: 0  •  tobramycin (TOBREX) 0 3 % SOLN, INSERT 1 DROP IN EACH EYE FOUR TIMES DAILY FOR 7 DAYS (Patient not taking: Reported on 3/2/2023), Disp: , Rfl:   No Known Allergies      Review of Systems   Constitutional: Positive for fatigue (slight but improving)  HENT: Negative  Eyes: Negative  Respiratory: Negative  Cardiovascular: Negative  Gastrointestinal: Negative  Endocrine: Negative  Genitourinary: Negative  Musculoskeletal: Positive for arthralgias  Decreased ROM from surgery in axilla  Pain with stretching  Skin:        Some pruritis and mild irritation  Denies pain or swelling  Faint hyperpigmentation remains  Allergic/Immunologic: Negative  Neurological: Positive for dizziness (occasional) and numbness (hands and feet)  Hematological: Negative  Psychiatric/Behavioral: Negative            OBJECTIVE:   /80   Pulse 72   Temp 97 9 °F (36 6 °C)   Resp 16   Wt 84 8 kg (187 lb)   LMP  (LMP Unknown)   SpO2 98%   BMI 27 62 kg/m²   Pain Assessment:  0  ECOG/Zubrod/WHO: 0 - Asymptomatic    Physical Exam   There is no supraclavicular or axillary adenopathy palpable no suspicious lesions palpable in the left breast   The right chest wall is without any suspicious lesions noted  She has mild hyperpigmentation in the chest wall region  No significant arm edema  Fairly good range of motion  Abdomen soft nontender  Her breathing is unlabored  Ambulating dependently          RESULTS    Lab Results:   Recent Results (from the past 672 hour(s))   Basic metabolic panel    Collection Time: 02/21/23 11:01 AM   Result Value Ref Range    Sodium 138 135 - 147 mmol/L    Potassium 3 9 3 5 - 5 3 mmol/L    Chloride 109 (H) 96 - 108 mmol/L    CO2 25 21 - 32 mmol/L    ANION GAP 4 4 - 13 mmol/L    BUN 19 5 - 25 mg/dL    Creatinine 0 70 0 60 - 1 30 mg/dL Glucose 92 65 - 140 mg/dL    Glucose, Fasting 92 65 - 99 mg/dL    Calcium 9 3 8 3 - 10 1 mg/dL    eGFR 86 ml/min/1 73sq m   CBC    Collection Time: 02/21/23 11:01 AM   Result Value Ref Range    WBC 3 23 (L) 4 31 - 10 16 Thousand/uL    RBC 4 37 3 81 - 5 12 Million/uL    Hemoglobin 14 0 11 5 - 15 4 g/dL    Hematocrit 41 2 34 8 - 46 1 %    MCV 94 82 - 98 fL    MCH 32 0 26 8 - 34 3 pg    MCHC 34 0 31 4 - 37 4 g/dL    RDW 12 0 11 6 - 15 1 %    Platelets 315 492 - 989 Thousands/uL    MPV 9 6 8 9 - 12 7 fL       Imaging Studies:IR port removal    Result Date: 2/21/2023  Narrative: PROCEDURE: Chest port removal STAFF: BALAJI Lawson  FLUOROSCOPY TIME: 0 3 minutes  NUMBER OF IMAGES: 2  COMPLICATIONS: None  MEDICATIONS: Local lidocaine  Moderate sedation with fentanyl and Versed was monitored by radiology nursing staff  Monitoring of conscious sedation time totaled 30 minutes  INDICATION: Left chest port no longer being used for history of breast cancer  PROCEDURE: Through a small incision, the port was dissected free and the port and attached catheter were removed in their entirety  The sponge count was reconciled  The incision was closed in layers using absorbable suture and surgical glue        Impression: Chest port removal  Workstation performed: [id number]           Assessment/Plan:  No orders of the defined types were placed in this encounter  Clau Hoffman is a 67y o  year old female who is 7 months status post adjuvant radiation therapy to the right chest wall and regional lymphatics for inflammatory breast carcinoma  She has no clinical evidence of recurrence  Mammogram of the left breast from December returned stable  She remains on anastrozole with good tolerance  She will return for follow-up visit in 1 year as she will be seeing medical and surgical oncology in the interim        Hermila Sky MD  3/2/2023,2:02 PM    Portions of the record may have been created with voice recognition software   Occasional wrong word or "sound a like" substitutions may have occurred due to the inherent limitations of voice recognition software   Read the chart carefully and recognize, using context, where substitutions have occurred

## 2023-03-02 NOTE — PROGRESS NOTES
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 followed by mastectomy  Her tumor is ER/MT positive and HER2 negative  She had 14 lymph nodes out of 19 lymph nodes positive for metastasis  There was also extranodal extension noted  Her margins of resection were negative  She was started on adjuvant hormonal therapy with anastrozole 1 mg daily in June 2022  She completed a course of adjuvant radiation therapy to the right chest wall and regional lymph nodes on 7/25/2022  The pt was last seen in radiation on 09/01/22 via telemedicine visit  She returns today for her follow up     10/26/22 - Surg Onc, Christian Barros  Pt doing well post radiation  Remains on Anastrozole - tolerating it well      11/16/22 - DXA bone density spine hip and pelvis  IMPRESSION:  1  Low bone mass (osteopenia)  2   The 10 year risk of hip fracture is 2 6% with the 10 year risk of major osteoporotic fracture being 16% as calculated by the [facility] fracture risk assessment tool (FRAX, which is based on data generated by the [facility]   for Metabolic Bone Diseases)  3   The current NOF guidelines recommend treating patients with a T-score of -2 5 or less in the lumbar spine or hips, or in post-menopausal women and men over the age of 48 with low bone mass (osteopenia) and a FRAX 10 year risk score of >3% for hip   fracture and/or >20% for major osteoporotic fracture  4   The NOF recommends follow-up DXA in 1-2 years after initiating therapy for osteoporosis and every 2 years thereafter  More frequent evaluation is appropriate for patients with conditions associated with rapid bone loss, such as glucocorticoid   therapy  The interval between DXA screenings may be longer for individuals without major risk factors and initial T-score in the normal or upper low bone mass range      12/12/22 - Mammo diagnostic left w 3d & cad  FINDINGS:   There are no suspicious masses, grouped microcalcifications or areas of unexplained architectural distortion  The skin and nipple areolar complex are unremarkable  Biopsy clips are again seen  There are a few stable small calcifications noted  IMPRESSION:   Stable findings  Recommend diagnostic mammogram left breast 1 year  ASSESSMENT/BI-RADS CATEGORY:  Left: 2 - Benign  Overall: 2 - Benign    23 - Hem Nancie Miller  Pt currently on Anastrozole   No evidence of recurrent disease  Follow up in 1 year     Upcomin23 - Surg Laverne Miller  24 - Hem Dony Brochure        Follow up visit     Oncology History Overview Note   Ziggy Cole is a 67year old female with 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 followed by mastectomy  Her tumor is ER/UT positive and HER2 negative  She had 14 lymph nodes out of 19 lymph nodes positive for metastasis  There was also extranodal extension noted  Her margins of resection were negative  She was started on adjuvant hormonal therapy with anastrozole 1 mg daily in 2022  She completed a course of adjuvant radiation therapy to the right chest wall and regional lymph nodes on 2022  The pt was last seen in radiation on 22 via telemedicine visit  She returns today for her follow up     10/26/22 - Surg OncLaverne  Pt doing well post radiation  Remains on Anastrozole - tolerating it well      22 - DXA bone density spine hip and pelvis  IMPRESSION:  1  Low bone mass (osteopenia)  2   The 10 year risk of hip fracture is 2 6% with the 10 year risk of major osteoporotic fracture being 16% as calculated by the [facility] fracture risk assessment tool (FRAX, which is based on data generated by the [facility]   for Metabolic Bone Diseases)    3   The current NOF guidelines recommend treating patients with a T-score of -2 5 or less in the lumbar spine or hips, or in post-menopausal women and men over the age of 48 with low bone mass (osteopenia) and a FRAX 10 year risk score of >3% for hip   fracture and/or >20% for major osteoporotic fracture  4   The NOF recommends follow-up DXA in 1-2 years after initiating therapy for osteoporosis and every 2 years thereafter  More frequent evaluation is appropriate for patients with conditions associated with rapid bone loss, such as glucocorticoid   therapy  The interval between DXA screenings may be longer for individuals without major risk factors and initial T-score in the normal or upper low bone mass range  22 - Mammo diagnostic left w 3d & cad  FINDINGS:   There are no suspicious masses, grouped microcalcifications or areas of unexplained architectural distortion  The skin and nipple areolar complex are unremarkable  Biopsy clips are again seen  There are a few stable small calcifications noted  IMPRESSION:   Stable findings  Recommend diagnostic mammogram left breast 1 year  ASSESSMENT/BI-RADS CATEGORY:  Left: 2 - Benign  Overall: 2 - Benign    23 - Hem Nancie Miller  Pt currently on Anastrozole   No evidence of recurrent disease  Follow up in 1 year     Upcomin23 - Surg OncTracee  24 - Hem OncNancie       Malignant neoplasm of overlapping sites of right breast in female, estrogen receptor positive ([facility] [address] )   2021 Biopsy    Punch biopsy of right breast  Dermal lymphovascular invasion, immunoprofile consistent with breast primary  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)  Grade 2  ER 90; CA 70; HER2 2+, FISH negative     2021 Observation    Bilateral breast MRI - findings suspicious for multicentric right breast cancer; 2 biopsies recommended at areas of concern (never done); left breast clear; no right axillary adenopathy       2021 - 2022 Chemotherapy    DOXOrubicin (ADRIAMYCIN), 122 mg, Intravenous, Once, 4 of 4 cycles  Administration: 120 mg (12/30/2021), 122 mg (1/13/2022), 122 mg (1/27/2022), 122 mg (2/10/2022)  palonosetron (ALOXI), 0 25 mg, Intravenous, Once, 2 of 2 cycles  Administration: 0 25 mg (3/24/2022), 0 25 mg (4/7/2022)  pegfilgrastim (NEULASTA), 4 mg (100 % of original dose 4 mg), Subcutaneous, Once, 3 of 3 cycles  Dose modification: 4 mg (original dose 4 mg, Cycle 6)  Administration: 4 mg (3/11/2022), 4 mg (3/25/2022), 4 mg (4/8/2022)  pegfilgrastim (NEULASTA ONPRO), 6 mg, Subcutaneous, Once, 5 of 5 cycles  Administration: 6 mg (12/30/2021), 6 mg (1/13/2022), 6 mg (2/24/2022), 6 mg (1/27/2022), 6 mg (2/10/2022)  cyclophosphamide (CYTOXAN) IVPB, 600 mg/m2 = 1,218 mg, Intravenous, Once, 4 of 4 cycles  Administration: 1,218 mg (12/30/2021), 1,218 mg (1/13/2022), 1,218 mg (1/27/2022), 1,218 mg (2/10/2022)  fosaprepitant (EMEND) IVPB, 150 mg, Intravenous, Once, 4 of 4 cycles  Administration: 150 mg (12/30/2021), 150 mg (1/13/2022), 150 mg (1/27/2022), 150 mg (2/10/2022)  PACLItaxel (TAXOL) chemo IVPB, 175 mg/m2 = 355 2 mg, Intravenous, Once, 4 of 4 cycles  Administration: 355 2 mg (2/24/2022), 355 2 mg (3/10/2022), 355 2 mg (3/24/2022), 355 2 mg (4/7/2022)     4/26/2022 Surgery    Right breast modified radical mastectomy  Inflammatory breast cancer  Grade 1  1 5 cm (multiple foci involving all four quadrants)  Lymphovascular invasion present  Margins negative  14/19 Lymph nodes with macro-metastases  Anatomic Stage IIIC  Prognostic Stage IIIA        Procedure  Total mastectomy    Specimen Laterality  Right    TUMOR   Tumor Site  Upper outer quadrant      Lower outer quadrant      Upper inner quadrant      Lower inner quadrant      Central    Histologic Type  inflammatory  breast carcinoma    Histologic Grade (Earl Histologic Score)     Glandular (Acinar) / Tubular Differentiation  Score 1    Nuclear Pleomorphism  Score 2    Mitotic Rate  Score 1    Overall Grade  Grade 1 (scores of 3, 4 or 5)    Tumor Size Greatest dimension of largest invasive focus (Millimeters): Extensive  invasive breast carcinoma involving all four breast quadrants mm   Tumor Focality  Multiple foci of invasive carcinoma    Number of Foci  Cannot be determined         Sizes of Individual Foci (Millimeters)  Multiple foci, largest measures 15 mm   Ductal Carcinoma In Situ (DCIS)  Not identified    Lobular Carcinoma In Situ (LCIS)  Not identified         Tumor Extent  Skin is present and involved    Skin Invasion  Invasive carcinoma directly invades into the dermis or epidermis without skin ulceration (this does not change the T classification)    Skin Satellite Foci  Satellite skin foci of invasive carcinoma are present    Lymphovascular Invasion  Present    Dermal Lymphovascular Invasion  Present    Microcalcifications  Present in invasive carcinoma      Present in non-neoplastic tissue    Treatment Effect in the Breast  No definite response to presurgical therapy in the invasive carcinoma    Treatment Effect in the Lymph Nodes  No definite response to presurgical therapy in metastatic carcinoma    MARGINS   Margin Status for Invasive Carcinoma  All margins negative for invasive carcinoma    Distance from Invasive Carcinoma to Closest Margin  10 mm   Closest Margin(s) to Invasive Carcinoma  Posterior    Distance from Invasive Carcinoma to Inferior Margin  13 mm   REGIONAL LYMPH NODES   Regional Lymph Node Status  Tumor present in regional lymph node(s)    Number of Lymph Nodes with Macrometastases  14    Number of Lymph Nodes with Micrometastases  0    Size of Largest Sigifredo Metastatic Deposit  8 mm   Extranodal Extension  Present, 2 mm or less    Total Number of Lymph Nodes Examined (sentinel and non-sentinel)  19         6/2022 -  Hormone Therapy    Anastrozole      6/20/2022 - 7/25/2022 Radiation    Plan ID Energy Fractions Dose per Fraction (cGy) Dose Correction (cGy) Total Dose Delivered (cGy) Elapsed Days   R CW 6X 25 / 25 200 0 5,000 35   R PAB 6X 25 / 25 51 0 1,275 35   R Sclav 6X 25 / 25 200 0 5,000 35      Treatment Dates:  6/20/2022 - 7/25/2022  Dr Chloe Boston             Review of Systems:  Review of Systems   Constitutional: Positive for fatigue (slight but improving)  HENT: Negative  Eyes: Negative  Respiratory: Negative  Cardiovascular: Negative  Gastrointestinal: Negative  Endocrine: Negative  Genitourinary: Negative  Musculoskeletal: Positive for arthralgias  Decreased ROM from surgery in axilla  Pain with stretching  Skin:        Some pruritis and mild irritation  Denies pain or swelling  Faint hyperpigmentation remains  Allergic/Immunologic: Negative  Neurological: Positive for dizziness (occasional) and numbness (hands and feet)  Hematological: Negative  Psychiatric/Behavioral: Negative          Clinical Trial: no    Teaching    Health Maintenance   Topic Date Due   • Hepatitis C Screening  Never done   • Colorectal Cancer Screening  01/19/2021   • COVID-19 Vaccine (3 - Moderna risk series) 03/22/2021   • Influenza Vaccine (1) 06/30/2023 (Originally 9/1/2022)   • Fall Risk  10/14/2023   • Urinary Incontinence Screening  10/14/2023   • Medicare Annual Wellness Visit (AWV)  10/14/2023   • Breast Cancer Screening: Mammogram  12/12/2023   • BMI: Adult  02/21/2024   • Osteoporosis Screening  Completed   • Pneumococcal Vaccine: 65+ Years  Completed   • HIB Vaccine  Aged Out   • IPV Vaccine  Aged Out   • Hepatitis A Vaccine  Aged Out   • Meningococcal ACWY Vaccine  Aged Out   • HPV Vaccine  Aged Out     Patient Active Problem List   Diagnosis   • Varicose vein of leg   • Anxiety   • Arthritis   • Depression   • Hyperbilirubinemia   • Hyperlipidemia, mild   • Over weight   • Vitamin D deficiency   • Malignant neoplasm of overlapping sites of right breast in female, estrogen receptor positive ([address] )   • Chemotherapy induced neutropenia ([facility] [address] )   • Insomnia due to medical condition   • Port-A-Cath in place   • Osteopenia of both hips   • Use of anastrozole (Arimidex)     Past Medical History:   Diagnosis Date   • Anxiety    • Breast cancer ([address] ) 12/06/2021   • Cancer (HCC)     Right breast cancer   • Depression    • Fracture of radius, distal, closed     Last Assessed:  6/10/14   • Thrombophlebitis    • Vitamin D deficiency      Past Surgical History:   Procedure Laterality Date   • ANKLE SURGERY     • ANKLE SURGERY     • BREAST BIOPSY Left 2001    neg   • BREAST BIOPSY Right 12/06/2021    punch bx- ca   • GALLBLADDER SURGERY     • HYSTERECTOMY     • IR PORT PLACEMENT  12/29/2021   • IR PORT REMOVAL  2/21/2023   • MASTECTOMY Right 04/26/2022   • ND MAST MODF RAD W/AX LYMPH NOD W/WO PECT/DALTON MIN Right 04/26/2022    Procedure: BREAST MODIFIED RADICAL MASTECTOMY WITH RIGHT LYMPHATIC MAPPING WITH BLUE AND RADIOACTIVE DYE (INJECT AT 0900 BY DR DEJESUS IN THE OR);   Surgeon: Joe Wood MD;  Location: [facility];  Service: Surgical Oncology   • WISDOM TOOTH EXTRACTION       Family History   Problem Relation Age of Onset   • Ovarian cancer Mother 64   • Heart disease Father    • Leukemia Brother 62   • Heart disease Brother    • Heart disease Brother    • Cancer Maternal Grandmother         Unknown primary; mid 52's   • Stomach cancer Maternal Aunt         63's     Social History     Socioeconomic History   • Marital status: /Civil Union     Spouse name: Not on file   • Number of children: Not on file   • Years of education: Not on file   • Highest education level: Not on file   Occupational History   • Not on file   Tobacco Use   • Smoking status: Never   • Smokeless tobacco: Never   Vaping Use   • Vaping Use: Never used   Substance and Sexual Activity   • Alcohol use: Yes     Comment: rarely   • Drug use: Yes     Types: Marijuana     Comment: not helping stopped   • Sexual activity: Not Currently     Partners: Male     Birth control/protection: Female Sterilization   Other Topics Concern   • Not on file   Social History Narrative     to Lilly Beatty  Social Determinants of Health     Financial Resource Strain: Low Risk    • Difficulty of Paying Living Expenses: Not hard at all   Food Insecurity: Not on file   Transportation Needs: No Transportation Needs   • Lack of Transportation (Medical): No   • Lack of Transportation (Non-Medical): No   Physical Activity: Not on file   Stress: Not on file   Social Connections: Not on file   Intimate Partner Violence: Not on file   Housing Stability: Not on file       Current Outpatient Medications:   •  ALPRAZolam (XANAX) 0 25 mg tablet, Take 1/2 to 1 pill Q 8 hours p r n   For anxiety, Disp: 30 tablet, Rfl: 0  •  anastrozole (ARIMIDEX) 1 mg tablet, TAKE 1 TABLET BY MOUTH EVERY DAY, Disp: 90 tablet, Rfl: 1  •  anastrozole (ARIMIDEX) 1 mg tablet, Take 1 tablet (1 mg total) by mouth daily, Disp: 90 tablet, Rfl: 0  •  Calcium 250 MG CAPS, Take 250 mg by mouth in the morning  , Disp: , Rfl:   •  cholecalciferol (VITAMIN D3) 1,000 units tablet, Take 2 capsules by mouth daily, Disp: , Rfl:   •  escitalopram (LEXAPRO) 20 mg tablet, Take 1 tablet (20 mg total) by mouth daily, Disp: 90 tablet, Rfl: 2  •  Fish Oil-Krill Oil (KRILL OIL PLUS) CAPS, Take by mouth in the morning  , Disp: , Rfl:   •  fluocinonide (LIDEX) 0 05 % external solution, APPLY TO SCALP ONCE DAILY AS NEEDED FOR ITCH, Disp: , Rfl: 2  •  ketoconazole (NIZORAL) 2 % shampoo, Apply 1 application topically if needed  , Disp: , Rfl: 5  •  meclizine (ANTIVERT) 25 mg tablet, Take 1 tablet (25 mg total) by mouth every 8 (eight) hours, Disp: 30 tablet, Rfl: 0  •  tobramycin (TOBREX) 0 3 % SOLN, INSERT 1 DROP IN EACH EYE FOUR TIMES DAILY FOR 7 DAYS (Patient not taking: Reported on 3/2/2023), Disp: , Rfl:   No Known Allergies  Vitals:    03/02/23 1351   BP: 140/80   Pulse: 72   Resp: 16   Temp: 97 9 °F (36 6 °C)   SpO2: 98%   Weight: 84 8 kg (187 lb)      Pain Score: 0-No pain

## 2023-03-17 ENCOUNTER — TELEMEDICINE (OUTPATIENT)
Dept: INTERNAL MEDICINE CLINIC | Facility: CLINIC | Age: 73
End: 2023-03-17

## 2023-03-17 VITALS — HEIGHT: 69 IN | WEIGHT: 187 LBS | BODY MASS INDEX: 27.7 KG/M2

## 2023-03-17 DIAGNOSIS — Z20.822 CLOSE EXPOSURE TO COVID-19 VIRUS: Primary | ICD-10-CM

## 2023-03-17 NOTE — ASSESSMENT & PLAN NOTE
Currently the patient is testing negative and asymptomatic but has been exposed to her  who can is testing positive and her granddaughter 3 days ago who is testing positive  I recommended the patient isolate from her  and wear a mask if they are in the same room  I have recommended that she start vitamin C at 1000 units daily vitamin D 2000 units daily and zinc 2025 mg daily for a period of 2 weeks  She should maintain good nutrition good hydration EXTR rest   I recommended that she tested for COVID daily the next 2 to 3 days and can contact me if she converts to positive status

## 2023-03-17 NOTE — PROGRESS NOTES
COVID-19 Outpatient Progress Note    Assessment/Plan:    Problem List Items Addressed This Visit        Other    Close exposure to COVID-19 virus - Primary     Currently the patient is testing negative and asymptomatic but has been exposed to her  who can is testing positive and her granddaughter 3 days ago who is testing positive  I recommended the patient isolate from her  and wear a mask if they are in the same room  I have recommended that she start vitamin C at 1000 units daily vitamin D 2000 units daily and zinc 2025 mg daily for a period of 2 weeks  She should maintain good nutrition good hydration EXTR rest   I recommended that she tested for COVID daily the next 2 to 3 days and can contact me if she converts to positive status  Disposition:     While awaiting the results of covid testing, patient was advised to isolate  Discussed vitamin D, vitamin C, and/or zinc supplementation with patient  This patient was exposed to her granddaughter 3 days ago who subsequently tested positive for COVID currently the patient does not have any symptoms and is testing negative for COVID but her  has converted to positive status  I have spent a total time of 30 minutes on the day of the encounter for this patient including discussing diagnostic results, discussing prognosis, risks and benefits of treatment options, instructions for management, patient and family education, importance of treatment compliance, risk factor reductions, impressions, counseling/coordination of care, documenting in the medical record, reviewing/ordering tests, medicine, procedures and obtaining or reviewing history  Encounter provider: New Salgado MD     Provider located at: [address] [address]     Recent Visits  No visits were found meeting these conditions    Showing recent visits within past 7 days and meeting all other requirements  Today's Visits  Date Type Provider Dept   03/17/23 Telemedicine David Marr MD [facility]   Showing today's visits and meeting all other requirements  Future Appointments  No visits were found meeting these conditions  Showing future appointments within next 150 days and meeting all other requirements     This virtual check-in was done via [address] and patient was informed that this is a secure, HIPAA-compliant platform  She agrees to proceed  Patient agrees to participate in a virtual check in via telephone or video visit instead of presenting to the office to address urgent/immediate medical needs  Patient is aware this is a billable service  She acknowledged consent and understanding of privacy and security of the video platform  The patient has agreed to participate and understands they can discontinue the visit at any time  After connecting through [facility], the patient was identified by name and date of birth  Shwetha Coyne was informed that this was a telemedicine visit and that the exam was being conducted confidentially over secure lines  My office door was closed  No one else was in the room  Shwetha Coyne acknowledged consent and understanding of privacy and security of the telemedicine visit  I informed the patient that I have reviewed her record in Epic and presented the opportunity for her to ask any questions regarding the visit today  The patient agreed to participate  Verification of patient location:  Patient is located in the following state in which I hold an active license: [place]    Subjective:   Shwetha Coyne is a 67 y o  female who is concerned about COVID-19           COVID-19 vaccination status: Fully vaccinated (primary series)    Exposure:   Contact with a person who is under investigation (PUI) for or who is positive for COVID-19 within the last 14 days?: Yes    Hospitalized recently for fever and/or lower respiratory symptoms?: No      Currently a healthcare worker that is involved in direct patient care?: No      Works in a special setting where the risk of COVID-19 transmission may be high? (this may include long-term care, correctional and penitentiary facilities; homeless shelters; assisted-living facilities and group homes ): No      Resident in a special setting where the risk of COVID-19 transmission may be high? (this may include long-term care, correctional and penitentiary facilities; homeless shelters; assisted-living facilities and group homes ): No      No results found for: SARSCOV2, [address] [facility], [address] [facility], [facility] [address], [address] [facility], [address] [facility]    Review of Systems  Current Outpatient Medications on File Prior to Visit   Medication Sig   • ALPRAZolam (XANAX) 0 25 mg tablet Take 1/2 to 1 pill Q 8 hours p r n   For anxiety   • anastrozole (ARIMIDEX) 1 mg tablet TAKE 1 TABLET BY MOUTH EVERY DAY   • anastrozole (ARIMIDEX) 1 mg tablet Take 1 tablet (1 mg total) by mouth daily   • Calcium 250 MG CAPS Take 250 mg by mouth in the morning     • cholecalciferol (VITAMIN D3) 1,000 units tablet Take 2 capsules by mouth daily   • escitalopram (LEXAPRO) 20 mg tablet Take 1 tablet (20 mg total) by mouth daily   • Fish Oil-Krill Oil (KRILL OIL PLUS) CAPS Take by mouth in the morning     • fluocinonide (LIDEX) 0 05 % external solution APPLY TO SCALP ONCE DAILY AS NEEDED FOR ITCH   • ketoconazole (NIZORAL) 2 % shampoo Apply 1 application topically if needed     • meclizine (ANTIVERT) 25 mg tablet Take 1 tablet (25 mg total) by mouth every 8 (eight) hours   • [DISCONTINUED] tobramycin (TOBREX) 0 3 % SOLN INSERT 1 DROP IN EACH EYE FOUR TIMES DAILY FOR 7 DAYS (Patient not taking: Reported on 3/2/2023)       Objective:    Ht 5' 9" (1 753 m) Comment: verbal  Wt 84 8 kg (187 lb) Comment: verbal  LMP  (LMP Unknown)   BMI 27 62 kg/m²      Physical Exam  Constitutional:       Comments: Virtual examination today reveals the patient to be alert and oriented x3 skin color is normal no evidence of any respiratory distress         Britney Pickens MD

## 2023-04-21 PROBLEM — C77.3 CARCINOMA OF RIGHT BREAST METASTATIC TO AXILLARY LYMPH NODE (HCC): Status: ACTIVE | Noted: 2023-04-21

## 2023-04-21 PROBLEM — C50.911 CARCINOMA OF RIGHT BREAST METASTATIC TO AXILLARY LYMPH NODE (HCC): Status: ACTIVE | Noted: 2023-04-21

## 2023-04-26 ENCOUNTER — OFFICE VISIT (OUTPATIENT)
Dept: SURGICAL ONCOLOGY | Facility: CLINIC | Age: 73
End: 2023-04-26

## 2023-04-26 VITALS
TEMPERATURE: 96.7 F | HEIGHT: 69 IN | RESPIRATION RATE: 18 BRPM | DIASTOLIC BLOOD PRESSURE: 78 MMHG | HEART RATE: 73 BPM | OXYGEN SATURATION: 98 % | BODY MASS INDEX: 28.81 KG/M2 | SYSTOLIC BLOOD PRESSURE: 122 MMHG | WEIGHT: 194.5 LBS

## 2023-04-26 DIAGNOSIS — Z79.811 USE OF ANASTROZOLE (ARIMIDEX): ICD-10-CM

## 2023-04-26 DIAGNOSIS — C50.811 MALIGNANT NEOPLASM OF OVERLAPPING SITES OF RIGHT BREAST IN FEMALE, ESTROGEN RECEPTOR POSITIVE (HCC): Primary | ICD-10-CM

## 2023-04-26 DIAGNOSIS — Z17.0 MALIGNANT NEOPLASM OF OVERLAPPING SITES OF RIGHT BREAST IN FEMALE, ESTROGEN RECEPTOR POSITIVE (HCC): Primary | ICD-10-CM

## 2023-04-26 DIAGNOSIS — C77.3 CARCINOMA OF RIGHT BREAST METASTATIC TO AXILLARY LYMPH NODE (HCC): ICD-10-CM

## 2023-04-26 DIAGNOSIS — C50.911 CARCINOMA OF RIGHT BREAST METASTATIC TO AXILLARY LYMPH NODE (HCC): ICD-10-CM

## 2023-04-26 NOTE — PROGRESS NOTES
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  She is stage IIIc  She had neoadjuvant chemotherapy she is now on anastrozole  Patient is in good spirits  Clinical exam shows no evidence of local regional or distant recurrent disease  She is completed her radiation treatment  We will coordinate a mammogram for the left side in 6 months and see her back shortly thereafter  Reviewed signs and symptoms of recurrent disease  Recommended she have a low threshold to contact us should she appreciate any unexpected findings  Cancer History:     Oncology History   Malignant neoplasm of overlapping sites of right breast in female, estrogen receptor positive ([facility] [address] )   12/6/2021 Biopsy    Punch biopsy of right breast  Dermal lymphovascular invasion, immunoprofile consistent with breast primary  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)  Grade 2  ER 90; CT 70; HER2 2+, FISH negative     12/13/2021 Observation    Bilateral breast MRI - findings suspicious for multicentric right breast cancer; 2 biopsies recommended at areas of concern (never done); left breast clear; no right axillary adenopathy       12/30/2021 - 4/8/2022 Chemotherapy    DOXOrubicin (ADRIAMYCIN), 122 mg, Intravenous, Once, 4 of 4 cycles  Administration: 120 mg (12/30/2021), 122 mg (1/13/2022), 122 mg (1/27/2022), 122 mg (2/10/2022)  palonosetron (ALOXI), 0 25 mg, Intravenous, Once, 2 of 2 cycles  Administration: 0 25 mg (3/24/2022), 0 25 mg (4/7/2022)  pegfilgrastim (NEULASTA), 4 mg (100 % of original dose 4 mg), Subcutaneous, Once, 3 of 3 cycles  Dose modification: 4 mg (original dose 4 mg, Cycle 6)  Administration: 4 mg (3/11/2022), 4 mg (3/25/2022), 4 mg (4/8/2022)  pegfilgrastim (Deboraha Romp), 6 mg, Subcutaneous, Once, 5 of 5 cycles  Administration: 6 mg (12/30/2021), 6 mg (1/13/2022), 6 mg (2/24/2022), 6 mg (1/27/2022), 6 mg (2/10/2022)  cyclophosphamide (CYTOXAN) IVPB, 600 mg/m2 = 1,218 mg, Intravenous, Once, 4 of 4 cycles  Administration: 1,218 mg (12/30/2021), 1,218 mg (1/13/2022), 1,218 mg (1/27/2022), 1,218 mg (2/10/2022)  fosaprepitant (EMEND) IVPB, 150 mg, Intravenous, Once, 4 of 4 cycles  Administration: 150 mg (12/30/2021), 150 mg (1/13/2022), 150 mg (1/27/2022), 150 mg (2/10/2022)  PACLItaxel (TAXOL) chemo IVPB, 175 mg/m2 = 355 2 mg, Intravenous, Once, 4 of 4 cycles  Administration: 355 2 mg (2/24/2022), 355 2 mg (3/10/2022), 355 2 mg (3/24/2022), 355 2 mg (4/7/2022)     4/26/2022 Surgery    Right breast modified radical mastectomy  Inflammatory breast cancer  Grade 1  1 5 cm (multiple foci involving all four quadrants)  Lymphovascular invasion present  Margins negative  14/19 Lymph nodes with macro-metastases  Anatomic Stage IIIC  Prognostic Stage IIIA     6/2022 -  Hormone Therapy    Anastrozole 1 mg daily  Dr Chris Landis     6/20/2022 - 7/25/2022 Radiation    Plan ID Energy Fractions Dose per Fraction (cGy) Dose Correction (cGy) Total Dose Delivered (cGy) Elapsed Days   R CW 6X 25 / 25 200 0 5,000 35   R PAB 6X 25 / 25 51 0 1,275 35   R Sclav 6X 25 / 25 200 0 5,000 28    Dr Les Coyle           Interval History:   Overall the patient is doing quite well  She has no complaints referable to her surgery  Does complain of being tired at various times however she understands this is consistent with her systemic therapy is quite manageable  She presents with her   Review of Systems:   Review of Systems   Constitutional: Positive for fatigue   Negative for chills and fever  Occasional mild fatigue   HENT: Negative for ear pain and sore throat  Eyes: Negative for pain and visual disturbance  Respiratory: Negative for cough and shortness of breath  Cardiovascular: Negative for chest pain and palpitations  Gastrointestinal: Negative for abdominal pain and vomiting  Genitourinary: Negative for dysuria and hematuria  Musculoskeletal: Negative for arthralgias and back pain  Skin: Negative for color change and rash  Neurological: Negative for seizures and syncope  All other systems reviewed and are negative        Past Medical History     Patient Active Problem List   Diagnosis   • Varicose vein of leg   • Anxiety   • Arthritis   • Depression   • Hyperbilirubinemia   • Hyperlipidemia, mild   • Over weight   • Vitamin D deficiency   • Malignant neoplasm of overlapping sites of right breast in female, estrogen receptor positive ([place] [address] )   • Chemotherapy induced neutropenia (HCC)   • Insomnia due to medical condition   • Port-A-Cath in place   • Osteopenia of both hips   • Use of anastrozole (Arimidex)   • Close exposure to COVID-19 virus   • Carcinoma of right breast metastatic to axillary lymph node [facility])     Past Medical History:   Diagnosis Date   • Anxiety    • Breast cancer ([place] [address] ) 12/06/2021   • Cancer ([place] [address] )     Right breast cancer   • Depression    • Fracture of radius, distal, closed     Last Assessed:  6/10/14   • Thrombophlebitis    • Vitamin D deficiency      Past Surgical History:   Procedure Laterality Date   • ANKLE SURGERY     • ANKLE SURGERY     • BREAST BIOPSY Left 2001    neg   • BREAST BIOPSY Right 12/06/2021    punch bx- ca   • GALLBLADDER SURGERY     • HYSTERECTOMY     • IR PORT PLACEMENT  12/29/2021   • IR PORT REMOVAL  2/21/2023   • MASTECTOMY Right 04/26/2022   • MO MAST MODF RAD W/AX LYMPH NOD W/WO PECT/DALTON MIN Right 04/26/2022    Procedure: BREAST MODIFIED RADICAL MASTECTOMY WITH RIGHT LYMPHATIC MAPPING WITH BLUE AND RADIOACTIVE DYE (INJECT AT 0900 BY DR DEJESUS IN THE OR); Surgeon: Lucio Meyers MD;  Location: [facility];  Service: Surgical Oncology   • WISDOM TOOTH EXTRACTION       Family History   Problem Relation Age of Onset   • Ovarian cancer Mother 64   • Heart disease Father    • Leukemia Brother 62   • Heart disease Brother    • Heart disease Brother    • Cancer Maternal Grandmother         Unknown primary; mid 52's   • Stomach cancer Maternal Aunt         63's     Social History     Socioeconomic History   • Marital status: /Civil Union     Spouse name: Not on file   • Number of children: Not on file   • Years of education: Not on file   • Highest education level: Not on file   Occupational History   • Not on file   Tobacco Use   • Smoking status: Never   • Smokeless tobacco: Never   Vaping Use   • Vaping Use: Never used   Substance and Sexual Activity   • Alcohol use: Yes     Comment: rarely   • Drug use: Yes     Types: Marijuana     Comment: not helping stopped   • Sexual activity: Not Currently     Partners: Male     Birth control/protection: Female Sterilization   Other Topics Concern   • Not on file   Social History Narrative     to [facility]  Social Determinants of Health     Financial Resource Strain: Low Risk    • Difficulty of Paying Living Expenses: Not hard at all   Food Insecurity: Not on file   Transportation Needs: No Transportation Needs   • Lack of Transportation (Medical): No   • Lack of Transportation (Non-Medical): No   Physical Activity: Not on file   Stress: Not on file   Social Connections: Not on file   Intimate Partner Violence: Not on file   Housing Stability: Not on file       Current Outpatient Medications:   •  ALPRAZolam (XANAX) 0 25 mg tablet, Take 1/2 to 1 pill Q 8 hours p r n   For anxiety, Disp: 30 tablet, Rfl: 0  •  anastrozole (ARIMIDEX) 1 mg tablet, TAKE 1 TABLET BY MOUTH EVERY DAY, Disp: 90 tablet, Rfl: 1  •  Calcium 250 MG CAPS, Take 250 mg by mouth in the morning  , Disp: , Rfl: •  cholecalciferol (VITAMIN D3) 1,000 units tablet, Take 2 capsules by mouth daily, Disp: , Rfl:   •  escitalopram (LEXAPRO) 20 mg tablet, Take 1 tablet (20 mg total) by mouth daily, Disp: 90 tablet, Rfl: 2  •  Fish Oil-Krill Oil (KRILL OIL PLUS) CAPS, Take by mouth in the morning  , Disp: , Rfl:   •  fluocinonide (LIDEX) 0 05 % external solution, APPLY TO SCALP ONCE DAILY AS NEEDED FOR ITCH, Disp: , Rfl: 2  •  ketoconazole (NIZORAL) 2 % shampoo, Apply 1 application topically if needed  , Disp: , Rfl: 5  •  meclizine (ANTIVERT) 25 mg tablet, Take 1 tablet (25 mg total) by mouth every 8 (eight) hours, Disp: 30 tablet, Rfl: 0  •  anastrozole (ARIMIDEX) 1 mg tablet, Take 1 tablet (1 mg total) by mouth daily (Patient not taking: Reported on 4/26/2023), Disp: 90 tablet, Rfl: 0  No Known Allergies    Physical Exam:     Vitals:    04/26/23 1056   BP: 122/78   Pulse: 73   Resp: 18   Temp: (!) 96 7 °F (35 9 °C)   SpO2: 98%     Physical Exam  Vitals reviewed  Constitutional:       Appearance: She is well-developed  HENT:      Head: Normocephalic and atraumatic  Eyes:      Pupils: Pupils are equal, round, and reactive to light  Neck:      Thyroid: No thyromegaly  Vascular: No JVD  Trachea: No tracheal deviation  Cardiovascular:      Rate and Rhythm: Normal rate and regular rhythm  Heart sounds: Normal heart sounds  No murmur heard  No friction rub  No gallop  Pulmonary:      Effort: Pulmonary effort is normal  No respiratory distress  Breath sounds: Normal breath sounds  No wheezing or rales  Chest:      Comments: Examination the right mastectomy site demonstrates some scarring  There is no evidence of any worrisome skin findings or dermal metastasis there is no axillary or supraclavicular adenopathy  The left breast was examined in the sitting and supine position    There are no worrisome skin changes, tenderness, inverted nipple, nipple discharge, swelling, bleeding or evidence of a mass in any quadrant  Sigifredo survey demonstrated no evidence of any clinically suspicious axillary, pectoral or paraclavicular lymph nodes  Abdominal:      General: There is no distension  Palpations: Abdomen is soft  There is no hepatomegaly or mass  Tenderness: There is no abdominal tenderness  There is no guarding or rebound  Musculoskeletal:         General: No tenderness  Normal range of motion  Cervical back: Normal range of motion and neck supple  Lymphadenopathy:      Cervical: No cervical adenopathy  Skin:     General: Skin is warm and dry  Findings: No erythema or rash  Neurological:      Mental Status: She is alert and oriented to person, place, and time  Cranial Nerves: No cranial nerve deficit  Psychiatric:         Behavior: Behavior normal            Results & Discussion:   Overall the patient is doing quite well  She has no evidence of local regional or distant recurrent disease  She asked me to clarify the rationale for not doing routine systemic screening to her  which I did  He is understanding  Coordinate a mammogram for her and see her back in 6 months  All questions were answered the patient's and her 's satisfaction  Advance Care Planning/Advance Directives:  I discussed the disease status, treatment plans and follow-up with the patient

## 2023-06-13 ENCOUNTER — RA CDI HCC (OUTPATIENT)
Dept: OTHER | Facility: HOSPITAL | Age: 73
End: 2023-06-13

## 2023-06-13 NOTE — PROGRESS NOTES
[facility] [address]  coding opportunities       Chart reviewed, no opportunity found: CHART REVIEWED, NO OPPORTUNITY FOUND        Patients Insurance     Medicare Insurance: Medicare

## 2023-06-20 ENCOUNTER — OFFICE VISIT (OUTPATIENT)
Dept: INTERNAL MEDICINE CLINIC | Facility: CLINIC | Age: 73
End: 2023-06-20
Payer: MEDICARE

## 2023-06-20 VITALS
HEART RATE: 90 BPM | WEIGHT: 195.6 LBS | DIASTOLIC BLOOD PRESSURE: 72 MMHG | SYSTOLIC BLOOD PRESSURE: 126 MMHG | HEIGHT: 69 IN | OXYGEN SATURATION: 97 % | TEMPERATURE: 98.3 F | BODY MASS INDEX: 28.97 KG/M2

## 2023-06-20 DIAGNOSIS — C77.3 CARCINOMA OF RIGHT BREAST METASTATIC TO AXILLARY LYMPH NODE (HCC): ICD-10-CM

## 2023-06-20 DIAGNOSIS — F41.9 ANXIETY: ICD-10-CM

## 2023-06-20 DIAGNOSIS — M85.851 OSTEOPENIA OF BOTH HIPS: Primary | ICD-10-CM

## 2023-06-20 DIAGNOSIS — Z12.11 COLON CANCER SCREENING: ICD-10-CM

## 2023-06-20 DIAGNOSIS — C50.911 CARCINOMA OF RIGHT BREAST METASTATIC TO AXILLARY LYMPH NODE (HCC): ICD-10-CM

## 2023-06-20 DIAGNOSIS — E80.6 HYPERBILIRUBINEMIA: ICD-10-CM

## 2023-06-20 DIAGNOSIS — R39.9 UTI SYMPTOMS: ICD-10-CM

## 2023-06-20 DIAGNOSIS — E78.5 HYPERLIPIDEMIA, MILD: ICD-10-CM

## 2023-06-20 DIAGNOSIS — G62.9 PERIPHERAL POLYNEUROPATHY: ICD-10-CM

## 2023-06-20 DIAGNOSIS — F32.A DEPRESSION, UNSPECIFIED DEPRESSION TYPE: ICD-10-CM

## 2023-06-20 DIAGNOSIS — Z13.0 SCREENING FOR DEFICIENCY ANEMIA: ICD-10-CM

## 2023-06-20 DIAGNOSIS — M85.852 OSTEOPENIA OF BOTH HIPS: Primary | ICD-10-CM

## 2023-06-20 PROBLEM — Z20.822 CLOSE EXPOSURE TO COVID-19 VIRUS: Status: RESOLVED | Noted: 2023-03-17 | Resolved: 2023-06-20

## 2023-06-20 PROCEDURE — 99214 OFFICE O/P EST MOD 30 MIN: CPT | Performed by: INTERNAL MEDICINE

## 2023-06-20 NOTE — ASSESSMENT & PLAN NOTE
Peripheral neuropathy type symptoms of the fingers and toes likely secondary to the patient's chemo use to treat her breast cancer    Recommended a trial of B complex vitamin daily for 3 months

## 2023-06-20 NOTE — PROGRESS NOTES
Name: Tara Michaels      : 1950      MRN: [id number]  Encounter Provider: Shantell Rios MD  Encounter Date: 2023   Encounter department: [facility]    Assessment & Plan     1  Osteopenia of both hips  Assessment & Plan:  Reviewed the patient's history of osteopenia and the fact that she is on Arimidex medication I recommended a DEXA scan this fall  We will have this performed prior to her next visit with us in November    Orders:  -     DXA bone density spine hip and pelvis; Future; Expected date: 2023    2  Hyperlipidemia, mild  -     Lipid panel; Future; Expected date: 2023    3  Hyperbilirubinemia  -     Comprehensive metabolic panel; Future; Expected date: 2023    4  Screening for deficiency anemia  -     CBC and differential; Future; Expected date: 2023    5  UTI symptoms  -     UA w Reflex to Microscopic w Reflex to Culture -Lab Collect; Future; Expected date: 2023    6  Colon cancer screening  -     Occult Blood, Fecal Immunochemical; Future; Expected date: 2023    7  Carcinoma of right breast metastatic to axillary lymph node [facility])  Assessment & Plan:  Currently no evidence of recurrent disease patient continues on Arimidex and careful surveillance  8  Anxiety  Assessment & Plan:  Anxiety symptoms remain stable at the present time patient is on escitalopram 20 mg daily she occasionally uses one half of a 0 25 mg alprazolam for breakthrough anxiety  Use of alprazolam is infrequent      9  Depression, unspecified depression type  Assessment & Plan:  Depression symptoms remain quite stable at the present time continue on Lexapro 20 mg daily no apparent side effects of the medication      10  Peripheral polyneuropathy  Assessment & Plan:  Peripheral neuropathy type symptoms of the fingers and toes likely secondary to the patient's chemo use to treat her breast cancer    Recommended a trial of B complex vitamin daily for 3 months           Subjective     This 13-year-old female patient returns today for a routine follow-up visit  She returns indicating she has been feeling well over the past several months  She has recuperated nicely from her breast surgery and treatment for breast cancer  She is now on Arimidex treatment and surveillance follow-up  Her functionality level is quite good  She does have some occasional fatigue symptoms but she has gained almost 10 pounds since her gnosis and treatment  I recommended that she work on starting to decrease her calorie consumption to try to drop approximately 10 pounds  Current therapy for anxiety and depression continues to be quite effective with no significant side effects  Review of Systems   Constitutional: Positive for fatigue  All other systems reviewed and are negative  Past Medical History:   Diagnosis Date   • Anxiety    • Breast cancer ([facility] [address] ) 12/06/2021   • Cancer ([facility] [address] )     Right breast cancer   • Depression    • Fracture of radius, distal, closed     Last Assessed:  6/10/14   • Thrombophlebitis    • Vitamin D deficiency      Past Surgical History:   Procedure Laterality Date   • ANKLE SURGERY     • ANKLE SURGERY     • BREAST BIOPSY Left 2001    neg   • BREAST BIOPSY Right 12/06/2021    punch bx- ca   • GALLBLADDER SURGERY     • HYSTERECTOMY     • IR PORT PLACEMENT  12/29/2021   • IR PORT REMOVAL  2/21/2023   • MASTECTOMY Right 04/26/2022   • [place] MAST MODF RAD W/AX LYMPH NOD W/WO PECT/DALTON MIN Right 04/26/2022    Procedure: BREAST MODIFIED RADICAL MASTECTOMY WITH RIGHT LYMPHATIC MAPPING WITH BLUE AND RADIOACTIVE DYE (INJECT AT 0900 BY DR DEJESUS IN THE OR);   Surgeon: Moose Valente MD;  [facility];  Service: Surgical Oncology   • WISDOM TOOTH EXTRACTION       Family History   Problem Relation Age of Onset   • Ovarian cancer Mother 64   • Heart disease Father    • Leukemia Brother 62   • Heart disease Brother    • Heart disease Brother    • Cancer Maternal Grandmother         Unknown primary; mid 52's   • Stomach cancer Maternal Aunt         60's     Social History     Socioeconomic History   • Marital status: /Civil Union     Spouse name: None   • Number of children: None   • Years of education: None   • Highest education level: None   Occupational History   • None   Tobacco Use   • Smoking status: Never   • Smokeless tobacco: Never   Vaping Use   • Vaping Use: Never used   Substance and Sexual Activity   • Alcohol use: Yes     Comment: rarely   • Drug use: Yes     Types: Marijuana     Comment: not helping stopped   • Sexual activity: Not Currently     Partners: Male     Birth control/protection: Female Sterilization   Other Topics Concern   • None   Social History Narrative     to [facility]  Social Determinants of Health     Financial Resource Strain: Low Risk  (10/14/2022)    Overall Financial Resource Strain (CARDIA)    • Difficulty of Paying Living Expenses: Not hard at all   Food Insecurity: Not on file   Transportation Needs: No Transportation Needs (10/14/2022)    PRAPARE - Transportation    • Lack of Transportation (Medical): No    • Lack of Transportation (Non-Medical): No   Physical Activity: Not on file   Stress: Not on file   Social Connections: Not on file   Intimate Partner Violence: Not on file   Housing Stability: Not on file     Current Outpatient Medications on File Prior to Visit   Medication Sig   • ALPRAZolam (XANAX) 0 25 mg tablet Take 1/2 to 1 pill Q 8 hours p r n   For anxiety   • anastrozole (ARIMIDEX) 1 mg tablet Take 1 tablet (1 mg total) by mouth daily   • anastrozole (ARIMIDEX) 1 mg tablet Take 1 tablet (1 mg total) by mouth daily   • Calcium 250 MG CAPS Take 250 mg by mouth in the morning     • cholecalciferol (VITAMIN D3) 1,000 units tablet Take 2 capsules by mouth daily   • escitalopram (LEXAPRO) 20 mg tablet Take 1 tablet (20 mg total) by mouth daily   • Fish Oil-Krill Oil (KRILL OIL PLUS) CAPS Take by mouth in the "morning     • fluocinonide (LIDEX) 0 05 % external solution APPLY TO SCALP ONCE DAILY AS NEEDED FOR ITCH   • ketoconazole (NIZORAL) 2 % shampoo Apply 1 application topically if needed     • meclizine (ANTIVERT) 25 mg tablet Take 1 tablet (25 mg total) by mouth every 8 (eight) hours     No Known Allergies  Immunization History   Administered Date(s) Administered   • COVID-19 MODERNA VACC 0 5 ML IM 01/25/2021, 01/25/2021, 02/22/2021, 02/22/2021   • INFLUENZA 10/22/2018, 10/27/2021   • Influenza Split High Dose Preservative Free IM 12/18/2015, 10/17/2017, 11/10/2018   • Influenza, high dose seasonal 0 7 mL 11/18/2019, 10/26/2020   • Pneumococcal Conjugate 13-Valent 02/11/2019   • Pneumococcal Polysaccharide PPV23 07/30/2020   • Tdap 05/18/2008, 01/18/2018   • Zoster Vaccine Recombinant 02/24/2020, 06/29/2020       Objective     /72   Pulse 90   Temp 98 3 °F (36 8 °C)   Ht 5' 9\" (1 753 m)   Wt 88 7 kg (195 lb 9 6 oz)   LMP  (LMP Unknown)   SpO2 97%   BMI 28 89 kg/m²     Physical Exam  Vitals reviewed  Constitutional:       General: She is not in acute distress  Appearance: Normal appearance  She is well-developed  She is not ill-appearing  HENT:      Right Ear: Hearing and external ear normal       Left Ear: Hearing and external ear normal       Nose: Nose normal  No mucosal edema  Mouth/Throat:      Pharynx: Uvula midline  Eyes:      General: Lids are normal       Conjunctiva/sclera: Conjunctivae normal       Pupils: Pupils are equal, round, and reactive to light  Neck:      Thyroid: No thyromegaly  Vascular: No carotid bruit or JVD  Cardiovascular:      Rate and Rhythm: Normal rate and regular rhythm  Heart sounds: Normal heart sounds  No murmur heard  Pulmonary:      Effort: Pulmonary effort is normal  No respiratory distress  Breath sounds: Normal breath sounds  No wheezing or rhonchi     Abdominal:      General: Bowel sounds are normal       Palpations: Abdomen is " soft    Musculoskeletal:         General: Normal range of motion  Lymphadenopathy:      Cervical: No cervical adenopathy  Skin:     General: Skin is warm and dry  Neurological:      General: No focal deficit present  Mental Status: She is alert and oriented to person, place, and time  Deep Tendon Reflexes: Reflexes are normal and symmetric  Psychiatric:         Speech: Speech normal          Behavior: Behavior normal  Behavior is cooperative  Thought Content:  Thought content normal          Judgment: Judgment normal        Anita Lezama MD

## 2023-06-20 NOTE — ASSESSMENT & PLAN NOTE
Anxiety symptoms remain stable at the present time patient is on escitalopram 20 mg daily she occasionally uses one half of a 0 25 mg alprazolam for breakthrough anxiety    Use of alprazolam is infrequent

## 2023-06-20 NOTE — ASSESSMENT & PLAN NOTE
Depression symptoms remain quite stable at the present time continue on Lexapro 20 mg daily no apparent side effects of the medication

## 2023-06-20 NOTE — ASSESSMENT & PLAN NOTE
Reviewed the patient's history of osteopenia and the fact that she is on Arimidex medication I recommended a DEXA scan this fall    We will have this performed prior to her next visit with us in November

## 2023-08-02 ENCOUNTER — TELEPHONE (OUTPATIENT)
Dept: INTERNAL MEDICINE CLINIC | Facility: CLINIC | Age: 73
End: 2023-08-02

## 2023-08-02 DIAGNOSIS — M25.569 ACUTE KNEE PAIN, UNSPECIFIED LATERALITY: Primary | ICD-10-CM

## 2023-08-02 NOTE — TELEPHONE ENCOUNTER
Gilbert, my name is Luis Almonte. I am a patient of Doctor Stephany. My phone number is [phone]. I'm calling about a sore knee that has persisted for about four weeks now. There is a spot on the interior of my right knee that just doesn't go away. I have some concerns about that. So if the doctor could give me or someone give me a call back as to whether to see Doctor Stephany or go straight to an orthopedic with the recommendation of someone. Thank you very much.  Kristen hawkins

## 2023-08-02 NOTE — TELEPHONE ENCOUNTER
Referral Placed to orthopedics please give the patient phone number to schedule her appointment thank you

## 2023-08-07 ENCOUNTER — HOSPITAL ENCOUNTER (OUTPATIENT)
Dept: RADIOLOGY | Facility: HOSPITAL | Age: 73
Discharge: HOME | End: 2023-08-07
Attending: ORTHOPAEDIC SURGERY
Payer: MEDICARE

## 2023-08-07 ENCOUNTER — OFFICE VISIT (OUTPATIENT)
Dept: OBGYN CLINIC | Facility: HOSPITAL | Age: 73
End: 2023-08-07
Attending: INTERNAL MEDICINE
Payer: MEDICARE

## 2023-08-07 VITALS
HEIGHT: 69 IN | BODY MASS INDEX: 28.88 KG/M2 | WEIGHT: 195 LBS | DIASTOLIC BLOOD PRESSURE: 84 MMHG | SYSTOLIC BLOOD PRESSURE: 125 MMHG | HEART RATE: 88 BPM

## 2023-08-07 DIAGNOSIS — M25.561 RIGHT KNEE PAIN, UNSPECIFIED CHRONICITY: Primary | ICD-10-CM

## 2023-08-07 DIAGNOSIS — M25.569 ACUTE KNEE PAIN, UNSPECIFIED LATERALITY: ICD-10-CM

## 2023-08-07 DIAGNOSIS — S83.206A POSITIVE MCMURRAY TEST OF RIGHT KNEE, INITIAL ENCOUNTER: ICD-10-CM

## 2023-08-07 DIAGNOSIS — M25.561 RIGHT KNEE PAIN, UNSPECIFIED CHRONICITY: ICD-10-CM

## 2023-08-07 DIAGNOSIS — M23.91 KNEE INTERNAL DERANGEMENT, RIGHT: ICD-10-CM

## 2023-08-07 DIAGNOSIS — S86.111A GASTROCNEMIUS STRAIN, RIGHT, INITIAL ENCOUNTER: ICD-10-CM

## 2023-08-07 PROCEDURE — 99213 OFFICE O/P EST LOW 20 MIN: CPT | Performed by: ORTHOPAEDIC SURGERY

## 2023-08-07 PROCEDURE — 73562 X-RAY EXAM OF KNEE 3: CPT

## 2023-08-07 NOTE — PROGRESS NOTES
Assessment:  1. Right knee pain, unspecified chronicity  XR knee 3 vw right non injury    MRI knee right  wo contrast      2. Acute knee pain, unspecified laterality  Ambulatory Referral to Orthopedic Surgery    MRI knee right  wo contrast      3. Gastrocnemius strain, right, initial encounter  MRI knee right  wo contrast      4. Positive Ced test of right knee, initial encounter  MRI knee right  wo contrast      5. Knee internal derangement, right  MRI knee right  wo contrast          Plan:  Right proximal medial gastrocnemius strain versus medial meniscus tear/MCL strain   Patient has history of breast cancer with concerns   · The patient has increase of pain posterior to MCL with valgus stress. She does have tenderness over proximal medial gastroc origin. · Start physical therapy for gastrocnemius strain  · Right knee MRI ordered to rule out meniscus tear/MCL strain versus gastroc strain/tear  · History of breast cancer with concerns over metastatic process   · Follow up for right knee MRI review     To do next visit:  Return for right knee MRI review. The above stated was discussed in layman's terms and the patient expressed understanding. All questions were answered to the patient's satisfaction. Scribe Attestation    I,:  Charis Tafoya am acting as a scribe while in the presence of the attending physician.:       I,:  Tino Genao MD personally performed the services described in this documentation    as scribed in my presence.:             Subjective:   Paul Weaver is a 75 y.o. female who presents for initial evaluation of right knee. She has had on/off symptoms for about one month with no injury. Today she complains of right posteromedial > generalized knee pain and swelling. Her pain can effect her daily activity. The knee can click, catch and feel unstable. Steps and transitional positions aggravate while rest alleviates.   She has been [place] physician guided exercises over past 6 weeks. She denies past right knee surgery. She has history of breast cancer and was told her cancer may come back in several years. Review of systems negative unless otherwise specified in HPI    Past Medical History:   Diagnosis Date   • Anxiety    • Breast cancer ([address]) 12/06/2021   • Cancer ([address])     Right breast cancer   • Depression    • Fracture of radius, distal, closed     Last Assessed:  6/10/14   • Thrombophlebitis    • Vitamin D deficiency        Past Surgical History:   Procedure Laterality Date   • ANKLE SURGERY     • ANKLE SURGERY     • BREAST BIOPSY Left 2001    neg   • BREAST BIOPSY Right 12/06/2021    punch bx- ca   • GALLBLADDER SURGERY     • HYSTERECTOMY     • IR PORT PLACEMENT  12/29/2021   • IR PORT REMOVAL  2/21/2023   • MASTECTOMY Right 04/26/2022   • OR MAST MODF RAD W/AX LYMPH NOD W/WO PECT/DALTON MIN Right 04/26/2022    Procedure: BREAST MODIFIED RADICAL MASTECTOMY WITH RIGHT LYMPHATIC MAPPING WITH BLUE AND RADIOACTIVE DYE (INJECT AT 0900 BY DR DEJESUS IN THE OR);   Surgeon: Jose Borrero MD;  Location: [facility];  Service: Surgical Oncology   • WISDOM TOOTH EXTRACTION         Family History   Problem Relation Age of Onset   • Ovarian cancer Mother 64   • Heart disease Father    • Leukemia Brother 62   • Heart disease Brother    • Heart disease Brother    • Cancer Maternal Grandmother         Unknown primary; mid 52's   • Stomach cancer Maternal Aunt         63's       Social History     Occupational History   • Not on file   Tobacco Use   • Smoking status: Never   • Smokeless tobacco: Never   Vaping Use   • Vaping Use: Never used   Substance and Sexual Activity   • Alcohol use: Yes     Comment: rarely   • Drug use: Yes     Types: Marijuana     Comment: not helping stopped   • Sexual activity: Not Currently     Partners: Male     Birth control/protection: Female Sterilization         Current Outpatient Medications:   •  ALPRAZolam (XANAX) 0.25 mg tablet, Take 1/2 to 1 pill Q 8 hours p.r.n. For anxiety, Disp: 30 tablet, Rfl: 0  •  anastrozole (ARIMIDEX) 1 mg tablet, Take 1 tablet (1 mg total) by mouth daily, Disp: 90 tablet, Rfl: 0  •  anastrozole (ARIMIDEX) 1 mg tablet, Take 1 tablet (1 mg total) by mouth daily, Disp: 90 tablet, Rfl: 1  •  Calcium 250 MG CAPS, Take 250 mg by mouth in the morning  , Disp: , Rfl:   •  cholecalciferol (VITAMIN D3) 1,000 units tablet, Take 2 capsules by mouth daily, Disp: , Rfl:   •  escitalopram (LEXAPRO) 20 mg tablet, Take 1 tablet (20 mg total) by mouth daily, Disp: 90 tablet, Rfl: 2  •  Fish Oil-Krill Oil (KRILL OIL PLUS) CAPS, Take by mouth in the morning  , Disp: , Rfl:   •  fluocinonide (LIDEX) 0.05 % external solution, APPLY TO SCALP ONCE DAILY AS NEEDED FOR ITCH, Disp: , Rfl: 2  •  ketoconazole (NIZORAL) 2 % shampoo, Apply 1 application topically if needed  , Disp: , Rfl: 5  •  meclizine (ANTIVERT) 25 mg tablet, Take 1 tablet (25 mg total) by mouth every 8 (eight) hours, Disp: 30 tablet, Rfl: 0    No Known Allergies         Vitals:    08/07/23 1320   BP: 125/84   Pulse: 88       Objective:  Physical exam  · General: Awake, Alert, Oriented  · Eyes: Pupils equal, round and reactive to light  · Heart: regular rate and rhythm  · Lungs: No audible wheezing  · Abdomen: soft                    Ortho Exam  Right knee:  TTP over medial joint line  TTP posterior to MCL  TTP over proximal medial head of gastrocnemius   Increased medial pain posterior to MCL with valgus stress   No erythema or ecchymosis  No effusion or swelling  Normal strength  Good ROM with crepitus   Positive Medial McMurrays  Positive Apleys  Calf compartments soft and supple  Sensation intact  Toes are warm sensate and mobile      Diagnostics, reviewed and taken today if performed as documented: The attending physician has personally reviewed the pertinent films in PACS and interpretation is as follows:  Right knee x-ray:   Moderate medial and patellofemoral arthritic changes with decreased joint space, subchondral sclerosis and osteophyte formation. Procedures, if performed today:    Procedures    None performed      Portions of the record may have been created with voice recognition software. Occasional wrong word or "sound a like" substitutions may have occurred due to the inherent limitations of voice recognition software. Read the chart carefully and recognize, using context, where substitutions have occurred.

## 2023-08-08 ENCOUNTER — HOSPITAL ENCOUNTER (OUTPATIENT)
Dept: RADIOLOGY | Facility: HOSPITAL | Age: 73
Discharge: HOME | End: 2023-08-08
Attending: ORTHOPAEDIC SURGERY
Payer: MEDICARE

## 2023-08-08 DIAGNOSIS — M25.561 RIGHT KNEE PAIN, UNSPECIFIED CHRONICITY: ICD-10-CM

## 2023-08-08 DIAGNOSIS — M25.569 ACUTE KNEE PAIN, UNSPECIFIED LATERALITY: ICD-10-CM

## 2023-08-08 DIAGNOSIS — S86.111A GASTROCNEMIUS STRAIN, RIGHT, INITIAL ENCOUNTER: ICD-10-CM

## 2023-08-08 DIAGNOSIS — S83.206A POSITIVE MCMURRAY TEST OF RIGHT KNEE, INITIAL ENCOUNTER: ICD-10-CM

## 2023-08-08 DIAGNOSIS — M23.91 KNEE INTERNAL DERANGEMENT, RIGHT: ICD-10-CM

## 2023-08-08 PROCEDURE — 73721 MRI JNT OF LWR EXTRE W/O DYE: CPT

## 2023-08-08 PROCEDURE — G1004 CDSM NDSC: HCPCS

## 2023-08-15 DIAGNOSIS — S83.241A OTHER TEAR OF MEDIAL MENISCUS, CURRENT INJURY, RIGHT KNEE, INITIAL ENCOUNTER: Primary | ICD-10-CM

## 2023-08-21 ENCOUNTER — OFFICE VISIT (OUTPATIENT)
Dept: OBGYN CLINIC | Facility: HOSPITAL | Age: 73
End: 2023-08-21
Payer: MEDICARE

## 2023-08-21 VITALS
BODY MASS INDEX: 28.88 KG/M2 | HEIGHT: 69 IN | SYSTOLIC BLOOD PRESSURE: 137 MMHG | DIASTOLIC BLOOD PRESSURE: 79 MMHG | HEART RATE: 67 BPM | WEIGHT: 195 LBS

## 2023-08-21 DIAGNOSIS — S83.241A OTHER TEAR OF MEDIAL MENISCUS, CURRENT INJURY, RIGHT KNEE, INITIAL ENCOUNTER: Primary | ICD-10-CM

## 2023-08-21 DIAGNOSIS — M17.11 OSTEOARTHRITIS OF RIGHT PATELLOFEMORAL JOINT: ICD-10-CM

## 2023-08-21 DIAGNOSIS — M25.561 RIGHT KNEE PAIN, UNSPECIFIED CHRONICITY: ICD-10-CM

## 2023-08-21 PROCEDURE — 99213 OFFICE O/P EST LOW 20 MIN: CPT | Performed by: ORTHOPAEDIC SURGERY

## 2023-08-21 PROCEDURE — 20610 DRAIN/INJ JOINT/BURSA W/O US: CPT | Performed by: ORTHOPAEDIC SURGERY

## 2023-08-21 RX ORDER — BUPIVACAINE HYDROCHLORIDE 2.5 MG/ML
2 INJECTION, SOLUTION INFILTRATION; PERINEURAL
Status: COMPLETED | OUTPATIENT
Start: 2023-08-21 | End: 2023-08-21

## 2023-08-21 RX ORDER — BUPIVACAINE HYDROCHLORIDE 2.5 MG/ML
1 INJECTION, SOLUTION INFILTRATION; PERINEURAL
Status: COMPLETED | OUTPATIENT
Start: 2023-08-21 | End: 2023-08-21

## 2023-08-21 RX ORDER — METHYLPREDNISOLONE ACETATE 40 MG/ML
1 INJECTION, SUSPENSION INTRA-ARTICULAR; INTRALESIONAL; INTRAMUSCULAR; SOFT TISSUE
Status: COMPLETED | OUTPATIENT
Start: 2023-08-21 | End: 2023-08-21

## 2023-08-21 RX ADMIN — BUPIVACAINE HYDROCHLORIDE 2 ML: 2.5 INJECTION, SOLUTION INFILTRATION; PERINEURAL at 15:15

## 2023-08-21 RX ADMIN — BUPIVACAINE HYDROCHLORIDE 1 ML: 2.5 INJECTION, SOLUTION INFILTRATION; PERINEURAL at 15:15

## 2023-08-21 RX ADMIN — METHYLPREDNISOLONE ACETATE 1 ML: 40 INJECTION, SUSPENSION INTRA-ARTICULAR; INTRALESIONAL; INTRAMUSCULAR; SOFT TISSUE at 15:15

## 2023-08-21 NOTE — PROGRESS NOTES
Assessment:  1. Other tear of medial meniscus, current injury, right knee, initial encounter        2. Right knee pain, unspecified chronicity        3. Osteoarthritis of right patellofemoral joint            Plan:  Right medial meniscus tear, MCL sprain and lateral patellofemoral arthritic changes. · Right knee MRI reviewed with patient displaying posterior horn medial meniscus tear with MCL sprain and lateral patellar cartilage loss  · The patient was provided with right knee steroid injection. The patient tolerated the procedure well. · Start physical therapy  · The patient can follow up as needed and is welcome to return at any point with any new or old issue. To do next visit:  Return if symptoms worsen or fail to improve. The above stated was discussed in layman's terms and the patient expressed understanding. All questions were answered to the patient's satisfaction. Scribe Attestation    I,:  Isabell Aparicio am acting as a scribe while in the presence of the attending physician.:       I,:  Viktor Benitez MD personally performed the services described in this documentation    as scribed in my presence.:             Subjective:   Jeramy Pardo is a 75 y.o. female who presents for follow up of right knee with MRI review. She feels 40% better. Today she complains of right posteromedial > generalized knee pain and swelling. Steps and transitional positions aggravate while rest alleviates. She denies past right knee surgery. She has history of breast cancer and was told her cancer may come back in several years.        Review of systems negative unless otherwise specified in HPI    Past Medical History:   Diagnosis Date   • Anxiety    • Breast cancer ([address]) 12/06/2021   • Cancer [facility])     Right breast cancer   • Depression    • Fracture of radius, distal, closed     Last Assessed:  6/10/14   • Thrombophlebitis    • Vitamin D deficiency        Past Surgical History:   Procedure Laterality Date   • ANKLE SURGERY     • ANKLE SURGERY     • BREAST BIOPSY Left 2001    neg   • BREAST BIOPSY Right 12/06/2021    punch bx- ca   • GALLBLADDER SURGERY     • HYSTERECTOMY     • IR PORT PLACEMENT  12/29/2021   • IR PORT REMOVAL  2/21/2023   • MASTECTOMY Right 04/26/2022   • [facility] MAST MODF RAD W/AX LYMPH NOD W/WO PECT/DALTON MIN Right 04/26/2022    Procedure: BREAST MODIFIED RADICAL MASTECTOMY WITH RIGHT LYMPHATIC MAPPING WITH BLUE AND RADIOACTIVE DYE (INJECT AT 0900 BY DR DEJESUS IN THE OR); Surgeon: Ross Milton MD;  Location: [facility];  Service: Surgical Oncology   • WISDOM TOOTH EXTRACTION         Family History   Problem Relation Age of Onset   • Ovarian cancer Mother 64   • Heart disease Father    • Leukemia Brother 62   • Heart disease Brother    • Heart disease Brother    • Cancer Maternal Grandmother         Unknown primary; mid 52's   • Stomach cancer Maternal Aunt         63's       Social History     Occupational History   • Not on file   Tobacco Use   • Smoking status: Never   • Smokeless tobacco: Never   Vaping Use   • Vaping Use: Never used   Substance and Sexual Activity   • Alcohol use: Yes     Comment: rarely   • Drug use: Yes     Types: Marijuana     Comment: not helping stopped   • Sexual activity: Not Currently     Partners: Male     Birth control/protection: Female Sterilization         Current Outpatient Medications:   •  ALPRAZolam (XANAX) 0.25 mg tablet, Take 1/2 to 1 pill Q 8 hours p.r.n.  For anxiety, Disp: 30 tablet, Rfl: 0  •  anastrozole (ARIMIDEX) 1 mg tablet, Take 1 tablet (1 mg total) by mouth daily, Disp: 90 tablet, Rfl: 0  •  anastrozole (ARIMIDEX) 1 mg tablet, Take 1 tablet (1 mg total) by mouth daily, Disp: 90 tablet, Rfl: 1  •  Calcium 250 MG CAPS, Take 250 mg by mouth in the morning  , Disp: , Rfl:   •  cholecalciferol (VITAMIN D3) 1,000 units tablet, Take 2 capsules by mouth daily, Disp: , Rfl:   •  escitalopram (LEXAPRO) 20 mg tablet, Take 1 tablet (20 mg total) by mouth daily, Disp: 90 tablet, Rfl: 2  •  Fish Oil-Krill Oil (KRILL OIL PLUS) CAPS, Take by mouth in the morning  , Disp: , Rfl:   •  fluocinonide (LIDEX) 0.05 % external solution, APPLY TO SCALP ONCE DAILY AS NEEDED FOR ITCH, Disp: , Rfl: 2  •  ketoconazole (NIZORAL) 2 % shampoo, Apply 1 application topically if needed  , Disp: , Rfl: 5  •  meclizine (ANTIVERT) 25 mg tablet, Take 1 tablet (25 mg total) by mouth every 8 (eight) hours, Disp: 30 tablet, Rfl: 0    No Known Allergies         Vitals:    08/21/23 1530   BP: 137/79   Pulse: 67       Objective:  Physical exam  · General: Awake, Alert, Oriented  · Eyes: Pupils equal, round and reactive to light  · Heart: regular rate and rhythm  · Lungs: No audible wheezing  · Abdomen: soft                    Ortho Exam  Right knee:  TTP over medial joint line  No erythema or ecchymosis  No effusion or swelling  Normal strength  Good ROM with crepitus   Calf compartments soft and supple  Sensation intact  Toes are warm sensate and mobile      Diagnostics, reviewed and taken today if performed as documented: The attending physician has personally reviewed the pertinent films in PACS and interpretation is as follows:  Right knee MRI:  Complex posterior medial horn meniscus tear. MCL sprain. Lateral patellar cartilage loss. Procedures, if performed today:    Large joint arthrocentesis: R knee  Universal Protocol:  Consent: Verbal consent obtained. Risks and benefits: risks, benefits and alternatives were discussed  Consent given by: patient  Time out: Immediately prior to procedure a "time out" was called to verify the correct patient, procedure, equipment, support staff and site/side marked as required.   Timeout called at: 8/21/2023 3:43 PM.  Patient understanding: patient states understanding of the procedure being performed  Site marked: the operative site was marked  Patient identity confirmed: verbally with patient    Supporting Documentation  Indications: pain   Procedure Details  Location: knee - R knee  Needle size: 25 G  Ultrasound guidance: no  Approach: posterolateral  Medications administered: 2 mL bupivacaine 0.25 %; 1 mL bupivacaine 0.25 %; 1 mL methylPREDNISolone acetate 40 mg/mL    Patient tolerance: patient tolerated the procedure well with no immediate complications  Dressing:  Sterile dressing applied              Portions of the record may have been created with voice recognition software. Occasional wrong word or "sound a like" substitutions may have occurred due to the inherent limitations of voice recognition software. Read the chart carefully and recognize, using context, where substitutions have occurred.

## 2023-08-24 ENCOUNTER — TELEPHONE (OUTPATIENT)
Age: 73
End: 2023-08-24

## 2023-08-24 DIAGNOSIS — C50.811 MALIGNANT NEOPLASM OF OVERLAPPING SITES OF RIGHT BREAST IN FEMALE, ESTROGEN RECEPTOR POSITIVE (HCC): ICD-10-CM

## 2023-08-24 DIAGNOSIS — Z17.0 MALIGNANT NEOPLASM OF OVERLAPPING SITES OF RIGHT BREAST IN FEMALE, ESTROGEN RECEPTOR POSITIVE (HCC): ICD-10-CM

## 2023-08-24 DIAGNOSIS — M25.561 RIGHT KNEE PAIN, UNSPECIFIED CHRONICITY: Primary | ICD-10-CM

## 2023-08-24 DIAGNOSIS — M23.91 KNEE INTERNAL DERANGEMENT, RIGHT: ICD-10-CM

## 2023-08-24 RX ORDER — ANASTROZOLE 1 MG/1
1 TABLET ORAL DAILY
Qty: 90 TABLET | Refills: 2 | Status: SHIPPED | OUTPATIENT
Start: 2023-08-24

## 2023-08-24 RX ORDER — ANASTROZOLE 1 MG/1
1 TABLET ORAL DAILY
Qty: 90 TABLET | Refills: 0 | Status: CANCELLED | OUTPATIENT
Start: 2023-08-24

## 2023-08-24 NOTE — TELEPHONE ENCOUNTER
Caller: Patient    Doctor: Rosalina Schwartz    Reason for call:    Patient scheduled OP Physical Therapy for 8/29/23 for her rt knee, but she was told by her therapist there was not   An order in our system. Can an order be placed for the therapist to retrieve?   Thanks     Call back#: [phone]

## 2023-08-29 ENCOUNTER — EVALUATION (OUTPATIENT)
Dept: PHYSICAL THERAPY | Age: 73
End: 2023-08-29
Payer: MEDICARE

## 2023-08-29 DIAGNOSIS — M23.91 KNEE INTERNAL DERANGEMENT, RIGHT: ICD-10-CM

## 2023-08-29 DIAGNOSIS — M25.561 RIGHT KNEE PAIN, UNSPECIFIED CHRONICITY: Primary | ICD-10-CM

## 2023-08-29 PROCEDURE — 97161 PT EVAL LOW COMPLEX 20 MIN: CPT | Performed by: PHYSICAL THERAPIST

## 2023-08-29 NOTE — PROGRESS NOTES
PT Evaluation     Today's date: 2023  Patient name: Vandana Preciado  : 1950  MRN: [id number]  Referring provider: Grecia King MD  Dx:   Encounter Diagnosis     ICD-10-CM    1. Right knee pain, unspecified chronicity  M25.561 Ambulatory Referral to Physical Therapy      2. Knee internal derangement, right  M23.91 Ambulatory Referral to Physical Therapy                     Assessment  Assessment details: Patient seen for PT evaluation for R knee pain. Patient presents with decreased ROM and strength at R knee as well as weakness at R>L B hips. PT established HEP for gentle stretches and strengthening exercises. Recommended patient to start with her stretches, atleast 1-2x/day if able. PT recommending 1-2x/week as needed, holding therapy x 2-3 weeks for patient to try her HEP at home and to be able to progress to strengthening exercises at next treatment. Impairments: abnormal gait, abnormal or restricted ROM, activity intolerance, impaired physical strength, lacks appropriate home exercise program, pain with function, poor posture  and poor body mechanics  Functional limitations: Knee pain with walking, sit<>stand, standing, stair climbing. Understanding of Dx/Px/POC: good   Prognosis: good    Goals  STG/LTG to be met in 6 weeks  - Patient to be able to tolerate stretching HEP  - Progress to strengthening HEP  - FOTO to be expected    Plan  Patient would benefit from: skilled physical therapy  Planned modality interventions: cryotherapy and thermotherapy: hydrocollator packs  Planned therapy interventions: IASTM, joint mobilization, abdominal trunk stabilization, balance, body mechanics training, manual therapy, kinesiology taping, neuromuscular re-education, patient education, postural training, strengthening, stretching, therapeutic activities, therapeutic exercise, home exercise program and gait training  Frequency: 2x month  Duration in weeks: 8  Treatment plan discussed with: patient        Subjective Evaluation    History of Present Illness  Date of onset: 2023  Mechanism of injury: Patient with progressive worsening of B knee pain, followed up with sports medicine, MRI taken as patient's PMH + for Breast CA with possible bone mets; imaging clear revealed meniscal tears. Patient offered cortisone injection for R knee pain which has alleviated 80% of patient's pain sx. Patient's goal is to learn HEP for home stretching, strengthening exercises to manage her pain and to be able to continue to walk for fitness. Patient Goals  Patient goals for therapy: decreased pain, improved balance, increased motion, increased strength and independence with ADLs/IADLs    Pain  Current pain ratin  At best pain rating: 3  At worst pain ratin  Location: R knee  Quality: sharp and dull ache  Relieving factors: change in position and medications  Aggravating factors: standing, walking, stair climbing and lifting    Social Support  Steps to enter house: yes  Stairs in house: yes   Lives in: multiple-level home  Lives with: spouse    Employment status: not working    Diagnostic Tests  MRI studies: abnormal  Treatments  Previous treatment: injection treatment        Objective     Tenderness     Right Knee   Tenderness in the lateral joint line, lateral patella, medial joint line, medial patella and popliteal fossa.      Neurological Testing     Sensation     Knee   Left Knee   Intact: light touch    Right Knee   Intact: light touch     Active Range of Motion   Left Hip   Flexion: WFL    Right Hip   Flexion: WFL  Left Knee   Normal active range of motion    Right Knee   Flexion: 110 degrees with pain  Extension: 0 degrees   Left Ankle/Foot   Normal active range of motion    Right Ankle/Foot   Normal active range of motion    Strength/Myotome Testing     Left Hip   Planes of Motion   Flexion: 4-  Abduction: 4-  Adduction: 4    Right Hip   Planes of Motion   Flexion: 4-  Abduction: 4-  Adduction: 4    Left Knee   Flexion: 4  Extension: 4    Right Knee   Flexion: 4-  Extension: 4-    Ambulation     Observational Gait   Gait: antalgic              Precautions: Breast CA dx in 2021  Progress HEP to strengthening    Manuals                                        Neuro Re-Ed                bridges        Supine marches        TA ball press                                Ther Ex                SLR flex        S/l abd        SAQ                SLR x 3, stand // bars        Ham curls        HR                Squats to hi/lo table                                                        Ther Activity                        Gait Training                        Modalities

## 2023-10-19 ENCOUNTER — HOSPITAL ENCOUNTER (OUTPATIENT)
Dept: MAMMOGRAPHY | Facility: CLINIC | Age: 73
Discharge: HOME | End: 2023-10-19
Payer: MEDICARE

## 2023-10-19 VITALS — WEIGHT: 195.11 LBS | HEIGHT: 69 IN | BODY MASS INDEX: 28.9 KG/M2

## 2023-10-19 DIAGNOSIS — C77.3 CARCINOMA OF RIGHT BREAST METASTATIC TO AXILLARY LYMPH NODE (HCC): ICD-10-CM

## 2023-10-19 DIAGNOSIS — C50.911 CARCINOMA OF RIGHT BREAST METASTATIC TO AXILLARY LYMPH NODE (HCC): ICD-10-CM

## 2023-10-19 PROCEDURE — 77065 DX MAMMO INCL CAD UNI: CPT

## 2023-10-19 PROCEDURE — G0279 TOMOSYNTHESIS, MAMMO: HCPCS

## 2023-10-26 ENCOUNTER — OFFICE VISIT (OUTPATIENT)
Dept: SURGICAL ONCOLOGY | Facility: CLINIC | Age: 73
End: 2023-10-26
Payer: MEDICARE

## 2023-10-26 VITALS
WEIGHT: 199 LBS | DIASTOLIC BLOOD PRESSURE: 86 MMHG | SYSTOLIC BLOOD PRESSURE: 138 MMHG | HEART RATE: 93 BPM | OXYGEN SATURATION: 97 % | BODY MASS INDEX: 29.39 KG/M2

## 2023-10-26 DIAGNOSIS — Z79.811 USE OF ANASTROZOLE (ARIMIDEX): ICD-10-CM

## 2023-10-26 DIAGNOSIS — C50.911 CARCINOMA OF RIGHT BREAST METASTATIC TO AXILLARY LYMPH NODE (HCC): ICD-10-CM

## 2023-10-26 DIAGNOSIS — C50.811 MALIGNANT NEOPLASM OF OVERLAPPING SITES OF RIGHT BREAST IN FEMALE, ESTROGEN RECEPTOR POSITIVE: Primary | ICD-10-CM

## 2023-10-26 DIAGNOSIS — C77.3 CARCINOMA OF RIGHT BREAST METASTATIC TO AXILLARY LYMPH NODE (HCC): ICD-10-CM

## 2023-10-26 DIAGNOSIS — Z17.0 MALIGNANT NEOPLASM OF OVERLAPPING SITES OF RIGHT BREAST IN FEMALE, ESTROGEN RECEPTOR POSITIVE: Primary | ICD-10-CM

## 2023-10-26 PROCEDURE — 99214 OFFICE O/P EST MOD 30 MIN: CPT | Performed by: SURGERY

## 2023-10-26 NOTE — PROGRESS NOTES
Surgical Oncology Follow Up       [address] [facility] SURGICAL ONCOLOGY [place]  [address]  [place] [address]    Bautista Stark  1950  [id number]  [address] 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 Genetics negative. Neoadjuvant chemo. Post-mastectomy RT. On Anastrozole with Nakajima/Lawrence. >>Last left DX mammo 10/2023 -B1          Assessment & Plan:   Patient presents for a follow-up visit. She has no complaints referable to her breast other than a little bit of thickness in the lateral aspect of her mastectomy incision which is actually outside of the breast is most likely some thickening of the soft tissues from radiation. She has no evidence of local regional or distant recurrent disease on exam.  She has very good range of motion but continues to work on it on the right side. Her last mammogram of the left side showed no worrisome findings. She remains on her anastrozole without difficulty. We will see her back in 6 months. She is agreeable to follow with our nurse practitioner with the understanding that I would be available if there are any unanswered questions or concerns. 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      12/30/2021 - 4/8/2022 Chemotherapy    DOXOrubicin (ADRIAMYCIN), 122 mg, Intravenous, Once, 4 of 4 cycles  Administration: 120 mg (12/30/2021), 122 mg (1/13/2022), 122 mg (1/27/2022), 122 mg (2/10/2022)  palonosetron (ALOXI), 0.25 mg, Intravenous, Once, 2 of 2 cycles  Administration: 0.25 mg (3/24/2022), 0.25 mg (4/7/2022)  pegfilgrastim (NEULASTA), 4 mg (100 % of original dose 4 mg), Subcutaneous, Once, 3 of 3 cycles  Dose modification: 4 mg (original dose 4 mg, Cycle 6)  Administration: 4 mg (3/11/2022), 4 mg (3/25/2022), 4 mg (4/8/2022)  pegfilgrastim ([place]), 6 mg, Subcutaneous, Once, 5 of 5 cycles  Administration: 6 mg (12/30/2021), 6 mg (1/13/2022), 6 mg (2/24/2022), 6 mg (1/27/2022), 6 mg (2/10/2022)  cyclophosphamide (CYTOXAN) IVPB, 600 mg/m2 = 1,218 mg, Intravenous, Once, 4 of 4 cycles  Administration: 1,218 mg (12/30/2021), 1,218 mg (1/13/2022), 1,218 mg (1/27/2022), 1,218 mg (2/10/2022)  fosaprepitant (EMEND) IVPB, 150 mg, Intravenous, Once, 4 of 4 cycles  Administration: 150 mg (12/30/2021), 150 mg (1/13/2022), 150 mg (1/27/2022), 150 mg (2/10/2022)  PACLItaxel (TAXOL) chemo IVPB, 175 mg/m2 = 355.2 mg, Intravenous, Once, 4 of 4 cycles  Administration: 355.2 mg (2/24/2022), 355.2 mg (3/10/2022), 355.2 mg (3/24/2022), 355.2 mg (4/7/2022)     4/26/2022 Surgery    Right breast modified radical mastectomy  Inflammatory breast cancer  Grade 1  1.5 cm (multiple foci involving all four quadrants)  Lymphovascular invasion present  Margins negative  14/19 Lymph nodes with macro-metastases  Anatomic Stage IIIC  Prognostic Stage IIIA     6/2022 -  Hormone Therapy    Anastrozole 1 mg daily  Dr. Ann Barrios     6/20/2022 - 7/25/2022 Radiation    Plan ID Energy Fractions Dose per Fraction (cGy) Dose Correction (cGy) Total Dose Delivered (cGy) Elapsed Days   R CW 6X 25 / 25 200 0 5,000 35   R PAB 6X 25 / 25 51 0 1,275 35   R Sclav 6X 25 / 25 200 0 5,000 35    Dr Carlos Cortez           Interval History: Patient remains on her anastrozole without difficulty. She has a B1 reading on her left mammogram.  Her clinical exam from her perspective is unchanged. Review of Systems:   Review of Systems   Constitutional:  Negative for chills and fever. HENT:  Negative for ear pain and sore throat. Eyes:  Negative for pain and visual disturbance. Respiratory:  Negative for cough and shortness of breath. Cardiovascular:  Negative for chest pain and palpitations. Gastrointestinal:  Negative for abdominal pain and vomiting. Genitourinary:  Negative for dysuria and hematuria. Musculoskeletal:  Negative for arthralgias and back pain. Skin:  Negative for color change and rash. Neurological:  Negative for seizures and syncope. All other systems reviewed and are negative.       Past Medical History     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 in female, estrogen receptor positive     Chemotherapy induced neutropenia     Insomnia due to medical condition    Port-A-Cath in place    Osteopenia of both hips    Use of anastrozole (Arimidex)    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)    Peripheral neuropathy     Past Medical History:   Diagnosis Date    Anxiety     Breast cancer ([address]) 12/06/2021    Cancer ([address])     Right breast cancer    Depression     Fracture of radius, distal, closed     Last Assessed:  6/10/14    Thrombophlebitis     Vitamin D deficiency      Past Surgical History:   Procedure Laterality Date    ANKLE SURGERY      ANKLE SURGERY      BREAST BIOPSY Left 2001    neg    BREAST BIOPSY Right 12/06/2021    punch bx- ca    GALLBLADDER SURGERY      HYSTERECTOMY      IR PORT PLACEMENT  12/29/2021    IR PORT REMOVAL  02/21/2023    MASTECTOMY Right 04/26/2022    [place] MAST MODF RAD W/AX LYMPH NOD W/WO PECT/DALTON MIN Right 04/26/2022    Procedure: BREAST MODIFIED RADICAL MASTECTOMY WITH RIGHT LYMPHATIC MAPPING WITH BLUE AND RADIOACTIVE DYE (INJECT AT 0900 BY DR DEJESUS IN THE OR); Surgeon: Pedro Cowart MD;  Location: [facility];  Service: Surgical Oncology    WISDOM TOOTH EXTRACTION       Family History   Problem Relation Age of Onset    Ovarian cancer Mother 64    Heart disease Father     Leukemia Brother 62    Heart disease Brother     Heart disease Brother     Cancer Maternal Grandmother         Unknown primary; mid 52's    Stomach cancer Maternal Aunt         60's     Social History     Socioeconomic History    Marital status: /Civil Union     Spouse name: Not on file    Number of children: Not on file    Years of education: Not on file    Highest education level: Not on file   Occupational History    Not on file   Tobacco Use    Smoking status: Never    Smokeless tobacco: Never   Vaping Use    Vaping Use: Never used   Substance and Sexual Activity    Alcohol use: Yes     Comment: rarely    Drug use: Yes     Types: Marijuana     Comment: not helping stopped    Sexual activity: Not Currently     Partners: Male     Birth control/protection: Female Sterilization   Other Topics Concern    Not on file   Social History Narrative     to [facility]. Social Determinants of Health     Financial Resource Strain: Low Risk  (10/14/2022)    Overall Financial Resource Strain (CARDIA)     Difficulty of Paying Living Expenses: Not hard at all   Food Insecurity: Not on file   Transportation Needs: No Transportation Needs (10/14/2022)    PRAPARE - Transportation     Lack of Transportation (Medical): No     Lack of Transportation (Non-Medical):  No   Physical Activity: Not on file   Stress: Not on file   Social Connections: Not on file   Intimate Partner Violence: Not on file   Housing Stability: Not on file       Current Outpatient Medications:     ALPRAZolam (XANAX) 0.25 mg tablet, Take 1/2 to 1 pill Q 8 hours p.r.n. For anxiety, Disp: 30 tablet, Rfl: 0    anastrozole (ARIMIDEX) 1 mg tablet, Take 1 tablet (1 mg total) by mouth daily, Disp: 90 tablet, Rfl: 1    Calcium 250 MG CAPS, Take 250 mg by mouth in the morning  , Disp: , Rfl:     cholecalciferol (VITAMIN D3) 1,000 units tablet, Take 2 capsules by mouth daily, Disp: , Rfl:     escitalopram (LEXAPRO) 20 mg tablet, Take 1 tablet (20 mg total) by mouth daily, Disp: 90 tablet, Rfl: 2    Fish Oil-Krill Oil (KRILL OIL PLUS) CAPS, Take by mouth in the morning  , Disp: , Rfl:     fluocinonide (LIDEX) 0.05 % external solution, APPLY TO SCALP ONCE DAILY AS NEEDED FOR ITCH, Disp: , Rfl: 2    ketoconazole (NIZORAL) 2 % shampoo, Apply 1 application topically if needed  , Disp: , Rfl: 5    meclizine (ANTIVERT) 25 mg tablet, Take 1 tablet (25 mg total) by mouth every 8 (eight) hours, Disp: 30 tablet, Rfl: 0    anastrozole (ARIMIDEX) 1 mg tablet, Take 1 tablet (1 mg total) by mouth daily (Patient not taking: Reported on 10/26/2023), Disp: 90 tablet, Rfl: 2  No Known Allergies    Physical Exam:     Vitals:    10/26/23 1046   BP: 138/86   Pulse: 93   SpO2: 97%     Physical Exam  Vitals reviewed. Constitutional:       Appearance: She is well-developed. HENT:      Head: Normocephalic and atraumatic. Eyes:      Pupils: Pupils are equal, round, and reactive to light. Neck:      Thyroid: No thyromegaly. Vascular: No JVD. Trachea: No tracheal deviation. Cardiovascular:      Rate and Rhythm: Normal rate and regular rhythm. Heart sounds: Normal heart sounds. No murmur heard. No friction rub. No gallop. Pulmonary:      Effort: Pulmonary effort is normal. No respiratory distress. Breath sounds: Normal breath sounds. No wheezing or rales. Chest:          Comments: Nation of the right mastectomy site demonstrates postradiation changes, there is no evidence of any dermal metastasis or axillary adenopathy.   The patient has very good range of motion she continues to work on increasing her abduction. The left breast was examined in the sitting and supine position. There are no worrisome skin changes, tenderness, inverted nipple, nipple discharge, swelling, bleeding or evidence of a mass in any quadrant. Sigifredo survey demonstrated no evidence of any clinically suspicious axillary, pectoral or paraclavicular lymph nodes. Abdominal:      General: There is no distension. Palpations: Abdomen is soft. There is no hepatomegaly or mass. Tenderness: There is no abdominal tenderness. There is no guarding or rebound. Musculoskeletal:         General: No tenderness. Normal range of motion. Cervical back: Normal range of motion and neck supple. Lymphadenopathy:      Cervical: No cervical adenopathy. Skin:     General: Skin is warm and dry. Findings: No erythema or rash. Neurological:      Mental Status: She is alert and oriented to person, place, and time. Cranial Nerves: No cranial nerve deficit. Psychiatric:         Behavior: Behavior normal.           Results & Discussion:   Patient is free of any evidence of local regional or distant recurrent disease. She is up-to-date on her mammogram.  We will see her back in 6 months. She is agreeable to see our nurse practitioner on this follow-up with the understanding as outlined above. Advance Care Planning/Advance Directives:  I discussed the disease status, treatment plans and follow-up with the patient.

## 2023-11-20 ENCOUNTER — RA CDI HCC (OUTPATIENT)
Dept: OTHER | Facility: HOSPITAL | Age: 73
End: 2023-11-20

## 2023-11-21 ENCOUNTER — HOSPITAL ENCOUNTER (OUTPATIENT)
Dept: BONE DENSITY | Facility: IMAGING CENTER | Age: 73
Discharge: HOME | End: 2023-11-21
Payer: MEDICARE

## 2023-11-21 VITALS — HEIGHT: 68 IN | WEIGHT: 199.4 LBS | BODY MASS INDEX: 30.22 KG/M2

## 2023-11-21 DIAGNOSIS — M85.852 OSTEOPENIA OF BOTH HIPS: ICD-10-CM

## 2023-11-21 DIAGNOSIS — M85.851 OSTEOPENIA OF BOTH HIPS: ICD-10-CM

## 2023-11-21 PROCEDURE — 77080 DXA BONE DENSITY AXIAL: CPT

## 2023-11-27 ENCOUNTER — APPOINTMENT (OUTPATIENT)
Dept: LAB | Facility: CLINIC | Age: 73
End: 2023-11-27
Payer: MEDICARE

## 2023-11-27 DIAGNOSIS — E80.6 HYPERBILIRUBINEMIA: ICD-10-CM

## 2023-11-27 DIAGNOSIS — Z13.0 SCREENING FOR DEFICIENCY ANEMIA: ICD-10-CM

## 2023-11-27 DIAGNOSIS — Z12.11 COLON CANCER SCREENING: ICD-10-CM

## 2023-11-27 DIAGNOSIS — R39.9 UTI SYMPTOMS: ICD-10-CM

## 2023-11-27 DIAGNOSIS — E78.5 HYPERLIPIDEMIA, MILD: ICD-10-CM

## 2023-11-27 LAB
ALBUMIN SERPL BCP-MCNC: 4 G/DL (ref 3.5–5)
ALP SERPL-CCNC: 67 U/L (ref 34–104)
ALT SERPL W P-5'-P-CCNC: 16 U/L (ref 7–52)
ANION GAP SERPL CALCULATED.3IONS-SCNC: 6 MMOL/L
AST SERPL W P-5'-P-CCNC: 21 U/L (ref 13–39)
BASOPHILS # BLD AUTO: 0.05 THOUSANDS/ÂΜL (ref 0–0.1)
BASOPHILS NFR BLD AUTO: 2 % (ref 0–1)
BILIRUB SERPL-MCNC: 0.93 MG/DL (ref 0.2–1)
BILIRUB UR QL STRIP: NEGATIVE
BUN SERPL-MCNC: 16 MG/DL (ref 5–25)
CALCIUM SERPL-MCNC: 11.2 MG/DL (ref 8.4–10.2)
CHLORIDE SERPL-SCNC: 103 MMOL/L (ref 96–108)
CHOLEST SERPL-MCNC: 198 MG/DL
CLARITY UR: CLEAR
CO2 SERPL-SCNC: 28 MMOL/L (ref 21–32)
COLOR UR: NORMAL
CREAT SERPL-MCNC: 0.58 MG/DL (ref 0.6–1.3)
EOSINOPHIL # BLD AUTO: 0.16 THOUSAND/ÂΜL (ref 0–0.61)
EOSINOPHIL NFR BLD AUTO: 5 % (ref 0–6)
ERYTHROCYTE [DISTWIDTH] IN BLOOD BY AUTOMATED COUNT: 11.9 % (ref 11.6–15.1)
GFR SERPL CREATININE-BSD FRML MDRD: 91 ML/MIN/1.73SQ M
GLUCOSE P FAST SERPL-MCNC: 85 MG/DL (ref 65–99)
GLUCOSE UR STRIP-MCNC: NEGATIVE MG/DL
HCT VFR BLD AUTO: 42.1 % (ref 34.8–46.1)
HDLC SERPL-MCNC: 47 MG/DL
HGB BLD-MCNC: 13.6 G/DL (ref 11.5–15.4)
HGB UR QL STRIP.AUTO: NEGATIVE
IMM GRANULOCYTES # BLD AUTO: 0.01 THOUSAND/UL (ref 0–0.2)
IMM GRANULOCYTES NFR BLD AUTO: 0 % (ref 0–2)
KETONES UR STRIP-MCNC: NEGATIVE MG/DL
LDLC SERPL CALC-MCNC: 125 MG/DL (ref 0–100)
LEUKOCYTE ESTERASE UR QL STRIP: NEGATIVE
LYMPHOCYTES # BLD AUTO: 1.22 THOUSANDS/ÂΜL (ref 0.6–4.47)
LYMPHOCYTES NFR BLD AUTO: 37 % (ref 14–44)
MCH RBC QN AUTO: 31.5 PG (ref 26.8–34.3)
MCHC RBC AUTO-ENTMCNC: 32.3 G/DL (ref 31.4–37.4)
MCV RBC AUTO: 98 FL (ref 82–98)
MONOCYTES # BLD AUTO: 0.46 THOUSAND/ÂΜL (ref 0.17–1.22)
MONOCYTES NFR BLD AUTO: 14 % (ref 4–12)
NEUTROPHILS # BLD AUTO: 1.44 THOUSANDS/ÂΜL (ref 1.85–7.62)
NEUTS SEG NFR BLD AUTO: 42 % (ref 43–75)
NITRITE UR QL STRIP: NEGATIVE
NONHDLC SERPL-MCNC: 151 MG/DL
NRBC BLD AUTO-RTO: 0 /100 WBCS
PH UR STRIP.AUTO: 6.5 [PH]
PLATELET # BLD AUTO: 185 THOUSANDS/UL (ref 149–390)
PMV BLD AUTO: 10.3 FL (ref 8.9–12.7)
POTASSIUM SERPL-SCNC: 4.2 MMOL/L (ref 3.5–5.3)
PROT SERPL-MCNC: 6.8 G/DL (ref 6.4–8.4)
PROT UR STRIP-MCNC: NEGATIVE MG/DL
RBC # BLD AUTO: 4.32 MILLION/UL (ref 3.81–5.12)
SODIUM SERPL-SCNC: 137 MMOL/L (ref 135–147)
SP GR UR STRIP.AUTO: 1.02 (ref 1–1.03)
TRIGL SERPL-MCNC: 132 MG/DL
UROBILINOGEN UR STRIP-ACNC: <2 MG/DL
WBC # BLD AUTO: 3.34 THOUSAND/UL (ref 4.31–10.16)

## 2023-11-27 PROCEDURE — 36415 COLL VENOUS BLD VENIPUNCTURE: CPT

## 2023-11-27 PROCEDURE — 85025 COMPLETE CBC W/AUTO DIFF WBC: CPT

## 2023-11-27 PROCEDURE — 80053 COMPREHEN METABOLIC PANEL: CPT

## 2023-11-27 PROCEDURE — 80061 LIPID PANEL: CPT

## 2023-11-28 ENCOUNTER — OFFICE VISIT (OUTPATIENT)
Dept: OBGYN CLINIC | Facility: HOSPITAL | Age: 73
End: 2023-11-28
Payer: MEDICARE

## 2023-11-28 VITALS
DIASTOLIC BLOOD PRESSURE: 83 MMHG | SYSTOLIC BLOOD PRESSURE: 139 MMHG | BODY MASS INDEX: 30.13 KG/M2 | WEIGHT: 198.8 LBS | HEART RATE: 71 BPM | HEIGHT: 68 IN

## 2023-11-28 DIAGNOSIS — M17.11 OSTEOARTHRITIS OF RIGHT PATELLOFEMORAL JOINT: Primary | ICD-10-CM

## 2023-11-28 PROCEDURE — 99213 OFFICE O/P EST LOW 20 MIN: CPT | Performed by: ORTHOPAEDIC SURGERY

## 2023-11-28 PROCEDURE — 20610 DRAIN/INJ JOINT/BURSA W/O US: CPT | Performed by: ORTHOPAEDIC SURGERY

## 2023-11-28 RX ORDER — LIDOCAINE HYDROCHLORIDE 10 MG/ML
2 INJECTION, SOLUTION INFILTRATION; PERINEURAL
Status: COMPLETED | OUTPATIENT
Start: 2023-11-28 | End: 2023-11-28

## 2023-11-28 RX ORDER — BETAMETHASONE SODIUM PHOSPHATE AND BETAMETHASONE ACETATE 3; 3 MG/ML; MG/ML
12 INJECTION, SUSPENSION INTRA-ARTICULAR; INTRALESIONAL; INTRAMUSCULAR; SOFT TISSUE
Status: COMPLETED | OUTPATIENT
Start: 2023-11-28 | End: 2023-11-28

## 2023-11-28 RX ORDER — BUPIVACAINE HYDROCHLORIDE 2.5 MG/ML
2 INJECTION, SOLUTION INFILTRATION; PERINEURAL
Status: COMPLETED | OUTPATIENT
Start: 2023-11-28 | End: 2023-11-28

## 2023-11-28 RX ADMIN — BUPIVACAINE HYDROCHLORIDE 2 ML: 2.5 INJECTION, SOLUTION INFILTRATION; PERINEURAL at 08:45

## 2023-11-28 RX ADMIN — LIDOCAINE HYDROCHLORIDE 2 ML: 10 INJECTION, SOLUTION INFILTRATION; PERINEURAL at 08:45

## 2023-11-28 RX ADMIN — BETAMETHASONE SODIUM PHOSPHATE AND BETAMETHASONE ACETATE 12 MG: 3; 3 INJECTION, SUSPENSION INTRA-ARTICULAR; INTRALESIONAL; INTRAMUSCULAR; SOFT TISSUE at 08:45

## 2023-11-28 NOTE — PROGRESS NOTES
Assessment:  No diagnosis found. Plan:  Right medial meniscus tear, MCL sprain and lateral patellofemoral osteoarthritic changes. Weight bearing and activities to tolerance  Rest, ice, compression, and elevation PRN  Motrin and Tylenol PRN if not medically contra indicated  Results of recent DEXA reviewed, recommend continued vit D supplement and follow up w either PCP or endocrinology  Elevated Ca seen on CMP to be discussed with PCP  A repeat CSI was offered, accepted, and provided in clinic today  Post injection instructions provided  Patient requesting consultation for potential TKA - will provide referral to joints surgeon. Meets all [place] criteria. Return to care PRN      To do next visit:  No follow-ups on file. The above stated was discussed in layman's terms and the patient expressed understanding. All questions were answered to the patient's satisfaction. Subjective:   Reyes Kea is a 75 y.o. female who presents for follow up of right knee pain in the setting of known right knee osteoarthritis, medial mensicus tear. She reports that she did PT and improved slightly. She states that the CSI provided at least 1 month of pain relief previously. Pain is 6/10, worse with weight bearing, made better with rest. Reports stiffness with prolonged sitting. Naprocen is no longer providing adequate pain relief and reports her quality of life is no longer adequate. Considering total knee arthroplasty.       Review of systems negative unless otherwise specified in HPI    Past Medical History:   Diagnosis Date    Anxiety     Breast cancer ([address]) 12/06/2021    Cancer ([address])     Right breast cancer    Depression     Fracture of radius, distal, closed     Last Assessed:  6/10/14    Thrombophlebitis     Vitamin D deficiency        Past Surgical History:   Procedure Laterality Date    ANKLE SURGERY      ANKLE SURGERY      BREAST BIOPSY Left 2001    neg    BREAST BIOPSY Right 12/06/2021    punch bx- ca    GALLBLADDER SURGERY      HYSTERECTOMY      IR PORT PLACEMENT  12/29/2021    IR PORT REMOVAL  02/21/2023    MASTECTOMY Right 04/26/2022    [place] MAST MODF RAD W/AX LYMPH NOD W/WO PECT/DALTON MIN Right 04/26/2022    Procedure: BREAST MODIFIED RADICAL MASTECTOMY WITH RIGHT LYMPHATIC MAPPING WITH BLUE AND RADIOACTIVE DYE (INJECT AT 0900 BY DR DEJESUS IN THE OR); Surgeon: Mechelle Crum MD;  Location: [facility];  Service: Surgical Oncology    WISDOM TOOTH EXTRACTION         Family History   Problem Relation Age of Onset    Ovarian cancer Mother 64    Heart disease Father     Leukemia Brother 62    Heart disease Brother     Heart disease Brother     Cancer Maternal Grandmother         Unknown primary; mid 52's    Stomach cancer Maternal Aunt         63's       Social History     Occupational History    Not on file   Tobacco Use    Smoking status: Never    Smokeless tobacco: Never   Vaping Use    Vaping Use: Never used   Substance and Sexual Activity    Alcohol use: Yes     Comment: rarely    Drug use: Yes     Types: Marijuana     Comment: not helping stopped    Sexual activity: Not Currently     Partners: Male     Birth control/protection: Female Sterilization         Current Outpatient Medications:     ALPRAZolam (XANAX) 0.25 mg tablet, Take 1/2 to 1 pill Q 8 hours p.r.n.  For anxiety, Disp: 30 tablet, Rfl: 0    anastrozole (ARIMIDEX) 1 mg tablet, Take 1 tablet (1 mg total) by mouth daily, Disp: 90 tablet, Rfl: 1    Calcium 250 MG CAPS, Take 250 mg by mouth in the morning  , Disp: , Rfl:     cholecalciferol (VITAMIN D3) 1,000 units tablet, Take 2 capsules by mouth daily, Disp: , Rfl:     escitalopram (LEXAPRO) 20 mg tablet, Take 1 tablet (20 mg total) by mouth daily, Disp: 90 tablet, Rfl: 2    Fish Oil-Krill Oil (KRILL OIL PLUS) CAPS, Take by mouth in the morning  , Disp: , Rfl:     fluocinonide (LIDEX) 0.05 % external solution, APPLY TO SCALP ONCE DAILY AS NEEDED FOR ITCH, Disp: , Rfl: 2    ketoconazole (NIZORAL) 2 % shampoo, Apply 1 application topically if needed  , Disp: , Rfl: 5    meclizine (ANTIVERT) 25 mg tablet, Take 1 tablet (25 mg total) by mouth every 8 (eight) hours, Disp: 30 tablet, Rfl: 0    anastrozole (ARIMIDEX) 1 mg tablet, Take 1 tablet (1 mg total) by mouth daily (Patient not taking: Reported on 10/26/2023), Disp: 90 tablet, Rfl: 2    No Known Allergies         Vitals:    11/28/23 0845   BP: 139/83   Pulse: 71       Objective:  Physical exam  General: Awake, Alert, Oriented  Eyes: Pupils equal, round and reactive to light  Heart: regular rate and rhythm  Lungs: No audible wheezing  Abdomen: soft                    Ortho Exam  Right knee:  Skin intact  No erythema or ecchymosis  No effusion or swelling  Normal strength  Good ROM with crepitus with patellar tracking  Calf compartments soft and supple  Sensation intact  Toes are warm sensate and mobile      Diagnostics, reviewed and taken today if performed as documented:   None    Procedures, if performed today:    Large joint arthrocentesis: R knee  Universal Protocol:  Consent: Verbal consent obtained. Risks and benefits: risks, benefits and alternatives were discussed  Consent given by: patient  Time out: Immediately prior to procedure a "time out" was called to verify the correct patient, procedure, equipment, support staff and site/side marked as required.   Patient understanding: patient states understanding of the procedure being performed  Site marked: the operative site was marked  Patient identity confirmed: verbally with patient  Supporting Documentation  Indications: pain   Procedure Details  Location: knee - R knee  Needle size: 22 G  Ultrasound guidance: no  Approach: anterolateral  Medications administered: 12 mg betamethasone acetate-betamethasone sodium phosphate 6 (3-3) mg/mL; 2 mL bupivacaine 0.25 %; 2 mL lidocaine 1 %    Patient tolerance: patient tolerated the procedure well with no immediate complications  Dressing:  Sterile dressing applied              Portions of the record may have been created with voice recognition software. Occasional wrong word or "sound a like" substitutions may have occurred due to the inherent limitations of voice recognition software. Read the chart carefully and recognize, using context, where substitutions have occurred.

## 2023-11-29 ENCOUNTER — TELEPHONE (OUTPATIENT)
Dept: HEMATOLOGY ONCOLOGY | Facility: CLINIC | Age: 73
End: 2023-11-29

## 2023-11-29 ENCOUNTER — OFFICE VISIT (OUTPATIENT)
Dept: INTERNAL MEDICINE CLINIC | Facility: CLINIC | Age: 73
End: 2023-11-29
Payer: MEDICARE

## 2023-11-29 VITALS
HEIGHT: 68 IN | WEIGHT: 199.6 LBS | OXYGEN SATURATION: 98 % | BODY MASS INDEX: 30.25 KG/M2 | TEMPERATURE: 97.1 F | HEART RATE: 78 BPM | DIASTOLIC BLOOD PRESSURE: 78 MMHG | SYSTOLIC BLOOD PRESSURE: 134 MMHG

## 2023-11-29 DIAGNOSIS — Z17.0 MALIGNANT NEOPLASM OF OVERLAPPING SITES OF RIGHT BREAST IN FEMALE, ESTROGEN RECEPTOR POSITIVE: ICD-10-CM

## 2023-11-29 DIAGNOSIS — F41.9 ANXIETY: ICD-10-CM

## 2023-11-29 DIAGNOSIS — C50.911 CARCINOMA OF RIGHT BREAST METASTATIC TO AXILLARY LYMPH NODE (HCC): Primary | ICD-10-CM

## 2023-11-29 DIAGNOSIS — E78.5 HYPERLIPIDEMIA, MILD: ICD-10-CM

## 2023-11-29 DIAGNOSIS — M85.852 OSTEOPENIA OF BOTH HIPS: ICD-10-CM

## 2023-11-29 DIAGNOSIS — C50.811 MALIGNANT NEOPLASM OF OVERLAPPING SITES OF RIGHT BREAST IN FEMALE, ESTROGEN RECEPTOR POSITIVE: ICD-10-CM

## 2023-11-29 DIAGNOSIS — F32.A DEPRESSION, UNSPECIFIED DEPRESSION TYPE: ICD-10-CM

## 2023-11-29 DIAGNOSIS — M85.851 OSTEOPENIA OF BOTH HIPS: ICD-10-CM

## 2023-11-29 DIAGNOSIS — C77.3 CARCINOMA OF RIGHT BREAST METASTATIC TO AXILLARY LYMPH NODE (HCC): Primary | ICD-10-CM

## 2023-11-29 PROCEDURE — 99214 OFFICE O/P EST MOD 30 MIN: CPT | Performed by: INTERNAL MEDICINE

## 2023-11-29 RX ORDER — ANASTROZOLE 1 MG/1
1 TABLET ORAL DAILY
Qty: 90 TABLET | Refills: 0 | Status: SHIPPED | OUTPATIENT
Start: 2023-11-29

## 2023-11-29 NOTE — ASSESSMENT & PLAN NOTE
Lipid profile reviewed in detail with the patient today recommend continued care and caution and consumption of high-fat foods as well as continuation of Krill oil.

## 2023-11-29 NOTE — TELEPHONE ENCOUNTER
Medication Refill Request   Who are you speaking with? Patient   If it is not the patient, are they listed on an active communication consent form? N/A   Which medication is being requested for refill? Please list medication name and dosage anastrozole (ARIMIDEX) 1 mg tablet     How many pills does the patient have left? 4   Preferred Pharmacy / Address  [facility]/pharmacy [address] [place] [address]  Phone: [phone]  Fax: [phone]  CORTES #: [id number]    Who is the prescribing provider?  Dr. Princess Kwon   Call back number  [phone]    Relevant Information  N/a

## 2023-11-29 NOTE — ASSESSMENT & PLAN NOTE
Symptoms remain quite stable on Lexapro 20 mg daily patient has a prescription for as needed use of alprazolam 0.25 mg which can be taken 1/2 to 1 tablet every hours as necessary

## 2023-11-29 NOTE — ASSESSMENT & PLAN NOTE
DEXA scan reviewed remains in in osteopenia category recommend continuation of calcium and vitamin D supplementation follow-up study in 1 year due to current use of Arimidex medication

## 2023-11-29 NOTE — PROGRESS NOTES
Name: Samir Knight      : 1950      MRN: [id number]  Encounter Provider: Soha Mobley MD  Encounter Date: 2023   Encounter department: [facility] INTERNAL MEDICINE    Assessment & Plan     1. Carcinoma of right breast metastatic to axillary lymph node [facility])  Assessment & Plan:  No indication of recurrent disease recommend continued surveillance      2. Anxiety  Assessment & Plan:  Symptoms remain quite stable on Lexapro 20 mg daily patient has a prescription for as needed use of alprazolam 0.25 mg which can be taken 1/2 to 1 tablet every hours as necessary      3. Depression, unspecified depression type  Assessment & Plan:  Symptoms stable continue Lexapro 20 mg daily      4. Hyperlipidemia, mild  Assessment & Plan:  Lipid profile reviewed in detail with the patient today recommend continued care and caution and consumption of high-fat foods as well as continuation of Krill oil. 5. Osteopenia of both hips  Assessment & Plan:  DEXA scan reviewed remains in in osteopenia category recommend continuation of calcium and vitamin D supplementation follow-up study in 1 year due to current use of Arimidex medication             Subjective      This 26-year-old female patient returns today for a routine follow-up visit. She indicates that she has been feeling well and doing well since her last visit with us. She continues on Arimidex treatment for her previous diagnosis of breast cancer. Today's visit with the patient we have reviewed her most recent DEXA scan indicating stability of her osteopenia and no progressive loss of calcium despite being on the Arimidex for approximately 1 year. Review of Systems   All other systems reviewed and are negative. Current Outpatient Medications on File Prior to Visit   Medication Sig    ALPRAZolam (XANAX) 0.25 mg tablet Take 1/2 to 1 pill Q 8 hours p.r.n.  For anxiety    Calcium 250 MG CAPS Take 250 mg by mouth in the morning cholecalciferol (VITAMIN D3) 1,000 units tablet Take 2 capsules by mouth daily    escitalopram (LEXAPRO) 20 mg tablet Take 1 tablet (20 mg total) by mouth daily    Fish Oil-Krill Oil (KRILL OIL PLUS) CAPS Take by mouth in the morning      fluocinonide (LIDEX) 0.05 % external solution APPLY TO SCALP ONCE DAILY AS NEEDED FOR ITCH    ketoconazole (NIZORAL) 2 % shampoo Apply 1 application topically if needed      meclizine (ANTIVERT) 25 mg tablet Take 1 tablet (25 mg total) by mouth every 8 (eight) hours    [DISCONTINUED] anastrozole (ARIMIDEX) 1 mg tablet Take 1 tablet (1 mg total) by mouth daily    [DISCONTINUED] anastrozole (ARIMIDEX) 1 mg tablet Take 1 tablet (1 mg total) by mouth daily       Objective     /78   Pulse 78   Temp (!) 97.1 °F (36.2 °C)   Ht 5' 8" (1.727 m)   Wt 90.5 kg (199 lb 9.6 oz)   LMP  (LMP Unknown)   SpO2 98%   BMI 30.35 kg/m²     Physical Exam  Vitals reviewed. Constitutional:       General: She is not in acute distress. Appearance: Normal appearance. She is well-developed. She is not ill-appearing. HENT:      Right Ear: Hearing and external ear normal.      Left Ear: Hearing and external ear normal.      Nose: Nose normal. No mucosal edema. Mouth/Throat:      Mouth: Mucous membranes are moist.      Pharynx: Oropharynx is clear. Uvula midline. Eyes:      General: Lids are normal.      Conjunctiva/sclera: Conjunctivae normal.      Pupils: Pupils are equal, round, and reactive to light. Neck:      Thyroid: No thyromegaly. Vascular: No carotid bruit or JVD. Cardiovascular:      Rate and Rhythm: Normal rate and regular rhythm. Heart sounds: Normal heart sounds. No murmur heard. Pulmonary:      Effort: Pulmonary effort is normal. No respiratory distress. Breath sounds: Normal breath sounds. Abdominal:      General: Bowel sounds are normal.      Palpations: Abdomen is soft. Musculoskeletal:         General: Normal range of motion.       Right lower leg: No edema. Left lower leg: No edema. Lymphadenopathy:      Cervical: No cervical adenopathy. Skin:     General: Skin is warm and dry. Neurological:      Mental Status: She is alert and oriented to person, place, and time. Mental status is at baseline. Deep Tendon Reflexes: Reflexes are normal and symmetric. Psychiatric:         Mood and Affect: Mood normal.         Speech: Speech normal.         Behavior: Behavior normal. Behavior is cooperative. Thought Content:  Thought content normal.         Judgment: Judgment normal.       Perry Arreola MD

## 2023-12-13 ENCOUNTER — APPOINTMENT (OUTPATIENT)
Dept: LAB | Facility: CLINIC | Age: 73
End: 2023-12-13
Payer: MEDICARE

## 2023-12-13 LAB — HEMOCCULT STL QL IA: NEGATIVE

## 2023-12-13 PROCEDURE — G0328 FECAL BLOOD SCRN IMMUNOASSAY: HCPCS

## 2023-12-25 DIAGNOSIS — F32.A DEPRESSION, UNSPECIFIED DEPRESSION TYPE: ICD-10-CM

## 2023-12-25 RX ORDER — ESCITALOPRAM OXALATE 20 MG/1
20 TABLET ORAL DAILY
Qty: 86 TABLET | Refills: 1 | Status: SHIPPED | OUTPATIENT
Start: 2023-12-25

## 2024-01-04 ENCOUNTER — OFFICE VISIT (OUTPATIENT)
Dept: OBGYN CLINIC | Facility: CLINIC | Age: 74
End: 2024-01-04
Payer: MEDICARE

## 2024-01-04 VITALS
HEIGHT: 68 IN | BODY MASS INDEX: 30.16 KG/M2 | SYSTOLIC BLOOD PRESSURE: 151 MMHG | DIASTOLIC BLOOD PRESSURE: 88 MMHG | WEIGHT: 199 LBS | HEART RATE: 63 BPM

## 2024-01-04 DIAGNOSIS — M17.11 PRIMARY OSTEOARTHRITIS OF RIGHT KNEE: Primary | ICD-10-CM

## 2024-01-04 DIAGNOSIS — M17.11 OSTEOARTHRITIS OF RIGHT PATELLOFEMORAL JOINT: ICD-10-CM

## 2024-01-04 PROCEDURE — 99213 OFFICE O/P EST LOW 20 MIN: CPT | Performed by: STUDENT IN AN ORGANIZED HEALTH CARE EDUCATION/TRAINING PROGRAM

## 2024-01-04 NOTE — PROGRESS NOTES
Date: 24  Carie Fernandez   MRN# [id number]  : 1950      Chief Complaint: right knee pain/osteoarthritis    Assessment and Plan:    Right knee osteoarthritis     -WBAT  -Activity modification to limit strain or impact on the joint  -ibuprofen (Motrin) 800mg TID.  -Tylenol 1000mg up to three times daily as needed. Do not exceed 3000mg daily  -maintain home exercise program directed by PT  -Weight loss discussed   -Knee sleeve or brace for comfort was discussed  -Cane or walker recommended to offload joint  -repeat CSI 24   -Patient may follow up end  for further evaluation and treatment      Subjective:     Knee Pain  Patient complains of right knee pain.  Referred by Dr Rodriguez. She denies any specific injury or trauma. Last CSI was by Dr Rodriguez 23 who was treating for osteoarthritis, medial meniscal tear which was beneficial and starting to wear off.  The pain began years ago and has gradually progressed.  She describes the symptoms as aching, dull, and sharp. Symptoms improve with rest, ice, medication: Naprosyn used but not effective, physical therapy. Physical therapy aggravated her knee, she does maintain HEP to tolerance. The symptoms are worse with stair climbing, kneeling, getting up from a chair, sleeping, sitting for prolonged periods of time. The knee has given out or felt unstable with stairs. The patient can bend and straighten the knee fully.  The patient is active with grandkids, walking for exercise. Treatment to date has been ice, Tylenol, NSAID's, cortisone injection, therapy, without significant relief.    External Records Reviewed: imaging reports: positive  joint(s): right knee(s)  Findings: joint space narrowing, lab reports, office notes, and radiology reports    Allergy:  No Known Allergies  Medications:  all current active meds have been reviewed  Past Medical History:  Past Medical History:   Diagnosis Date    Anxiety     Breast cancer (HCC) 2021     Cancer (HCC)     Right breast cancer    Depression     Fracture of radius, distal, closed     Last Assessed:  6/10/14    Thrombophlebitis     Vitamin D deficiency      Past Surgical History:  Past Surgical History:   Procedure Laterality Date    ANKLE SURGERY      ANKLE SURGERY      BREAST BIOPSY Left 2001    neg    BREAST BIOPSY Right 12/06/2021    punch bx- ca    GALLBLADDER SURGERY      HYSTERECTOMY      IR PORT PLACEMENT  12/29/2021    IR PORT REMOVAL  02/21/2023    MASTECTOMY Right 04/26/2022    SC MAST MODF RAD W/AX LYMPH NOD W/WO PECT/DALTON MIN Right 04/26/2022    Procedure: BREAST MODIFIED RADICAL MASTECTOMY WITH RIGHT LYMPHATIC MAPPING WITH BLUE AND RADIOACTIVE DYE (INJECT AT 0900 BY DR VASQUEZ IN THE OR);  Surgeon: Kevin Vasquez MD;  Location[facility];  Service: Surgical Oncology    WISDOM TOOTH EXTRACTION       Family History:  Family History   Problem Relation Age of Onset    Ovarian cancer Mother 56    Heart disease Father     Leukemia Brother 58    Heart disease Brother     Heart disease Brother     Cancer Maternal Grandmother         Unknown primary; mid 50's    Stomach cancer Maternal Aunt         60's     Social History:  Social History     Substance and Sexual Activity   Alcohol Use Yes    Comment: rarely     Social History     Substance and Sexual Activity   Drug Use Yes    Types: Marijuana    Comment: not helping stopped     Social History     Tobacco Use   Smoking Status Never   Smokeless Tobacco Never           ROS:   Review of Systems   Constitutional:  Negative for chills and fever.   HENT:  Negative for ear pain and sore throat.    Eyes:  Negative for pain and visual disturbance.   Respiratory:  Negative for cough and shortness of breath.    Cardiovascular:  Negative for chest pain and palpitations.   Gastrointestinal:  Negative for abdominal pain and vomiting.   Genitourinary:  Negative for dysuria and hematuria.   Musculoskeletal:  Positive for arthralgias (right knee). Negative for back  "pain.   Skin:  Negative for color change and rash.   Neurological:  Negative for seizures and syncope.   All other systems reviewed and are negative.       Objective:   BP Readings from Last 1 Encounters:   01/04/24 151/88      Wt Readings from Last 1 Encounters:   01/04/24 90.3 kg (199 lb)      Pulse Readings from Last 1 Encounters:   01/04/24 63      BMI: Estimated body mass index is 30.26 kg/m² as calculated from the following:    Height as of this encounter: 5' 8\" (1.727 m).    Weight as of this encounter: 90.3 kg (199 lb).        Physical Exam  Constitutional:       Appearance: She is well-developed.   Eyes:      Pupils: Pupils are equal, round, and reactive to light.   Pulmonary:      Effort: Pulmonary effort is normal.      Breath sounds: Normal breath sounds.   Musculoskeletal:         General: Tenderness (right knee arthralgia) present.   Skin:     General: Skin is warm and dry.   Neurological:      Mental Status: She is alert and oriented to person, place, and time.      Deep Tendon Reflexes: Reflexes are normal and symmetric.   Psychiatric:         Behavior: Behavior normal.         Thought Content: Thought content normal.         Judgment: Judgment normal.          Gait and Station:   normal      Right knee:     Inspection:  normal color, temperature, turgor and moisture    Overall limb alignment: neutral    Effusion: no    ROM 0 to 140 with pain    Extensor Lag: Absent    Palpation: Medial and pes bursa joint line tenderness to palpation    stable to AP translation at 90 deg    Coronal plane stable in full extension    Coronal plane stable in mid-flexion     Motor: 5/5 EHL/FHL/TA/GS/Qd/Hs    Vascular: Toes WWP with BCR    Sensory: SILT DP/SP/Rahul/Saph/Tib  Crepitation with motion of patella     Images:    I personally reviewed relevant images in the PACS system and my interpretation is as follows:  X-rays of the right knee reveal moderate degenerative changes with subchondral sclerosis, joint space " narrowing, and osteophyte formation        Tee Olvera MD  Adult Reconstruction Specialist   [facility]

## 2024-01-09 ENCOUNTER — TELEPHONE (OUTPATIENT)
Dept: HEMATOLOGY ONCOLOGY | Facility: CLINIC | Age: 74
End: 2024-01-09

## 2024-01-09 NOTE — TELEPHONE ENCOUNTER
I am reaching out regarding your appointment with Dr. Lawrence on 1/31/24.    There has been a change to the providers schedule, and we will need to reschedule your appointment.    I left a voicemail explaining to patient that this appointment will need to be rescheduled due to a change in the providers schedule. Patient was advised to call [facility] [phone] to reschedule.

## 2024-01-10 ENCOUNTER — TELEPHONE (OUTPATIENT)
Dept: HEMATOLOGY ONCOLOGY | Facility: CLINIC | Age: 74
End: 2024-01-10

## 2024-01-10 NOTE — TELEPHONE ENCOUNTER
Appointment Change  Cancel, Reschedule, Change to Virtual      Who are you speaking with? Patient   If it is not the patient, is the caller listed on the communication consent form? Yes   Which provider is the appointment scheduled with? Dr. Lawrence   When was the original appointment scheduled?    Please list date and time 1/31   At which location is the appointment scheduled to take place? [facility]   Was the appointment rescheduled?     Was the appointment changed from an in person visit to a virtual visit?    If so, please list the details of the change. 2/22 10:20am V[facility] at [facility]   What is the reason for the appointment change? LAMONT       Was STAR transport scheduled? No   Does STAR transport need to be scheduled for the new visit (if applicable) No   Does the patient need an infusion appointment rescheduled? No   Does the patient have an upcoming infusion appointment scheduled? If so, when? No   Is the patient undergoing chemotherapy? No   For appointments cancelled with less than 24 hours:  Was the no-show policy reviewed? Yes

## 2024-01-29 ENCOUNTER — TELEPHONE (OUTPATIENT)
Dept: INTERNAL MEDICINE CLINIC | Facility: CLINIC | Age: 74
End: 2024-01-29

## 2024-01-29 NOTE — TELEPHONE ENCOUNTER
"Pt tested positive for covid today and has a stomach bug. Pt stated she has very little what she considers \"Covid symptoms\" has no cough, runny nose.  She wants know what you advise she should do.   "

## 2024-01-29 NOTE — TELEPHONE ENCOUNTER
Call returned to the patient COVID symptoms are mild do not see an indication of need for Paxlovid patient will treat symptoms of nausea with Zofran which she did already has at home.

## 2024-02-21 ENCOUNTER — TELEPHONE (OUTPATIENT)
Dept: HEMATOLOGY ONCOLOGY | Facility: CLINIC | Age: 74
End: 2024-02-21

## 2024-02-21 NOTE — TELEPHONE ENCOUNTER
Appointment Change  Cancel, Reschedule, Change to Virtual      Who are you speaking with? Patient   If it is not the patient, is the caller listed on the communication consent form? Yes   Which provider is the appointment scheduled with? Dr. Laws   When was the original appointment scheduled?    Please list date and time 2/22 10:20am   At which location is the appointment scheduled to take place? [facility]   Was the appointment rescheduled?     Was the appointment changed from an in person visit to a virtual visit?    If so, please list the details of the change. No, will call   What is the reason for the appointment change? Sched conflict       Was STAR transport scheduled? No   Does STAR transport need to be scheduled for the new visit (if applicable) No   Does the patient need an infusion appointment rescheduled? No   Does the patient have an upcoming infusion appointment scheduled? If so, when? No   Is the patient undergoing chemotherapy? No   For appointments cancelled with less than 24 hours:  Was the no-show policy reviewed? Yes

## 2024-03-04 ENCOUNTER — RADIATION ONCOLOGY FOLLOW-UP (OUTPATIENT)
Dept: RADIATION ONCOLOGY | Facility: HOSPITAL | Age: 74
End: 2024-03-04
Attending: STUDENT IN AN ORGANIZED HEALTH CARE EDUCATION/TRAINING PROGRAM
Payer: MEDICARE

## 2024-03-04 ENCOUNTER — TELEPHONE (OUTPATIENT)
Dept: HEMATOLOGY ONCOLOGY | Facility: CLINIC | Age: 74
End: 2024-03-04

## 2024-03-04 VITALS
RESPIRATION RATE: 18 BRPM | OXYGEN SATURATION: 98 % | HEIGHT: 68 IN | HEART RATE: 85 BPM | SYSTOLIC BLOOD PRESSURE: 128 MMHG | BODY MASS INDEX: 30.26 KG/M2 | DIASTOLIC BLOOD PRESSURE: 76 MMHG | TEMPERATURE: 98.4 F

## 2024-03-04 DIAGNOSIS — Z17.0 MALIGNANT NEOPLASM OF OVERLAPPING SITES OF RIGHT BREAST IN FEMALE, ESTROGEN RECEPTOR POSITIVE: Primary | ICD-10-CM

## 2024-03-04 DIAGNOSIS — C50.811 MALIGNANT NEOPLASM OF OVERLAPPING SITES OF RIGHT BREAST IN FEMALE, ESTROGEN RECEPTOR POSITIVE: Primary | ICD-10-CM

## 2024-03-04 PROCEDURE — 99213 OFFICE O/P EST LOW 20 MIN: CPT | Performed by: STUDENT IN AN ORGANIZED HEALTH CARE EDUCATION/TRAINING PROGRAM

## 2024-03-04 NOTE — PROGRESS NOTES
Carie Fernandez 1950 is a 73 y.o. 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  She was started on adjuvant hormonal therapy with anastrozole in June 2022. She completed a course of adjuvant radiation therapy to the right chest wall and regional lymph nodes on 7/25/2022. Last follow-up in radiation on 3/2/23 (Dr. Evangelista).       10/19/23 Left diagnostic mammogram  No mammographic evidence of malignancy in the left breast.      ASSESSMENT/BI-RADS CATEGORY:  Left: 1 - Negative  Overall: 1 - Negative     RECOMMENDATION:       - Diagnostic mammogram in 1 year for the left breast.        10/26/23 Dr. Vasquez  No evidence of local regional or distant recurrent disease. She is up-to-date on her mammogram. She will follow-up in 6 months.       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     12/30/2021 - 4/8/2022 Chemotherapy    DOXOrubicin (ADRIAMYCIN), 122 mg, Intravenous, Once, 4 of 4 cycles  Administration: 120 mg (12/30/2021), 122 mg (1/13/2022), 122 mg (1/27/2022), 122 mg (2/10/2022)  palonosetron (ALOXI), 0.25 mg, Intravenous, Once, 2 of 2 cycles  Administration: 0.25 mg (3/24/2022), 0.25 mg (4/7/2022)  pegfilgrastim (NEULASTA), 4 mg (100 % of original dose 4 mg), Subcutaneous, Once, 3 of 3 cycles  Dose modification: 4 mg (original dose 4 mg, Cycle 6)  Administration: 4 mg (3/11/2022), 4 mg  (3/25/2022), 4 mg (4/8/2022)  pegfilgrastim (NEULASTA ONPRO), 6 mg, Subcutaneous, Once, 5 of 5 cycles  Administration: 6 mg (12/30/2021), 6 mg (1/13/2022), 6 mg (2/24/2022), 6 mg (1/27/2022), 6 mg (2/10/2022)  cyclophosphamide (CYTOXAN) IVPB, 600 mg/m2 = 1,218 mg, Intravenous, Once, 4 of 4 cycles  Administration: 1,218 mg (12/30/2021), 1,218 mg (1/13/2022), 1,218 mg (1/27/2022), 1,218 mg (2/10/2022)  fosaprepitant (EMEND) IVPB, 150 mg, Intravenous, Once, 4 of 4 cycles  Administration: 150 mg (12/30/2021), 150 mg (1/13/2022), 150 mg (1/27/2022), 150 mg (2/10/2022)  PACLItaxel (TAXOL) chemo IVPB, 175 mg/m2 = 355.2 mg, Intravenous, Once, 4 of 4 cycles  Administration: 355.2 mg (2/24/2022), 355.2 mg (3/10/2022), 355.2 mg (3/24/2022), 355.2 mg (4/7/2022)     4/26/2022 Surgery    Right breast modified radical mastectomy  Inflammatory breast cancer  Grade 1  1.5 cm (multiple foci involving all four quadrants)  Lymphovascular invasion present  Margins negative  14/19 Lymph nodes with macro-metastases  Anatomic Stage IIIC  Prognostic Stage IIIA     6/2022 -  Hormone Therapy    Anastrozole 1 mg daily  Dr. 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   HENT: Negative.     Eyes: Negative.    Respiratory: Negative.     Cardiovascular: Negative.    Gastrointestinal: Negative.    Endocrine: Negative.    Genitourinary: Negative.    Musculoskeletal: Negative.         Slight limited ROM, mild lymphedema    Skin: Negative.    Allergic/Immunologic:  Negative.    Neurological: Negative.    Hematological: Negative.    Psychiatric/Behavioral: Negative.         Clinical Trial: no      Health Maintenance   Topic Date Due    Hepatitis C Screening  Never done    PT PLAN OF CARE  Never done    BMI: Followup Plan  06/10/2022    Influenza Vaccine (1) 09/01/2023    Urinary Incontinence Screening  10/14/2023    Medicare Annual Wellness Visit (AWV)  10/14/2023    Colorectal Cancer Screening  12/14/2023    COVID-19 Vaccine (6 - 2023-24 season) 12/27/2023    Depression Screening  03/02/2024    Fall Risk  08/29/2024    Breast Cancer Screening: Mammogram  10/19/2024    BMI: Adult  01/04/2025    Osteoporosis Screening  Completed    Zoster Vaccine  Completed    Pneumococcal Vaccine: 65+ Years  Completed    HIB Vaccine  Aged Out    IPV Vaccine  Aged Out    Hepatitis A Vaccine  Aged Out    Meningococcal ACWY Vaccine  Aged Out    HPV Vaccine  Aged Out     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 in female, estrogen receptor positive     Chemotherapy induced neutropenia     Insomnia due to medical condition    Port-A-Cath in place    Osteopenia of both hips    Use of anastrozole (Arimidex)    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)    Peripheral neuropathy    Primary osteoarthritis of right knee     Past Medical History:   Diagnosis Date    Anxiety     Breast cancer (HCC) 12/06/2021    Cancer (HCC)     Right breast cancer    Depression     Fracture of radius, distal, closed     Last Assessed:  6/10/14    Thrombophlebitis     Vitamin D deficiency      Past Surgical History:   Procedure Laterality Date    ANKLE SURGERY      ANKLE SURGERY      BREAST BIOPSY Left 2001    neg    BREAST BIOPSY Right 12/06/2021    punch bx- ca    GALLBLADDER SURGERY       HYSTERECTOMY      IR PORT PLACEMENT  12/29/2021    IR PORT REMOVAL  02/21/2023    MASTECTOMY Right 04/26/2022    RI MAST MODF RAD W/AX LYMPH NOD W/WO PECT/DALTON MIN Right 04/26/2022    Procedure: BREAST MODIFIED RADICAL MASTECTOMY WITH RIGHT LYMPHATIC MAPPING WITH BLUE AND RADIOACTIVE DYE (INJECT AT 0900 BY DR VASQUEZ IN THE OR);  Surgeon: Kevin Vasquez MD;  [facility];  Service: Surgical Oncology    WISDOM TOOTH EXTRACTION       Family History   Problem Relation Age of Onset    Ovarian cancer Mother 56    Heart disease Father     Leukemia Brother 58    Heart disease Brother     Heart disease Brother     Cancer Maternal Grandmother         Unknown primary; mid 50's    Stomach cancer Maternal Aunt         60's     Social History     Socioeconomic History    Marital status: /Civil Union     Spouse name: Not on file    Number of children: Not on file    Years of education: Not on file    Highest education level: Not on file   Occupational History    Not on file   Tobacco Use    Smoking status: Never    Smokeless tobacco: Never   Vaping Use    Vaping status: Never Used   Substance and Sexual Activity    Alcohol use: Yes     Comment: rarely    Drug use: Yes     Types: Marijuana     Comment: not helping stopped    Sexual activity: Not Currently     Partners: Male     Birth control/protection: Female Sterilization   Other Topics Concern    Not on file   Social History Narrative     to Que.     Social Determinants of Health     Financial Resource Strain: Low Risk  (10/14/2022)    Overall Financial Resource Strain (CARDIA)     Difficulty of Paying Living Expenses: Not hard at all   Food Insecurity: Not on file   Transportation Needs: No Transportation Needs (10/14/2022)    PRAPARE - Transportation     Lack of Transportation (Medical): No     Lack of Transportation (Non-Medical): No   Physical Activity: Not on file   Stress: Not on file   Social Connections: Not on file   Intimate Partner Violence: Not  "on file   Housing Stability: Not on file       Current Outpatient Medications:     ALPRAZolam (XANAX) 0.25 mg tablet, Take 1/2 to 1 pill Q 8 hours p.r.n. For anxiety, Disp: 30 tablet, Rfl: 0    anastrozole (ARIMIDEX) 1 mg tablet, Take 1 tablet (1 mg total) by mouth daily, Disp: 90 tablet, Rfl: 0    Calcium 250 MG CAPS, Take 250 mg by mouth in the morning  , Disp: , Rfl:     cholecalciferol (VITAMIN D3) 1,000 units tablet, Take 2 capsules by mouth daily, Disp: , Rfl:     escitalopram (LEXAPRO) 20 mg tablet, TAKE 1 TABLET BY MOUTH EVERY DAY, Disp: 86 tablet, Rfl: 1    Fish Oil-Krill Oil (KRILL OIL PLUS) CAPS, Take by mouth in the morning  , Disp: , Rfl:     fluocinonide (LIDEX) 0.05 % external solution, APPLY TO SCALP ONCE DAILY AS NEEDED FOR ITCH, Disp: , Rfl: 2    ketoconazole (NIZORAL) 2 % shampoo, Apply 1 application topically if needed  , Disp: , Rfl: 5    meclizine (ANTIVERT) 25 mg tablet, Take 1 tablet (25 mg total) by mouth every 8 (eight) hours, Disp: 30 tablet, Rfl: 0  No Known Allergies  Vitals:    03/04/24 1507   BP: 128/76   BP Location: Left arm   Patient Position: Sitting   Cuff Size: Standard   Pulse: 85   Resp: 18   Temp: 98.4 °F (36.9 °C)   TempSrc: Temporal   SpO2: 98%   Height: 5' 8\" (1.727 m)      Pain Score: 0-No pain  "

## 2024-03-04 NOTE — TELEPHONE ENCOUNTER
Appointment Change  Cancel, Reschedule, Change to Virtual      Who are you speaking with? Patient   If it is not the patient, is the caller listed on the communication consent form? Yes   Which provider is the appointment scheduled with? Dr. Laws   When was the original appointment scheduled?    Please list date and time 2/22   At which location is the appointment scheduled to take place? [facility]   Was the appointment rescheduled?     Was the appointment changed from an in person visit to a virtual visit?    If so, please list the details of the change. 3/5 10:20am Shivani at [facility]   What is the reason for the appointment change? LAMONT Nancie       Was STAR transport scheduled? No   Does STAR transport need to be scheduled for the new visit (if applicable) No   Does the patient need an infusion appointment rescheduled? No   Does the patient have an upcoming infusion appointment scheduled? If so, when? No   Is the patient undergoing chemotherapy? Yes   For appointments cancelled with less than 24 hours:  Was the no-show policy reviewed? Yes

## 2024-03-05 ENCOUNTER — OFFICE VISIT (OUTPATIENT)
Dept: HEMATOLOGY ONCOLOGY | Facility: CLINIC | Age: 74
End: 2024-03-05
Payer: MEDICARE

## 2024-03-05 VITALS
HEIGHT: 68 IN | WEIGHT: 197 LBS | TEMPERATURE: 97.8 F | RESPIRATION RATE: 18 BRPM | OXYGEN SATURATION: 100 % | HEART RATE: 97 BPM | BODY MASS INDEX: 29.86 KG/M2 | SYSTOLIC BLOOD PRESSURE: 128 MMHG | DIASTOLIC BLOOD PRESSURE: 72 MMHG

## 2024-03-05 DIAGNOSIS — C77.3 CARCINOMA OF RIGHT BREAST METASTATIC TO AXILLARY LYMPH NODE (HCC): ICD-10-CM

## 2024-03-05 DIAGNOSIS — C50.911 CARCINOMA OF RIGHT BREAST METASTATIC TO AXILLARY LYMPH NODE (HCC): ICD-10-CM

## 2024-03-05 DIAGNOSIS — C50.811 MALIGNANT NEOPLASM OF OVERLAPPING SITES OF RIGHT BREAST IN FEMALE, ESTROGEN RECEPTOR POSITIVE: ICD-10-CM

## 2024-03-05 DIAGNOSIS — Z17.0 MALIGNANT NEOPLASM OF OVERLAPPING SITES OF RIGHT BREAST IN FEMALE, ESTROGEN RECEPTOR POSITIVE: ICD-10-CM

## 2024-03-05 PROCEDURE — 99215 OFFICE O/P EST HI 40 MIN: CPT | Performed by: INTERNAL MEDICINE

## 2024-03-05 RX ORDER — ANASTROZOLE 1 MG/1
1 TABLET ORAL DAILY
Qty: 90 TABLET | Refills: 4 | Status: SHIPPED | OUTPATIENT
Start: 2024-03-05

## 2024-03-05 NOTE — PROGRESS NOTES
Follow-up - Radiation Oncology   Carie Fernandez 1950 73 y.o. female [id number]      History of Present Illness   Cancer Staging   Malignant neoplasm of overlapping sites of right breast in female, estrogen receptor positive   Staging form: Breast, AJCC 8th Edition  - Clinical: Stage IIIB (cT4d, cN3a, cM0, G2, ER+, WI+, HER2-) - Signed by Sergio Vieyra MD on 3/5/2024  Histologic grading system: 3 grade system    Ms. 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 On 7/25/22 the patient completed a course of adjuvant post-mastectomy RT to a dose of 5000cGy in 25 fractions under the care of Dr. Evangelista. She has since continued on systemic therapy with anastrazole. She returns today for follow-up.     Interval History:  The patient was last seen in clinic on 3/2/23 under the care of Dr. Evangelista.     Since then the patient is continue to follow with breast surgery, last seen on 10/26/2023 with Dr. Vasquez.  Mammogram of her left breast (10/19/2023) was negative.    Currently she is doing well overall.  She does note some intermittent swelling in her right upper extremity, particularly with heavy activities such as gardening.  She does have a compression sleeve, though wears this intermittently and does not have substantial lymphedema today.  She has noted some subcutaneous fibrosis/scarring on her right chest wall, though does not have any new or growing masses.        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     12/30/2021 - 4/8/2022 Chemotherapy    DOXOrubicin (ADRIAMYCIN), 122 mg, Intravenous, Once, 4 of 4 cycles  Administration: 120 mg (12/30/2021), 122 mg (1/13/2022), 122 mg (1/27/2022), 122 mg (2/10/2022)  palonosetron (ALOXI), 0.25 mg, Intravenous, Once, 2 of 2 cycles  Administration: 0.25 mg (3/24/2022), 0.25 mg (4/7/2022)  pegfilgrastim (NEULASTA), 4 mg (100 % of original dose 4 mg), Subcutaneous, Once, 3 of 3 cycles  Dose modification: 4 mg (original dose 4 mg, Cycle 6)  Administration: 4 mg (3/11/2022), 4 mg (3/25/2022), 4 mg (4/8/2022)  pegfilgrastim (NEULASTA ONPRO), 6 mg, Subcutaneous, Once, 5 of 5 cycles  Administration: 6 mg (12/30/2021), 6 mg (1/13/2022), 6 mg (2/24/2022), 6 mg (1/27/2022), 6 mg (2/10/2022)  cyclophosphamide (CYTOXAN) IVPB, 600 mg/m2 = 1,218 mg, Intravenous, Once, 4 of 4 cycles  Administration: 1,218 mg (12/30/2021), 1,218 mg (1/13/2022), 1,218 mg (1/27/2022), 1,218 mg (2/10/2022)  fosaprepitant (EMEND) IVPB, 150 mg, Intravenous, Once, 4 of 4 cycles  Administration: 150 mg (12/30/2021), 150 mg (1/13/2022), 150 mg (1/27/2022), 150 mg (2/10/2022)  PACLItaxel (TAXOL) chemo IVPB, 175 mg/m2 = 355.2 mg, Intravenous, Once, 4 of 4 cycles  Administration: 355.2 mg (2/24/2022), 355.2 mg (3/10/2022), 355.2 mg (3/24/2022), 355.2 mg (4/7/2022)     4/26/2022 Surgery    Right breast modified radical mastectomy  Inflammatory breast cancer  Grade 1  1.5 cm (multiple foci involving all four quadrants)  Lymphovascular invasion present  Margins negative  14/19 Lymph nodes with macro-metastases  Anatomic Stage IIIC  Prognostic Stage IIIA     6/2022 -  Hormone Therapy    Anastrozole 1 mg daily  Dr. 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   HENT: Negative.     Eyes: Negative.    Respiratory: Negative.     Cardiovascular: Negative.    Gastrointestinal: Negative.    Endocrine: Negative.    Genitourinary: Negative.    Musculoskeletal: Negative.         Slight limited ROM, mild lymphedema    Skin: Negative.    Allergic/Immunologic: Negative.    Neurological: Negative.    Hematological: Negative.    Psychiatric/Behavioral: Negative.          OBJECTIVE:   /76 (BP Location: Left arm, Patient Position: Sitting, Cuff Size: Standard)   Pulse 85   Temp 98.4 °F (36.9 °C) (Temporal)   Resp 18   Ht 5' 8\" (1.727 m)   LMP  (LMP Unknown)   SpO2 98%   BMI 30.26 kg/m²   Pain Assessment:  0  ECOG/Zubrod/WHO: 1 - Symptomatic but completely ambulatory    Physical Exam   Well-appearing.  NAD.  No cervical or supraclavicular lymphadenopathy appreciated.  No increased work of breathing.  Extremities warm well-perfused.  No evidence of upper extremity edema noted.  Good range of motion in right upper extremity.  Right breast surgically absent without reconstruction.  Postradiation fibrosis noted in the subcutaneous tissues.  No distinct or discrete masses appreciated.  No skin lesions noted.  No right axillary lymph adenopathy appreciated.        Assessment/Plan:  No orders of the defined " "types were placed in this encounter.     Approximately 1 year, 8 months following completion of adjuvant RT, the patient is doing well overall remains clinically and radiographically without evidence of disease recurrence.  She has follow-up scheduled with surgical oncology and will continue to undergo routine mammograms of her left breast.  She does note, that she has not seen medical oncology in some time and is soon to run out of her anastrozole.  I will help arrange short interval medical oncology follow-up so that she can obtain anastrozole refill.  Going forward I will remain available should any questions or concerns arise.    Sergio Vieyra MD  3/5/2024,2:28 PM    Portions of the record may have been created with voice recognition software.  Occasional wrong word or \"sound a like\" substitutions may have occurred due to the inherent limitations of voice recognition software.  Read the chart carefully and recognize, using context, where substitutions have occurred.        "

## 2024-03-05 NOTE — PROGRESS NOTES
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     2021 - 2022 Chemotherapy    DOXOrubicin (ADRIAMYCIN), 122 mg, Intravenous, Once, 4 of 4 cycles  Administration: 120 mg (2021), 122 mg (2022), 122 mg (2022), 122 mg (2/10/2022)  palonosetron (ALOXI), 0.25 mg, Intravenous, Once, 2 of 2 cycles  Administration: 0.25 mg (3/24/2022), 0.25 mg (2022)  pegfilgrastim (NEULASTA), 4 mg (100 % of original dose 4 mg), Subcutaneous, Once, 3 of 3 cycles  Dose modification: 4 mg (original dose 4 mg, Cycle 6)  Administration: 4 mg (3/11/2022), 4 mg (3/25/2022), 4 mg (2022)  pegfilgrastim (NEULASTA ONPRO), 6 mg, Subcutaneous, Once, 5 of 5 cycles  Administration: 6 mg (2021), 6 mg (2022), 6 mg (2022), 6 mg (2022), 6 mg (2/10/2022)  cyclophosphamide (CYTOXAN) IVPB, 600 mg/m2 = 1,218 mg, Intravenous, Once, 4 of 4 cycles  Administration: 1,218 mg (2021), 1,218 mg (2022), 1,218 mg (2022), 1,218 mg (2/10/2022)  fosaprepitant (EMEND) IVPB, 150 mg, Intravenous, Once, 4 of 4 cycles  Administration: 150 mg (2021), 150 mg (2022), 150 mg (2022), 150 mg (2/10/2022)  PACLItaxel (TAXOL) chemo IVPB, 175 mg/m2 = 355.2 mg,  Intravenous, Once, 4 of 4 cycles  Administration: 355.2 mg (2/24/2022), 355.2 mg (3/10/2022), 355.2 mg (3/24/2022), 355.2 mg (4/7/2022)     4/26/2022 Surgery    Right breast modified radical mastectomy  Inflammatory breast cancer  Grade 1  1.5 cm (multiple foci involving all four quadrants)  Lymphovascular invasion present  Margins negative  14/19 Lymph nodes with macro-metastases  Anatomic Stage IIIC  Prognostic Stage IIIA     6/2022 -  Hormone Therapy    Anastrozole 1 mg daily  Dr. 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      Breast Cancer:       Based on MRI, she may have 9 mm of tumor with clear skin thickening in her right breast.  Inflammatory breast cancer was confirmed by the biopsy with dermal invasion.  She underwent neoadjuvant chemotherapy with dose dense AC followed by dose dense paclitaxel, resulting in clinically responding disease.  However, when she underwent right mastectomy and axillary lymph node dissection, she had significant amount of residual disease with 14 positive lymph nodes.  Therefore, she has quite high risk disease.     She completed postmastectomy radiation therapy.  She is currently on adjuvant hormonal therapy with anastrozole since June 2022 with minimal toxicity.  She has no evidence of recurrent disease, based on her symptoms and physical examinations. She will continue  anastrozole 1 mg once a day. \    Last Dexa scan 2023, on Calcium/issues with constipation. Discussed Prolie    Mammogram 10/23    Follow up     One year  Mammogram Oct 2024           HPI:        A 71-year-old postmenopausal woman noticed red spot on the skin in her right breast in 2021.  She brought this to medical attention.  Mammography did not show any mass.  However, ultrasound shows 6 mm of skin thickening in her right breast at 4:00 a.m. position.  She was seen by Dr. Vasquez who did a skin biopsy.  Skin biopsy was positive for dermal lymphatic invasion of carcinoma, consistent with breast primary.  ER, RI and HER2 are pending at this time.  She subsequent underwent CT scan of chest abdomen pelvis which showed right breast skin thickening but no evidence the distant metastasis.  She presents today with her  and daughter-in-law to discuss the medical treatment for her breast cancer.  She has no complaint of pain.  She is afebrile.  She has no significant past medical history.  Her mother  of ovarian cancer at age of 58.  Therefore, she underwent prophylactic hysterectomy and bilateral oophorectomy in .  She also have cholecystectomy as a surgical past history.  She currently feels well.  She has no complaint of pain.  Her weight is stable.  She has no respiratory symptoms.  She is a lifetime never smoker.  Her performance status is normal.         Interval History  3/5/2024     73 year old postmenopausal woman with inflammatory right breast cancer, grade 2, ER 90 % positive, RI 70 % positive, HER2 fish negative disease.  Her right breast mass is not easy to evaluate.  Even MRI showed only 9 mm of mass with skin thickening.      She underwent neoadjuvant chemotherapy with dose dense AC followed by dose dense paclitaxel with excellent tolerance.  Clinically, she had responding disease.  She underwent mastectomy and axillary lymph node dissection in 2022 which showed significant  amount of multifocal medullary disease with dermal invasion.  14/19 axillary lymph nodes were positive for metastatic disease with extranodal extension.  She did not have reconstruction.      Since June 2022, she has been on adjuvant hormonal therapy/adjuvant Arimidex 1 mg daily     She completed postmastectomy radiation therapy in summer 2022.    Last Dexa scan Nov 2023 with bone loss    Last mammogram left breast 10/29/2023 with one year followo up with negative disease in the left breast.     She has had issues with constipation from calcium and thus discussed use of stimulant laxatives, softeners but also Prolia.  She has hot flashes and joint pain which are not worse.  She has fatigue but is taking care of young grandchildren.  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  She denies any other issues and her ROS is negative otherwise.     Oncologic Diagnosis:     Right inflammatory breast cancer.  Grade 2.  ER 90% positive, MT 70% positive, HER2 fish negative disease.  Ki 67 less than 10%.  Diagnosed in December 2021.     Cancer Staging:  Cancer Staging  No matching staging information was found for the patient.        Previous Hematologic/ Oncologic Treatment:       Neoadjuvant chemotherapy with dose dense AC x4 followed by dose dense paclitaxel x4.  Completed in April 2022.     Current Hematologic/ Oncologic Treatment:       1. Adjuvant hormonal therapy with anastrozole since June 2022.      Disease Status:      1. Clinically responding disease.     2. Pathologic significant residual disease after neoadjuvant chemotherapy.     3. ASHLYN status post mastectomy and at 0 later after dissection.     Test Results:     Pathology:     Right breast skin biopsy showed dermal and lymphatic invasion of carcinoma, consistent with breast primary.  At least grade 2.  ER 90% positive, MT 70% positive, HER2 2+ disease.  HER2 fish was negative for gene application with ratio of 1.2.     Mastectomy specimen showed extensive multiple  residual disease measuring up to 1 cm with dermal invasion.  14/19 axillary lymph nodes were positive for metastatic disease with tumor measuring 8 mm with extranodal extension.  Ki 67 <10%.     Radiology:     CT scan of chest abdomen pelvis showed no evidence of distant metastasis.     Breast ultrasound shows 6 mm of skin thickening at 4:00 a.m. position with no visible underline mass.     Echocardiogram showed ejection fraction 60 % in December 2021.       REVIEW OF SYSTEMS:  Please note that a 14-point review of systems was performed to include Constitutional, HEENT, Respiratory, CVS, GI, , Musculoskeletal, Integumentary, Neurologic, Rheumatologic, Endocrinologic, Psychiatric, Lymphatic, and Hematologic/Oncologic systems were reviewed and are negative unless otherwise stated in HPI. Positive and negative findings pertinent to this evaluation are incorporated into the history of present illness.      ECOG PS: 0    PROBLEM LIST:  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 in female, estrogen receptor positive     Chemotherapy induced neutropenia     Insomnia due to medical condition    Port-A-Cath in place    Osteopenia of both hips    Use of anastrozole (Arimidex)    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)    Peripheral neuropathy    Primary osteoarthritis of right knee       Past Medical History:   has a past medical history of Anxiety, Breast cancer (HCC) (12/06/2021), Cancer (HCC), Depression, Fracture of radius, distal, closed, Thrombophlebitis, and Vitamin D deficiency.    PAST SURGICAL HISTORY:   has a past surgical history that includes Ankle surgery; Hysterectomy; Gallbladder surgery; [place] tooth extraction; Ankle surgery; Breast biopsy (Left, 2001); IR port placement  (12/29/2021); pr mast modf rad w/ax lymph nod w/wo pect/sarai min (Right, 04/26/2022); Breast biopsy (Right, 12/06/2021); Mastectomy (Right, 04/26/2022); and IR port removal (02/21/2023).    CURRENT MEDICATIONS  Current Outpatient Medications   Medication Sig Dispense Refill    ALPRAZolam (XANAX) 0.25 mg tablet Take 1/2 to 1 pill Q 8 hours p.r.n. For anxiety 30 tablet 0    anastrozole (ARIMIDEX) 1 mg tablet Take 1 tablet (1 mg total) by mouth daily 90 tablet 0    Calcium 250 MG CAPS Take 250 mg by mouth in the morning        cholecalciferol (VITAMIN D3) 1,000 units tablet Take 2 capsules by mouth daily      escitalopram (LEXAPRO) 20 mg tablet TAKE 1 TABLET BY MOUTH EVERY DAY 86 tablet 1    Fish Oil-Krill Oil (KRILL OIL PLUS) CAPS Take by mouth in the morning        fluocinonide (LIDEX) 0.05 % external solution APPLY TO SCALP ONCE DAILY AS NEEDED FOR ITCH  2    ketoconazole (NIZORAL) 2 % shampoo Apply 1 application topically if needed    5    meclizine (ANTIVERT) 25 mg tablet Take 1 tablet (25 mg total) by mouth every 8 (eight) hours 30 tablet 0     No current facility-administered medications for this visit.     [unfilled]    SOCIAL HISTORY:   reports that she has never smoked. She has never used smokeless tobacco. She reports current alcohol use. She reports current drug use. Drug: Marijuana.     FAMILY HISTORY:  family history includes Cancer in her maternal grandmother; Heart disease in her brother, brother, and father; Leukemia (age of onset: 58) in her brother; Ovarian cancer (age of onset: 56) in her mother; Stomach cancer in her maternal aunt.     ALLERGIES:  has No Known Allergies.      Physical Exam:  Vital Signs:   Visit Vitals  LMP  (LMP Unknown)   OB Status Hysterectomy   Smoking Status Never     There is no height or weight on file to calculate BMI.  There is no height or weight on file to calculate BSA.    GEN: Alert, awake oriented x3, in no acute distress  HEENT- No pallor, icterus, cyanosis, no oral  mucosal lesions,   LAD - no palpable cervical, clavicle, axillary, inguinal LAD  Heart- normal S1 S2, regular rate and rhythm, No murmur, rubs.   Lungs- clear breathing sound bilateral.   Abdomen- soft, Non tender, bowel sounds present  Extremities- No cyanosis, clubbing, edema  Neuro- No focal neurological deficit    Labs:  Lab Results   Component Value Date    WBC 3.34 (L) 11/27/2023    HGB 13.6 11/27/2023    HCT 42.1 11/27/2023    MCV 98 11/27/2023     11/27/2023     Lab Results   Component Value Date     07/03/2015    SODIUM 137 11/27/2023    K 4.2 11/27/2023     11/27/2023    CO2 28 11/27/2023    ANIONGAP 8 07/03/2015    AGAP 6 11/27/2023    BUN 16 11/27/2023    CREATININE 0.58 (L) 11/27/2023    GLUC 92 02/21/2023    GLUF 85 11/27/2023    CALCIUM 11.2 (H) 11/27/2023    AST 21 11/27/2023    ALT 16 11/27/2023    ALKPHOS 67 11/27/2023    PROT 7.7 07/03/2015    TP 6.8 11/27/2023    BILITOT 0.91 07/03/2015    TBILI 0.93 11/27/2023    EGFR 91 11/27/2023       DXA SCAN  11/21/2023     CLINICAL HISTORY: 73-year-old postmenopausal female.  OTHER RISK FACTORS: SSRI therapy, Arimidex prior fracture if as a result of minor injury.     PHARMACOLOGIC THERAPY FOR OSTEOPOROSIS: None.     TECHNIQUE: Bone densitometry was performed using a Hologic Horizon A bone densitometer.  Regions of interest appear properly placed.     COMPARISON: 11/16/2022.     RESULTS:     LUMBAR SPINE  Level: L1-L4:  BMD: 0.905 gm/cm2  T-score: -1.3        LEFT TOTAL HIP:  BMD: 0.734 gm/cm2  T-score: -1.7     LEFT FEMORAL NECK:  BMD: 0.604 gm/cm2  T score: -2.2        IMPRESSION:     1. Low bone mass (osteopenia).     2.  Since a DXA study from 11/16/2022, there has been:  No statistically significant change in bone mineral density at any of the evaluated skeletal sites.           3.  The 10 year risk of hip fracture is 5.0% with the 10 year risk of major osteoporotic fracture being 20% as calculated by the [employer]  fracture risk assessment tool (FRAX, which is based on data generated by the WHO Collaborating [place]   for Metabolic Bone Diseases).     4.  The current NOF guidelines recommend treating patients with a T-score of -2.5 or less in the lumbar spine or hips, or in post-menopausal women and men over the age of 50 with low bone mass (osteopenia) and a FRAX 10 year risk score of >3% for hip   fracture and/or >20% for major osteoporotic fracture.     5.  The NOF recommends follow-up DXA in 1-2 years after initiating therapy for osteoporosis and every 2 years thereafter. More frequent evaluation is appropriate for patients with conditions associated with rapid bone loss, such as glucocorticoid   therapy. The interval between DXA screenings may be longer for individuals without major risk factors and initial T-score in the normal or upper low bone mass range.     The FRAX algorithm has certain limitations:  -FRAX has not been validated in patients currently or previously treated with pharmacotherapy for osteoporosis.  In such patients, clinical judgment must be exercised in interpreting FRAX scores.  -Prior hip, vertebral and humeral fragility fractures appear to confer greater risk of subsequent fracture than fractures at other sites (this is especially true for individuals with severe vertebral fractures), but quantification of this incremental   risk is not possible with FRAX.  -FRAX underestimates fracture risk in patients with history of multiple fragility fractures.  -FRAX may underestimate fracture risk in patients with history of frequent falls.  -It is not appropriate to use FRAX to monitor treatment response.        WHO CLASSIFICATION:  Normal (a T-score of -1.0 or higher)  Low bone mineral density (a T-score of less than -1.0 but higher than -2.5)  Osteoporosis (a T-score of -2.5 or less)  Severe osteoporosis (a T-score of -2.5 or less with a fragility fracture)     LEAST SIGNIFICANT CHANGE (AT 95% C.I):  Lumbar  spine: 0.025 g/cm2; 2.8%  Total hip: 0.025 g/cm2; 3.7%  Forearm: 0.012 g/cm2; 1.9%      DIAGNOSIS: 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)  10/19/2023      TECHNIQUE:  Digital diagnostic mammography was performed. Computer Aided Detection (CAD) analyzed all applicable images.     COMPARISONS: Prior breast imaging dated: 12/12/2022, 04/26/2022, 04/20/2022, 12/13/2021, 12/02/2021, 12/02/2021, 11/05/2019, 04/18/2019, 04/18/2019, 04/18/2019, 04/18/2019, 04/15/2019, 03/07/2016, 01/29/2016, and 11/21/2014     RELEVANT HISTORY:   Family Breast Cancer History: No known family history of breast cancer.  Family Medical History: Family medical history includes ovarian cancer in mother.   Personal History: Hormone history includes other and other. Surgical history includes breast biopsy, mastectomy, and hysterectomy. Medical history includes breast cancer.     RISK ASSESSMENT:   [facility] was suppressed due to the patient's history and/or demographic factors.     TISSUE DENSITY:   There are scattered areas of fibroglandular density.      INDICATION: Carie Fernandez is a 73 y.o. female presenting for History of breast cancer.  Right breast cancer status postmastectomy in April 2022.  Annual left mammogram, no reported breast symptoms.     FINDINGS:   There are no suspicious masses, grouped microcalcifications or areas of unexplained architectural distortion. The skin and nipple areolar complex are unremarkable.         IMPRESSION:   No mammographic evidence of malignancy in the left breast.           ASSESSMENT/BI-RADS CATEGORY:  Left: 1 - Negative  Overall: 1 - Negative     RECOMMENDATION:       - Diagnostic mammogram in 1 year for the left breast.       I spent 45 minutes on chart review, face to face counseling time, coordination of care and documentation.    Stephanie Parrish MD PhD

## 2024-03-08 ENCOUNTER — OFFICE VISIT (OUTPATIENT)
Dept: OBGYN CLINIC | Facility: CLINIC | Age: 74
End: 2024-03-08
Payer: MEDICARE

## 2024-03-08 VITALS
HEIGHT: 68 IN | SYSTOLIC BLOOD PRESSURE: 116 MMHG | WEIGHT: 197 LBS | BODY MASS INDEX: 29.86 KG/M2 | HEART RATE: 87 BPM | DIASTOLIC BLOOD PRESSURE: 75 MMHG

## 2024-03-08 DIAGNOSIS — G89.29 CHRONIC PAIN OF RIGHT KNEE: ICD-10-CM

## 2024-03-08 DIAGNOSIS — M25.561 CHRONIC PAIN OF RIGHT KNEE: ICD-10-CM

## 2024-03-08 DIAGNOSIS — M17.11 PRIMARY OSTEOARTHRITIS OF RIGHT KNEE: Primary | ICD-10-CM

## 2024-03-08 PROCEDURE — 20610 DRAIN/INJ JOINT/BURSA W/O US: CPT | Performed by: STUDENT IN AN ORGANIZED HEALTH CARE EDUCATION/TRAINING PROGRAM

## 2024-03-08 PROCEDURE — 99214 OFFICE O/P EST MOD 30 MIN: CPT | Performed by: STUDENT IN AN ORGANIZED HEALTH CARE EDUCATION/TRAINING PROGRAM

## 2024-03-08 RX ORDER — LIDOCAINE HYDROCHLORIDE 10 MG/ML
1 INJECTION, SOLUTION EPIDURAL; INFILTRATION; INTRACAUDAL; PERINEURAL
Status: COMPLETED | OUTPATIENT
Start: 2024-03-08 | End: 2024-03-08

## 2024-03-08 RX ORDER — BUPIVACAINE HYDROCHLORIDE 2.5 MG/ML
1 INJECTION, SOLUTION INFILTRATION; PERINEURAL
Status: COMPLETED | OUTPATIENT
Start: 2024-03-08 | End: 2024-03-08

## 2024-03-08 RX ORDER — BETAMETHASONE SODIUM PHOSPHATE AND BETAMETHASONE ACETATE 3; 3 MG/ML; MG/ML
12 INJECTION, SUSPENSION INTRA-ARTICULAR; INTRALESIONAL; INTRAMUSCULAR; SOFT TISSUE
Status: COMPLETED | OUTPATIENT
Start: 2024-03-08 | End: 2024-03-08

## 2024-03-08 RX ADMIN — LIDOCAINE HYDROCHLORIDE 1 ML: 10 INJECTION, SOLUTION EPIDURAL; INFILTRATION; INTRACAUDAL; PERINEURAL at 10:15

## 2024-03-08 RX ADMIN — BUPIVACAINE HYDROCHLORIDE 1 ML: 2.5 INJECTION, SOLUTION INFILTRATION; PERINEURAL at 10:15

## 2024-03-08 RX ADMIN — BETAMETHASONE SODIUM PHOSPHATE AND BETAMETHASONE ACETATE 12 MG: 3; 3 INJECTION, SUSPENSION INTRA-ARTICULAR; INTRALESIONAL; INTRAMUSCULAR; SOFT TISSUE at 10:15

## 2024-03-08 NOTE — PROGRESS NOTES
Date: 24  Carie Fernandez   MRN# [id number]  : 1950      Chief Complaint: Right Knee Pain    Assessment and Plan:  The patient verbalized understanding of exam findings and treatment plan. We engaged in the shared decision-making process and treatment options were discussed at length with the patient. Surgical and conservative management discussed today along with risks and benefits. Patient was agreeable with the plan and all questions were answered to satisfaction.     Primary osteoarthritis of right knee  -WBAT  -Activity modification to limit strain or impact on the joint  -ibuprofen (Motrin) as needed  -Tylenol 1000mg up to three times daily as needed. Do not exceed 3000mg daily  -Supervised physical therapy.  -Continue home exercise program directed by PT  -Knee sleeve or brace for comfort  -Corticosteroid injection was offered, accepted, and administered.  Risk benefits and alternatives were discussed prior to injection.  Patient tolerated the procedure well.  -Patient may follow up in 3 month(s) for further evaluation and treatment        Subjective:   Carie Fernandez is a 73 y.o. female who is being seen in follow-up for Right knee pain. Patient has known right knee OA.  She was provided with a CS injection 23. Patient report relief follow the injection which since has worn off.  She took the IBU and Tylenol as directed to 10 days and did well.  Patient has increased pain with WB activities, improved with rest. No other orthopedic complaints or concerns.       Prior treatment:  NSAIDs Yes    Bracing No   Physical Therapy Yes   Cortisone Injections Yes   Viscosupplementation No     Allergy:  Allergies   Allergen Reactions    Pollen Extract Nasal Congestion     Medications:  All current active meds have been reviewed   Past Medical History:  Past Medical History:   Diagnosis Date    Anxiety     Breast cancer (HCC) 2021    Cancer (HCC)     Right breast cancer    Depression      Fracture of radius, distal, closed     Last Assessed:  6/10/14    Thrombophlebitis     Vitamin D deficiency      Past Surgical History:  Past Surgical History:   Procedure Laterality Date    ANKLE SURGERY      ANKLE SURGERY      BREAST BIOPSY Left 2001    neg    BREAST BIOPSY Right 12/06/2021    punch bx- ca    GALLBLADDER SURGERY      HYSTERECTOMY      IR PORT PLACEMENT  12/29/2021    IR PORT REMOVAL  02/21/2023    MASTECTOMY Right 04/26/2022    [place] MAST MODF RAD W/AX LYMPH NOD W/WO PECT/DALTON MIN Right 04/26/2022    Procedure: BREAST MODIFIED RADICAL MASTECTOMY WITH RIGHT LYMPHATIC MAPPING WITH BLUE AND RADIOACTIVE DYE (INJECT AT 0900 BY DR VASQUEZ IN THE OR);  Surgeon: Kevin Vasquez MD;  Location: [facility];  Service: Surgical Oncology    WISDOM TOOTH EXTRACTION       Family History:  Family History   Problem Relation Age of Onset    Ovarian cancer Mother 56    Heart disease Father     Leukemia Brother 58    Heart disease Brother     Heart disease Brother     Cancer Maternal Grandmother         Unknown primary; mid 50's    Stomach cancer Maternal Aunt         60's     Social History:  Social History     Substance and Sexual Activity   Alcohol Use Yes    Comment: rarely     Social History     Substance and Sexual Activity   Drug Use Yes    Types: Marijuana    Comment: not helping stopped     Social History     Tobacco Use   Smoking Status Never   Smokeless Tobacco Never           Review of Systems:  General- denies fever/chills  HEENT- denies hearing loss or sore throat  Eyes- denies eye pain or visual disturbances, denies red eyes  Respiratory- denies cough or SOB  Cardio- denies chest pain or palpitations  GI- denies abdominal pain  Endocrine- denies urinary frequency  Urinary- denies pain with urination  Musculoskeletal- Negative except noted above  Skin- denies rashes or wounds  Neurological- denies dizziness or headache  Psychiatric- denies anxiety or difficulty concentrating    Objective:   BP Readings from Last 1  "Encounters:   03/08/24 116/75      Wt Readings from Last 1 Encounters:   03/08/24 89.4 kg (197 lb)      Pulse Readings from Last 1 Encounters:   03/08/24 87        BMI: Estimated body mass index is 29.95 kg/m² as calculated from the following:    Height as of this encounter: 5' 8\" (1.727 m).    Weight as of this encounter: 89.4 kg (197 lb).    Physical Exam  /75 (BP Location: Left arm, Patient Position: Sitting, Cuff Size: Large)   Pulse 87   Ht 5' 8\" (1.727 m) Comment: verbal  Wt 89.4 kg (197 lb) Comment: verbal  LMP  (LMP Unknown)   BMI 29.95 kg/m²   General/Constitutional: No apparent distress: well-nourished and well developed.  Eyes: normal ocular motion  Cardio: RRR, Normal S1S2, No m/r/g.   Lymphatic: No appreciable lymphadenopathy  Respiratory: Non-labored breathing, CTA b/l no w/c/r  Vascular: No edema, swelling or tenderness, except as noted in detailed exam. Extremities well perfused. No LE edema  Integumentary: No impressive skin lesions present, except as noted in detailed exam.  Neuro: No ataxia or tremors noted  Psych: Normal mood and affect, oriented to person, place and time. Appropriate affect.  Musculoskeletal: Normal, except as noted in detailed exam and in HPI.    Gait and Station:   normal    Right knee:     Inspection:  normal color, temperature, turgor and moisture    Overall limb alignment: neutral    Effusion: no    ROM 0 to 140 with pain    Extensor Lag: Absent    Palpation: Medial and pes bursa joint line tenderness to palpation    stable to AP translation at 90 deg    Coronal plane stable in full extension    Coronal plane stable in mid-flexion     Motor: 5/5 EHL/FHL/TA/GS/Qd/Hs    Vascular: Toes WWP with BCR    Sensory: SILT DP/SP/Rahul/Saph/Tib  Crepitation with motion of patella     Large joint arthrocentesis: R knee  Universal Protocol:  Consent: Verbal consent obtained.  Consent given by: patient  Patient understanding: patient states understanding of the procedure being " performed  Site marked: the operative site was marked  Patient identity confirmed: verbally with patient  Supporting Documentation  Indications: pain   Procedure Details  Location: knee - R knee  Needle size: 22 G  Ultrasound guidance: no  Approach: superior  Medications administered: 12 mg betamethasone acetate-betamethasone sodium phosphate 6 (3-3) mg/mL; 1 mL bupivacaine 0.25 %; 1 mL lidocaine (PF) 1 %    Patient tolerance: patient tolerated the procedure well with no immediate complications  Dressing:  Sterile dressing applied           Images:  I personally reviewed relevant images in the PACS system and my interpretation is as follows:  X-rays of the right knee reveal moderate degenerative changes with subchondral sclerosis, joint space narrowing, and osteophyte formation       Scribe Attestation      I,:  Chrissy Covington am acting as a scribe while in the presence of the attending physician.:       I,:  Tee Olvera MD personally performed the services described in this documentation    as scribed in my presence.:               Tee Olvera MD  Adult Reconstruction Specialist   [facility]

## 2024-03-08 NOTE — ASSESSMENT & PLAN NOTE
-WBAT  -Activity modification to limit strain or impact on the joint  -ibuprofen (Motrin) as needed  -Tylenol 1000mg up to three times daily as needed. Do not exceed 3000mg daily  -Supervised physical therapy.  -Continue home exercise program directed by PT  -Knee sleeve or brace for comfort  -Corticosteroid injection was offered, accepted, and administered.  Risk benefits and alternatives were discussed prior to injection.  Patient tolerated the procedure well.  -Patient may follow up in 3 month(s) for further evaluation and treatment

## 2024-05-01 ENCOUNTER — TELEPHONE (OUTPATIENT)
Dept: HEMATOLOGY ONCOLOGY | Facility: CLINIC | Age: 74
End: 2024-05-01

## 2024-05-01 ENCOUNTER — OFFICE VISIT (OUTPATIENT)
Dept: SURGICAL ONCOLOGY | Facility: CLINIC | Age: 74
End: 2024-05-01
Payer: MEDICARE

## 2024-05-01 VITALS
BODY MASS INDEX: 30.99 KG/M2 | HEART RATE: 80 BPM | OXYGEN SATURATION: 97 % | SYSTOLIC BLOOD PRESSURE: 130 MMHG | WEIGHT: 204.5 LBS | TEMPERATURE: 97.6 F | DIASTOLIC BLOOD PRESSURE: 80 MMHG | HEIGHT: 68 IN

## 2024-05-01 DIAGNOSIS — C77.3 CARCINOMA OF RIGHT BREAST METASTATIC TO AXILLARY LYMPH NODE (HCC): ICD-10-CM

## 2024-05-01 DIAGNOSIS — C50.811 MALIGNANT NEOPLASM OF OVERLAPPING SITES OF RIGHT BREAST IN FEMALE, ESTROGEN RECEPTOR POSITIVE (HCC): Primary | ICD-10-CM

## 2024-05-01 DIAGNOSIS — Z90.11 ACQUIRED ABSENCE OF RIGHT BREAST: ICD-10-CM

## 2024-05-01 DIAGNOSIS — Z17.0 MALIGNANT NEOPLASM OF OVERLAPPING SITES OF RIGHT BREAST IN FEMALE, ESTROGEN RECEPTOR POSITIVE (HCC): Primary | ICD-10-CM

## 2024-05-01 DIAGNOSIS — C50.911 CARCINOMA OF RIGHT BREAST METASTATIC TO AXILLARY LYMPH NODE (HCC): ICD-10-CM

## 2024-05-01 DIAGNOSIS — Z90.11 S/P MASTECTOMY, RIGHT: ICD-10-CM

## 2024-05-01 PROCEDURE — 99213 OFFICE O/P EST LOW 20 MIN: CPT

## 2024-05-01 NOTE — PROGRESS NOTES
Surgical Oncology Follow Up       [facility] [place]  [address] [place] [address]    Carie LISA Fernandez  1950  [id number]  [facility] [place]  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     12/30/2021 - 4/8/2022 Chemotherapy    DOXOrubicin (ADRIAMYCIN), 122 mg, Intravenous, Once, 4 of 4 cycles  Administration: 120 mg (12/30/2021), 122 mg (1/13/2022), 122 mg (1/27/2022), 122 mg (2/10/2022)  palonosetron (ALOXI), 0.25 mg, Intravenous, Once, 2 of 2 cycles  Administration: 0.25 mg (3/24/2022), 0.25 mg (4/7/2022)  pegfilgrastim (NEULASTA), 4 mg (100 % of  original dose 4 mg), Subcutaneous, Once, 3 of 3 cycles  Dose modification: 4 mg (original dose 4 mg, Cycle 6)  Administration: 4 mg (3/11/2022), 4 mg (3/25/2022), 4 mg (4/8/2022)  pegfilgrastim (NEULASTA ONPRO), 6 mg, Subcutaneous, Once, 5 of 5 cycles  Administration: 6 mg (12/30/2021), 6 mg (1/13/2022), 6 mg (2/24/2022), 6 mg (1/27/2022), 6 mg (2/10/2022)  cyclophosphamide (CYTOXAN) IVPB, 600 mg/m2 = 1,218 mg, Intravenous, Once, 4 of 4 cycles  Administration: 1,218 mg (12/30/2021), 1,218 mg (1/13/2022), 1,218 mg (1/27/2022), 1,218 mg (2/10/2022)  fosaprepitant (EMEND) IVPB, 150 mg, Intravenous, Once, 4 of 4 cycles  Administration: 150 mg (12/30/2021), 150 mg (1/13/2022), 150 mg (1/27/2022), 150 mg (2/10/2022)  PACLItaxel (TAXOL) chemo IVPB, 175 mg/m2 = 355.2 mg, Intravenous, Once, 4 of 4 cycles  Administration: 355.2 mg (2/24/2022), 355.2 mg (3/10/2022), 355.2 mg (3/24/2022), 355.2 mg (4/7/2022)     4/26/2022 Surgery    Right breast modified radical mastectomy  Inflammatory breast cancer  Grade 1  1.5 cm (multiple foci involving all four quadrants)  Lymphovascular invasion present  Margins negative  14/19 Lymph nodes with macro-metastases  Anatomic Stage IIIC  Prognostic Stage IIIA     6/2022 -  Hormone Therapy    Anastrozole 1 mg daily  Dr. 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  She is currently ASHLYN at 2 years from right breast modified radical mastectomy after undergoing neoadjuvant chemotherapy, as well as adjuvant radiation.  She reports she is doing well and has not noticed any new lumps, skin changes or tenderness on self-exam.  She denies any new headaches, dizziness, bone pain or unintentional weight loss.  She continues on anastrazole without complaint.        Review of Systems   Constitutional:  Negative for activity change, appetite change, fatigue and unexpected weight change.   HENT: Negative.     Respiratory: Negative.  Negative for shortness of breath.    Cardiovascular: Negative.    Gastrointestinal: Negative.  Negative for abdominal distention, abdominal pain, nausea and vomiting.   Musculoskeletal:  Positive for arthralgias.   Skin: Negative.  Negative for color change, rash and wound.   Neurological: Negative.  Negative for dizziness and headaches.   Hematological: Negative.  Negative for adenopathy.   Psychiatric/Behavioral: Negative.               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)    Peripheral neuropathy    Primary osteoarthritis of right knee    S/P mastectomy, right    Acquired absence of right breast     Past Medical History:   Diagnosis Date    Anxiety     Breast cancer (HCC) 12/06/2021    Cancer (HCC)     Right breast cancer    Depression     Fracture of radius,  distal, closed     Last Assessed:  6/10/14    Thrombophlebitis     Vitamin D deficiency      Past Surgical History:   Procedure Laterality Date    ANKLE SURGERY      ANKLE SURGERY      BREAST BIOPSY Left 2001    neg    BREAST BIOPSY Right 12/06/2021    punch bx- ca    GALLBLADDER SURGERY      HYSTERECTOMY      IR PORT PLACEMENT  12/29/2021    IR PORT REMOVAL  02/21/2023    MASTECTOMY Right 04/26/2022    [place] MAST MODF RAD W/AX LYMPH NOD W/WO PECT/DALTON MIN Right 04/26/2022    Procedure: BREAST MODIFIED RADICAL MASTECTOMY WITH RIGHT LYMPHATIC MAPPING WITH BLUE AND RADIOACTIVE DYE (INJECT AT 0900 BY DR VASQUEZ IN THE OR);  Surgeon: Kevin Vasquez MD;  Location: [facility];  Service: Surgical Oncology    WISDOM TOOTH EXTRACTION       Family History   Problem Relation Age of Onset    Ovarian cancer Mother 56    Heart disease Father     Leukemia Brother 58    Heart disease Brother     Heart disease Brother     Cancer Maternal Grandmother         Unknown primary; mid 50's    Stomach cancer Maternal Aunt         60's     Social History     Socioeconomic History    Marital status: /Civil Union     Spouse name: Not on file    Number of children: Not on file    Years of education: Not on file    Highest education level: Not on file   Occupational History    Not on file   Tobacco Use    Smoking status: Never    Smokeless tobacco: Never   Vaping Use    Vaping status: Never Used   Substance and Sexual Activity    Alcohol use: Yes     Comment: rarely    Drug use: Yes     Types: Marijuana     Comment: not helping stopped    Sexual activity: Not Currently     Partners: Male     Birth control/protection: Female Sterilization   Other Topics Concern    Not on file   Social History Narrative     to Que.     Social Determinants of Health     Financial Resource Strain: Low Risk  (10/14/2022)    Overall Financial Resource Strain (CARDIA)     Difficulty of Paying Living Expenses: Not hard at all   Food Insecurity: Not on file    Transportation Needs: No Transportation Needs (10/14/2022)    PRAPARE - Transportation     Lack of Transportation (Medical): No     Lack of Transportation (Non-Medical): No   Physical Activity: Not on file   Stress: Not on file   Social Connections: Not on file   Intimate Partner Violence: Not on file   Housing Stability: Not on file       Current Outpatient Medications:     ALPRAZolam (XANAX) 0.25 mg tablet, Take 1/2 to 1 pill Q 8 hours p.r.n. For anxiety, Disp: 30 tablet, Rfl: 0    anastrozole (ARIMIDEX) 1 mg tablet, Take 1 tablet (1 mg total) by mouth daily, Disp: 90 tablet, Rfl: 4    Calcium 250 MG CAPS, Take 250 mg by mouth in the morning  , Disp: , Rfl:     cholecalciferol (VITAMIN D3) 1,000 units tablet, Take 2 capsules by mouth daily, Disp: , Rfl:     ciclopirox (LOPROX) 0.77 % cream, , Disp: , Rfl:     escitalopram (LEXAPRO) 20 mg tablet, TAKE 1 TABLET BY MOUTH EVERY DAY, Disp: 86 tablet, Rfl: 1    Fish Oil-Krill Oil (KRILL OIL PLUS) CAPS, Take by mouth in the morning  , Disp: , Rfl:     fluocinonide (LIDEX) 0.05 % external solution, APPLY TO SCALP ONCE DAILY AS NEEDED FOR ITCH, Disp: , Rfl: 2    ketoconazole (NIZORAL) 2 % shampoo, Apply 1 application topically if needed  , Disp: , Rfl: 5    meclizine (ANTIVERT) 25 mg tablet, Take 1 tablet (25 mg total) by mouth every 8 (eight) hours, Disp: 30 tablet, Rfl: 0  Allergies   Allergen Reactions    Pollen Extract Nasal Congestion     Vitals:    05/01/24 1034   BP: 130/80   Pulse: 80   Temp: 97.6 °F (36.4 °C)   SpO2: 97%       Physical Exam  Vitals reviewed.   Constitutional:       General: She is not in acute distress.     Appearance: Normal appearance. She is normal weight. She is not ill-appearing or toxic-appearing.   HENT:      Head: Normocephalic and atraumatic.      Nose: Nose normal.      Mouth/Throat:      Mouth: Mucous membranes are moist.   Eyes:      General: No scleral icterus.     Conjunctiva/sclera: Conjunctivae normal.   Cardiovascular:      Rate  and Rhythm: Normal rate.   Pulmonary:      Effort: Pulmonary effort is normal.   Chest:   Breasts:     Right: Absent.          Comments: Right chest wall and left breast are generally smooth, without any underlying masses or nodules.  I do not appreciate any skin changes, tenderness or adenopathy on exam.  Musculoskeletal:         General: No swelling or tenderness. Normal range of motion.      Cervical back: Normal range of motion and neck supple.   Lymphadenopathy:      Cervical: No cervical adenopathy.      Upper Body:      Right upper body: No supraclavicular, axillary or pectoral adenopathy.      Left upper body: No supraclavicular, axillary or pectoral adenopathy.   Skin:     General: Skin is warm and dry.      Coloration: Skin is not jaundiced.      Findings: No erythema or rash.   Neurological:      General: No focal deficit present.      Mental Status: She is alert and oriented to person, place, and time.   Psychiatric:         Mood and Affect: Mood normal.         Behavior: Behavior normal.         Thought Content: Thought content normal.         Judgment: Judgment normal.           Discussion/Summary: This is a 75 y/o female who presents today for continued breast cancer surveillance.  She is doing well and there is no evidence of disease recurrence at this time.  I will see her again in 6 months for another clinical exam following left breast mammogram, or sooner should the need arise.  She is agreeable to the plan, all questions were answered today.

## 2024-05-22 ENCOUNTER — RA CDI HCC (OUTPATIENT)
Dept: OTHER | Facility: HOSPITAL | Age: 74
End: 2024-05-22

## 2024-05-30 ENCOUNTER — OFFICE VISIT (OUTPATIENT)
Dept: INTERNAL MEDICINE CLINIC | Facility: CLINIC | Age: 74
End: 2024-05-30
Payer: MEDICARE

## 2024-05-30 VITALS
OXYGEN SATURATION: 97 % | HEIGHT: 68 IN | BODY MASS INDEX: 30.92 KG/M2 | TEMPERATURE: 97.8 F | HEART RATE: 82 BPM | WEIGHT: 204 LBS | SYSTOLIC BLOOD PRESSURE: 132 MMHG | DIASTOLIC BLOOD PRESSURE: 80 MMHG

## 2024-05-30 DIAGNOSIS — F32.A DEPRESSION, UNSPECIFIED DEPRESSION TYPE: ICD-10-CM

## 2024-05-30 DIAGNOSIS — C77.3 CARCINOMA OF RIGHT BREAST METASTATIC TO AXILLARY LYMPH NODE (HCC): ICD-10-CM

## 2024-05-30 DIAGNOSIS — Z13.29 SCREENING FOR HYPOTHYROIDISM: ICD-10-CM

## 2024-05-30 DIAGNOSIS — R53.82 CHRONIC FATIGUE: Primary | ICD-10-CM

## 2024-05-30 DIAGNOSIS — F41.9 ANXIETY: ICD-10-CM

## 2024-05-30 DIAGNOSIS — Z91.89 RISK OF EXPOSURE TO LYME DISEASE: ICD-10-CM

## 2024-05-30 DIAGNOSIS — M81.0 OSTEOPOROSIS WITHOUT CURRENT PATHOLOGICAL FRACTURE, UNSPECIFIED OSTEOPOROSIS TYPE: ICD-10-CM

## 2024-05-30 DIAGNOSIS — C50.911 CARCINOMA OF RIGHT BREAST METASTATIC TO AXILLARY LYMPH NODE (HCC): ICD-10-CM

## 2024-05-30 DIAGNOSIS — Z13.0 SCREENING FOR DEFICIENCY ANEMIA: ICD-10-CM

## 2024-05-30 DIAGNOSIS — Z13.1 SCREENING FOR DIABETES MELLITUS: ICD-10-CM

## 2024-05-30 PROCEDURE — G2211 COMPLEX E/M VISIT ADD ON: HCPCS | Performed by: INTERNAL MEDICINE

## 2024-05-30 PROCEDURE — 99214 OFFICE O/P EST MOD 30 MIN: CPT | Performed by: INTERNAL MEDICINE

## 2024-05-30 RX ORDER — ALPRAZOLAM 0.25 MG/1
TABLET ORAL
Qty: 30 TABLET | Refills: 0 | Status: SHIPPED | OUTPATIENT
Start: 2024-05-30

## 2024-05-30 NOTE — ASSESSMENT & PLAN NOTE
Currently without evidence of recurrent disease at this time continue regular follow-up with oncology and continue Arimidex 1 mg daily

## 2024-05-30 NOTE — ASSESSMENT & PLAN NOTE
Fatigue symptoms reviewed with the patient recommend testing for thyroid insufficiency CBC CMP and Lyme testing.  Will review the lab work results when completed

## 2024-05-30 NOTE — ASSESSMENT & PLAN NOTE
Anxiety symptoms remain quite stable recommend continuation of escitalopram 20 mg daily and alprazolam 2 5 mg 1/2 to 1 tablet every 8 hours as needed for breakthrough anxiety

## 2024-05-30 NOTE — ASSESSMENT & PLAN NOTE
Patient continues on vitamin D and calcium supplementation.  I have recommended a DEXA scan this fall to evaluate effects of anastrozole on bone density.  Patient may be candidate for either Fosamax or Prolia

## 2024-05-30 NOTE — PROGRESS NOTES
Ambulatory Visit  Name: Carie Fernandez      : 1950      MRN: [id number]  Encounter Provider: Yaron Patel MD  Encounter Date: 2024   Encounter department: [facility] INTERNAL MEDICINE    Assessment & Plan   1. Osteoporosis without current pathological fracture, unspecified osteoporosis type  Assessment & Plan:  Patient continues on vitamin D and calcium supplementation.  I have recommended a DEXA scan this fall to evaluate effects of anastrozole on bone density.  Patient may be candidate for either Fosamax or Prolia  Orders:  -     DXA body comp analysis; Future; Expected date: 2024  2. Anxiety  Assessment & Plan:  Anxiety symptoms remain quite stable recommend continuation of escitalopram 20 mg daily and alprazolam 2 5 mg 1/2 to 1 tablet every 8 hours as needed for breakthrough anxiety  Orders:  -     ALPRAZolam (XANAX) 0.25 mg tablet; Take 1/2 to 1 pill Q 8 hours p.r.n. For anxiety  3. Screening for hypothyroidism  -     TSH, 3rd generation with Free T4 reflex; Future  4. Screening for deficiency anemia  -     CBC and differential; Future  5. Screening for diabetes mellitus  -     Comprehensive metabolic panel; Future  6. Risk of exposure to Lyme disease  -     Lyme Total AB W Reflex to IGM/IGG; Future  7. Chronic fatigue  Assessment & Plan:  Fatigue symptoms reviewed with the patient recommend testing for thyroid insufficiency CBC CMP and Lyme testing.  Will review the lab work results when completed  8. 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)  Assessment & Plan:  Currently without evidence of recurrent disease at this time continue regular follow-up with oncology and continue Arimidex 1 mg daily  9. Depression, unspecified depression type  Assessment & Plan:  Depression symptoms appear stable continue escitalopram at 20 mg daily         History of Present Illness     This pleasant 74-year-old female patient returns today for a routine follow-up  visit.  She continues to do well we will continues to have some degree of fatigue and exercise intolerance.  Currently she has no evidence of recurrent cancer of the breast continues on Arimidex therapy.  We discussed today the effects of Arimidex on bone density and she is scheduled to have a bone density scan this fall.  He is currently trying to maintain regular activity of weightbearing nature to help keep bones healthy and strong.  He continues on vitamin D and calcium supplementations.      Review of Systems   Constitutional:  Positive for fatigue.   All other systems reviewed and are negative.    Past Medical History:   Diagnosis Date    Anxiety     Breast cancer (HCC) 12/06/2021    Cancer (HCC)     Right breast cancer    Depression     Fracture of radius, distal, closed     Last Assessed:  6/10/14    Thrombophlebitis     Vitamin D deficiency      Past Surgical History:   Procedure Laterality Date    ANKLE SURGERY      ANKLE SURGERY      BREAST BIOPSY Left 2001    neg    BREAST BIOPSY Right 12/06/2021    punch bx- ca    GALLBLADDER SURGERY      HYSTERECTOMY      IR PORT PLACEMENT  12/29/2021    IR PORT REMOVAL  02/21/2023    MASTECTOMY Right 04/26/2022    OR MAST MODF RAD W/AX LYMPH NOD W/WO PECT/DALTON MIN Right 04/26/2022    Procedure: BREAST MODIFIED RADICAL MASTECTOMY WITH RIGHT LYMPHATIC MAPPING WITH BLUE AND RADIOACTIVE DYE (INJECT AT 0900 BY DR VASQUEZ IN THE OR);  Surgeon: Kevin Vasquez MD;  Location: [facility];  Service: Surgical Oncology    WISDOM TOOTH EXTRACTION       Family History   Problem Relation Age of Onset    Ovarian cancer Mother 56    Heart disease Father     Leukemia Brother 58    Heart disease Brother     Heart disease Brother     Cancer Maternal Grandmother         Unknown primary; mid 50's    Stomach cancer Maternal Aunt         60's     Social History     Tobacco Use    Smoking status: Never    Smokeless tobacco: Never   Vaping Use    Vaping status: Never Used   Substance and Sexual  "Activity    Alcohol use: Yes     Comment: rarely    Drug use: Yes     Types: Marijuana     Comment: not helping stopped    Sexual activity: Not Currently     Partners: Male     Birth control/protection: Female Sterilization     Current Outpatient Medications on File Prior to Visit   Medication Sig    anastrozole (ARIMIDEX) 1 mg tablet Take 1 tablet (1 mg total) by mouth daily    Calcium 250 MG CAPS Take 250 mg by mouth in the morning      cholecalciferol (VITAMIN D3) 1,000 units tablet Take 2 capsules by mouth daily    ciclopirox (LOPROX) 0.77 % cream     escitalopram (LEXAPRO) 20 mg tablet TAKE 1 TABLET BY MOUTH EVERY DAY    Fish Oil-Krill Oil (KRILL OIL PLUS) CAPS Take by mouth in the morning      fluocinonide (LIDEX) 0.05 % external solution APPLY TO SCALP ONCE DAILY AS NEEDED FOR ITCH    ketoconazole (NIZORAL) 2 % shampoo Apply 1 application topically if needed      meclizine (ANTIVERT) 25 mg tablet Take 1 tablet (25 mg total) by mouth every 8 (eight) hours    [DISCONTINUED] ALPRAZolam (XANAX) 0.25 mg tablet Take 1/2 to 1 pill Q 8 hours p.r.n. For anxiety     Allergies   Allergen Reactions    Pollen Extract Nasal Congestion     Immunization History   Administered Date(s) Administered    COVID-19 MODERNA VACC 0.5 ML IM 01/25/2021, 01/25/2021, 02/22/2021, 02/22/2021, 08/01/2022    COVID-19 Moderna mRNA Vaccine 12 Yr+ 50 mcg/0.5 mL (Spikevax) 11/01/2023    INFLUENZA 10/22/2018, 10/27/2021    Influenza Split High Dose Preservative Free IM 12/18/2015, 10/17/2017, 11/10/2018    Influenza, high dose seasonal 0.7 mL 11/18/2019, 10/26/2020    Pneumococcal Conjugate 13-Valent 02/11/2019    Pneumococcal Polysaccharide PPV23 07/30/2020    Tdap 05/18/2008, 01/18/2018    Zoster Vaccine Recombinant 02/24/2020, 06/29/2020     Objective     /80   Pulse 82   Temp 97.8 °F (36.6 °C)   Ht 5' 8\" (1.727 m)   Wt 92.5 kg (204 lb)   LMP  (LMP Unknown)   SpO2 97%   BMI 31.02 kg/m²     Physical Exam  Vitals and nursing note " reviewed.   Constitutional:       General: She is not in acute distress.     Appearance: She is well-developed.   HENT:      Head: Normocephalic and atraumatic.   Eyes:      Conjunctiva/sclera: Conjunctivae normal.   Cardiovascular:      Rate and Rhythm: Normal rate and regular rhythm.      Heart sounds: Normal heart sounds. No murmur heard.  Pulmonary:      Effort: Pulmonary effort is normal. No respiratory distress.      Breath sounds: Normal breath sounds. No wheezing, rhonchi or rales.   Abdominal:      Palpations: Abdomen is soft.      Tenderness: There is no abdominal tenderness.   Musculoskeletal:         General: No swelling.      Cervical back: Neck supple.   Skin:     General: Skin is warm and dry.      Capillary Refill: Capillary refill takes less than 2 seconds.   Neurological:      Mental Status: She is alert.   Psychiatric:         Mood and Affect: Mood normal.       Administrative Statements

## 2024-06-13 ENCOUNTER — APPOINTMENT (OUTPATIENT)
Dept: LAB | Facility: CLINIC | Age: 74
End: 2024-06-13
Payer: MEDICARE

## 2024-06-13 DIAGNOSIS — Z13.29 SCREENING FOR HYPOTHYROIDISM: ICD-10-CM

## 2024-06-13 DIAGNOSIS — Z13.0 SCREENING FOR DEFICIENCY ANEMIA: ICD-10-CM

## 2024-06-13 DIAGNOSIS — Z91.89 RISK OF EXPOSURE TO LYME DISEASE: ICD-10-CM

## 2024-06-13 DIAGNOSIS — Z13.1 SCREENING FOR DIABETES MELLITUS: ICD-10-CM

## 2024-06-13 LAB
ALBUMIN SERPL BCP-MCNC: 4 G/DL (ref 3.5–5)
ALP SERPL-CCNC: 66 U/L (ref 34–104)
ALT SERPL W P-5'-P-CCNC: 12 U/L (ref 7–52)
ANION GAP SERPL CALCULATED.3IONS-SCNC: 7 MMOL/L (ref 4–13)
AST SERPL W P-5'-P-CCNC: 18 U/L (ref 13–39)
B BURGDOR IGG+IGM SER QL IA: NEGATIVE
BASOPHILS # BLD AUTO: 0.06 THOUSANDS/ÂΜL (ref 0–0.1)
BASOPHILS NFR BLD AUTO: 2 % (ref 0–1)
BILIRUB SERPL-MCNC: 0.88 MG/DL (ref 0.2–1)
BUN SERPL-MCNC: 15 MG/DL (ref 5–25)
CALCIUM SERPL-MCNC: 9.2 MG/DL (ref 8.4–10.2)
CHLORIDE SERPL-SCNC: 103 MMOL/L (ref 96–108)
CO2 SERPL-SCNC: 29 MMOL/L (ref 21–32)
CREAT SERPL-MCNC: 0.71 MG/DL (ref 0.6–1.3)
EOSINOPHIL # BLD AUTO: 0.16 THOUSAND/ÂΜL (ref 0–0.61)
EOSINOPHIL NFR BLD AUTO: 4 % (ref 0–6)
ERYTHROCYTE [DISTWIDTH] IN BLOOD BY AUTOMATED COUNT: 11.6 % (ref 11.6–15.1)
GFR SERPL CREATININE-BSD FRML MDRD: 83 ML/MIN/1.73SQ M
GLUCOSE P FAST SERPL-MCNC: 96 MG/DL (ref 65–99)
HCT VFR BLD AUTO: 43.1 % (ref 34.8–46.1)
HGB BLD-MCNC: 14 G/DL (ref 11.5–15.4)
IMM GRANULOCYTES # BLD AUTO: 0.01 THOUSAND/UL (ref 0–0.2)
IMM GRANULOCYTES NFR BLD AUTO: 0 % (ref 0–2)
LYMPHOCYTES # BLD AUTO: 1.52 THOUSANDS/ÂΜL (ref 0.6–4.47)
LYMPHOCYTES NFR BLD AUTO: 38 % (ref 14–44)
MCH RBC QN AUTO: 32.1 PG (ref 26.8–34.3)
MCHC RBC AUTO-ENTMCNC: 32.5 G/DL (ref 31.4–37.4)
MCV RBC AUTO: 99 FL (ref 82–98)
MONOCYTES # BLD AUTO: 0.45 THOUSAND/ÂΜL (ref 0.17–1.22)
MONOCYTES NFR BLD AUTO: 11 % (ref 4–12)
NEUTROPHILS # BLD AUTO: 1.83 THOUSANDS/ÂΜL (ref 1.85–7.62)
NEUTS SEG NFR BLD AUTO: 45 % (ref 43–75)
NRBC BLD AUTO-RTO: 0 /100 WBCS
PLATELET # BLD AUTO: 169 THOUSANDS/UL (ref 149–390)
PMV BLD AUTO: 11.1 FL (ref 8.9–12.7)
POTASSIUM SERPL-SCNC: 4.1 MMOL/L (ref 3.5–5.3)
PROT SERPL-MCNC: 6.8 G/DL (ref 6.4–8.4)
RBC # BLD AUTO: 4.36 MILLION/UL (ref 3.81–5.12)
SODIUM SERPL-SCNC: 139 MMOL/L (ref 135–147)
TSH SERPL DL<=0.05 MIU/L-ACNC: 2.06 UIU/ML (ref 0.45–4.5)
WBC # BLD AUTO: 4.03 THOUSAND/UL (ref 4.31–10.16)

## 2024-06-13 PROCEDURE — 80053 COMPREHEN METABOLIC PANEL: CPT

## 2024-06-13 PROCEDURE — 36415 COLL VENOUS BLD VENIPUNCTURE: CPT

## 2024-06-13 PROCEDURE — 84443 ASSAY THYROID STIM HORMONE: CPT

## 2024-06-13 PROCEDURE — 85025 COMPLETE CBC W/AUTO DIFF WBC: CPT

## 2024-06-13 PROCEDURE — 86618 LYME DISEASE ANTIBODY: CPT

## 2024-06-20 DIAGNOSIS — F32.A DEPRESSION, UNSPECIFIED DEPRESSION TYPE: ICD-10-CM

## 2024-06-20 RX ORDER — ESCITALOPRAM OXALATE 20 MG/1
20 TABLET ORAL DAILY
Qty: 86 TABLET | Refills: 1 | Status: SHIPPED | OUTPATIENT
Start: 2024-06-20

## 2024-08-29 DIAGNOSIS — F32.A DEPRESSION, UNSPECIFIED DEPRESSION TYPE: ICD-10-CM

## 2024-08-29 RX ORDER — ESCITALOPRAM OXALATE 20 MG/1
20 TABLET ORAL DAILY
Qty: 86 TABLET | Refills: 1 | Status: SHIPPED | OUTPATIENT
Start: 2024-08-29

## 2024-09-18 DIAGNOSIS — C50.811 MALIGNANT NEOPLASM OF OVERLAPPING SITES OF RIGHT BREAST IN FEMALE, ESTROGEN RECEPTOR POSITIVE (HCC): ICD-10-CM

## 2024-09-18 DIAGNOSIS — Z17.0 MALIGNANT NEOPLASM OF OVERLAPPING SITES OF RIGHT BREAST IN FEMALE, ESTROGEN RECEPTOR POSITIVE (HCC): ICD-10-CM

## 2024-09-18 RX ORDER — ANASTROZOLE 1 MG/1
1 TABLET ORAL DAILY
Qty: 90 TABLET | Refills: 0 | Status: SHIPPED | OUTPATIENT
Start: 2024-09-18

## 2024-09-26 ENCOUNTER — OFFICE VISIT (OUTPATIENT)
Dept: OBGYN CLINIC | Facility: CLINIC | Age: 74
End: 2024-09-26
Payer: MEDICARE

## 2024-09-26 VITALS — WEIGHT: 190 LBS | HEIGHT: 69 IN | BODY MASS INDEX: 28.14 KG/M2

## 2024-09-26 DIAGNOSIS — M17.11 PRIMARY OSTEOARTHRITIS OF RIGHT KNEE: Primary | ICD-10-CM

## 2024-09-26 PROCEDURE — 20610 DRAIN/INJ JOINT/BURSA W/O US: CPT | Performed by: STUDENT IN AN ORGANIZED HEALTH CARE EDUCATION/TRAINING PROGRAM

## 2024-09-26 PROCEDURE — 99214 OFFICE O/P EST MOD 30 MIN: CPT | Performed by: STUDENT IN AN ORGANIZED HEALTH CARE EDUCATION/TRAINING PROGRAM

## 2024-09-26 RX ORDER — BETAMETHASONE SODIUM PHOSPHATE AND BETAMETHASONE ACETATE 3; 3 MG/ML; MG/ML
12 INJECTION, SUSPENSION INTRA-ARTICULAR; INTRALESIONAL; INTRAMUSCULAR; SOFT TISSUE
Status: COMPLETED | OUTPATIENT
Start: 2024-09-26 | End: 2024-09-26

## 2024-09-26 RX ORDER — BUPIVACAINE HYDROCHLORIDE 2.5 MG/ML
1 INJECTION, SOLUTION INFILTRATION; PERINEURAL
Status: COMPLETED | OUTPATIENT
Start: 2024-09-26 | End: 2024-09-26

## 2024-09-26 RX ORDER — LIDOCAINE HYDROCHLORIDE 10 MG/ML
1 INJECTION, SOLUTION EPIDURAL; INFILTRATION; INTRACAUDAL; PERINEURAL
Status: COMPLETED | OUTPATIENT
Start: 2024-09-26 | End: 2024-09-26

## 2024-09-26 RX ADMIN — BETAMETHASONE SODIUM PHOSPHATE AND BETAMETHASONE ACETATE 12 MG: 3; 3 INJECTION, SUSPENSION INTRA-ARTICULAR; INTRALESIONAL; INTRAMUSCULAR; SOFT TISSUE at 11:30

## 2024-09-26 RX ADMIN — BUPIVACAINE HYDROCHLORIDE 1 ML: 2.5 INJECTION, SOLUTION INFILTRATION; PERINEURAL at 11:30

## 2024-09-26 RX ADMIN — LIDOCAINE HYDROCHLORIDE 1 ML: 10 INJECTION, SOLUTION EPIDURAL; INFILTRATION; INTRACAUDAL; PERINEURAL at 11:30

## 2024-09-26 NOTE — ASSESSMENT & PLAN NOTE
-WBAT  -Activity modification to limit strain or impact on the joint  -ibuprofen (Motrin) as needed  -Tylenol 1000mg up to three times daily as needed. Do not exceed 3000mg daily  -Supervised physical therapy.  -Continue home exercise program directed by PT  -Knee sleeve or brace for comfort  -Corticosteroid injection was offered, accepted, and administered.  Risk benefits and alternatives were discussed prior to injection.  Patient tolerated the procedure well.  -Discussion was had with the patient regarding risks and benefits of total knee arthroplasty.  Explained that she should only get surgery she feels that the above nonsurgical treatment modalities are not providing durable relief.  She would like to defer surgery at this time.  -Patient may follow up in 3 month(s) for further evaluation and treatment

## 2024-09-26 NOTE — PROGRESS NOTES
Date: 24  Carie Fernandez   MRN# [id number]  : 1950      Chief Complaint: Right Knee Pain    Assessment and Plan:  The patient verbalized understanding of exam findings and treatment plan. We engaged in the shared decision-making process and treatment options were discussed at length with the patient. Surgical and conservative management discussed today along with risks and benefits. Patient was agreeable with the plan and all questions were answered to satisfaction.     Primary osteoarthritis of right knee  -WBAT  -Activity modification to limit strain or impact on the joint  -ibuprofen (Motrin) as needed  -Tylenol 1000mg up to three times daily as needed. Do not exceed 3000mg daily  -Supervised physical therapy.  -Continue home exercise program directed by PT  -Knee sleeve or brace for comfort  -Corticosteroid injection was offered, accepted, and administered.  Risk benefits and alternatives were discussed prior to injection.  Patient tolerated the procedure well.  -Discussion was had with the patient regarding risks and benefits of total knee arthroplasty.  Explained that she should only get surgery she feels that the above nonsurgical treatment modalities are not providing durable relief.  She would like to defer surgery at this time.  -Patient may follow up in 3 month(s) for further evaluation and treatment          Subjective:   Carie Fernandez is a 74 y.o. female who is being seen in follow-up for Right knee pain. Patient has known right knee OA.  She was provided with a CS injection 3/8/2024. Patient reports relief follow the injection which since has worn off.  She took the IBU and Tylenol as directed to 10 days and did well.  Patient has increased pain with WB activities, improved with rest. No other orthopedic complaints or concerns.       Prior treatment:  NSAIDs Yes    Bracing No   Physical Therapy Yes   Cortisone Injections Yes   Viscosupplementation No     Allergy:  Allergies    Allergen Reactions    Pollen Extract Nasal Congestion     Medications:  All current active meds have been reviewed   Past Medical History:  Past Medical History:   Diagnosis Date    Anxiety     Breast cancer (HCC) 12/06/2021    Cancer (HCC)     Right breast cancer    Depression     Fracture of radius, distal, closed     Last Assessed:  6/10/14    Thrombophlebitis     Vitamin D deficiency      Past Surgical History:  Past Surgical History:   Procedure Laterality Date    ANKLE SURGERY      ANKLE SURGERY      BREAST BIOPSY Left 2001    neg    BREAST BIOPSY Right 12/06/2021    punch bx- ca    GALLBLADDER SURGERY      HYSTERECTOMY      IR PORT PLACEMENT  12/29/2021    IR PORT REMOVAL  02/21/2023    MASTECTOMY Right 04/26/2022    PA MAST MODF RAD W/AX LYMPH NOD W/WO PECT/DALTON MIN Right 04/26/2022    Procedure: BREAST MODIFIED RADICAL MASTECTOMY WITH RIGHT LYMPHATIC MAPPING WITH BLUE AND RADIOACTIVE DYE (INJECT AT 0900 BY DR VASQUEZ IN THE OR);  Surgeon: Kevin Vasquez MD;  Location: [facility];  Service: Surgical Oncology    WISDOM TOOTH EXTRACTION       Family History:  Family History   Problem Relation Age of Onset    Ovarian cancer Mother 56    Heart disease Father     Leukemia Brother 58    Heart disease Brother     Heart disease Brother     Cancer Maternal Grandmother         Unknown primary; mid 50's    Stomach cancer Maternal Aunt         60's     Social History:  Social History     Substance and Sexual Activity   Alcohol Use Yes    Comment: rarely     Social History     Substance and Sexual Activity   Drug Use Yes    Types: Marijuana    Comment: not helping stopped     Social History     Tobacco Use   Smoking Status Never   Smokeless Tobacco Never           Review of Systems:  General- denies fever/chills  HEENT- denies hearing loss or sore throat  Eyes- denies eye pain or visual disturbances, denies red eyes  Respiratory- denies cough or SOB  Cardio- denies chest pain or palpitations  GI- denies abdominal  "pain  Endocrine- denies urinary frequency  Urinary- denies pain with urination  Musculoskeletal- Negative except noted above  Skin- denies rashes or wounds  Neurological- denies dizziness or headache  Psychiatric- denies anxiety or difficulty concentrating    Objective:   BP Readings from Last 1 Encounters:   05/30/24 132/80      Wt Readings from Last 1 Encounters:   09/26/24 86.2 kg (190 lb)      Pulse Readings from Last 1 Encounters:   05/30/24 82        BMI: Estimated body mass index is 28.47 kg/m² as calculated from the following:    Height as of this encounter: 5' 8.5\" (1.74 m).    Weight as of this encounter: 86.2 kg (190 lb).    Physical Exam  Ht 5' 8.5\" (1.74 m)   Wt 86.2 kg (190 lb)   LMP  (LMP Unknown)   BMI 28.47 kg/m²   General/Constitutional: No apparent distress: well-nourished and well developed.  Eyes: normal ocular motion  Cardio: RRR, Normal S1S2, No m/r/g.   Lymphatic: No appreciable lymphadenopathy  Respiratory: Non-labored breathing, CTA b/l no w/c/r  Vascular: No edema, swelling or tenderness, except as noted in detailed exam. Extremities well perfused. No LE edema  Integumentary: No impressive skin lesions present, except as noted in detailed exam.  Neuro: No ataxia or tremors noted  Psych: Normal mood and affect, oriented to person, place and time. Appropriate affect.  Musculoskeletal: Normal, except as noted in detailed exam and in HPI.    Gait and Station:   normal    Right knee:     Inspection:  normal color, temperature, turgor and moisture    Overall limb alignment: neutral    Effusion: no    ROM 0 to 140 with pain    Extensor Lag: Absent    Palpation: Medial and pes bursa joint line tenderness to palpation    stable to AP translation at 90 deg    Coronal plane stable in full extension    Coronal plane stable in mid-flexion     Motor: 5/5 EHL/FHL/TA/GS/Qd/Hs    Vascular: Toes WWP with BCR    Sensory: SILT DP/SP/Rahul/Saph/Tib  Crepitation with motion of patella     Large joint " arthrocentesis: R knee  Universal Protocol:  procedure performed by consultantConsent: Verbal consent obtained.  Consent given by: patient  Patient understanding: patient states understanding of the procedure being performed  Site marked: the operative site was marked  Patient identity confirmed: verbally with patient  Supporting Documentation  Indications: pain   Procedure Details  Location: knee - R knee  Needle size: 22 G  Ultrasound guidance: no  Approach: superior  Medications administered: 12 mg betamethasone acetate-betamethasone sodium phosphate 6 (3-3) mg/mL; 1 mL bupivacaine 0.25 %; 1 mL lidocaine (PF) 1 %    Patient tolerance: patient tolerated the procedure well with no immediate complications  Dressing:  Sterile dressing applied           Images:  I personally reviewed relevant images in the PACS system and my interpretation is as follows:  X-rays of the right knee reveal moderate degenerative changes with subchondral sclerosis, joint space narrowing, and osteophyte formation       Scribe Attestation      I,:   am acting as a scribe while in the presence of the attending physician.:       I,:   personally performed the services described in this documentation    as scribed in my presence.:               Tee Olvera MD  Adult Reconstruction Specialist   [facility]

## 2024-11-01 ENCOUNTER — OFFICE VISIT (OUTPATIENT)
Dept: SURGICAL ONCOLOGY | Facility: CLINIC | Age: 74
End: 2024-11-01
Payer: MEDICARE

## 2024-11-01 VITALS
BODY MASS INDEX: 29.03 KG/M2 | TEMPERATURE: 96.9 F | OXYGEN SATURATION: 94 % | SYSTOLIC BLOOD PRESSURE: 142 MMHG | HEART RATE: 75 BPM | DIASTOLIC BLOOD PRESSURE: 84 MMHG | WEIGHT: 196 LBS | HEIGHT: 69 IN

## 2024-11-01 DIAGNOSIS — Z12.31 VISIT FOR SCREENING MAMMOGRAM: ICD-10-CM

## 2024-11-01 DIAGNOSIS — Z17.0 MALIGNANT NEOPLASM OF OVERLAPPING SITES OF RIGHT BREAST IN FEMALE, ESTROGEN RECEPTOR POSITIVE (HCC): Primary | ICD-10-CM

## 2024-11-01 DIAGNOSIS — C50.811 MALIGNANT NEOPLASM OF OVERLAPPING SITES OF RIGHT BREAST IN FEMALE, ESTROGEN RECEPTOR POSITIVE (HCC): Primary | ICD-10-CM

## 2024-11-01 DIAGNOSIS — E01.0 THYROMEGALY: ICD-10-CM

## 2024-11-01 PROCEDURE — 99214 OFFICE O/P EST MOD 30 MIN: CPT

## 2024-11-01 PROCEDURE — G2211 COMPLEX E/M VISIT ADD ON: HCPCS

## 2024-11-01 NOTE — PROGRESS NOTES
Surgical Oncology Follow Up       [facility] [place]  [address]  [place] [address]    Carie GONZALEZ Jim  1950  [id number]  [facility] [place]  [address]  [place] [address]    1. Malignant neoplasm of overlapping sites of right breast in female, estrogen receptor positive (HCC)  Assessment & Plan:  There is no evidence of disease recurrence at this time. She is currently due for left mammogram, and I will help coordinate this.  I will see her again in 6 months for another clinical exam.  2. Visit for screening mammogram  -     Mammo screening left w 3d and cad; Future; Expected date: 11/01/2024  3. Thyromegaly  -     US thyroid; Future; Expected date: 11/01/2024         Chief Complaint   Patient presents with    Follow-up       Return in about 6 months (around 5/1/2025) for Office Visit, Imaging - See orders.      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     12/30/2021 - 4/8/2022 Chemotherapy    DOXOrubicin (ADRIAMYCIN), 122 mg, Intravenous, Once, 4 of 4 cycles  Administration: 120 mg (12/30/2021), 122 mg (1/13/2022), 122 mg (1/27/2022), 122 mg (2/10/2022)  palonosetron (ALOXI), 0.25 mg, Intravenous, Once, 2 of 2 cycles  Administration: 0.25 mg (3/24/2022), 0.25 mg (4/7/2022)  pegfilgrastim (NEULASTA), 4 mg (100 % of original dose 4 mg),  Subcutaneous, Once, 3 of 3 cycles  Dose modification: 4 mg (original dose 4 mg, Cycle 6)  Administration: 4 mg (3/11/2022), 4 mg (3/25/2022), 4 mg (4/8/2022)  pegfilgrastim (NEULASTA ONPRO), 6 mg, Subcutaneous, Once, 5 of 5 cycles  Administration: 6 mg (12/30/2021), 6 mg (1/13/2022), 6 mg (2/24/2022), 6 mg (1/27/2022), 6 mg (2/10/2022)  cyclophosphamide (CYTOXAN) IVPB, 600 mg/m2 = 1,218 mg, Intravenous, Once, 4 of 4 cycles  Administration: 1,218 mg (12/30/2021), 1,218 mg (1/13/2022), 1,218 mg (1/27/2022), 1,218 mg (2/10/2022)  fosaprepitant (EMEND) IVPB, 150 mg, Intravenous, Once, 4 of 4 cycles  Administration: 150 mg (12/30/2021), 150 mg (1/13/2022), 150 mg (1/27/2022), 150 mg (2/10/2022)  PACLItaxel (TAXOL) chemo IVPB, 175 mg/m2 = 355.2 mg, Intravenous, Once, 4 of 4 cycles  Administration: 355.2 mg (2/24/2022), 355.2 mg (3/10/2022), 355.2 mg (3/24/2022), 355.2 mg (4/7/2022)     4/26/2022 Surgery    Right breast modified radical mastectomy  Inflammatory breast cancer  Grade 1  1.5 cm (multiple foci involving all four quadrants)  Lymphovascular invasion present  Margins negative  14/19 Lymph nodes with macro-metastases  Anatomic Stage IIIC  Prognostic Stage IIIA     4/26/2022 -  Cancer Staged    Staging form: Breast, AJCC 8th Edition  - Pathologic stage from 4/26/2022: ypT4d, pN3a, cM0, G1, ER+, IL+, HER2- - Signed by HERMINIO Mullen on 10/24/2024  Stage prefix: Post-therapy  Response to neoadjuvant therapy: No response  Method of lymph node assessment: Axillary lymph node dissection  Nuclear grade: G2  Histologic grading system: 3 grade system       6/2022 -  Hormone Therapy    Anastrozole 1 mg daily  Dr. 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 She is currently ASHLYN at 1 1/2 years from right modified radical mastectomy.  She reports she feels well overall but does endorse fatigue.  She has not noticed any new lumps or skin changes, headaches, weight loss or bone pain.  She did have an episode several weeks ago where she was extremely sore and had some pain with inspiration after doing a lot of yard work, but states those symptoms have fully resolved.  She is due now for left breast mammogram, although this has not been set up.        Review of Systems   Constitutional:  Positive for fatigue. Negative for activity change, appetite change and unexpected weight change.   HENT: Negative.     Respiratory: Negative.  Negative for cough and shortness of breath.    Cardiovascular: Negative.    Gastrointestinal: Negative.  Negative for abdominal pain, nausea and vomiting.   Musculoskeletal: Negative.  Negative for arthralgias.   Skin: Negative.  Negative for color change and wound.   Neurological: Negative.  Negative for dizziness, light-headedness and headaches.   Hematological: Negative.  Negative for adenopathy.   Psychiatric/Behavioral: Negative.               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)    Peripheral neuropathy    Primary osteoarthritis of right knee    S/P mastectomy, right    Acquired absence of right breast    Osteoporosis without current pathological fracture    Chronic fatigue     Past Medical History:   Diagnosis Date    Anxiety     Breast cancer (HCC) 12/06/2021    Cancer (HCC)     Right breast cancer    Depression     Fracture of radius, distal, closed     Last Assessed:  6/10/14    Thrombophlebitis     Vitamin D deficiency      Past Surgical History:   Procedure Laterality Date    ANKLE SURGERY      ANKLE SURGERY      BREAST BIOPSY Left 2001    neg    BREAST BIOPSY Right 12/06/2021    punch bx- ca    GALLBLADDER SURGERY      HYSTERECTOMY      IR PORT PLACEMENT  12/29/2021    IR PORT REMOVAL  02/21/2023    MASTECTOMY Right 04/26/2022    OH MAST MODF RAD W/AX LYMPH NOD W/WO PECT/DALTON MIN Right 04/26/2022    Procedure: BREAST MODIFIED RADICAL MASTECTOMY WITH RIGHT LYMPHATIC MAPPING WITH BLUE AND RADIOACTIVE DYE (INJECT AT 0900 BY DR VASQUEZ IN THE OR);  Surgeon: Kevin Vasquez MD;  Location: [facility];  Service: Surgical Oncology    WISDOM TOOTH EXTRACTION       Family History   Problem Relation Age of Onset    Ovarian cancer Mother 56    Heart disease Father     Leukemia Brother 58    Heart disease Brother     Heart disease Brother     Cancer Maternal Grandmother         Unknown primary; mid 50's    Stomach cancer Maternal Aunt         60's     Social History     Socioeconomic History    Marital status: /Civil Union     Spouse name: Not on file    Number of children: Not on file    Years of education: Not on file    Highest education level: Not on file   Occupational History    Not on file   Tobacco Use    Smoking status: Never    Smokeless tobacco: Never   Vaping Use    Vaping  status: Never Used   Substance and Sexual Activity    Alcohol use: Yes     Comment: rarely    Drug use: Yes     Types: Marijuana     Comment: not helping stopped    Sexual activity: Not Currently     Partners: Male     Birth control/protection: Female Sterilization   Other Topics Concern    Not on file   Social History Narrative     to Que.     Social Determinants of Health     Financial Resource Strain: Low Risk  (10/14/2022)    Overall Financial Resource Strain (CARDIA)     Difficulty of Paying Living Expenses: Not hard at all   Food Insecurity: Not on file   Transportation Needs: No Transportation Needs (10/14/2022)    PRAPARE - Transportation     Lack of Transportation (Medical): No     Lack of Transportation (Non-Medical): No   Physical Activity: Not on file   Stress: Not on file   Social Connections: Not on file   Intimate Partner Violence: Not on file   Housing Stability: Not on file       Current Outpatient Medications:     ALPRAZolam (XANAX) 0.25 mg tablet, Take 1/2 to 1 pill Q 8 hours p.r.n. For anxiety, Disp: 30 tablet, Rfl: 0    anastrozole (ARIMIDEX) 1 mg tablet, Take 1 tablet (1 mg total) by mouth daily, Disp: 90 tablet, Rfl: 0    Calcium 250 MG CAPS, Take 250 mg by mouth in the morning  , Disp: , Rfl:     cholecalciferol (VITAMIN D3) 1,000 units tablet, Take 2 capsules by mouth daily, Disp: , Rfl:     ciclopirox (LOPROX) 0.77 % cream, , Disp: , Rfl:     escitalopram (LEXAPRO) 20 mg tablet, Take 1 tablet (20 mg total) by mouth daily, Disp: 86 tablet, Rfl: 1    Fish Oil-Krill Oil (KRILL OIL PLUS) CAPS, Take by mouth in the morning  , Disp: , Rfl:     fluocinonide (LIDEX) 0.05 % external solution, APPLY TO SCALP ONCE DAILY AS NEEDED FOR ITCH, Disp: , Rfl: 2    ketoconazole (NIZORAL) 2 % shampoo, Apply 1 application topically if needed  , Disp: , Rfl: 5    meclizine (ANTIVERT) 25 mg tablet, Take 1 tablet (25 mg total) by mouth every 8 (eight) hours, Disp: 30 tablet, Rfl: 0  Allergies   Allergen  Reactions    Pollen Extract Nasal Congestion     Vitals:    11/01/24 1101   BP: 142/84   Pulse: 75   Temp: (!) 96.9 °F (36.1 °C)   SpO2: 94%       Physical Exam  Vitals reviewed.   Constitutional:       General: She is not in acute distress.     Appearance: Normal appearance. She is not ill-appearing or toxic-appearing.   HENT:      Head: Normocephalic and atraumatic.      Nose: Nose normal.      Mouth/Throat:      Mouth: Mucous membranes are moist.   Eyes:      General: No scleral icterus.  Neck:      Comments: Right thyroid feels mildly prominent  Cardiovascular:      Rate and Rhythm: Normal rate.   Pulmonary:      Effort: Pulmonary effort is normal.   Chest:   Breasts:     Right: Absent.      Left: Normal.          Comments: Left breast and right chest wall are smooth, without any masses, nodules or skin changes.  I do not appreciate any tenderness or adenopathy.  Musculoskeletal:         General: No swelling or tenderness. Normal range of motion.      Cervical back: Normal range of motion and neck supple. No rigidity.   Lymphadenopathy:      Cervical: No cervical adenopathy.      Upper Body:      Right upper body: No supraclavicular, axillary or pectoral adenopathy.      Left upper body: No supraclavicular, axillary or pectoral adenopathy.   Skin:     General: Skin is warm and dry.      Coloration: Skin is not jaundiced.      Findings: No erythema.   Neurological:      General: No focal deficit present.      Mental Status: She is alert and oriented to person, place, and time.   Psychiatric:         Mood and Affect: Mood normal.         Behavior: Behavior normal.         Thought Content: Thought content normal.         Judgment: Judgment normal.

## 2024-11-01 NOTE — ASSESSMENT & PLAN NOTE
There is no evidence of disease recurrence at this time. She is currently due for left mammogram, and I will help coordinate this.  I will see her again in 6 months for another clinical exam.

## 2024-12-06 ENCOUNTER — OFFICE VISIT (OUTPATIENT)
Age: 74
End: 2024-12-06
Payer: MEDICARE

## 2024-12-06 VITALS
HEART RATE: 77 BPM | TEMPERATURE: 96.9 F | HEIGHT: 69 IN | WEIGHT: 196 LBS | OXYGEN SATURATION: 97 % | BODY MASS INDEX: 29.03 KG/M2

## 2024-12-06 DIAGNOSIS — E78.5 HYPERLIPIDEMIA, MILD: ICD-10-CM

## 2024-12-06 DIAGNOSIS — F41.9 ANXIETY: ICD-10-CM

## 2024-12-06 DIAGNOSIS — Z13.0 SCREENING FOR DEFICIENCY ANEMIA: ICD-10-CM

## 2024-12-06 DIAGNOSIS — Z13.1 SCREENING FOR DIABETES MELLITUS: ICD-10-CM

## 2024-12-06 DIAGNOSIS — R39.9 UTI SYMPTOMS: ICD-10-CM

## 2024-12-06 DIAGNOSIS — C77.3 CARCINOMA OF RIGHT BREAST METASTATIC TO AXILLARY LYMPH NODE (HCC): Primary | ICD-10-CM

## 2024-12-06 DIAGNOSIS — F32.A DEPRESSION, UNSPECIFIED DEPRESSION TYPE: ICD-10-CM

## 2024-12-06 DIAGNOSIS — I83.93 VARICOSE VEINS OF BOTH LOWER EXTREMITIES, UNSPECIFIED WHETHER COMPLICATED: ICD-10-CM

## 2024-12-06 DIAGNOSIS — E80.6 HYPERBILIRUBINEMIA: ICD-10-CM

## 2024-12-06 DIAGNOSIS — C50.911 CARCINOMA OF RIGHT BREAST METASTATIC TO AXILLARY LYMPH NODE (HCC): Primary | ICD-10-CM

## 2024-12-06 PROCEDURE — G0438 PPPS, INITIAL VISIT: HCPCS | Performed by: INTERNAL MEDICINE

## 2024-12-06 PROCEDURE — 99214 OFFICE O/P EST MOD 30 MIN: CPT | Performed by: INTERNAL MEDICINE

## 2024-12-06 NOTE — PROGRESS NOTES
Name: Carie Fernandez      : 1950      MRN: [id number]  Encounter Provider: Yaron Patel MD  Encounter Date: 2024   Encounter department: [facility] INTERNAL MEDICINE    Assessment & Plan  Hyperlipidemia, mild  A comprehensive lipid profile has been requested and will be reviewed with the patient upon completion    Orders:    Lipid panel; Future    Hyperbilirubinemia    Orders:    Comprehensive metabolic panel; Future    Screening for deficiency anemia    Orders:    CBC and differential; Future    Screening for diabetes mellitus    Orders:    Comprehensive metabolic panel; Future    UTI symptoms    Orders:    UA w Reflex to Microscopic w Reflex to Culture; Future    Varicose veins of both lower extremities, unspecified whether complicated  Varicosities of the legs continue no indication of thrombosis recommend continued surveillance         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)  No evidence of recurrent breast cancer at this time recommend continuation of anastrozole medication         Anxiety  Medication of Lexapro and alprazolam seem to be very effective for maintaining control of anxiety symptoms no adverse symptoms are noted recommend continuation of both medications at current dosing levels         Depression, unspecified depression type  No current symptoms of depression well-controlled on Lexapro 20 mg daily no adverse symptoms noted continue current therapy advised            Preventive health issues were discussed with patient, and age appropriate screening tests were ordered as noted in patient's After Visit Summary. Personalized health advice and appropriate referrals for health education or preventive services given if needed, as noted in patient's After Visit Summary.    History of Present Illness     This pleasant 74-year-old female patient returns to our office today for an annual physical examination.  Routine comprehensive blood work has  been requested to accompany today's examination will be reviewed with the patient upon completion       Patient Care Team:  Yaron Patel MD as PCP - General  MD Addi De La Vega MD Lee Riley, MD as Surgeon (Surgical Oncology)  Jumana Canada MD as Medical Oncologist (Hematology)  Sergio Vieyra MD (Radiation Oncology)  HERMINIO Mullen as Nurse Practitioner (Surgical Oncology)    Review of Systems   All other systems reviewed and are negative.    Medical History Reviewed by provider this encounter:  Meds  Problems       Annual Wellness Visit Questionnaire   Carie is here for her Subsequent Wellness visit. Last Medicare Wellness visit information reviewed, patient interviewed, no change since last AWV.     Health Risk Assessment:   Patient rates overall health as good. Patient feels that their physical health rating is same. Patient is very satisfied with their life. Eyesight was rated as same. Hearing was rated as same. Patient feels that their emotional and mental health rating is same. Patients states they are never, rarely angry. Patient states they are often unusually tired/fatigued. Pain experienced in the last 7 days has been none. Patient states that she has experienced no weight loss or gain in last 6 months.     Depression Screening:   PHQ-9 Score: 2      Fall Risk Screening:   In the past year, patient has experienced: no history of falling in past year      Urinary Incontinence Screening:   Patient has leaked urine accidently in the last six months.     Home Safety:  Patient has trouble with stairs inside or outside of their home. Patient has working smoke alarms and has working carbon monoxide detector. Home safety hazards include: none.     Nutrition:   Current diet is Regular.     Medications:   Patient is currently taking over-the-counter supplements. OTC medications include: see medication list. Patient is able to manage medications.     Activities  of Daily Living (ADLs)/Instrumental Activities of Daily Living (IADLs):   Walk and transfer into and out of bed and chair?: Yes  Dress and groom yourself?: Yes    Bathe or shower yourself?: Yes    Feed yourself? Yes  Do your laundry/housekeeping?: Yes  Manage your money, pay your bills and track your expenses?: Yes  Make your own meals?: Yes    Do your own shopping?: Yes    Previous Hospitalizations:   Any hospitalizations or ED visits within the last 12 months?: No      Advance Care Planning:   Living will: Yes    Durable POA for healthcare: Yes    Advanced directive: Yes      PREVENTIVE SCREENINGS      Cardiovascular Screening:    General: Screening Not Indicated and History Lipid Disorder      Diabetes Screening:     General: Screening Current      Breast Cancer Screening:     General: History Breast Cancer      Cervical Cancer Screening:    General: Screening Not Indicated      Osteoporosis Screening:    General: Screening Not Indicated and History Osteoporosis      Lung Cancer Screening:     General: Screening Not Indicated    Screening, Brief Intervention, and Referral to Treatment (SBIRT)    Screening      Single Item Drug Screening:  How often have you used an illegal drug (including marijuana) or a prescription medication for non-medical reasons in the past year? never    Single Item Drug Screen Score: 0  Interpretation: Negative screen for possible drug use disorder    Social Drivers of Health     Financial Resource Strain: Low Risk  (10/14/2022)    Overall Financial Resource Strain (CARDIA)     Difficulty of Paying Living Expenses: Not hard at all   Food Insecurity: No Food Insecurity (12/6/2024)    Hunger Vital Sign     Worried About Running Out of Food in the Last Year: Never true     Ran Out of Food in the Last Year: Never true   Transportation Needs: No Transportation Needs (12/6/2024)    PRAPARE - Transportation     Lack of Transportation (Medical): No     Lack of Transportation (Non-Medical): No  "  Housing Stability: Low Risk  (12/6/2024)    Housing Stability Vital Sign     Unable to Pay for Housing in the Last Year: No     Number of Times Moved in the Last Year: 0     Homeless in the Last Year: No   Utilities: Not At Risk (12/6/2024)    [facility] Utilities     Threatened with loss of utilities: No     No results found.    Objective   Pulse 77   Temp (!) 96.9 °F (36.1 °C) (Tympanic)   Ht 5' 8.5\" (1.74 m)   Wt 88.9 kg (196 lb)   LMP  (LMP Unknown)   SpO2 97%   BMI 29.37 kg/m²     Physical Exam  Vitals and nursing note reviewed.   Constitutional:       General: She is not in acute distress.     Appearance: She is well-developed.   HENT:      Head: Normocephalic and atraumatic.      Right Ear: Tympanic membrane, ear canal and external ear normal.      Left Ear: Tympanic membrane, ear canal and external ear normal.      Nose: Nose normal.      Mouth/Throat:      Mouth: Mucous membranes are moist.      Pharynx: Oropharynx is clear.   Eyes:      Extraocular Movements: Extraocular movements intact.      Conjunctiva/sclera: Conjunctivae normal.      Pupils: Pupils are equal, round, and reactive to light.   Neck:      Vascular: No carotid bruit.   Cardiovascular:      Rate and Rhythm: Normal rate and regular rhythm.      Heart sounds: Normal heart sounds. No murmur heard.  Pulmonary:      Effort: Pulmonary effort is normal. No respiratory distress.      Breath sounds: Normal breath sounds. No wheezing, rhonchi or rales.   Abdominal:      General: Abdomen is flat. Bowel sounds are normal. There is no distension.      Palpations: Abdomen is soft. There is no mass.      Tenderness: There is no abdominal tenderness. There is no guarding.   Musculoskeletal:         General: No swelling.      Cervical back: Normal range of motion and neck supple. No rigidity or tenderness.      Right lower leg: No edema.      Left lower leg: No edema.   Lymphadenopathy:      Cervical: No cervical adenopathy.   Skin:     General: Skin is " warm and dry.      Capillary Refill: Capillary refill takes less than 2 seconds.      Coloration: Skin is not jaundiced or pale.   Neurological:      General: No focal deficit present.      Mental Status: She is alert. Mental status is at baseline.   Psychiatric:         Mood and Affect: Mood normal.         Behavior: Behavior normal.         Thought Content: Thought content normal.         Judgment: Judgment normal.

## 2024-12-07 PROBLEM — Z95.828 PORT-A-CATH IN PLACE: Status: RESOLVED | Noted: 2022-06-17 | Resolved: 2024-12-07

## 2024-12-07 NOTE — ASSESSMENT & PLAN NOTE
Medication of Lexapro and alprazolam seem to be very effective for maintaining control of anxiety symptoms no adverse symptoms are noted recommend continuation of both medications at current dosing levels

## 2024-12-07 NOTE — ASSESSMENT & PLAN NOTE
Varicosities of the legs continue no indication of thrombosis recommend continued surveillance

## 2024-12-07 NOTE — ASSESSMENT & PLAN NOTE
No evidence of recurrent breast cancer at this time recommend continuation of anastrozole medication

## 2024-12-07 NOTE — ASSESSMENT & PLAN NOTE
A comprehensive lipid profile has been requested and will be reviewed with the patient upon completion    Orders:    Lipid panel; Future

## 2024-12-07 NOTE — ASSESSMENT & PLAN NOTE
No current symptoms of depression well-controlled on Lexapro 20 mg daily no adverse symptoms noted continue current therapy advised

## 2024-12-11 ENCOUNTER — TELEPHONE (OUTPATIENT)
Age: 74
End: 2024-12-11

## 2024-12-11 NOTE — TELEPHONE ENCOUNTER
Melani from [facility].  Dr Patel ordered a body comp DXA Scan. Does he want a body comp or regular normal DEXA scan?  Two different tests    Body Comp is what is currently scheduled but she had a dexa one year ago does he really want it in this short period of time.  Typically they don't do them for 24 months again.  If so they need the diagnosis code changed to be indicitive of the reason it would be done so soon.    They just want to clarify that she said.    Please call her back.

## 2024-12-12 ENCOUNTER — HOSPITAL ENCOUNTER (OUTPATIENT)
Dept: RADIOLOGY | Facility: IMAGING CENTER | Age: 74
End: 2024-12-12
Payer: MEDICARE

## 2024-12-12 VITALS — WEIGHT: 196 LBS | HEIGHT: 69 IN | BODY MASS INDEX: 29.03 KG/M2

## 2024-12-12 DIAGNOSIS — Z12.31 VISIT FOR SCREENING MAMMOGRAM: ICD-10-CM

## 2024-12-12 DIAGNOSIS — E01.0 THYROMEGALY: ICD-10-CM

## 2024-12-12 PROCEDURE — 77067 SCR MAMMO BI INCL CAD: CPT

## 2024-12-12 PROCEDURE — 77063 BREAST TOMOSYNTHESIS BI: CPT

## 2024-12-12 PROCEDURE — 76536 US EXAM OF HEAD AND NECK: CPT

## 2024-12-18 ENCOUNTER — HOSPITAL ENCOUNTER (OUTPATIENT)
Dept: BONE DENSITY | Facility: IMAGING CENTER | Age: 74
Discharge: HOME | End: 2024-12-18
Payer: MEDICARE

## 2024-12-18 VITALS — BODY MASS INDEX: 30.01 KG/M2 | WEIGHT: 198 LBS | HEIGHT: 68 IN

## 2024-12-18 DIAGNOSIS — Z79.811 USE OF ANASTROZOLE (ARIMIDEX): ICD-10-CM

## 2024-12-18 DIAGNOSIS — M81.0 OSTEOPOROSIS WITHOUT CURRENT PATHOLOGICAL FRACTURE, UNSPECIFIED OSTEOPOROSIS TYPE: ICD-10-CM

## 2024-12-18 PROCEDURE — 77080 DXA BONE DENSITY AXIAL: CPT

## 2024-12-19 DIAGNOSIS — M81.0 OSTEOPOROSIS WITHOUT CURRENT PATHOLOGICAL FRACTURE, UNSPECIFIED OSTEOPOROSIS TYPE: Primary | ICD-10-CM

## 2024-12-29 DIAGNOSIS — Z17.0 MALIGNANT NEOPLASM OF OVERLAPPING SITES OF RIGHT BREAST IN FEMALE, ESTROGEN RECEPTOR POSITIVE (HCC): ICD-10-CM

## 2024-12-29 DIAGNOSIS — C50.811 MALIGNANT NEOPLASM OF OVERLAPPING SITES OF RIGHT BREAST IN FEMALE, ESTROGEN RECEPTOR POSITIVE (HCC): ICD-10-CM

## 2024-12-30 RX ORDER — ANASTROZOLE 1 MG/1
1 TABLET ORAL DAILY
Qty: 90 TABLET | Refills: 0 | Status: SHIPPED | OUTPATIENT
Start: 2024-12-30

## 2025-01-23 ENCOUNTER — OFFICE VISIT (OUTPATIENT)
Dept: OBGYN CLINIC | Facility: CLINIC | Age: 75
End: 2025-01-23
Payer: MEDICARE

## 2025-01-23 VITALS — HEIGHT: 68 IN | WEIGHT: 197 LBS | BODY MASS INDEX: 29.86 KG/M2

## 2025-01-23 DIAGNOSIS — M17.11 PRIMARY OSTEOARTHRITIS OF RIGHT KNEE: Primary | ICD-10-CM

## 2025-01-23 PROCEDURE — 99214 OFFICE O/P EST MOD 30 MIN: CPT | Performed by: STUDENT IN AN ORGANIZED HEALTH CARE EDUCATION/TRAINING PROGRAM

## 2025-01-23 PROCEDURE — 20610 DRAIN/INJ JOINT/BURSA W/O US: CPT | Performed by: STUDENT IN AN ORGANIZED HEALTH CARE EDUCATION/TRAINING PROGRAM

## 2025-01-23 RX ADMIN — BETAMETHASONE SODIUM PHOSPHATE AND BETAMETHASONE ACETATE 3 MG: 3; 3 INJECTION, SUSPENSION INTRA-ARTICULAR; INTRALESIONAL; INTRAMUSCULAR; SOFT TISSUE at 14:15

## 2025-01-23 RX ADMIN — ROPIVACAINE HYDROCHLORIDE 2 ML: 5 INJECTION, SOLUTION EPIDURAL; INFILTRATION; PERINEURAL at 14:15

## 2025-01-23 NOTE — PROGRESS NOTES
Date: 25  Carie Fernandez   MRN# [id number]  : 1950      Chief Complaint: Right Knee Pain    Assessment and Plan:  The patient verbalized understanding of exam findings and treatment plan. We engaged in the shared decision-making process and treatment options were discussed at length with the patient. Surgical and conservative management discussed today along with risks and benefits. Patient was agreeable with the plan and all questions were answered to satisfaction.     Primary osteoarthritis of right knee  -WBAT  -Activity modification to limit strain or impact on the joint  -Tylenol 1000mg up to three times daily as needed. Do not exceed 3000mg daily  -Home exercise program directed by PT  -Knee sleeve or brace for comfort  -Cane or walker recommended to offload joint  -Corticosteroid injection was offered, accepted, and administered.  Risk benefits and alternatives were discussed prior to injection.  Patient tolerated the procedure well.  -Patient may follow up prn for further evaluation and treatment           Subjective:   Carie Fernandez is a 74 y.o. female who is being seen in follow-up for Right knee pain. Patient states her last CSI provided a few months of relief. When we last saw she we recommended conservative measures.  Pain has improved, than returned.. No other orthopedic complaints or concerns.       Prior treatment:  NSAIDs Yes    Bracing No   Physical Therapy No   Cortisone Injections Yes   Viscosupplementation No     Allergy:  Allergies   Allergen Reactions    Pollen Extract Nasal Congestion     Medications:  All current active meds have been reviewed   Past Medical History:  Past Medical History:   Diagnosis Date    Anxiety     Breast cancer (HCC) 2021    Cancer (HCC)     Right breast cancer    Depression     Fracture of radius, distal, closed     Last Assessed:  6/10/14    Thrombophlebitis     Vitamin D deficiency      Past Surgical History:  Past Surgical History:    Procedure Laterality Date    ANKLE SURGERY      ANKLE SURGERY      BREAST BIOPSY Left 2001    neg    BREAST BIOPSY Right 12/06/2021    punch bx- ca    GALLBLADDER SURGERY      HYSTERECTOMY  1996    IR PORT PLACEMENT  12/29/2021    IR PORT REMOVAL  02/21/2023    MASTECTOMY Right 04/26/2022    OOPHORECTOMY Bilateral 1996    [place] MAST MODF RAD W/AX LYMPH NOD W/WO PECT/DALTON MIN Right 04/26/2022    Procedure: BREAST MODIFIED RADICAL MASTECTOMY WITH RIGHT LYMPHATIC MAPPING WITH BLUE AND RADIOACTIVE DYE (INJECT AT 0900 BY DR VASQUEZ IN THE OR);  Surgeon: Kevin Vasquez MD;  Location: [facility];  Service: Surgical Oncology    WISDOM TOOTH EXTRACTION       Family History:  Family History   Problem Relation Age of Onset    Ovarian cancer Mother 56    Heart disease Father     No Known Problems Daughter     No Known Problems Daughter     Cancer Maternal Grandmother         Unknown primary; mid 50's    Leukemia Brother 58    Heart disease Brother     Heart disease Brother     No Known Problems Son     No Known Problems Son     Stomach cancer Maternal Aunt         60's     Social History:  Social History     Substance and Sexual Activity   Alcohol Use Yes    Comment: rarely     Social History     Substance and Sexual Activity   Drug Use Yes    Types: Marijuana    Comment: not helping stopped     Social History     Tobacco Use   Smoking Status Never   Smokeless Tobacco Never           Review of Systems:  General- denies fever/chills  HEENT- denies hearing loss or sore throat  Eyes- denies eye pain or visual disturbances, denies red eyes  Respiratory- denies cough or SOB  Cardio- denies chest pain or palpitations  GI- denies abdominal pain  Endocrine- denies urinary frequency  Urinary- denies pain with urination  Musculoskeletal- Negative except noted above  Skin- denies rashes or wounds  Neurological- denies dizziness or headache  Psychiatric- denies anxiety or difficulty concentrating    Objective:   BP Readings from Last 1  "Encounters:   11/01/24 142/84      Wt Readings from Last 1 Encounters:   01/23/25 89.4 kg (197 lb)      Pulse Readings from Last 1 Encounters:   12/06/24 77        BMI: Estimated body mass index is 29.95 kg/m² as calculated from the following:    Height as of this encounter: 5' 8\" (1.727 m).    Weight as of this encounter: 89.4 kg (197 lb).    Physical Exam  Ht 5' 8\" (1.727 m)   Wt 89.4 kg (197 lb)   LMP  (LMP Unknown)   BMI 29.95 kg/m²   General/Constitutional: No apparent distress: well-nourished and well developed.  Eyes: normal ocular motion  Cardio: RRR, Normal S1S2, No m/r/g.   Lymphatic: No appreciable lymphadenopathy  Respiratory: Non-labored breathing, CTA b/l no w/c/r  Vascular: No edema, swelling or tenderness, except as noted in detailed exam. Extremities well perfused. No LE edema  Integumentary: No impressive skin lesions present, except as noted in detailed exam.  Neuro: No ataxia or tremors noted  Psych: Normal mood and affect, oriented to person, place and time. Appropriate affect.  Musculoskeletal: Normal, except as noted in detailed exam and in HPI.    Gait and Station:   normal    Right knee:     Inspection:  normal color, temperature, turgor and moisture    Overall limb alignment: neutral    Effusion: no    ROM 3 to 120 with pain    Extensor Lag: Absent    Palpation: Medial and pes bursa joint line tenderness to palpation    Patellar crepitus     stable to AP translation at 90 deg    Coronal plane stable in full extension    Coronal plane stable in mid-flexion     Motor: 5/5 EHL/FHL/TA/GS/Qd/Hs    Vascular: Toes WWP with BCR    Sensory: SILT DP/SP/Rahul/Saph/Tib  Crepitation with motion of patella     Large joint arthrocentesis: R knee  Universal Protocol:  Consent: Verbal consent obtained.  Risks and benefits: risks, benefits and alternatives were discussed  Consent given by: patient  Patient understanding: patient states understanding of the procedure being performed  Patient identity " confirmed: verbally with patient  Supporting Documentation  Indications: pain and joint swelling   Procedure Details  Location: knee - R knee  Needle size: 22 G  Ultrasound guidance: no  Approach: lateral  Medications administered: 3 mg betamethasone acetate-betamethasone sodium phosphate 6 (3-3) mg/mL; 2 mL ropivacaine 0.5 %    Patient tolerance: patient tolerated the procedure well with no immediate complications  Dressing:  Sterile dressing applied           Images:  I personally reviewed relevant images in the PACS system and my interpretation is as follows:  X-rays of the right knee reveal moderate degenerative changes with subchondral sclerosis, joint space narrowing, and osteophyte formation            Scribe Attestation      I,:  Natan Miner am acting as a scribe while in the presence of the attending physician.:       I,:  Tee Olvera MD personally performed the services described in this documentation    as scribed in my presence.:               Tee Olvera MD  Adult Reconstruction Specialist   [facility]

## 2025-01-24 RX ORDER — ROPIVACAINE HYDROCHLORIDE 5 MG/ML
2 INJECTION, SOLUTION EPIDURAL; INFILTRATION; PERINEURAL
Status: COMPLETED | OUTPATIENT
Start: 2025-01-23 | End: 2025-01-23

## 2025-01-24 RX ORDER — BETAMETHASONE SODIUM PHOSPHATE AND BETAMETHASONE ACETATE 3; 3 MG/ML; MG/ML
3 INJECTION, SUSPENSION INTRA-ARTICULAR; INTRALESIONAL; INTRAMUSCULAR; SOFT TISSUE
Status: COMPLETED | OUTPATIENT
Start: 2025-01-23 | End: 2025-01-23

## 2025-01-24 NOTE — ASSESSMENT & PLAN NOTE
-WBAT  -Activity modification to limit strain or impact on the joint  -Tylenol 1000mg up to three times daily as needed. Do not exceed 3000mg daily  -Home exercise program directed by PT  -Knee sleeve or brace for comfort  -Cane or walker recommended to offload joint  -Corticosteroid injection was offered, accepted, and administered.  Risk benefits and alternatives were discussed prior to injection.  Patient tolerated the procedure well.  -Patient may follow up prn for further evaluation and treatment

## 2025-01-29 ENCOUNTER — OFFICE VISIT (OUTPATIENT)
Dept: OBGYN CLINIC | Facility: HOSPITAL | Age: 75
End: 2025-01-29
Payer: MEDICARE

## 2025-01-29 ENCOUNTER — HOSPITAL ENCOUNTER (OUTPATIENT)
Dept: RADIOLOGY | Facility: HOSPITAL | Age: 75
Discharge: HOME | End: 2025-01-29
Attending: ORTHOPAEDIC SURGERY
Payer: MEDICARE

## 2025-01-29 VITALS — HEIGHT: 68 IN | BODY MASS INDEX: 29.86 KG/M2 | WEIGHT: 197 LBS

## 2025-01-29 DIAGNOSIS — M18.11 OSTEOARTHRITIS OF RIGHT THUMB: ICD-10-CM

## 2025-01-29 DIAGNOSIS — R52 PAIN: ICD-10-CM

## 2025-01-29 DIAGNOSIS — G56.01 CARPAL TUNNEL SYNDROME ON RIGHT: Primary | ICD-10-CM

## 2025-01-29 DIAGNOSIS — M67.431 GANGLION CYST OF VOLAR ASPECT OF RIGHT WRIST: ICD-10-CM

## 2025-01-29 PROCEDURE — 99204 OFFICE O/P NEW MOD 45 MIN: CPT | Performed by: ORTHOPAEDIC SURGERY

## 2025-01-29 PROCEDURE — 99214 OFFICE O/P EST MOD 30 MIN: CPT | Performed by: ORTHOPAEDIC SURGERY

## 2025-01-29 PROCEDURE — 20600 DRAIN/INJ JOINT/BURSA W/O US: CPT | Performed by: ORTHOPAEDIC SURGERY

## 2025-01-29 PROCEDURE — 73140 X-RAY EXAM OF FINGER(S): CPT

## 2025-01-29 RX ADMIN — BETAMETHASONE SODIUM PHOSPHATE AND BETAMETHASONE ACETATE 6 MG: 3; 3 INJECTION, SUSPENSION INTRA-ARTICULAR; INTRALESIONAL; INTRAMUSCULAR; SOFT TISSUE at 09:15

## 2025-01-29 RX ADMIN — LIDOCAINE HYDROCHLORIDE 0.5 ML: 10 INJECTION, SOLUTION EPIDURAL; INFILTRATION; INTRACAUDAL; PERINEURAL at 09:15

## 2025-01-29 NOTE — PATIENT INSTRUCTIONS
Advil/Tylenol/Aleve as needed for pain  Tylenol extended release as needed for pain  Volaten Gel  Aspercreme with lidocaine topical  Australian dream topical  4-6 oz of tart cherry juice prior to bedtime  Tumeric

## 2025-01-29 NOTE — PROGRESS NOTES
ASSESSMENT/PLAN:    Assessment:   Right carpal tunnel syndrome  Right STT osteoarthritis  Right wrist volar ganglion cyst    Plan:   X-rays today show STT osteoarthritis discussed conservative vs surgical treatment options to include OTC treatment of inflammation, CS injection, hand therapy for strengthening exercises.   Discussed with the patient that the cyst is associated with the STT OA and is benign we can consider excision if the patient wishes.   Patient provided with a right STT joint injection, tolerated well, and can be repeated in 3-4 mos.   Patient will see hand therapy today for home exercise program  Comfort cool brace provided       Follow Up:  After Testing    To Do Next Visit:  Discussed US     General Discussions:  Carpal Tunnel Syndrome: The anatomy and physiology of carpal tunnel syndrome was discussed with the patient today.  Increase pressure localized under the transverse carpal ligament can cause pain, numbness, tingling, or dysesthesias within the median nerve distribution as well as feelings of fatigue, clumsiness, or awkwardness.  These symptoms typically occur at night and worse in the morning upon waking.  Eventually, untreated carpal tunnel syndrome can result in weakness and permanent loss of muscle within the thenar compartment of the hand.  Treatment options were discussed with the patient.  Conservative treatment includes nocturnal resting splints to keep the nerve in a neutral position, ergonomic changes within the work or home environment, activity modification, and tendon gliding exercises.  Steroid injections within the carpal canal can help a majority of patients, however this is often self-limited in a majority of patients.  Surgical intervention to divide the transverse carpal ligament typically results in a long-lasting relief of the patient's complaints, with the recurrence rate of less than 1%.   Ganglion Cysts: The anatomy and physiology of the ganglion was discussed with  the patient today in the office.  Fluid leaking out of the joint surface typically creates a small sac, which can enlarge and cause pain or limitation of motion.  Treatment options include observation, aspiration, or surgical incision were discussed with the patient today.  Observation typically lead to resolution and approximately 10% of patients, aspiration least resolution approximately 50% of patients, and surgical excision lead to resolution in approximately 97% of patients.  After discussion with the patient today, the patient voiced understanding of all treatment options.  Osteoarthritis:  The anatomy and physiology of osteoarthritis was discussed with the patient today in the office.  Deterioration of the articular cartilage eventually leads to altered mobility at the joint, resulting in joint subluxation, osteophyte formation, cystic changes, as well as subchondral sclerosis.  Eventually, pain, limited mobility, and compensatory hypermobility at surrounding joints may develop.  While normal activity and usage of the joint may provide a painful experience to the patient, this typically does not result in damage to the limb.  Treatment options include splints to decreased joint edema, pain, and inflammation.  Therapy exercises to strengthen the surrounding musculature may relieve pain, but do not alter the overall continued development of osteoarthritis.  Oral medications, topical medications, corticosteroid injections may decrease pain and increase overall function.  Eventually, some patients may require surgical intervention.     _____________________________________________________  CHIEF COMPLAINT:  Chief Complaint   Patient presents with   • Right Wrist - New Patient Visit, Numbness, Tingling   • Right Thumb - Pain         SUBJECTIVE:  Carie Fernandez is a 74 y.o. female who presents with right thumb pain and Numbness to the right index finger, long finger, and thumb.  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 Current symptoms started few month(s) ago as Insidious.  Patient reports she is dropping items.  She reports increased thumb pain with gripping activities. Patient also reports the index and long finger lock up on her.   Radiation: Yes to the  hand  Previous Treatments: bracing without relief  Associated symptoms: Numbness  Handedness: right  Work status: retired    PAST MEDICAL HISTORY:  Past Medical History:   Diagnosis Date   • Anxiety    • Breast cancer (HCC) 12/06/2021   • Cancer (HCC)     Right breast cancer   • Depression    • Fracture of radius, distal, closed     Last Assessed:  6/10/14   • Thrombophlebitis    • Vitamin D deficiency        PAST SURGICAL HISTORY:  Past Surgical History:   Procedure Laterality Date   • ANKLE SURGERY     • ANKLE SURGERY     • BREAST BIOPSY Left 2001    neg   • BREAST BIOPSY Right 12/06/2021    punch bx- ca   • GALLBLADDER SURGERY     • HYSTERECTOMY  1996   • IR PORT PLACEMENT  12/29/2021   • IR PORT REMOVAL  02/21/2023   • MASTECTOMY Right 04/26/2022   • OOPHORECTOMY Bilateral 1996   • [place] MAST MODF RAD W/AX LYMPH NOD W/WO PECT/DALTON MIN Right 04/26/2022    Procedure: BREAST MODIFIED RADICAL MASTECTOMY WITH RIGHT LYMPHATIC MAPPING WITH BLUE AND RADIOACTIVE DYE (INJECT AT 0900 BY DR VASQUEZ IN THE OR);  Surgeon: Kevin Vasquez MD;  Location[facility];  Service: Surgical Oncology   • WISDOM TOOTH EXTRACTION         FAMILY HISTORY:  Family History   Problem Relation Age of Onset   • Ovarian cancer Mother 56   • Heart disease Father    • No Known Problems Daughter    • No Known Problems Daughter    • Cancer Maternal Grandmother         Unknown primary; mid 50's   • Leukemia Brother 58   • Heart disease Brother    • Heart disease Brother    • No Known Problems Son    • No Known Problems Son    • Stomach cancer Maternal Aunt         60's       SOCIAL HISTORY:  Social History     Tobacco Use   • Smoking status: Never   • Smokeless tobacco: Never   Vaping Use   • Vaping  "status: Never Used   Substance Use Topics   • Alcohol use: Yes     Comment: rarely   • Drug use: Yes     Types: Marijuana     Comment: not helping stopped       MEDICATIONS:    Current Outpatient Medications:   •  ALPRAZolam (XANAX) 0.25 mg tablet, Take 1/2 to 1 pill Q 8 hours p.r.n. For anxiety, Disp: 30 tablet, Rfl: 0  •  anastrozole (ARIMIDEX) 1 mg tablet, Take 1 tablet (1 mg total) by mouth daily, Disp: 90 tablet, Rfl: 0  •  Calcium 250 MG CAPS, Take 250 mg by mouth in the morning  , Disp: , Rfl:   •  cholecalciferol (VITAMIN D3) 1,000 units tablet, Take 2 capsules by mouth daily, Disp: , Rfl:   •  ciclopirox (LOPROX) 0.77 % cream, , Disp: , Rfl:   •  escitalopram (LEXAPRO) 20 mg tablet, Take 1 tablet (20 mg total) by mouth daily, Disp: 86 tablet, Rfl: 1  •  Fish Oil-Krill Oil (KRILL OIL PLUS) CAPS, Take by mouth in the morning  , Disp: , Rfl:   •  fluocinonide (LIDEX) 0.05 % external solution, APPLY TO SCALP ONCE DAILY AS NEEDED FOR ITCH, Disp: , Rfl: 2  •  ketoconazole (NIZORAL) 2 % shampoo, Apply 1 application topically if needed  , Disp: , Rfl: 5  •  meclizine (ANTIVERT) 25 mg tablet, Take 1 tablet (25 mg total) by mouth every 8 (eight) hours, Disp: 30 tablet, Rfl: 0    ALLERGIES:  Allergies   Allergen Reactions   • Pollen Extract Nasal Congestion       REVIEW OF SYSTEMS:  Pertinent items are noted in HPI.  A comprehensive review of systems was negative.    LABS:  HgA1c: No results found for: \"HGBA1C\"  BMP:   Lab Results   Component Value Date    GLUCOSE 83 07/03/2015    CALCIUM 9.2 06/13/2024     07/03/2015    K 4.1 06/13/2024    CO2 29 06/13/2024     06/13/2024    BUN 15 06/13/2024    CREATININE 0.71 06/13/2024         _____________________________________________________  PHYSICAL EXAMINATION:  Vital signs: Ht 5' 8\" (1.727 m)   Wt 89.4 kg (197 lb)   LMP  (LMP Unknown)   BMI 29.95 kg/m²   General: well developed and well nourished, alert, oriented times 3, and appears " comfortable  Psychiatric: Normal  HEENT: Trachea Midline, No torticollis  Cardiovascular: No discernable arrhythmia  Pulmonary: No wheezing or stridor  Abdomen: No rebound or guarding  Extremities: No peripheral edema  Skin: No masses, erythema, lacerations, fluctation, ulcerations  Neurovascular: Sensation Intact to the Median, Ulnar, Radial Nerve, Motor Intact to the Median, Ulnar, Radial Nerve, and Pulses Intact    MUSCULOSKELETAL EXAMINATION:  Right Carpal Tunnel Exam:  Positive thenar atrophy. Positive carpal tunnel compression. Positive tinels over median nerve at the wrist.  AIN 5/5, APB 4-/5, intrinsics 5/5    right CMC Exam:  No adduction contracture  10 degrees hyperextension deformity of MCP joint  Positive localized tenderness over radial and dorsal aspect of thumb (CMC joint)  Grind test is Positive for pain and Positive for crepitus  Negative finkelstein     Positive cyst like structure on the volar radial wrist measuring 1x1 cm.  Skin is not translucent.  There is not skin thinning.  Skin is intact.  No signs of infection.  Normal armani's test  _____________________________________________________  STUDIES REVIEWED:  Images were reviewed in PACS by Dr. Ochoa and demonstrate: Right thumb x-rays demonstrates advanced STT osteoarthritis       PROCEDURES PERFORMED:  Small joint arthrocentesis  Universal Protocol:  procedure performed by consultantConsent: Verbal consent obtained.  Consent given by: patient  Patient understanding: patient states understanding of the procedure being performed  Site marked: the operative site was marked  Patient identity confirmed: verbally with patient  Supporting Documentation  Indications: pain   Procedure Details  Location: thumb - Thumb joint: right STT.  Preparation: Patient was prepped and draped in the usual sterile fashion  Needle size: 25 G  Ultrasound guidance: no  Approach: radial  Medications administered: 6 mg betamethasone acetate-betamethasone sodium  phosphate 6 (3-3) mg/mL; 0.5 mL lidocaine (PF) 1 %    Patient tolerance: patient tolerated the procedure well with no immediate complications  Dressing:  Sterile dressing applied          Scribe Attestation    I,:  Chrissy Covington MA am acting as a scribe while in the presence of the attending physician.:       I,:  Enrique Ochoa MD personally performed the services described in this documentation    as scribed in my presence.:

## 2025-02-01 RX ORDER — BETAMETHASONE SODIUM PHOSPHATE AND BETAMETHASONE ACETATE 3; 3 MG/ML; MG/ML
6 INJECTION, SUSPENSION INTRA-ARTICULAR; INTRALESIONAL; INTRAMUSCULAR; SOFT TISSUE
Status: COMPLETED | OUTPATIENT
Start: 2025-01-29 | End: 2025-01-29

## 2025-02-01 RX ORDER — LIDOCAINE HYDROCHLORIDE 10 MG/ML
0.5 INJECTION, SOLUTION EPIDURAL; INFILTRATION; INTRACAUDAL; PERINEURAL
Status: COMPLETED | OUTPATIENT
Start: 2025-01-29 | End: 2025-01-29

## 2025-02-10 ENCOUNTER — TELEPHONE (OUTPATIENT)
Dept: HEMATOLOGY ONCOLOGY | Facility: CLINIC | Age: 75
End: 2025-02-10

## 2025-02-10 NOTE — TELEPHONE ENCOUNTER
Confirmed with patient appt, on 3/4/25 with Dr. Cox at 10:40 AM. This is a transition of care from Dr. Parrish on 3/4/25 at 10:00 AM.

## 2025-02-26 ENCOUNTER — TELEPHONE (OUTPATIENT)
Age: 75
End: 2025-02-26

## 2025-02-26 ENCOUNTER — HOSPITAL ENCOUNTER (OUTPATIENT)
Dept: RADIOLOGY | Facility: HOSPITAL | Age: 75
Discharge: HOME | End: 2025-02-26
Attending: ORTHOPAEDIC SURGERY
Payer: MEDICARE

## 2025-02-26 DIAGNOSIS — G56.01 CARPAL TUNNEL SYNDROME ON RIGHT: ICD-10-CM

## 2025-02-26 PROCEDURE — 76882 US LMTD JT/FCL EVL NVASC XTR: CPT

## 2025-02-26 NOTE — TELEPHONE ENCOUNTER
Pt denies any colorectal related symptoms; Scheduled for colonoscopy on 3/19/2025 at [facility] w/ Dr. Stanley; paperwork mailed out for pt.     Pt denies intake of weight loss/diabetic medication or blood thinners.     OV 2/27 canceled.

## 2025-02-26 NOTE — LETTER
Carie GONZALEZ Jim  [address] [facility] [id number]            Location:  [address] [address]    Performing Physician: SHAWANDA Stanley MD      DATE OF PROCEDURE: 3/19/2025       The OR/GI Lab will contact you the evening prior to your procedure with your exact arrival time.    We kindly ask that you immediately notify us of any need to cancel or reschedule your procedure.      WEEK BEFORE THE PROCEDURE:  Do not take Iron tablets for one week  5 days prior to the appointment AVOID vegetables and fruits with skins or seeds, nuts, corn, popcorn, and whole grain breads.  Purchase: One (1) 238-gram container of Miralax (polyethylene glycol 3350), four (4) 5 mg Dulcolax (bisacodyl) tablets, and 64-ounces of Gatorade (sports drink) - no red, orange, or purple. These may be purchased at any pharmacy without a prescription. Generic products are permissible.  Arrange responsible transportation for day of the procedure.      DAY BEFORE THE PROCEDURE:  CLEAR liquids only for entire day prior. Nothing red, orange or purple.  You MAY have:  Soda  Water  Broth Gatorade  Jell-O  Popsicles Coffee/tea without  Milk/creamer     YOU MAY NOT HAVE:  Solid foods  Milk and milk products  Juice with pulp    BOWEL PREPARATION: Includes: One (1) 238-gram container of Miralax (polyethylene glycol 3350), four (4) 5 mg Dulcolax (bisacodyl) tablets, and 64-ounces of Gatorade (sports drink). Preparation may be refrigerated. Entire bowel prep should be completed.    Afternoon before the procedure (2:00 pm - 5:00 pm):  Take two (2) 5 mg Dulcolax laxative tablets.    Evening before the procedure (6:00 pm):  Mix entire container of Miralax with 64-ounces of Gatorade and shake until all medication is dissolved.  Begin drinking solution. Drink an eight (8) ounce cup every 10-15 minutes until you have consumed half (32 ounces) of the solution. Refrigerate remaining solution.    Night before the procedure (8:00  pm):  Take two (2) 5 mg Dulcolax laxative tablets.    Beginning 5 hours before your procedure:  Drink the remaining amount of prepared solution (32 ounces). Drink an eight (8) ounce cup every 10-15 minutes until you have consumed the remaining solution.      Bowel prep should be completed 4 hours prior to procedure time.  NOTHING TO EAT OR DRINK AFTER MIDNIGHT -EXCEPT YOUR BOWEL PREP                                                                                                                                                                                DAY OF THE PROCEDURE:  You may brush your teeth.  Leave all jewelry at home. Take out any facial piercings, if able.  Please arrive for your procedure as indicated by the OR / GI Lab / Endoscopy Unit. The hospital will contact you the day before with your exact arrival time.  Make sure you have arranged ahead of time for a responsible adult (18 or older) to accompany and drive you home after the procedure. Please discuss any transportation concerns with our staff prior to your procedure.  The effects of the anesthesia can persist for 24 hours. After receiving the sedation, you must exercise caution before engaging in any activity that could harm yourself and others (such as driving a car). Do not make any important decisions or do not drink any alcoholic beverages during this time period.  After your procedure, you may have anything you’d like to eat or drink. You will probably want to start with something light. Please include plenty of fluids. Avoid items that cause gas such as sodas and salads.                                        SPECIAL INSTRUCTIONS:    For patients currently taking blood thinners and/or antiplatelet therapy our office will contact the prescribing provider. Our office will contact you with any required changes to your medication regimen.  Blood thinner (i.e. - Coumadin, Pradaxa, Lovenox, Xarelto, Eliquis)  Antiplatelet (i.e. - Plavix,  Aggrenox, Effient, Brilinta)      Diabetes:  If you are Diabetic, please see separate Diabetic Instruction Sheet.  Prescribed medications:  Do not stop your aspirin, or any of your other medications (unless instructed otherwise).  Take the rest of your prescribed medications with small sips of water at least 2 hours prior to your procedure.      NOTHING TO EAT OR DRINK (INCLUDING CHEWING GUM) AFTER 12 MIDNIGHT PRIOR TO YOUR PROCEDURE EXCEPT YOUR BOWEL PREP.        For any questions or concerns related to your bowel preparation or pre-procedure instructions, please contact our office.  Thank you for choosing [facility]!    Revised 1/2023    [phone]                                                    Medicine Instructions for Adults with Diabetes who Need a Bowel Prep       Follow these instructions when a BOWEL PREP is required for your procedure or surgery!    NOTE:   GLP-1 Agonists taken weekly: do not take in the 7 days before your procedure   SGLT-2 Inhibitors: do not take in the 4 days before your procedure     On the Day Before Surgery/Procedure  If you are having a procedure (e.g. Colonoscopy) or surgery that requires a bowel prep and you may have at least a clear liquid diet, follow the directions below based on the type of medicine you take for your diabetes.     Type of Medicine You Take Examples What to do   Pre-Mixed Insulin - Intermediate Acting Humalog® 75/25, Humulin® 70/30, Novolog® 70/30, Regular Insulin Take ½ your regular dose the evening before your procedure   Rapid/Fast Acting Insulin Humalog® U200, NovoLog®, Apidra®, Fiasp® Take ½ your regular dose the evening before your procedure.   Long-Acting Insulin Lantus®, Levemir®, Tresiba®, Toujeo®, Basaglar® Take your FULL regular dose the day before procedure   Oral Sulfonylurea Glipizide/Glimepiride/Glucotrol® Take ½ your regular dose the evening before your procedure   Other Oral Diabetes Medicines Metformin®,  Glucophage®, Glucophage XR®, Riomet®, Glumetza®), Actose®, Avandia®, Glyset®, Prandin® Take your regular dose with dinner in the evening before your procedure   GLP-1 Agonists AdlyxinÒ, ByettaÒ, BydureonÒ, OzempicÒ, SoliquaÒ, TanzeumÒ, TrulicityÒ, VictozaÒ, Saxenda®, Rybelsus® If taken daily, take as normal    If taken weekly, do not take this medicine for 7 days before your procedure including the day of the procedure (resume taking after the procedure)   SGLT-2 Inhibitors Jardiance®, Invokana®, Farxiga®,   Steglatro®, Brenzavvy®, Qtern®, Segluromet®, Glyxambi®, Synjardy®, Synjardy XR®, Invokamet®, Invokamet XR®, Trijary XR®, Xigduo XR®, Steglujan® Do not take for 4 days before your procedure including the day of the procedure (resume taking after the procedure)                More information continued on back                    Medicine Instructions for Adults with Diabetes who Need a Bowel Prep  Page 2      On the Day of Surgery/Procedure  Follow the directions below based on the type of medicine you take for your diabetes.     Type of Medicine You Take Examples What to do   Long-Acting Insulin Lantus®, Levemir®, Tresiba®, Toujeo®, Basaglar®, Semglee®   If you usually take your Long-Acting Insulin in the morning, take the full dose as scheduled.   GLP-1 Agonists AdlyxinÒ, ByettaÒ, BydureonÒ, OzempicÒ, SoliquaÒ, TanzeumÒ, TrulicityÒ, VictozaÒ, Saxenda®, Rybelsus® Do NOT take this medicine on the day of your procedure (resume taking after the procedure)       On the Day of Surgery/Procedure (continued)  Except for the morning Long-Acting Insulin, DO NOT take ANY diabetic medicine on the day of your procedure unless you were instructed by the doctor who manages your diabetes medicines.    Continue to check your blood sugars.  If you have an insulin pump, ask your endocrinologist for instructions at least 3 days before your procedure. NOTE: If you are not able to ask your endocrinologist in advance, on the day of  the procedure set your insulin pump to your basal rate only. Bring your insulin pump supplies to the hospital.     If you have any questions about taking your diabetes medicines prior to your procedure, please contact the doctor who manages your diabetes medicines.

## 2025-03-03 NOTE — PROGRESS NOTES
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)         Other fatigue    Orders:  •  Echo complete w/ contrast if indicated; Future          Clinical/Pathologic Stage  Cancer Staging   Malignant neoplasm of overlapping sites of right breast in female, estrogen receptor positive (HCC)  Staging form: Breast, AJCC 8th Edition  - Pathologic stage from 2022: ypT4d, pN3a, cM0, G1, ER+, NC+, HER2- - Signed by HERMINIO Mullen on 10/24/2024  Stage prefix: Post-therapy  Response to neoadjuvant therapy: No response  Method of lymph node assessment: Axillary lymph node dissection  Nuclear grade: G2  Histologic grading system: 3 grade system  - Clinical: Stage IIIB (cT4d, cN3a, cM0, G2, ER+, NC+, HER2-) - Signed by Sergio Vieyra MD on 3/5/2024  Histologic grading system: 3 grade system      Current  "Therapy  Anastrozole 1 mg per day since June 2022    Discussion    I spent considerable time with this delightful patient and her .  Today represents my initial evaluation and visit.    As charted the patient suffered locally advanced to high risk node positive invasive carcinoma of the right breast.  She had neoadjuvant systemic chemotherapy but significant multifocal residual disease postop in spite of systemic chemotherapy in the neoadjuvant strategy.  She completed chest wall and alvin radiation therapy and is maintained on oral aromatase inhibitor therapy as a single agent with anastrozole.    I am concerned about her significant back pain and will immediately review.  I will restage her fully to ensure that there is no other evidence of metastatic recurrent disease in this high risk circumstance.    I had a long discussion with her about receptor positive inflammatory breast cancer of high risk.  We discussed goals of care and significant concerns.    The patient was given a handwritten sheet outlining core elements of our discussion today as well as educational materials on inflammatory breast cancer, adjuvant therapy, and other topics.    The patient and her  expressed understanding clarity and agreement with the aforementioned plan    I have ordered an MRI of the lumbar spine, CT scans of chest abdomen pelvis with contrast, nuclear medicine whole-body bone scan and laboratories          Special Studies  01/14/2022 Inaika  Negative    No results found for: \"GENETIC\", \"INTERP\", \"GENES\"      History of Present Illness     Postmenopausal female with diagnosis of locally advanced multifocal inflammatory carcinoma of the right breast established in December 2021.    The patient was treated with neoadjuvant systemic chemotherapy with the agents doxorubicin plus cyclophosphamide x 4 cycles followed by paclitaxel x 4 cycles ending in April 2022.    The patient went on to undergo a right sided " modified radical mastectomy in April 2022 which revealed significant multifocal tumor in residua.  YPT4 yN3 with 14 of 19 lymph nodes involved by metastatic spread of disease along with extranodal deposition.    The patient completed external beam radiation therapy long course on July 25, 2022    She has been managed with the oral aromatase inhibitor agent anastrozole from June 2022 by chart review    She comes for ongoing medical oncology follow-up and care    Pertinent Interval History from March 05, 2024  The patient endorses severe low back pain both with standing and with sitting and with movement  While she has had pain in the low back for 5 years it has escalated in recent months and at times is unbearable  Patient endorses a sense of fatigue and diminished activity over recent years  Patient denies other complaints on full review of systems      Cancer Screening and Prevention  Mammography: 12/12/2024   GI/Colonoscopy: 08/11/2016   GYN: 11/30/2021   Bone Density: 12/18/2024   Covid 19 Vaccination Status: Moderna Series times 2 Feb 2021, Moderna boosters 08/22, 11/2023    Oncology Therapeutic Summary:      June 2022-present anastrozole 1 mg/day    July 25, 2022 status post external beam radiation therapy to the right chest wall, draining lymph nodes and right supraclavicular alvin basin x 25 fractions    April 26, 2022 status post right modified radical mastectomy with lymph node review    April 8, 2022 from December 30, 2021 status post neoadjuvant systemic chemotherapy with doxorubicin plus cyclophosphamide x 4 cycles plus paclitaxel x 4 cycles    Oncology History   Cancer Staging   Malignant neoplasm of overlapping sites of right breast in female, estrogen receptor positive (HCC)  Staging form: Breast, AJCC 8th Edition  - Pathologic stage from 4/26/2022: ypT4d, pN3a, cM0, G1, ER+, ND+, HER2- - Signed by HERMINIO Mullen on 10/24/2024  Stage prefix: Post-therapy  Response to neoadjuvant therapy: No  response  Method of lymph node assessment: Axillary lymph node dissection  Nuclear grade: G2  Histologic grading system: 3 grade system  - Clinical: Stage IIIB (cT4d, cN3a, cM0, G2, ER+, MS+, HER2-) - Signed by Sergio Vieyra MD on 3/5/2024  Histologic grading system: 3 grade system  Oncology History   Malignant neoplasm of overlapping sites of right breast in female, estrogen receptor positive (HCC)   12/6/2021 Biopsy    Punch biopsy of right breast  Dermal lymphovascular invasion, immunoprofile consistent with breast primary  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     12/30/2021 - 4/8/2022 Chemotherapy    DOXOrubicin (ADRIAMYCIN), 122 mg, Intravenous, Once, 4 of 4 cycles  Administration: 120 mg (12/30/2021), 122 mg (1/13/2022), 122 mg (1/27/2022), 122 mg (2/10/2022)  palonosetron (ALOXI), 0.25 mg, Intravenous, Once, 2 of 2 cycles  Administration: 0.25 mg (3/24/2022), 0.25 mg (4/7/2022)  pegfilgrastim (NEULASTA), 4 mg (100 % of original dose 4 mg), Subcutaneous, Once, 3 of 3 cycles  Dose modification: 4 mg (original dose 4 mg, Cycle 6)  Administration: 4 mg (3/11/2022), 4 mg (3/25/2022), 4 mg (4/8/2022)  pegfilgrastim (NEULASTA ONPRO), 6 mg, Subcutaneous, Once, 5 of 5 cycles  Administration: 6 mg (12/30/2021), 6 mg (1/13/2022), 6 mg (2/24/2022), 6 mg (1/27/2022), 6 mg (2/10/2022)  cyclophosphamide (CYTOXAN) IVPB, 600 mg/m2 = 1,218 mg, Intravenous, Once, 4 of 4 cycles  Administration: 1,218 mg (12/30/2021), 1,218 mg (1/13/2022), 1,218 mg (1/27/2022), 1,218 mg (2/10/2022)  fosaprepitant (EMEND) IVPB, 150 mg, Intravenous, Once, 4 of 4 cycles  Administration: 150 mg (12/30/2021), 150 mg (1/13/2022), 150 mg (1/27/2022), 150 mg  (2/10/2022)  PACLItaxel (TAXOL) chemo IVPB, 175 mg/m2 = 355.2 mg, Intravenous, Once, 4 of 4 cycles  Administration: 355.2 mg (2/24/2022), 355.2 mg (3/10/2022), 355.2 mg (3/24/2022), 355.2 mg (4/7/2022)     4/26/2022 Surgery    Right breast modified radical mastectomy  Inflammatory breast cancer  Grade 1  1.5 cm (multiple foci involving all four quadrants)  Lymphovascular invasion present  Margins negative  14/19 Lymph nodes with macro-metastases  Anatomic Stage IIIC  Prognostic Stage IIIA     4/26/2022 -  Cancer Staged    Staging form: Breast, AJCC 8th Edition  - Pathologic stage from 4/26/2022: ypT4d, pN3a, cM0, G1, ER+, UT+, HER2- - Signed by HERMINIO Mullen on 10/24/2024  Stage prefix: Post-therapy  Response to neoadjuvant therapy: No response  Method of lymph node assessment: Axillary lymph node dissection  Nuclear grade: G2  Histologic grading system: 3 grade system       6/2022 -  Hormone Therapy    Anastrozole 1 mg daily  Dr. [place]     6/20/2022 - 7/25/2022 Radiation    Plan ID Energy Fractions Dose per Fraction (cGy) Dose Correction (cGy) Total Dose Delivered (cGy) Elapsed Days   R CW 6X 25 / 25 200 0 5,000 35   R PAB 6X 25 / 25 51 0 1,275 35   R Sclav 6X 25 / 25 200 0 5,000 35    Dr Evangelista     3/5/2024 -  Cancer Staged    Staging form: Breast, AJCC 8th Edition  - Clinical: Stage IIIB (cT4d, cN3a, cM0, G2, ER+, UT+, HER2-) - Signed by Sergio Vieyra MD on 3/5/2024  Histologic grading system: 3 grade system       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)   4/21/2023 Initial Diagnosis    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)        Review of Systems   Constitutional:  Positive for activity change and fatigue.   Musculoskeletal:  Positive for back pain.     Medical History Reviewed by provider this encounter:     .    Current Outpatient Medications on File Prior to Visit   Medication Sig Dispense Refill   • ALPRAZolam (XANAX) 0.25 mg tablet Take  "1/2 to 1 pill Q 8 hours p.r.n. For anxiety 30 tablet 0   • anastrozole (ARIMIDEX) 1 mg tablet Take 1 tablet (1 mg total) by mouth daily 90 tablet 0   • Calcium 250 MG CAPS Take 250 mg by mouth in the morning       • cholecalciferol (VITAMIN D3) 1,000 units tablet Take 2 capsules by mouth daily     • ciclopirox (LOPROX) 0.77 % cream      • escitalopram (LEXAPRO) 20 mg tablet Take 1 tablet (20 mg total) by mouth daily 86 tablet 1   • Fish Oil-Krill Oil (KRILL OIL PLUS) CAPS Take by mouth in the morning       • fluocinonide (LIDEX) 0.05 % external solution APPLY TO SCALP ONCE DAILY AS NEEDED FOR ITCH  2   • ketoconazole (NIZORAL) 2 % shampoo Apply 1 application topically if needed    5   • meclizine (ANTIVERT) 25 mg tablet Take 1 tablet (25 mg total) by mouth every 8 (eight) hours 30 tablet 0     No current facility-administered medications on file prior to visit.      Social History     Tobacco Use   • Smoking status: Never   • Smokeless tobacco: Never   Vaping Use   • Vaping status: Never Used   Substance and Sexual Activity   • Alcohol use: Yes     Comment: rarely   • Drug use: Yes     Types: Marijuana     Comment: not helping stopped   • Sexual activity: Not Currently     Partners: Male     Birth control/protection: Female Sterilization         Objective   /92 (BP Location: Right arm, Patient Position: Sitting, Cuff Size: Adult)   Pulse 97   Temp 98.2 °F (36.8 °C) (Temporal)   Resp 16   Ht 5' 8\" (1.727 m)   Wt 90.3 kg (199 lb)   LMP  (LMP Unknown)   SpO2 99%   BMI 30.26 kg/m²     Pain Screening:  Pain Score:   9  ECOG   2  Physical Exam  Exam conducted with a chaperone present.   Constitutional:       General: She is in acute distress.      Appearance: She is obese.   Neck:      Thyroid: No thyroid mass.      Trachea: Trachea normal.   Cardiovascular:      Rate and Rhythm: Normal rate and regular rhythm.      Heart sounds: No murmur heard.     No systolic murmur is present.      No diastolic murmur is " present.      No friction rub. No gallop. No S3 or S4 sounds.   Pulmonary:      Breath sounds: Normal breath sounds. No decreased breath sounds, wheezing or rhonchi.      Comments: On examination low back pain is exacerbated by deep inspiration  Chest:      Chest wall: No mass. There is no dullness to percussion.   Breasts:     Right: Absent. No mass, skin change or tenderness.      Left: Normal. No mass, skin change or tenderness.   Abdominal:      General: Abdomen is protuberant. Bowel sounds are normal.      Palpations: Abdomen is soft. There is no hepatomegaly, splenomegaly or mass.      Tenderness: There is no abdominal tenderness. There is no right CVA tenderness or left CVA tenderness.   Musculoskeletal:      Cervical back: No rigidity or bony tenderness. No pain with movement, spinous process tenderness or muscular tenderness.      Thoracic back: No tenderness or bony tenderness.      Lumbar back: Tenderness and bony tenderness present.      Right lower leg: No edema.      Left lower leg: No edema.   Lymphadenopathy:      Cervical: No cervical adenopathy.      Right cervical: No superficial, deep or posterior cervical adenopathy.     Left cervical: No superficial, deep or posterior cervical adenopathy.   Neurological:      Mental Status: She is alert.         Labs: I have reviewed the following labs:  Lab Results   Component Value Date/Time    WBC 4.03 (L) 06/13/2024 10:40 AM    RBC 4.36 06/13/2024 10:40 AM    Hemoglobin 14.0 06/13/2024 10:40 AM    Hematocrit 43.1 06/13/2024 10:40 AM    MCV 99 (H) 06/13/2024 10:40 AM    MCH 32.1 06/13/2024 10:40 AM    RDW 11.6 06/13/2024 10:40 AM    Platelets 169 06/13/2024 10:40 AM    Segmented % 45 06/13/2024 10:40 AM    Lymphocytes % 38 06/13/2024 10:40 AM    Monocytes % 11 06/13/2024 10:40 AM    Eosinophils Relative 4 06/13/2024 10:40 AM    Basophils Relative 2 (H) 06/13/2024 10:40 AM    Immature Grans % 0 06/13/2024 10:40 AM    Absolute Neutrophils 1.83 (L) 06/13/2024  10:40 AM     Lab Results   Component Value Date/Time    Potassium 4.1 06/13/2024 10:40 AM    Chloride 103 06/13/2024 10:40 AM    CO2 29 06/13/2024 10:40 AM    BUN 15 06/13/2024 10:40 AM    Creatinine 0.71 06/13/2024 10:40 AM    Glucose, Fasting 96 06/13/2024 10:40 AM    Calcium 9.2 06/13/2024 10:40 AM    AST 18 06/13/2024 10:40 AM    ALT 12 06/13/2024 10:40 AM    Alkaline Phosphatase 66 06/13/2024 10:40 AM    Total Protein 6.8 06/13/2024 10:40 AM    Albumin 4.0 06/13/2024 10:40 AM    Total Bilirubin 0.88 06/13/2024 10:40 AM    eGFR 83 06/13/2024 10:40 AM         Pathology:   April 26, 2022 S 21 [id number] status post right breast mastectomy with axillary contents        Final Diagnosis   A. Breast, Right, right breast mastectomy and axillary contents , suture marks short superior, medium medial, long lateral: -Status post neoadjuvant chemotherapy for inflammatory breast carcinoma.   -Extensive residual invasive breast carcinoma , presenting as two well defined tumor foci -  The first focus of tumor measuring 0.7 x 0.6 x 0.6 x 0.7 cm, located in the central upper outer/upper inner quadrant, 1.0cmawayfrom the deep margin -  The second tumor focus measures 1.0 x0.9 x 0.5 cm , lower inner quadrant , The tumor is located 1.3 cm fromthe inferior margin-  Multiple separate tumor foci, range from 2-4 mm seen in bridging tissue between the main 2 masses -  Two separate tumor foci measuring 9 mm and 6 mm grossly presented as fibrous foci.   -Multiple incidental foci of tumor seen in grossly unremarkable breast tissue in all four breast quadrants as follows: -    Few incidental foci of tumor seen in upper outer quadrant ranging from 2-4 mm -Two incidental foci of tumor seen in upper inner quadrant , ranging from 4-5 mm -Few incidental foci of tumor seen in the lower outer quadrant ranging from 5-15 mm -Multiple incidental foci of tumor seen in the the lower inner quadrant , ranging from 3-14 mm -Extensive LVI seen seen in all four  breast quadrants -Dermal LVI seen, multiple sections -Dermal Satellite focus of tumor 3 mm -Dermal scar -Rare foci of atypical lobular hyperplasia [id number] Carie Fernandez       December 6, 2021 S 21 [id number] status post punch biopsy skin lesion right breast  Final Diagnosis   A. Skin lesion, Breast, Right, punch biopsy: - Dermal lymphovascular invasion, immunoprofile consistent with breast primary, present at tissue edges.     Biomarkers  Estrogen receptor +90-95% positive  Progesterone receptor 70-80% positive  HER2/lory low 2+ by IHC yet nonamplified or negative by FISH analysis      Imaging:           December 18, 2024 bone density DEXA scan  Impression  1. Low bone mass (osteopenia).      2.  Since a DXA study from 11/21/2023, there has been:  A  STATISTICALLY SIGNIFICANT DECREASE in bone mineral density of 0.034 g/cm2 (4.3%) in the lumbar spine.      December 12, 2024 screening mammogram left breast    FINDINGS:   There are no suspicious masses, grouped microcalcifications or areas of unexplained architectural distortion. The skin and nipple areolar complex are unremarkable.       Biopsy clips throughout the breast noted.     IMPRESSION:  No mammographic evidence of malignancy.           ASSESSMENT/BI-RADS CATEGORY:  Left: 2 - Benign  Overall: 2 - Benign     RECOMMENDATION:       - Diagnostic mammogram in 1 year for the left breast.    April 20, 2022 bilateral breast MRI with and without contrast     FINDINGS:   RIGHT  B) MASS: Again noted is enhancing mass in the 12 o'clock position of the right breast, 8 cm from the nipple.  This has decreased in size.  It previously measured 10 mm and currently measures 6 mm. Series 401, image 105/224.      C) MASS:  Previously noted 9 mm mass in the 12 o'clock position of the right breast, 6 cm from the nipple has markedly decreased in size/no longer definitely seen; tiny focus of enhancement is noted in this region.  Series 401, image 100/224.     D) MASS:  Previously noted  6 mm enhancing mass in the 8 o'clock position of the breast has markedly decreased in size/no longer definitely seen; tiny focus of enhancement is noted in this region. Series 401, image 156/224.     E) NON-MASS ENHANCEMENT:  Previously noted area of non mass enhancement in the 6 o'clock position of the breast has markedly decreased; previously was a 28 x 17 mm area of enhancement and currently 1.1 mm linear area of enhancement. Series 401, image 177/224.     F) NON-MASS ENHANCEMENT:  Again noted is an area of non mass enhancement in the 11 o'clock position of the right breast.  The amount of enhancement within this region has decreased; however, the span is similar.  Series 401, image 108/224.     Diffuse right breast skin thickening again noted.  Catheter from port noted.  No right axillary axillary or internal mammary adenopathy.     Left  There are no suspicious enhancing masses or suspicious areas of non-mass enhancement. There is no axillary or internal mammary adenopathy.      IMPRESSION:  Partial response to neoadjuvant chemotherapy.     ASSESSMENT/BI-RADS CATEGORY:  Left: 2 - Benign  Right: 6 - Known Biopsy-Proven Malignancy  Overall: 6 - Known Biopsy-Proven Malignancy     RECOMMENDATION:       - Surgical consultation for the right breast.       - Routine screening mammogram in 1 year for the left breast.      December 14, 2021 nuclear medicine whole-body bone scan  IMPRESSION:     1.  No scintigraphic evidence of osseous metastasis.     December 11, 2021 CT scan chest abdomen pelvis with contrast     IMPRESSION:     No definite evidence of metastatic disease in the chest, abdomen, or pelvis.     Nonspecific 5 mm pulmonary nodule in the right lower lobe, likely infectious/inflammatory.  Recommend continued surveillance according to the patient's oncologic imaging protocol.     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     Workstation  performed: [id number]

## 2025-03-04 ENCOUNTER — OFFICE VISIT (OUTPATIENT)
Dept: HEMATOLOGY ONCOLOGY | Facility: CLINIC | Age: 75
End: 2025-03-04
Payer: MEDICARE

## 2025-03-04 ENCOUNTER — HOSPITAL ENCOUNTER (OUTPATIENT)
Dept: RADIOLOGY | Facility: HOSPITAL | Age: 75
Discharge: HOME | End: 2025-03-04
Payer: MEDICARE

## 2025-03-04 VITALS
RESPIRATION RATE: 16 BRPM | WEIGHT: 199 LBS | HEIGHT: 68 IN | HEART RATE: 97 BPM | OXYGEN SATURATION: 99 % | DIASTOLIC BLOOD PRESSURE: 92 MMHG | SYSTOLIC BLOOD PRESSURE: 150 MMHG | BODY MASS INDEX: 30.16 KG/M2 | TEMPERATURE: 98.2 F

## 2025-03-04 DIAGNOSIS — M54.50 LUMBAR BACK PAIN: ICD-10-CM

## 2025-03-04 DIAGNOSIS — M85.80 OSTEOPENIA, UNSPECIFIED LOCATION: ICD-10-CM

## 2025-03-04 DIAGNOSIS — C50.911 CARCINOMA OF RIGHT BREAST METASTATIC TO AXILLARY LYMPH NODE (HCC): ICD-10-CM

## 2025-03-04 DIAGNOSIS — C50.911 CARCINOMA OF RIGHT BREAST METASTATIC TO AXILLARY LYMPH NODE (HCC): Primary | ICD-10-CM

## 2025-03-04 DIAGNOSIS — Z78.0 OSTEOPENIA AFTER MENOPAUSE: ICD-10-CM

## 2025-03-04 DIAGNOSIS — R53.83 OTHER FATIGUE: ICD-10-CM

## 2025-03-04 DIAGNOSIS — C50.811 MALIGNANT NEOPLASM OF OVERLAPPING SITES OF RIGHT BREAST IN FEMALE, ESTROGEN RECEPTOR POSITIVE (HCC): ICD-10-CM

## 2025-03-04 DIAGNOSIS — M85.80 OSTEOPENIA AFTER MENOPAUSE: ICD-10-CM

## 2025-03-04 DIAGNOSIS — C50.811 MALIGNANT NEOPLASM OF OVERLAPPING SITES OF RIGHT BREAST IN FEMALE, ESTROGEN RECEPTOR POSITIVE (HCC): Primary | ICD-10-CM

## 2025-03-04 DIAGNOSIS — Z17.0 MALIGNANT NEOPLASM OF OVERLAPPING SITES OF RIGHT BREAST IN FEMALE, ESTROGEN RECEPTOR POSITIVE (HCC): ICD-10-CM

## 2025-03-04 DIAGNOSIS — C77.3 CARCINOMA OF RIGHT BREAST METASTATIC TO AXILLARY LYMPH NODE (HCC): ICD-10-CM

## 2025-03-04 DIAGNOSIS — Z17.0 MALIGNANT NEOPLASM OF OVERLAPPING SITES OF RIGHT BREAST IN FEMALE, ESTROGEN RECEPTOR POSITIVE (HCC): Primary | ICD-10-CM

## 2025-03-04 DIAGNOSIS — C77.3 CARCINOMA OF RIGHT BREAST METASTATIC TO AXILLARY LYMPH NODE (HCC): Primary | ICD-10-CM

## 2025-03-04 PROCEDURE — 99205 OFFICE O/P NEW HI 60 MIN: CPT | Performed by: INTERNAL MEDICINE

## 2025-03-04 PROCEDURE — 72158 MRI LUMBAR SPINE W/O & W/DYE: CPT

## 2025-03-04 PROCEDURE — A9585 GADOBUTROL INJECTION: HCPCS | Performed by: INTERNAL MEDICINE

## 2025-03-04 RX ORDER — GADOBUTROL 604.72 MG/ML
9 INJECTION INTRAVENOUS
Status: COMPLETED | OUTPATIENT
Start: 2025-03-04 | End: 2025-03-04

## 2025-03-04 RX ADMIN — GADOBUTROL 9 ML: 604.72 INJECTION INTRAVENOUS at 17:45

## 2025-03-04 NOTE — ASSESSMENT & PLAN NOTE
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    S/p rad rx July 2022    Anastrozole alone from June 2022  Plans  Labs  CT C A P with contrast  NM Bone Scan

## 2025-03-05 ENCOUNTER — HOSPITAL ENCOUNTER (OUTPATIENT)
Dept: RADIOLOGY | Facility: HOSPITAL | Age: 75
Discharge: HOME | End: 2025-03-05
Payer: MEDICARE

## 2025-03-05 ENCOUNTER — OFFICE VISIT (OUTPATIENT)
Dept: OBGYN CLINIC | Facility: HOSPITAL | Age: 75
End: 2025-03-05
Payer: MEDICARE

## 2025-03-05 ENCOUNTER — APPOINTMENT (OUTPATIENT)
Dept: LAB | Facility: CLINIC | Age: 75
End: 2025-03-05
Payer: MEDICARE

## 2025-03-05 ENCOUNTER — TELEPHONE (OUTPATIENT)
Dept: HEMATOLOGY ONCOLOGY | Facility: CLINIC | Age: 75
End: 2025-03-05

## 2025-03-05 VITALS — WEIGHT: 201.8 LBS | HEIGHT: 68 IN | BODY MASS INDEX: 30.58 KG/M2

## 2025-03-05 DIAGNOSIS — C50.911 CARCINOMA OF RIGHT BREAST METASTATIC TO AXILLARY LYMPH NODE (HCC): ICD-10-CM

## 2025-03-05 DIAGNOSIS — C77.3 CARCINOMA OF RIGHT BREAST METASTATIC TO AXILLARY LYMPH NODE (HCC): ICD-10-CM

## 2025-03-05 DIAGNOSIS — Z17.0 MALIGNANT NEOPLASM OF OVERLAPPING SITES OF RIGHT BREAST IN FEMALE, ESTROGEN RECEPTOR POSITIVE (HCC): ICD-10-CM

## 2025-03-05 DIAGNOSIS — G56.01 CARPAL TUNNEL SYNDROME ON RIGHT: ICD-10-CM

## 2025-03-05 DIAGNOSIS — M54.50 LUMBAR BACK PAIN: Primary | ICD-10-CM

## 2025-03-05 DIAGNOSIS — Z78.0 OSTEOPENIA AFTER MENOPAUSE: ICD-10-CM

## 2025-03-05 DIAGNOSIS — M18.11 OSTEOARTHRITIS OF RIGHT THUMB: Primary | ICD-10-CM

## 2025-03-05 DIAGNOSIS — C50.811 MALIGNANT NEOPLASM OF OVERLAPPING SITES OF RIGHT BREAST IN FEMALE, ESTROGEN RECEPTOR POSITIVE (HCC): ICD-10-CM

## 2025-03-05 DIAGNOSIS — M67.431 GANGLION CYST OF VOLAR ASPECT OF RIGHT WRIST: ICD-10-CM

## 2025-03-05 DIAGNOSIS — M85.80 OSTEOPENIA AFTER MENOPAUSE: ICD-10-CM

## 2025-03-05 LAB
25(OH)D3 SERPL-MCNC: 24.7 NG/ML (ref 30–100)
ALBUMIN SERPL BCG-MCNC: 4 G/DL (ref 3.5–5)
ALP SERPL-CCNC: 78 U/L (ref 34–104)
ALT SERPL W P-5'-P-CCNC: 13 U/L (ref 7–52)
ANION GAP SERPL CALCULATED.3IONS-SCNC: 9 MMOL/L (ref 4–13)
AST SERPL W P-5'-P-CCNC: 17 U/L (ref 13–39)
BASOPHILS # BLD AUTO: 0.05 THOUSANDS/ÂΜL (ref 0–0.1)
BASOPHILS NFR BLD AUTO: 1 % (ref 0–1)
BILIRUB SERPL-MCNC: 0.74 MG/DL (ref 0.2–1)
BUN SERPL-MCNC: 15 MG/DL (ref 5–25)
CALCIUM SERPL-MCNC: 9.6 MG/DL (ref 8.4–10.2)
CHLORIDE SERPL-SCNC: 104 MMOL/L (ref 96–108)
CO2 SERPL-SCNC: 28 MMOL/L (ref 21–32)
CREAT SERPL-MCNC: 0.76 MG/DL (ref 0.6–1.3)
EOSINOPHIL # BLD AUTO: 0.08 THOUSAND/ÂΜL (ref 0–0.61)
EOSINOPHIL NFR BLD AUTO: 2 % (ref 0–6)
ERYTHROCYTE [DISTWIDTH] IN BLOOD BY AUTOMATED COUNT: 11.9 % (ref 11.6–15.1)
GFR SERPL CREATININE-BSD FRML MDRD: 76 ML/MIN/1.73SQ M
GLUCOSE P FAST SERPL-MCNC: 98 MG/DL (ref 65–99)
HCT VFR BLD AUTO: 44.3 % (ref 34.8–46.1)
HGB BLD-MCNC: 14.5 G/DL (ref 11.5–15.4)
IMM GRANULOCYTES # BLD AUTO: 0.01 THOUSAND/UL (ref 0–0.2)
IMM GRANULOCYTES NFR BLD AUTO: 0 % (ref 0–2)
LYMPHOCYTES # BLD AUTO: 1.56 THOUSANDS/ÂΜL (ref 0.6–4.47)
LYMPHOCYTES NFR BLD AUTO: 41 % (ref 14–44)
MAGNESIUM SERPL-MCNC: 2 MG/DL (ref 1.9–2.7)
MCH RBC QN AUTO: 31.7 PG (ref 26.8–34.3)
MCHC RBC AUTO-ENTMCNC: 32.7 G/DL (ref 31.4–37.4)
MCV RBC AUTO: 97 FL (ref 82–98)
MONOCYTES # BLD AUTO: 0.51 THOUSAND/ÂΜL (ref 0.17–1.22)
MONOCYTES NFR BLD AUTO: 13 % (ref 4–12)
NEUTROPHILS # BLD AUTO: 1.62 THOUSANDS/ÂΜL (ref 1.85–7.62)
NEUTS SEG NFR BLD AUTO: 43 % (ref 43–75)
NRBC BLD AUTO-RTO: 0 /100 WBCS
PLATELET # BLD AUTO: 199 THOUSANDS/UL (ref 149–390)
PMV BLD AUTO: 9.6 FL (ref 8.9–12.7)
POTASSIUM SERPL-SCNC: 4.3 MMOL/L (ref 3.5–5.3)
PROT SERPL-MCNC: 7.2 G/DL (ref 6.4–8.4)
RBC # BLD AUTO: 4.57 MILLION/UL (ref 3.81–5.12)
SODIUM SERPL-SCNC: 141 MMOL/L (ref 135–147)
WBC # BLD AUTO: 3.83 THOUSAND/UL (ref 4.31–10.16)

## 2025-03-05 PROCEDURE — 83735 ASSAY OF MAGNESIUM: CPT

## 2025-03-05 PROCEDURE — 99213 OFFICE O/P EST LOW 20 MIN: CPT | Performed by: PHYSICIAN ASSISTANT

## 2025-03-05 PROCEDURE — 80053 COMPREHEN METABOLIC PANEL: CPT

## 2025-03-05 PROCEDURE — 36415 COLL VENOUS BLD VENIPUNCTURE: CPT

## 2025-03-05 PROCEDURE — 78306 BONE IMAGING WHOLE BODY: CPT

## 2025-03-05 PROCEDURE — 85025 COMPLETE CBC W/AUTO DIFF WBC: CPT

## 2025-03-05 PROCEDURE — 86300 IMMUNOASSAY TUMOR CA 15-3: CPT

## 2025-03-05 PROCEDURE — 20526 THER INJECTION CARP TUNNEL: CPT | Performed by: PHYSICIAN ASSISTANT

## 2025-03-05 PROCEDURE — A9503 TC99M MEDRONATE: HCPCS

## 2025-03-05 PROCEDURE — 82306 VITAMIN D 25 HYDROXY: CPT

## 2025-03-05 RX ORDER — LIDOCAINE HYDROCHLORIDE 10 MG/ML
1 INJECTION, SOLUTION INFILTRATION; PERINEURAL
Status: COMPLETED | OUTPATIENT
Start: 2025-03-05 | End: 2025-03-05

## 2025-03-05 RX ORDER — BETAMETHASONE SODIUM PHOSPHATE AND BETAMETHASONE ACETATE 3; 3 MG/ML; MG/ML
6 INJECTION, SUSPENSION INTRA-ARTICULAR; INTRALESIONAL; INTRAMUSCULAR; SOFT TISSUE
Status: COMPLETED | OUTPATIENT
Start: 2025-03-05 | End: 2025-03-05

## 2025-03-05 RX ADMIN — BETAMETHASONE SODIUM PHOSPHATE AND BETAMETHASONE ACETATE 6 MG: 3; 3 INJECTION, SUSPENSION INTRA-ARTICULAR; INTRALESIONAL; INTRAMUSCULAR; SOFT TISSUE at 10:00

## 2025-03-05 RX ADMIN — LIDOCAINE HYDROCHLORIDE 1 ML: 10 INJECTION, SOLUTION INFILTRATION; PERINEURAL at 10:00

## 2025-03-05 NOTE — TELEPHONE ENCOUNTER
Reviewed MRI Lumbar spine results with patient.    IMPRESSION:     No evidence for metastatic disease to the lumbar spine. No abnormal enhancement within the spinal canal.     Degenerative changes of the lumbar spine, as described above.     Multifactorial disease results in moderate mass effect on the thecal sac at L4-L5.    Explained to evidence of cancer.  Dr. Cox will be referring the patient to neurosurgery     Patient agreeable to plan and verbalized understanding

## 2025-03-05 NOTE — ASSESSMENT & PLAN NOTE
Patient states that the injection that she was previously given did not seem to help with the discomfort by the base of the thumb  She states that she is taking other medications to help with back pain which is helping with some of the pain in her thumb  She will hold off on other interventions at this time  She will follow-up in 8 weeks for reevaluation

## 2025-03-05 NOTE — PROGRESS NOTES
ORTHOPAEDIC HAND, WRIST, AND ELBOW OFFICE  VISIT     Name: Carie Fernandez      : 1950      MRN: [id number]  Encounter Provider: Jim Guy PA-C  Encounter Date: 3/5/2025   Encounter department: [facility] [place]  :  Assessment & Plan  Osteoarthritis of right thumb STT  Patient states that the injection that she was previously given did not seem to help with the discomfort by the base of the thumb  She states that she is taking other medications to help with back pain which is helping with some of the pain in her thumb  She will hold off on other interventions at this time  She will follow-up in 8 weeks for reevaluation       Carpal tunnel syndrome on right  Patient was given a right carpal tunnel cortisone injection today.  She tolerated well.  She was advised to monitor her symptoms to see if this helps with her numbness and tingling.  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  She was advised that this will not help with that numbness and tingling but may help with the numbness that goes down the fingers as well as waking up from sleep at night.  She will follow-up in 8 weeks for reevaluation of her symptoms       Ganglion cyst of volar aspect of right wrist  We will continue to watch the aspects of the ganglion cyst over the volar aspect of the wrist  It does not cause any pain or discomfort at this time  She will follow-up in 8 weeks for reevaluation             General Discussions:  Carpal Tunnel Syndrome: The anatomy and physiology of carpal tunnel syndrome was discussed with the patient today.  Increase pressure localized under the transverse carpal ligament can cause pain, numbness, tingling, or dysesthesias within the median nerve distribution as well as feelings of fatigue, clumsiness, or awkwardness.  These symptoms typically occur at night and worse in the morning upon waking.  Eventually, untreated carpal tunnel  syndrome can result in weakness and permanent loss of muscle within the thenar compartment of the hand.  Treatment options were discussed with the patient.  Conservative treatment includes nocturnal resting splints to keep the nerve in a neutral position, ergonomic changes within the work or home environment, activity modification, and tendon gliding exercises.  Steroid injections within the carpal canal can help a majority of patients, however this is often self-limited in a majority of patients.  Surgical intervention to divide the transverse carpal ligament typically results in a long-lasting relief of the patient's complaints, with the recurrence rate of less than 1%.   Ganglion Cysts: The anatomy and physiology of the ganglion was discussed with the patient today in the office.  Fluid leaking out of the joint surface typically creates a small sac, which can enlarge and cause pain or limitation of motion.  Treatment options include observation, aspiration, or surgical incision were discussed with the patient today.  Observation typically lead to resolution and approximately 10% of patients, aspiration least resolution approximately 50% of patients, and surgical excision lead to resolution in approximately 97% of patients.  After discussion with the patient today, the patient voiced understanding of all treatment options.        History of Present Illness   HPI  Chief Complaint   Patient presents with    Right Wrist - Follow-up     US- 2/26/25       Carie Fernandez is a 74 y.o. female who presents for follow-up after ultrasound to evaluate for carpal tunnel.  Patient was also given an STT cortisone injection into the right thumb.  She states that the injection did not seem to help with the pain and the discomfort.  She has been using a brace to help with the numbness and tingling into her fingers.  She does have a history of breast cancer and taking chemotherapy.  She has a history of chemotherapy induced  "neuropathy into her hands.  She states that the numbness not only is at the tips of the fingers but radiates down the digits.  She will wake up from sleep at night due to pain and numbness.  She has tried the use of bracing which helps with some of her symptoms.      REVIEW OF SYSTEMS:  General: no fever, no chills  HEENT:  No loss of hearing or eyesight problems  Eyes:  No red eyes  Respiratory:  No coughing, shortness of breath or wheezing  Cardiovascular:  No chest pain, no palpitations  GI:  Abdomen soft nontender, denies nausea  Endocrine:  No muscle weakness, no frequent urination, no excessive thirst  Urinary:  No dysuria, no incontinence  Musculoskeletal: see HPI and PE  SKIN:  No skin rash, no dry skin  Neurological:  No headaches, no confusion  Psychiatric:  No suicide thoughts, no anxiety, no depression  Review of all other systems is negative    Objective   Ht 5' 8\" (1.727 m)   Wt 91.5 kg (201 lb 12.8 oz)   LMP  (LMP Unknown)   BMI 30.68 kg/m²      General: well developed and well nourished, alert, oriented times 3, and appears comfortable  Psychiatric: Normal  HEENT: Trachea Midline, No torticollis  Cardiovascular: No discernable arrhythmia  Pulmonary: No wheezing or stridor  Abdomen: No rebound or guarding  Extremities: No peripheral edema  Skin: No masses, erythema, lacerations, fluctation, ulcerations  Neurovascular: Sensation Intact to the Median, Ulnar, Radial Nerve, Motor Intact to the Median, Ulnar, Radial Nerve, and Pulses Intact    Musculoskeletal exam:  Right Carpal Tunnel Exam:    Negative thenar atrophy. Negative phalen's test. Positive carpal tunnel compression. Positive tinels over median nerve at the wrist.,  APB 4/5 intrinsics 5/5, AIN 5/5    Palpation over the STT joint, minimal tenderness over the CMC joint, no crepitation with grind test, no shoulder sign    Palpable cyst appreciated over the volar radial aspect of the wrist near the radial artery measuring 5 mm x 5 " mm        STUDIES REVIEWED:  No Studies to review      PROCEDURES PERFORMED:  Hand/upper extremity injection: R carpal tunnel  Parrott Protocol:  Consent: Verbal consent obtained.  Risks and benefits: risks, benefits and alternatives were discussed  Consent given by: patient  Patient understanding: patient states understanding of the procedure being performed  Patient consent: the patient's understanding of the procedure matches consent given  Site marked: the operative site was marked  Patient identity confirmed: verbally with patient  Supporting Documentation  Indications: therapeutic   Procedure Details  Condition:carpal tunnel syndrome Site: R carpal tunnel   Preparation: Patient was prepped and draped in the usual sterile fashion  Needle size: 25 G  Approach: volar  Medications administered: 1 mL lidocaine 1 %; 6 mg betamethasone acetate-betamethasone sodium phosphate 6 (3-3) mg/mL  Patient tolerance: patient tolerated the procedure well with no immediate complications  Dressing:  Sterile dressing applied

## 2025-03-06 LAB
CANCER AG15-3 SERPL-ACNC: 11 U/ML (ref 0–25)
CANCER AG27-29 SERPL-ACNC: 18.4 U/ML (ref 0–38.6)

## 2025-03-07 ENCOUNTER — HOSPITAL ENCOUNTER (OUTPATIENT)
Dept: RADIOLOGY | Facility: HOSPITAL | Age: 75
Discharge: HOME | End: 2025-03-07
Payer: MEDICARE

## 2025-03-07 DIAGNOSIS — C50.911 CARCINOMA OF RIGHT BREAST METASTATIC TO AXILLARY LYMPH NODE (HCC): ICD-10-CM

## 2025-03-07 DIAGNOSIS — C77.3 CARCINOMA OF RIGHT BREAST METASTATIC TO AXILLARY LYMPH NODE (HCC): ICD-10-CM

## 2025-03-07 PROCEDURE — 71260 CT THORAX DX C+: CPT

## 2025-03-07 PROCEDURE — 74177 CT ABD & PELVIS W/CONTRAST: CPT

## 2025-03-07 RX ADMIN — IOHEXOL 90 ML: 350 INJECTION, SOLUTION INTRAVENOUS at 11:11

## 2025-03-19 ENCOUNTER — HOSPITAL ENCOUNTER (OUTPATIENT)
Dept: GASTROENTEROLOGY | Facility: HOSPITAL | Age: 75
Setting detail: OUTPATIENT SURGERY
Discharge: HOME | End: 2025-03-19
Attending: COLON & RECTAL SURGERY
Payer: MEDICARE

## 2025-03-19 ENCOUNTER — ANESTHESIA (OUTPATIENT)
Dept: GASTROENTEROLOGY | Facility: HOSPITAL | Age: 75
End: 2025-03-19
Payer: MEDICARE

## 2025-03-19 ENCOUNTER — ANESTHESIA EVENT (OUTPATIENT)
Dept: GASTROENTEROLOGY | Facility: HOSPITAL | Age: 75
End: 2025-03-19
Payer: MEDICARE

## 2025-03-19 VITALS
DIASTOLIC BLOOD PRESSURE: 84 MMHG | SYSTOLIC BLOOD PRESSURE: 158 MMHG | WEIGHT: 195 LBS | RESPIRATION RATE: 16 BRPM | BODY MASS INDEX: 29.55 KG/M2 | HEIGHT: 68 IN | TEMPERATURE: 97.2 F | HEART RATE: 67 BPM | OXYGEN SATURATION: 96 %

## 2025-03-19 DIAGNOSIS — Z86.0100 HISTORY OF COLON POLYPS: ICD-10-CM

## 2025-03-19 DIAGNOSIS — K57.90 DIVERTICULOSIS: ICD-10-CM

## 2025-03-19 PROCEDURE — 45385 COLONOSCOPY W/LESION REMOVAL: CPT | Performed by: COLON & RECTAL SURGERY

## 2025-03-19 PROCEDURE — 88305 TISSUE EXAM BY PATHOLOGIST: CPT | Performed by: PATHOLOGY

## 2025-03-19 RX ORDER — SODIUM CHLORIDE, SODIUM LACTATE, POTASSIUM CHLORIDE, CALCIUM CHLORIDE 600; 310; 30; 20 MG/100ML; MG/100ML; MG/100ML; MG/100ML
INJECTION, SOLUTION INTRAVENOUS CONTINUOUS PRN
Status: DISCONTINUED | OUTPATIENT
Start: 2025-03-19 | End: 2025-03-19

## 2025-03-19 RX ORDER — PROPOFOL 10 MG/ML
INJECTION, EMULSION INTRAVENOUS AS NEEDED
Status: DISCONTINUED | OUTPATIENT
Start: 2025-03-19 | End: 2025-03-19

## 2025-03-19 RX ADMIN — PROPOFOL 30 MG: 10 INJECTION, EMULSION INTRAVENOUS at 10:12

## 2025-03-19 RX ADMIN — PROPOFOL 30 MG: 10 INJECTION, EMULSION INTRAVENOUS at 09:52

## 2025-03-19 RX ADMIN — PROPOFOL 30 MG: 10 INJECTION, EMULSION INTRAVENOUS at 09:41

## 2025-03-19 RX ADMIN — PROPOFOL 30 MG: 10 INJECTION, EMULSION INTRAVENOUS at 09:48

## 2025-03-19 RX ADMIN — PROPOFOL 30 MG: 10 INJECTION, EMULSION INTRAVENOUS at 09:57

## 2025-03-19 RX ADMIN — PROPOFOL 90 MG: 10 INJECTION, EMULSION INTRAVENOUS at 09:37

## 2025-03-19 RX ADMIN — PROPOFOL 30 MG: 10 INJECTION, EMULSION INTRAVENOUS at 09:45

## 2025-03-19 RX ADMIN — SODIUM CHLORIDE, SODIUM LACTATE, POTASSIUM CHLORIDE, AND CALCIUM CHLORIDE: .6; .31; .03; .02 INJECTION, SOLUTION INTRAVENOUS at 09:34

## 2025-03-19 RX ADMIN — PROPOFOL 30 MG: 10 INJECTION, EMULSION INTRAVENOUS at 10:04

## 2025-03-19 NOTE — H&P
History and Physical   Colon and Rectal Surgery   Carie Fernandez 74 y.o. female MRN: [id number]  Unit/Bed#:  Encounter: [id number]  03/19/25   9:28 AM      CC:  History of colon polyps    History of Present Illness   HPI:  Carie Fernandez is a 74 y.o. female with no GI symptoms.  Historical Information   Past Medical History:   Diagnosis Date    Anxiety     Breast cancer (HCC) 12/06/2021    Cancer (HCC)     Right breast cancer    Depression     Fracture of radius, distal, closed     Last Assessed:  6/10/14    Thrombophlebitis     Vitamin D deficiency      Past Surgical History:   Procedure Laterality Date    ANKLE SURGERY      ANKLE SURGERY      BREAST BIOPSY Left 2001    neg    BREAST BIOPSY Right 12/06/2021    punch bx- ca    GALLBLADDER SURGERY      HYSTERECTOMY  1996    IR PORT PLACEMENT  12/29/2021    IR PORT REMOVAL  02/21/2023    MASTECTOMY Right 04/26/2022    OOPHORECTOMY Bilateral 1996    PA MAST MODF RAD W/AX LYMPH NOD W/WO PECT/DALTON MIN Right 04/26/2022    Procedure: BREAST MODIFIED RADICAL MASTECTOMY WITH RIGHT LYMPHATIC MAPPING WITH BLUE AND RADIOACTIVE DYE (INJECT AT 0900 BY DR VASQUEZ IN THE OR);  Surgeon: Kevin Vasquez MD;  Location: [facility];  Service: Surgical Oncology    WISDOM TOOTH EXTRACTION         Meds/Allergies     Not in a hospital admission.      Current Outpatient Medications:     ALPRAZolam (XANAX) 0.25 mg tablet, Take 1/2 to 1 pill Q 8 hours p.r.n. For anxiety, Disp: 30 tablet, Rfl: 0    anastrozole (ARIMIDEX) 1 mg tablet, Take 1 tablet (1 mg total) by mouth daily, Disp: 90 tablet, Rfl: 0    Calcium 250 MG CAPS, Take 250 mg by mouth in the morning  , Disp: , Rfl:     cholecalciferol (VITAMIN D3) 1,000 units tablet, Take 2 capsules by mouth daily, Disp: , Rfl:     ciclopirox (LOPROX) 0.77 % cream, , Disp: , Rfl:     escitalopram (LEXAPRO) 20 mg tablet, Take 1 tablet (20 mg total) by mouth daily, Disp: 86 tablet, Rfl: 1    Fish Oil-Krill Oil (KRILL OIL PLUS) CAPS, Take by mouth in the  "morning  , Disp: , Rfl:     fluocinonide (LIDEX) 0.05 % external solution, APPLY TO SCALP ONCE DAILY AS NEEDED FOR ITCH, Disp: , Rfl: 2    ketoconazole (NIZORAL) 2 % shampoo, Apply 1 application topically if needed  , Disp: , Rfl: 5    meclizine (ANTIVERT) 25 mg tablet, Take 1 tablet (25 mg total) by mouth every 8 (eight) hours, Disp: 30 tablet, Rfl: 0    Allergies   Allergen Reactions    Pollen Extract Nasal Congestion         Social History   Social History     Substance and Sexual Activity   Alcohol Use Yes    Comment: rarely     Social History     Substance and Sexual Activity   Drug Use Yes    Types: Marijuana    Comment: not helping stopped     Social History     Tobacco Use   Smoking Status Never   Smokeless Tobacco Never         Family History:   Family History   Problem Relation Age of Onset    Ovarian cancer Mother 56    Heart disease Father     No Known Problems Daughter     No Known Problems Daughter     Cancer Maternal Grandmother         Unknown primary; mid 50's    Leukemia Brother 58    Heart disease Brother     Heart disease Brother     No Known Problems Son     No Known Problems Son     Stomach cancer Maternal Aunt         60's         Objective     Current Vitals:   Blood Pressure: 166/91 (03/19/25 0855)  Pulse: 90 (03/19/25 0855)  Temperature: 97.6 °F (36.4 °C) (03/19/25 0855)  Temp Source: Temporal (03/19/25 0855)  Respirations: 18 (03/19/25 0855)  Height: 5' 8\" (172.7 cm) (03/19/25 0855)  Weight - Scale: 88.5 kg (195 lb) (03/19/25 0855)  SpO2: 96 % (03/19/25 0855)  No intake or output data in the 24 hours ending 03/19/25 0928    Physical Exam:  General:  Well nourished, no distress.  Neuro: Alert and oriented  Eyes:Sclera anicteric, conjunctiva pink.  Pulm: Clear to auscultation bilaterally. No respiratory Distress.   CV:  Regular rate and rhythm. No murmurs.  Abdomen:  Soft, flat, non-tender, without masses or hepatosplenomegaly.    Lab Results:       ASSESSMENT:  Carie Fernandez is a 74 " y.o. female for surveillance.  PLAN:  Colonoscopy.  Risks , including, but not limited to, bleeding, perforation, missed lesions, and potential need for surgery, were reviewed. Alternatives to colonoscopy were discussed.  SHAWANDA Stanley MD

## 2025-03-19 NOTE — ANESTHESIA PREPROCEDURE EVALUATION
Procedure:  COLONOSCOPY    Relevant Problems   CARDIO   (+) Hyperlipidemia, mild   (+) Varicose vein of leg      GYN   (+) Malignant neoplasm of overlapping sites of right breast in female, estrogen receptor positive (HCC)      MUSCULOSKELETAL   (+) Arthritis   (+) Osteoarthritis of right thumb   (+) Primary osteoarthritis of right knee      NEURO/PSYCH   (+) Anxiety   (+) Depression      Other   (+) 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)        Physical Exam    Airway    Mallampati score: II  TM Distance: >3 FB  Neck ROM: full     Dental   No notable dental hx     Cardiovascular      Pulmonary      Other Findings  post-pubertal.      Anesthesia Plan  ASA Score- 2     Anesthesia Type- IV sedation with anesthesia with ASA Monitors.         Additional Monitors:     Airway Plan:            Plan Factors-    Chart reviewed.    Patient summary reviewed.                  Induction- intravenous.    Postoperative Plan-     Perioperative Resuscitation Plan - Level 1 - Full Code.       Informed Consent- Anesthetic plan and risks discussed with patient.  I personally reviewed this patient with the CRNA. Discussed and agreed on the Anesthesia Plan with the CRNA..      NPO Status:  No vitals data found for the desired time range.

## 2025-03-20 ENCOUNTER — TELEPHONE (OUTPATIENT)
Age: 75
End: 2025-03-20

## 2025-03-20 NOTE — TELEPHONE ENCOUNTER
Called patient to reschedule appointment scheduled for 4/30/25 with Dr. Ochoa, due to schedule changes, no answer, left message for patient to call back. Patient can be rescheduled for anytime after 5/14/25

## 2025-03-21 ENCOUNTER — CONSULT (OUTPATIENT)
Dept: NEUROSURGERY | Facility: CLINIC | Age: 75
End: 2025-03-21
Payer: MEDICARE

## 2025-03-21 VITALS
OXYGEN SATURATION: 96 % | TEMPERATURE: 96.8 F | BODY MASS INDEX: 29.55 KG/M2 | HEIGHT: 68 IN | SYSTOLIC BLOOD PRESSURE: 122 MMHG | DIASTOLIC BLOOD PRESSURE: 68 MMHG | RESPIRATION RATE: 17 BRPM | WEIGHT: 195 LBS | HEART RATE: 93 BPM

## 2025-03-21 DIAGNOSIS — M54.50 LUMBAR BACK PAIN: ICD-10-CM

## 2025-03-21 DIAGNOSIS — Z17.0 MALIGNANT NEOPLASM OF OVERLAPPING SITES OF RIGHT BREAST IN FEMALE, ESTROGEN RECEPTOR POSITIVE (HCC): ICD-10-CM

## 2025-03-21 DIAGNOSIS — M54.16 LUMBAR RADICULOPATHY: Primary | ICD-10-CM

## 2025-03-21 DIAGNOSIS — C50.811 MALIGNANT NEOPLASM OF OVERLAPPING SITES OF RIGHT BREAST IN FEMALE, ESTROGEN RECEPTOR POSITIVE (HCC): ICD-10-CM

## 2025-03-21 PROCEDURE — 99204 OFFICE O/P NEW MOD 45 MIN: CPT | Performed by: NURSE PRACTITIONER

## 2025-03-21 NOTE — PROGRESS NOTES
Name: Carie Fernandez      : 1950      MRN: [id number]  Encounter Provider: HERMINIO Bruno  Encounter Date: 3/21/2025   Encounter department: [facility]  :  Assessment & Plan  Lumbar back pain  As addressed in [facility].  Orders:    Ambulatory Referral to Neurosurgery    Ambulatory Referral to Physical Therapy; Future    Lumbar radiculopathy  Symptoms as addressed in HPI  Nonfocal neurologic examination  She denies bowel or bladder dysfunction, saddle anesthesia or motor deficits in lower extremities.    Imaging  3/4/2025 MRI lumbar spine with and without---No evidence for metastatic disease to the lumbar spine. No abnormal enhancement within the spinal canal.  Degenerative changes of the lumbar spine. Multifactorial disease results in moderate mass effect on the thecal sac at L4-L5.    Plan   Reviewed MRI lumbar spine mild compressive disease no neurosurgical intervention indicated at this time  Ordered physical therapy for muscle strengthening and core, paraspinals lumbar and thoracic and lower extremities, heat and massage, and TENS unit as appropriate.  Osteoporosis weightbearing exercises.   Advised patient after completing physical therapy if she is interested and performing HEP integrating into aqua therapy to notify the office and I will enter an order.  Patient remarks her side has a heated pool and she will integrate HEP into this modality.  Reviewed red flag symptoms advised if she develops she should report to the closest ED for assessment.  Advised if she has additional questions or concerns and/or if symptoms fail to improve she can contact me and I will provide an order for pain management referral.  She refuses a pain management referral at this time.    Orders:    Ambulatory Referral to Physical Therapy; Future        History of Present Illness     Carie Fernandez is a 74 y.o. female who presents as a consult referral from hematology oncology initial  neurosurgery visit.    HPI   Patient has a history of malignant neoplasm of overlapping sites of right breast in female, estrogen receptor positive b Patient does have a history of BrCA, but 3/5/25 bone scan negative for active disease in spine.  She reports bilateral low back pain with distribution into the right buttock and hip.  She reports symptoms are worse on the right side and does not have left-sided pain to be on the back.  She has a chronic history of neuropathy in feet and hands which she relates to status post chemo and radiation therapy.  She reports intermittent heaviness in her legs although she can walk 1 mile.  She reports avoiding weightbearing on the right lower extremity as it causes pain in the right hip and low back.  Reports being unsteady when walking as she avoids weightbearing on right lower extremity.  She reports the onset of her symptoms and February 2025 after performing a lots of physical activity and prolonged standing the day prior to the onset as she was helping her daughter prepare for her grandchild's party.  She reports a longstanding history of chronic back pain for 10 years, this she associates with being an  and did a lot of standing.  She reports the quality of her symptoms as at onset was constant but now intermittent, symptoms are sharp, stabbing, and numbness and tingling in her hands and feet.  She reports the severity of her symptoms as 9/10 today in the office for over 10 for her chronic right hip and bilateral back pain into the right buttock.  She reports aggravating factors as any type of physical movement, twisting, transitioning from sit to stand, bending forward, bed mobility where she is is twisting her body, lifting 20 pounds or greater.  She remarked one of her grandchildren weighs 26 pounds and it does hurt her back when lifting.  She reports relieving factors as arching the back while sitting or laying flat in bed.  She denies prior spine surgery.       Multimodal Treatments::  PT --no   PM ---no   Medicinal  --- ibuprofen, tylenol x 2 days was not efficacious, did not take scheduled dosing only use when she had pain.  Naproxen OTC dose has proved to be the most efficacious dosing at onset of symptoms when they were severe were 3 tablets in the morning and the evening.  Admits she was aware the dose she was taking was out of compliance with labeling.  She admits the pain is improved and she is no longer taking this dose.  She also reports treating with Voltaren topical.     CAM --- heat      Social:  Retired  ,    live  with    .  She has 2 adult children, her daughter is a nurse practitioner.  She babysits her grandchildren ages 7 and 1 and the 1-year-old weighs about 26 pounds.  Nicotine dependence-no    Alcohol use--no     Review of Systems   Constitutional: Negative.    HENT: Negative.     Eyes: Negative.    Respiratory: Negative.     Cardiovascular: Negative.    Gastrointestinal: Negative.    Endocrine: Negative.    Genitourinary:         Urgency w   Musculoskeletal:  Positive for back pain (lbp  radiates to both sides more right side can occ touch right hip and buttock) and gait problem (unsteady at times  favors right leg).   Allergic/Immunologic: Negative.    Neurological:  Positive for weakness (legs feel heavy at times) and numbness (neurapthy hands and  feet).   Hematological: Negative.    Psychiatric/Behavioral: Negative.       I have personally reviewed the MA's review of systems and made changes as necessary.    Past Medical History   Past Medical History:   Diagnosis Date    Anxiety     Breast cancer (HCC) 12/06/2021    Cancer (HCC)     Right breast cancer    Depression     Fracture of radius, distal, closed     Last Assessed:  6/10/14    Thrombophlebitis     Vitamin D deficiency      Past Surgical History:   Procedure Laterality Date    ANKLE SURGERY      ANKLE SURGERY      BREAST BIOPSY Left 2001    neg    BREAST BIOPSY  "Right 12/06/2021    punch bx- ca    GALLBLADDER SURGERY      HYSTERECTOMY  1996    IR PORT PLACEMENT  12/29/2021    IR PORT REMOVAL  02/21/2023    MASTECTOMY Right 04/26/2022    OOPHORECTOMY Bilateral 1996    OR MAST MODF RAD W/AX LYMPH NOD W/WO PECT/DALTON MIN Right 04/26/2022    Procedure: BREAST MODIFIED RADICAL MASTECTOMY WITH RIGHT LYMPHATIC MAPPING WITH BLUE AND RADIOACTIVE DYE (INJECT AT 0900 BY DR VASQUEZ IN THE OR);  Surgeon: Kevin Vasquez MD;  Location[facility];  Service: Surgical Oncology    WISDOM TOOTH EXTRACTION       Family History   Problem Relation Age of Onset    Ovarian cancer Mother 56    Heart disease Father     No Known Problems Daughter     No Known Problems Daughter     Cancer Maternal Grandmother         Unknown primary; mid 50's    Leukemia Brother 58    Heart disease Brother     Heart disease Brother     No Known Problems Son     No Known Problems Son     Stomach cancer Maternal Aunt         60's     she reports that she has never smoked. She has never used smokeless tobacco. She reports current alcohol use. She reports current drug use. Drug: Marijuana.  Current Outpatient Medications   Medication Instructions    ALPRAZolam (XANAX) 0.25 mg tablet Take 1/2 to 1 pill Q 8 hours p.r.n. For anxiety    anastrozole (ARIMIDEX) 1 mg, Oral, Daily    Calcium 250 mg, Daily    cholecalciferol (VITAMIN D3) 1,000 units tablet 2 capsules, Daily    ciclopirox (LOPROX) 0.77 % cream     escitalopram (LEXAPRO) 20 mg, Oral, Daily    Fish Oil-Krill Oil (KRILL OIL PLUS) CAPS Daily    meclizine (ANTIVERT) 25 mg, Oral, Every 8 hours scheduled     Allergies   Allergen Reactions    Pollen Extract Nasal Congestion      Objective   /68 (BP Location: Left arm, Patient Position: Sitting, Cuff Size: Standard)   Pulse 93   Temp (!) 96.8 °F (36 °C) (Temporal)   Resp 17   Ht 5' 8\" (1.727 m)   Wt 88.5 kg (195 lb)   LMP  (LMP Unknown)   SpO2 96%   BMI 29.65 kg/m²     Physical Exam  Vitals and nursing note reviewed. "   Constitutional:       General: She is not in acute distress.     Appearance: Normal appearance. She is not ill-appearing, toxic-appearing or diaphoretic.   Pulmonary:      Effort: Pulmonary effort is normal. No respiratory distress.   Musculoskeletal:      Cervical back: Normal range of motion.      Right lower leg: No edema.      Left lower leg: No edema.   Neurological:      General: No focal deficit present.      Mental Status: She is alert.      Coordination: Coordination is intact.      Deep Tendon Reflexes:      Reflex Scores:       Patellar reflexes are 2+ on the right side and 2+ on the left side.       Achilles reflexes are 2+ on the right side and 2+ on the left side.  Psychiatric:         Mood and Affect: Mood normal.         Behavior: Behavior normal.       Neurological Exam  Mental Status  Alert. Oriented to person, place, time and situation.    Motor                                               Right                     Left  Hip flexion                              5                          5  Hip extension                         5                          5  Hip abduction                         5                          5  Hip adduction                         5                          5  Knee flexion                           5                          5  Knee extension                      5                          5  Plantarflexion                         5                          5  Dorsiflexion                            5                          5  Toe flexion                             5                          5  Toe extension                        5                          5    Sensory  Light touch is normal in upper and lower extremities.     Reflexes                                            Right                      Left  Patellar                                2+                         2+  Achilles                                2+                          2+    Coordination    Finger-to-nose, rapid alternating movements and heel-to-shin normal bilaterally without dysmetria.    Gait  Casual gait is normal including stance, stride, and arm swing.      Radiology Results Review: I have reviewed radi[facility] including: MRI spine.    Administrative Statements   I have spent a total time of 670 minutes in caring for this patient on the day of the visit/encounter including Diagnostic results, Risks and benefits of tx options, Instructions for management, Patient and family education, Importance of tx compliance, Risk factor reductions, Impressions, Counseling / Coordination of care, Documenting in the medical record, Reviewing/placing orders in the medical record (including tests, medications, and/or procedures), Obtaining or reviewing history  , and Communicating with other healthcare professionals .

## 2025-03-25 ENCOUNTER — RESULTS FOLLOW-UP (OUTPATIENT)
Dept: OTHER | Facility: HOSPITAL | Age: 75
End: 2025-03-25

## 2025-03-25 PROCEDURE — 88305 TISSUE EXAM BY PATHOLOGIST: CPT | Performed by: PATHOLOGY

## 2025-03-25 NOTE — LETTER
March 26, 2025     Carie Fernandez  [address] [facility] [id number]      Dear Ms. Fernandez:    Below are the results from your recent visit: Colonoscopy     Resulted Orders   Tissue Exam   Result Value Ref Range    Case Report       Surgical Pathology Report                         Case: [id number]                                  Authorizing Provider:  LUIZ Stanley MD       Collected:           03/19/2025 1010              Ordering Location:     [facility]   Received:            03/19/2025 7942                                     Endoscopy                                                                    Pathologist:           Tone Vanegas MD                                                    Specimens:   A) - Large Intestine, Sigmoid Colon, COLD BIOPSY, SIGMOID COLON                                     B) - Large Intestine, Left/Descending Colon, COLD SNARE, DESCENDING COLON POLYP            Final Diagnosis       A. Large Intestine, Sigmoid Colon, biopsy:  - Tubular adenoma.  - Negative for high grade dysplasia/ carcinoma.    B. Large Intestine, Left/Descending Colon, cold snare:  - Tubular adenoma.  - Negative for high grade dysplasia/ carcinoma.      Additional Information       All reported additional testing was performed with appropriately reactive controls.  These tests were developed and their performance characteristics determined by [facility] or appropriate performing facility, though some tests may be performed on tissues which have not been validated for performance characteristics (such as staining performed on alcohol exposed cell blocks and decalcified tissues).  Results should be interpreted with caution and in the context of the patients’ clinical condition. These tests may not be cleared or approved by the U.S. Food and Drug Administration, though the FDA has determined that such clearance or approval is not necessary. These tests are used  "for clinical purposes and they should not be regarded as investigational or for research. This laboratory has been approved by [place] [id number], designated as a high-complexity laboratory and is qualified to perform these tests.    Interpretation performed at [facility], [facility] [facility] [address]      Synoptic Checklist          COLON/RECTUM POLYP FORM - GI - All Specimens          :    Adenoma(s)      Gross Description       A. The specimen is received in formalin, labeled with the patient's name and hospital number, and is designated \" sigmoid colon biopsy\".  The specimen consists of 1 tan-pink soft tissue fragment measuring 0.5 cm in greatest dimension.  Entirely submitted. One screened cassette.  B. The specimen is received in formalin, labeled with the patient's name and hospital number, and is designated \" descending colon polyp\".  The specimen consists of 3 tan-pink soft tissue fragments ranging from 0.1 cm to 0.3 cm in greatest dimension.  Entirely submitted. One screened cassette.    Note: The estimated total formalin fixation time based upon information provided by the submitting clinician and the standard processing schedule is under 72 hours.  [place]      Clinical Information COLD SNARE, DESCENDING COLON POLYP      The test results shown above are benign.     If you have any questions or concerns, please don't hesitate to call.         Sincerely,        SHAWANDA Stanley MD  "

## 2025-04-04 DIAGNOSIS — Z17.0 MALIGNANT NEOPLASM OF OVERLAPPING SITES OF RIGHT BREAST IN FEMALE, ESTROGEN RECEPTOR POSITIVE (HCC): ICD-10-CM

## 2025-04-04 DIAGNOSIS — C50.811 MALIGNANT NEOPLASM OF OVERLAPPING SITES OF RIGHT BREAST IN FEMALE, ESTROGEN RECEPTOR POSITIVE (HCC): ICD-10-CM

## 2025-04-04 RX ORDER — ANASTROZOLE 1 MG/1
1 TABLET ORAL DAILY
Qty: 90 TABLET | Refills: 0 | Status: SHIPPED | OUTPATIENT
Start: 2025-04-04

## 2025-04-08 ENCOUNTER — EVALUATION (OUTPATIENT)
Dept: PHYSICAL THERAPY | Age: 75
End: 2025-04-08
Payer: MEDICARE

## 2025-04-08 DIAGNOSIS — M54.16 LUMBAR RADICULOPATHY: Primary | ICD-10-CM

## 2025-04-08 DIAGNOSIS — Z17.0 MALIGNANT NEOPLASM OF OVERLAPPING SITES OF RIGHT BREAST IN FEMALE, ESTROGEN RECEPTOR POSITIVE (HCC): ICD-10-CM

## 2025-04-08 DIAGNOSIS — C50.811 MALIGNANT NEOPLASM OF OVERLAPPING SITES OF RIGHT BREAST IN FEMALE, ESTROGEN RECEPTOR POSITIVE (HCC): ICD-10-CM

## 2025-04-08 DIAGNOSIS — M54.50 LUMBAR BACK PAIN: ICD-10-CM

## 2025-04-08 PROCEDURE — 97140 MANUAL THERAPY 1/> REGIONS: CPT

## 2025-04-08 PROCEDURE — 97110 THERAPEUTIC EXERCISES: CPT

## 2025-04-08 PROCEDURE — 97162 PT EVAL MOD COMPLEX 30 MIN: CPT

## 2025-04-08 NOTE — PROGRESS NOTES
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)        Other fatigue   multifactorial  Orders:    Echo complete w/ contrast if indicated; Future              Clinical/Pathologic Stage  Cancer Staging   Malignant neoplasm of overlapping sites of right breast in female, estrogen receptor positive (HCC)  Staging form: Breast, AJCC 8th Edition  - Pathologic stage from 4/26/2022: ypT4d, pN3a, cM0, G1, ER+, MS+, HER2- - Signed by HERMINIO Mullen on 10/24/2024  Stage prefix: Post-therapy  Response to neoadjuvant therapy: No response  Method of lymph node assessment: Axillary lymph node dissection  Nuclear grade: G2  Histologic grading system: 3 grade system  - Clinical: Stage IIIB (cT4d, cN3a, cM0, G2, ER+, MS+, HER2-) - Signed by Sergio Vieyra MD on 3/5/2024  Histologic grading system: 3 grade system        Current Therapy  Anastrozole 1 mg per day since June 2022     Discussion     I spent considerable time with this delightful patient and her .  Today represents my initial evaluation and visit.     As charted the patient suffered locally advanced to high risk node positive invasive carcinoma of the right breast.  She had neoadjuvant systemic chemotherapy but significant multifocal residual disease postop in  Ortho Attending  -postop left hip hemiarthroplasty 6/4  -doing well pain is controlled, no acute events  -Hb did drop to 7.7 from 10.9, will monitor if drops lower to transfuse  -LLE dressing dry, neuro intact baseline (left-sided deficits from prior CVA), no calf pain, no increase in swelling or dependent edema  -will change dressing prior to discharge, anticipate TCU, appreciate assistance  -PT as able, will have some difficulty but motivated  -continue Lovenox for vte ppx, still monitoring Hb, will hold if necessary though may end up requiring transfusion anyhow    Dr Danielle    ----------------------------------------------------------------------------------------    Orthopedic Surgery  Jamee Douglas  06/05/2024     Admit Date:  6/3/2024  POD: 1 Day Post-Op   Procedure(s):  LEFT HIP HEMIARTHROPLASTY  Covid +     Patient resting comfortably in bed.    Pain controlled.  Tolerating oral intake.    Denies nausea or vomiting  Denies chest pain or shortness of breath.  Remains on Oxygen.     Temp:  [96.8  F (36  C)-98.7  F (37.1  C)] 98.7  F (37.1  C)  Pulse:  [82-99] 85  Resp:  [11-18] 15  BP: ()/(47-75) 118/51  SpO2:  [93 %-100 %] 96 %    Alert and oriented  Dressing is clean, dry, and intact.   Minimal erythema of the surrounding skin.   Bilateral calves are soft, non-tender.  Left lower extremity is NVI.  Sensation intact bilateral lower extremities  Patient able to resist dorsi and plantar flexion bilaterally  +Dp pulse    Labs:  Recent Labs   Lab Test 06/05/24  0719 06/04/24  0910 06/03/24  1321   WBC 10.9 11.3* 11.6*   HGB 7.7* 10.9* 12.7   * 122* 170     Recent Labs   Lab Test 06/03/24  1354 03/21/24  1010 02/01/21  1006   INR 1.08 1.03 1.06       1. PLAN:   Continue lovenox x 4 weeks for DVT prophylaxis.     Mobilize with PT/OT    WBAT left LE with posterior precautions (no active abduction, no flexion > 90, no internal rotation).  Abduction pillow when in bed.      Continue current pain  "spite of systemic chemotherapy in the neoadjuvant strategy.  She completed chest wall and alvin radiation therapy and is maintained on oral aromatase inhibitor therapy as a single agent with anastrozole.     Gratefully, the patient has no evidence of metastatic recurrent disease on comprehensive imaging.  The patient has considerable degenerative change in the lumbar spine and has been seen by multidisciplinary team members with gratitude.  We have discussed physical therapy, consistent strengthening, weight loss    The patient will be continued on oral aromatase inhibitor therapy with the agent anastrozole for the indefinite future          Special Studies  01/14/2022 [employer]  Negative     No results found for: \"GENETIC\", \"INTERP\", \"GENES\"        History of Present Illness        Postmenopausal female with diagnosis of locally advanced multifocal inflammatory carcinoma of the right breast established in December 2021.     The patient was treated with neoadjuvant systemic chemotherapy with the agents doxorubicin plus cyclophosphamide x 4 cycles followed by paclitaxel x 4 cycles ending in April 2022.     The patient went on to undergo a right sided modified radical mastectomy in April 2022 which revealed significant multifocal tumor in residua.  YPT4 yN3 with 14 of 19 lymph nodes involved by metastatic spread of disease along with extranodal deposition.     The patient completed external beam radiation therapy long course on July 25, 2022     She has been managed with the oral aromatase inhibitor agent anastrozole from June 2022 by chart review     She comes for ongoing medical oncology follow-up and care     Pertinent Interval History from March 05, 2024  The patient endorses severe low back pain both with standing and with sitting and with movement  While she has had pain in the low back for 5 years it has escalated in recent months and at times is unbearable  Patient endorses a sense of fatigue and " regiment.   Dressings: Keep intact.  Change if >60% saturated or peeling off.    Follow-up: 2 weeks post-op with Dr Danielle team    2. Disposition   Anticipate d/c to TCU likely when medically cleared and progressing in PT.    Filomena Inman PA-C     diminished activity over recent years  Patient denies other complaints on full review of systems        Cancer Screening and Prevention  Mammography: 12/12/2024   GI/Colonoscopy: 08/11/2016   GYN: 11/30/2021   Bone Density: 12/18/2024   Covid 19 Vaccination Status: Moderna Series times 2 Feb 2021, Moderna boosters 08/22, 11/2023     Oncology Therapeutic Summary:        June 2022-present anastrozole 1 mg/day     July 25, 2022 status post external beam radiation therapy to the right chest wall, draining lymph nodes and right supraclavicular alvin basin x 25 fractions     April 26, 2022 status post right modified radical mastectomy with lymph node review     April 8, 2022 from December 30, 2021 status post neoadjuvant systemic chemotherapy with doxorubicin plus cyclophosphamide x 4 cycles plus paclitaxel x 4 cycles     Oncology History   Cancer Staging   Malignant neoplasm of overlapping sites of right breast in female, estrogen receptor positive (HCC)  Staging form: Breast, AJCC 8th Edition  - Pathologic stage from 4/26/2022: ypT4d, pN3a, cM0, G1, ER+, IN+, HER2- - Signed by HERMINIO Mullen on 10/24/2024  Stage prefix: Post-therapy  Response to neoadjuvant therapy: No response  Method of lymph node assessment: Axillary lymph node dissection  Nuclear grade: G2  Histologic grading system: 3 grade system  - Clinical: Stage IIIB (cT4d, cN3a, cM0, G2, ER+, IN+, HER2-) - Signed by Sergio Vieyra MD on 3/5/2024  Histologic grading system: 3 grade system      Oncology History   Malignant neoplasm of overlapping sites of right breast in female, estrogen receptor positive (HCC)   12/6/2021 Biopsy     Punch biopsy of right breast  Dermal lymphovascular invasion, immunoprofile consistent with breast primary  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      12/30/2021 - 4/8/2022 Chemotherapy     DOXOrubicin (ADRIAMYCIN), 122 mg, Intravenous, Once, 4 of 4 cycles  Administration: 120 mg (12/30/2021), 122 mg (1/13/2022), 122 mg (1/27/2022), 122 mg (2/10/2022)  palonosetron (ALOXI), 0.25 mg, Intravenous, Once, 2 of 2 cycles  Administration: 0.25 mg (3/24/2022), 0.25 mg (4/7/2022)  pegfilgrastim (NEULASTA), 4 mg (100 % of original dose 4 mg), Subcutaneous, Once, 3 of 3 cycles  Dose modification: 4 mg (original dose 4 mg, Cycle 6)  Administration: 4 mg (3/11/2022), 4 mg (3/25/2022), 4 mg (4/8/2022)  pegfilgrastim (NEULASTA ONPRO), 6 mg, Subcutaneous, Once, 5 of 5 cycles  Administration: 6 mg (12/30/2021), 6 mg (1/13/2022), 6 mg (2/24/2022), 6 mg (1/27/2022), 6 mg (2/10/2022)  cyclophosphamide (CYTOXAN) IVPB, 600 mg/m2 = 1,218 mg, Intravenous, Once, 4 of 4 cycles  Administration: 1,218 mg (12/30/2021), 1,218 mg (1/13/2022), 1,218 mg (1/27/2022), 1,218 mg (2/10/2022)  fosaprepitant (EMEND) IVPB, 150 mg, Intravenous, Once, 4 of 4 cycles  Administration: 150 mg (12/30/2021), 150 mg (1/13/2022), 150 mg (1/27/2022), 150 mg (2/10/2022)  PACLItaxel (TAXOL) chemo IVPB, 175 mg/m2 = 355.2 mg, Intravenous, Once, 4 of 4 cycles  Administration: 355.2 mg (2/24/2022), 355.2 mg (3/10/2022), 355.2 mg (3/24/2022), 355.2 mg (4/7/2022)      4/26/2022 Surgery     Right breast modified radical mastectomy  Inflammatory breast cancer  Grade 1  1.5 cm (multiple foci involving all four quadrants)  Lymphovascular invasion present  Margins negative  14/19 Lymph nodes with macro-metastases  Anatomic Stage IIIC  Prognostic Stage IIIA      4/26/2022 -  Cancer Staged     Staging form: Breast, AJCC 8th Edition  - Pathologic stage from 4/26/2022: ypT4d, pN3a, cM0, G1, ER+, WI+, HER2- - Signed by HERMINIO Mullen on 10/24/2024  Stage prefix: Post-therapy  Response to neoadjuvant  therapy: No response  Method of lymph node assessment: Axillary lymph node dissection  Nuclear grade: G2  Histologic grading system: 3 grade system         6/2022 -  Hormone Therapy     Anastrozole 1 mg daily  Dr. [place]      6/20/2022 - 7/25/2022 Radiation     Plan ID Energy Fractions Dose per Fraction (cGy) Dose Correction (cGy) Total Dose Delivered (cGy) Elapsed Days   R CW 6X 25 / 25 200 0 5,000 35   R PAB 6X 25 / 25 51 0 1,275 35   R Sclav 6X 25 / 25 200 0 5,000 35    Dr Evangelista      3/5/2024 -  Cancer Staged     Staging form: Breast, AJCC 8th Edition  - Clinical: Stage IIIB (cT4d, cN3a, cM0, G2, ER+, SD+, HER2-) - Signed by Sergio Vieyra MD on 3/5/2024  Histologic grading system: 3 grade system         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)   4/21/2023 Initial Diagnosis     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)         Review of Systems   Constitutional:  Positive for activity change and fatigue.   Musculoskeletal:  Positive for back pain.      Medical History Reviewed by provider this encounter:     .     Medications Ordered Prior to Encounter          Current Outpatient Medications on File Prior to Visit   Medication Sig Dispense Refill    ALPRAZolam (XANAX) 0.25 mg tablet Take 1/2 to 1 pill Q 8 hours p.r.n. For anxiety 30 tablet 0    anastrozole (ARIMIDEX) 1 mg tablet Take 1 tablet (1 mg total) by mouth daily 90 tablet 0    Calcium 250 MG CAPS Take 250 mg by mouth in the morning          cholecalciferol (VITAMIN D3) 1,000 units tablet Take 2 capsules by mouth daily        ciclopirox (LOPROX) 0.77 % cream          escitalopram (LEXAPRO) 20 mg tablet Take 1 tablet (20 mg total) by mouth daily 86 tablet 1    Fish Oil-Krill Oil (KRILL OIL PLUS) CAPS Take by mouth in the morning          fluocinonide (LIDEX) 0.05 % external solution APPLY TO SCALP ONCE DAILY AS NEEDED FOR ITCH   2    ketoconazole (NIZORAL) 2 % shampoo Apply 1 application topically if  "needed     5    meclizine (ANTIVERT) 25 mg tablet Take 1 tablet (25 mg total) by mouth every 8 (eight) hours 30 tablet 0      No current facility-administered medications on file prior to visit.         Social History               Tobacco Use    Smoking status: Never    Smokeless tobacco: Never   Vaping Use    Vaping status: Never Used   Substance and Sexual Activity    Alcohol use: Yes       Comment: rarely    Drug use: Yes       Types: Marijuana       Comment: not helping stopped    Sexual activity: Not Currently       Partners: Male       Birth control/protection: Female Sterilization             Objective     /92 (BP Location: Right arm, Patient Position: Sitting, Cuff Size: Adult)   Pulse 97   Temp 98.2 °F (36.8 °C) (Temporal)   Resp 16   Ht 5' 8\" (1.727 m)   Wt 90.3 kg (199 lb)   LMP  (LMP Unknown)   SpO2 99%   BMI 30.26 kg/m²      Pain Screening:  Pain Score:   9  ECOG   2  Physical Exam  Exam conducted with a chaperone present.   Constitutional:       General: She is in acute distress.      Appearance: She is obese.   Neck:      Thyroid: No thyroid mass.      Trachea: Trachea normal.   Cardiovascular:      Rate and Rhythm: Normal rate and regular rhythm.      Heart sounds: No murmur heard.     No systolic murmur is present.      No diastolic murmur is present.      No friction rub. No gallop. No S3 or S4 sounds.   Pulmonary:      Breath sounds: Normal breath sounds. No decreased breath sounds, wheezing or rhonchi.      Comments: On examination low back pain is exacerbated by deep inspiration  Chest:      Chest wall: No mass. There is no dullness to percussion.   Breasts:     Right: Absent. No mass, skin change or tenderness.      Left: Normal. No mass, skin change or tenderness.   Abdominal:      General: Abdomen is protuberant. Bowel sounds are normal.      Palpations: Abdomen is soft. There is no hepatomegaly, splenomegaly or mass.      Tenderness: There is no abdominal tenderness. There is " no right CVA tenderness or left CVA tenderness.   Musculoskeletal:      Cervical back: No rigidity or bony tenderness. No pain with movement, spinous process tenderness or muscular tenderness.      Thoracic back: No tenderness or bony tenderness.      Lumbar back: Tenderness and bony tenderness present.      Right lower leg: No edema.      Left lower leg: No edema.   Lymphadenopathy:      Cervical: No cervical adenopathy.      Right cervical: No superficial, deep or posterior cervical adenopathy.     Left cervical: No superficial, deep or posterior cervical adenopathy.   Neurological:      Mental Status: She is alert.            Labs: I have reviewed the following labs:        Lab Results   Component Value Date/Time     WBC 4.03 (L) 06/13/2024 10:40 AM     RBC 4.36 06/13/2024 10:40 AM     Hemoglobin 14.0 06/13/2024 10:40 AM     Hematocrit 43.1 06/13/2024 10:40 AM     MCV 99 (H) 06/13/2024 10:40 AM     MCH 32.1 06/13/2024 10:40 AM     RDW 11.6 06/13/2024 10:40 AM     Platelets 169 06/13/2024 10:40 AM     Segmented % 45 06/13/2024 10:40 AM     Lymphocytes % 38 06/13/2024 10:40 AM     Monocytes % 11 06/13/2024 10:40 AM     Eosinophils Relative 4 06/13/2024 10:40 AM     Basophils Relative 2 (H) 06/13/2024 10:40 AM     Immature Grans % 0 06/13/2024 10:40 AM     Absolute Neutrophils 1.83 (L) 06/13/2024 10:40 AM            Lab Results   Component Value Date/Time     Potassium 4.1 06/13/2024 10:40 AM     Chloride 103 06/13/2024 10:40 AM     CO2 29 06/13/2024 10:40 AM     BUN 15 06/13/2024 10:40 AM     Creatinine 0.71 06/13/2024 10:40 AM     Glucose, Fasting 96 06/13/2024 10:40 AM     Calcium 9.2 06/13/2024 10:40 AM     AST 18 06/13/2024 10:40 AM     ALT 12 06/13/2024 10:40 AM     Alkaline Phosphatase 66 06/13/2024 10:40 AM     Total Protein 6.8 06/13/2024 10:40 AM     Albumin 4.0 06/13/2024 10:40 AM     Total Bilirubin 0.88 06/13/2024 10:40 AM     eGFR 83 06/13/2024 10:40 AM            Pathology:   April 26, 2022 S 21 [id number]  status post right breast mastectomy with axillary contents         Final Diagnosis   A. Breast, Right, right breast mastectomy and axillary contents , suture marks short superior, medium medial, long lateral: -Status post neoadjuvant chemotherapy for inflammatory breast carcinoma.   -Extensive residual invasive breast carcinoma , presenting as two well defined tumor foci -  The first focus of tumor measuring 0.7 x 0.6 x 0.6 x 0.7 cm, located in the central upper outer/upper inner quadrant, 1.0cmawayfrom the deep margin -  The second tumor focus measures 1.0 x0.9 x 0.5 cm , lower inner quadrant , The tumor is located 1.3 cm fromthe inferior margin-  Multiple separate tumor foci, range from 2-4 mm seen in bridging tissue between the main 2 masses -  Two separate tumor foci measuring 9 mm and 6 mm grossly presented as fibrous foci.   -Multiple incidental foci of tumor seen in grossly unremarkable breast tissue in all four breast quadrants as follows: -     Few incidental foci of tumor seen in upper outer quadrant ranging from 2-4 mm -Two incidental foci of tumor seen in upper inner quadrant , ranging from 4-5 mm -Few incidental foci of tumor seen in the lower outer quadrant ranging from 5-15 mm -Multiple incidental foci of tumor seen in the the lower inner quadrant , ranging from 3-14 mm -Extensive LVI seen seen in all four breast quadrants -Dermal LVI seen, multiple sections -Dermal Satellite focus of tumor 3 mm -Dermal scar -Rare foci of atypical lobular hyperplasia [id number] Carie Fernandez         December 6, 2021 S 21 [id number] status post punch biopsy skin lesion right breast  Final Diagnosis   A. Skin lesion, Breast, Right, punch biopsy: - Dermal lymphovascular invasion, immunoprofile consistent with breast primary, present at tissue edges.      Biomarkers  Estrogen receptor +90-95% positive  Progesterone receptor 70-80% positive  HER2/lory low 2+ by IHC yet nonamplified or negative by FISH analysis        Imaging:          March 7, 2025 CT chest abdomen pelvis with contrast     FINDINGS:     CHEST     LUNGS: A few scattered 1-2 mm pulmonary nodules (identified on series 303), some of which are not definitely seen on CT 12/11/2021 which may be secondary to differences in slice thickness. Few scattered small perifissural nodules, likely intrapulmonary   lymph nodes (including a 3 mm nodule in the right lower lobe which is unchanged since 2021 on series 303/194). No suspicious pulmonary nodules. Mild right greater than left biapical pleural-parenchymal scarring. Central airways are patent. No   consolidation.     PLEURA: Unremarkable.     HEART/GREAT VESSELS: Heart is normal size. Mild coronary artery atherosclerotic calcifications. No thoracic aortic aneurysm.     MEDIASTINUM AND RAHEL: Unremarkable.     CHEST WALL AND LOWER NECK: Postsurgical changes of right mastectomy and right axillary lymph node dissection. No lymphadenopathy.     ABDOMEN     LIVER/BILIARY TREE: Stable 1.8 cm right posterior hepatic lobe cyst. Additional punctate hypoattenuating lesions, too small to characterize, but statistically likely benign, which do not require follow-up (ACR White paper 2017). No suspicious hepatic   mass. Normal hepatic contours. No biliary dilation.     GALLBLADDER: Post cholecystectomy.     SPLEEN: Unremarkable.     PANCREAS: Unremarkable.     ADRENAL GLANDS: Unremarkable.     KIDNEYS/URETERS: Stable left renal cortical scarring. No hydronephrosis or urinary tract calculi. Subcentimeter hypoattenuating renal lesion(s), too small to characterize but statistically likely benign, which do not warrant follow-up (Radiology June 2019).     STOMACH AND BOWEL: Few scattered colonic diverticula without evidence of acute diverticulitis.     APPENDIX: Normal.     ABDOMINOPELVIC CAVITY: No ascites. No pneumoperitoneum. No lymphadenopathy.     VESSELS: Mild atherosclerosis without abdominal aortic aneurysm.     PELVIS     REPRODUCTIVE ORGANS:  Post hysterectomy.     URINARY BLADDER: Under distended, somewhat limiting evaluation. Grossly unremarkable.     ABDOMINAL WALL/INGUINAL REGIONS: Unremarkable.     BONES: No acute fracture or suspicious osseous lesion. Spinal degenerative changes.     IMPRESSION:     1. No convincing evidence of metastatic disease in the chest, abdomen, or pelvis.     2. Few scattered 1-2 mm pulmonary micronodules, some of which are not definitely seen on CT 12/11/2021, which may be secondary to differences in slice thickness. Although these are not particularly suspicious, patients with a history of malignancy are at   increased risk of metastasis and should receive initial three-month follow-up chest CT to establish stability.    March 5, 2025 nuclear medicine whole-body bone scan  FINDINGS:     Foci of increased radiotracer uptake in the bilateral shoulders, cervical spine, left sternomanubrial joint, lower thoracic spine, bilateral wrists, feet, and knees are likely arthritic given distribution.     New focus of increased uptake in the distal right leg near the ankle joint.     Symmetric renal uptake. Mildly prominent renal calyces. No hydronephrosis seen on MRI lumbar spine 3/4/2025.     IMPRESSION:     1. No foci of increased uptake in the lumbar spine when compared to 12/14/2021. No findings suspicious for metastatic disease     2. New focus of increased uptake in the distal right leg near the ankle joint, possibly degenerative or post-traumatic. Recommend right ankle radiograph for further evaluation as clinically indicated.        December 18, 2024 bone density DEXA scan  Impression  1. Low bone mass (osteopenia).        2.  Since a DXA study from 11/21/2023, there has been:  A  STATISTICALLY SIGNIFICANT DECREASE in bone mineral density of 0.034 g/cm2 (4.3%) in the lumbar spine.    March 4, 2025 MRI lumbar spine with and without contrast      IMPRESSION:     No evidence for metastatic disease to the lumbar spine. No  abnormal enhancement within the spinal canal.     Degenerative changes of the lumbar spine, as described above.     Multifactorial disease results in moderate mass effect on the thecal sac at L4-L5.              December 12, 2024 screening mammogram left breast     FINDINGS:   There are no suspicious masses, grouped microcalcifications or areas of unexplained architectural distortion. The skin and nipple areolar complex are unremarkable.       Biopsy clips throughout the breast noted.     IMPRESSION:  No mammographic evidence of malignancy.           ASSESSMENT/BI-RADS CATEGORY:  Left: 2 - Benign  Overall: 2 - Benign     RECOMMENDATION:       - Diagnostic mammogram in 1 year for the left breast.    December 18, 2024 bone density DEXA scan     IMPRESSION:     1. Low bone mass (osteopenia).        2.  Since a DXA study from 11/21/2023, there has been:  A  STATISTICALLY SIGNIFICANT DECREASE in bone mineral density of 0.034 g/cm2 (4.3%) in the lumbar spine.        April 20, 2022 bilateral breast MRI with and without contrast      FINDINGS:   RIGHT  B) MASS: Again noted is enhancing mass in the 12 o'clock position of the right breast, 8 cm from the nipple.  This has decreased in size.  It previously measured 10 mm and currently measures 6 mm. Series 401, image 105/224.      C) MASS:  Previously noted 9 mm mass in the 12 o'clock position of the right breast, 6 cm from the nipple has markedly decreased in size/no longer definitely seen; tiny focus of enhancement is noted in this region.  Series 401, image 100/224.     D) MASS:  Previously noted 6 mm enhancing mass in the 8 o'clock position of the breast has markedly decreased in size/no longer definitely seen; tiny focus of enhancement is noted in this region. Series 401, image 156/224.     E) NON-MASS ENHANCEMENT:  Previously noted area of non mass enhancement in the 6 o'clock position of the breast has markedly decreased; previously was a 28 x 17 mm area of enhancement  and currently 1.1 mm linear area of enhancement. Series 401, image 177/224.     F) NON-MASS ENHANCEMENT:  Again noted is an area of non mass enhancement in the 11 o'clock position of the right breast.  The amount of enhancement within this region has decreased; however, the span is similar.  Series 401, image 108/224.     Diffuse right breast skin thickening again noted.  Catheter from port noted.  No right axillary axillary or internal mammary adenopathy.     Left  There are no suspicious enhancing masses or suspicious areas of non-mass enhancement. There is no axillary or internal mammary adenopathy.      IMPRESSION:  Partial response to neoadjuvant chemotherapy.     ASSESSMENT/BI-RADS CATEGORY:  Left: 2 - Benign  Right: 6 - Known Biopsy-Proven Malignancy  Overall: 6 - Known Biopsy-Proven Malignancy     RECOMMENDATION:       - Surgical consultation for the right breast.       - Routine screening mammogram in 1 year for the left breast.        December 14, 2021 nuclear medicine whole-body bone scan  IMPRESSION:     1.  No scintigraphic evidence of osseous metastasis.     December 11, 2021 CT scan chest abdomen pelvis with contrast     IMPRESSION:     No definite evidence of metastatic disease in the chest, abdomen, or pelvis.     Nonspecific 5 mm pulmonary nodule in the right lower lobe, likely infectious/inflammatory.  Recommend continued surveillance according to the patient's oncologic imaging protocol.     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     Workstation performed: [id number]                       Instructions

## 2025-04-09 ENCOUNTER — OFFICE VISIT (OUTPATIENT)
Dept: HEMATOLOGY ONCOLOGY | Facility: CLINIC | Age: 75
End: 2025-04-09
Payer: MEDICARE

## 2025-04-09 VITALS
HEART RATE: 83 BPM | TEMPERATURE: 97.6 F | DIASTOLIC BLOOD PRESSURE: 70 MMHG | SYSTOLIC BLOOD PRESSURE: 122 MMHG | OXYGEN SATURATION: 96 % | WEIGHT: 200 LBS | RESPIRATION RATE: 18 BRPM | BODY MASS INDEX: 30.31 KG/M2 | HEIGHT: 68 IN

## 2025-04-09 DIAGNOSIS — R91.8 PULMONARY NODULES/LESIONS, MULTIPLE: Primary | ICD-10-CM

## 2025-04-09 PROCEDURE — 99214 OFFICE O/P EST MOD 30 MIN: CPT | Performed by: INTERNAL MEDICINE

## 2025-04-09 NOTE — PROGRESS NOTES
PT Evaluation     Today's date: 2025  Patient name: Carie Fernandez  : 1950  MRN: [id number]  Referring provider: Kelli López CRNP  Dx:   Encounter Diagnosis     ICD-10-CM    1. Lumbar radiculopathy  M54.16 Ambulatory Referral to Physical Therapy      2. Lumbar back pain  M54.50 Ambulatory Referral to Physical Therapy      3. Malignant neoplasm of overlapping sites of right breast in female, estrogen receptor positive (HCC)  C50.811 Ambulatory Referral to Physical Therapy    Z17.0                      Assessment  Impairments: abnormal or restricted ROM, activity intolerance, impaired physical strength, lacks appropriate home exercise program, pain with function, poor posture , poor body mechanics and endurance  Other impairment: lumbar radiculopathy, decreased sitting posture, decreased tolerance to ambulation 20 min due to onset of low back pain  Functional limitations: decreased tolerance to bending ,lifting and twisting, due to low back pain, fair abd strength, decreased leg strength,  hx of scoliosis with pelvic obliquity  Symptom irritability: moderate  Understanding of Dx/Px/POC: good     Prognosis: good    Goals  Stg 1. Improve lumbar support with sititng posture in 1 week with lumbar towel roll  2 independence with home exer program in 2 weeks  3 promote centralization of low back pain in 2 weeks kacey exer extension bias     Ltg 1 achieve 4/5 mmt abdominal strength in 3-4 weeks  2 improve core stabilization with proper body mechanics with daily activity in 4 weeks  3 return to all prior activity without onset of symptoms in 4-6 weeks     Plan  Patient would benefit from: PT eval and skilled physical therapy  Referral necessary: Yes  Other planned modality interventions: prn    Planned therapy interventions: manual therapy, joint mobilization, IASTM, neuromuscular re-education, body mechanics training, massage, postural training, patient/caregiver education, strengthening, stretching,  therapeutic activities, therapeutic exercise, abdominal trunk stabilization and home exercise program    Frequency: 2x week  Plan of Care beginning date: 2025  Plan of Care expiration date: 2025  Treatment plan discussed with: patient        Subjective Evaluation    History of Present Illness  Onset date: chronic intermittent low back pain for 10 years with exacerbation 15 days ago with repetitive lifting and twisting.          Recurrent probem    Quality of life: good    Patient Goals  Patient goals for therapy: decreased pain, increased strength, increased motion and return to sport/leisure activities  Patient goal: improve trunk rom , decrease pain and return to all prior activities including lifitng grandchildren without pain .  Pain  Current pain ratin  At best pain ratin  At worst pain ratin  Location: low back pain with right sided radiation of symptom to the si and hip  Quality: dull ache, radiating, sharp, tight and pulling  Relieving factors: heat, change in position and rest  Aggravating factors: overhead activity, lifting, walking and standing  Progression: improved    Social Support  Steps to enter house: yes (3 steps bilateral railings)  Stairs in house: yes (flight of steps with one railing)   Lives in: multiple-level home  Lives with: spouse    Employment status: not working (retired  much standing , bending)  Hand dominance: right  Exercise comments: pt does like to walk 20min 1 min now with pain at end of walk.    Treatments  Current treatment: physical therapy        Objective    Flowsheet Rows      Flowsheet Row Most Recent Value   PT/OT G-Codes    Current Score 47   Projected Score 66   Assessment Type Evaluation   G code set Mobility: Walking & Moving Around   Mobility: Walking and Moving Around Current Status () CK   Mobility: Walking and Moving Around Goal Status () CK               Precautions: allergies : pollen extract      Manuals 25              I eval             Muscle energy technique right hip ext and with left le adduction  1 set of 5 hold 10 sec                                      Neuro Re-Ed             Prone on elbows 1min            Prone pressups  1 set of 5 in crease to 1 set of 10 nv            Standing backbends 10 reps             Squats to edge of hi lo table  23 inch height 3 sets of 10             Sitting situps 10 reps             Hamstring stretch in longsitting or supine with strap              Prone quad stretch             Ther Ex             Nu step              Hands on body mechanics lifting instruction             Supine dls                                                                              Ther Activity                                       Gait Training                                       Modalities                                             Details  + pelvic obliquity, right ASIS higher than left, right hip higher than left, right trunk rotation, left shoulder higher ,     right sh rom slight limitation s/p right mod radical breast ca mastectomy with 19 lymph nodes removed 13 + s/p chemo and radiation a    Abdominals strength 3/5 mmt  trunk extensor 3+/5 mmt    pt does wear orthotics custom.   Pt is right handed.   Slr 0-60 bilaterally   Mod quad tightness bilaterally, + ITB tightness noted bilaterally     Strength/Myotome Testing     Left Hip   Planes of Motion   Flexion: 4+  Extension: 3-  Abduction: 4+  Adduction: 3+    Right Hip   Planes of Motion   Flexion: 4+  Extension: 1  Abduction: 4+  Adduction: 3+    Left Knee   Flexion: 4+  Extension: 5    Right Knee   Flexion: 4+  Extension: 5    Left Ankle/Foot   Dorsiflexion: 5  Plantar flexion: 4+  Inversion: 5  Eversion: 5    Right Ankle/Foot   Dorsiflexion: 5  Plantar flexion: 4+  Inversion: 5  Eversion: 5    Ambulation     Ambulation: Level Surfaces   Ambulation without assistive device: independent    Additional Level Surfaces Ambulation Details  150 ft  level surface      Sit to stand from 18inch chair height 8 reps in 30 seconds     Flowsheet Rows      Flowsheet Row Most Recent Value   PT/OT G-Codes    Current Score 47   Projected Score 66   Assessment Type Evaluation   G code set Mobility: Walking & Moving Around   Mobility: Walking and Moving Around Current Status () CK   Mobility: Walking and Moving Around Goal Status () CK        PMH:  varicose vein of leg, peripheral neuropathy, osteopenia of both hips, OA right knee, osteoporosis, OA right thumb, anxiety, depression , right breast ca s/p modified radical mastectomy with lymph node removal 19 , 13 positive s/p chemo and radiation, 4/26/22, chemo induce neutropenia, vit d deficient, hx of use of anastrozole, chronic fatigue, ankle surgery, gallbladder surgery, s/p hysterectomy and oophorectomy 1996, chronic low back pain 10 years,   fx radium distal closed last assessed 6/10/14, thrombophlebitis        Precautions: allergies : pollen extract      Manuals 4/8/25             I eval             Muscle energy technique right hip ext and with left le adduction  1 set of 5 hold 10 sec                                      Neuro Re-Ed             Prone on elbows 1min            Prone pressups  1 set of 5 in crease to 1 set of 10 nv            Standing backbends 10 reps             Squats to edge of hi lo table  23 inch height 3 sets of 10             Sitting situps 10 reps             Hamstring stretch in longsitting or supine with strap              Prone quad stretch             Ther Ex             Nu step              Hands on body mechanics lifting instruction             Supine dls                                                                              Ther Activity                                                                              Modalities

## 2025-04-11 ENCOUNTER — OFFICE VISIT (OUTPATIENT)
Dept: PHYSICAL THERAPY | Age: 75
End: 2025-04-11
Payer: MEDICARE

## 2025-04-11 DIAGNOSIS — Z17.0 MALIGNANT NEOPLASM OF OVERLAPPING SITES OF RIGHT BREAST IN FEMALE, ESTROGEN RECEPTOR POSITIVE (HCC): ICD-10-CM

## 2025-04-11 DIAGNOSIS — C50.811 MALIGNANT NEOPLASM OF OVERLAPPING SITES OF RIGHT BREAST IN FEMALE, ESTROGEN RECEPTOR POSITIVE (HCC): ICD-10-CM

## 2025-04-11 DIAGNOSIS — M54.16 LUMBAR RADICULOPATHY: ICD-10-CM

## 2025-04-11 DIAGNOSIS — M54.50 LUMBAR BACK PAIN: Primary | ICD-10-CM

## 2025-04-11 PROCEDURE — 97112 NEUROMUSCULAR REEDUCATION: CPT

## 2025-04-11 PROCEDURE — 97110 THERAPEUTIC EXERCISES: CPT

## 2025-04-12 NOTE — PROGRESS NOTES
Daily Note     Today's date: 2025  Patient name: Carie Fernandez  : 1950  MRN: [id number]  Referring provider: Kelli López CRNP  Dx:   Encounter Diagnosis     ICD-10-CM    1. Lumbar back pain  M54.50       2. Lumbar radiculopathy  M54.16                      Subjective: fair tolerance to home exer programming      Objective: See treatment diary below      Assessment: Tolerated treatment well. Patient would benefit from continued PT. PT issued written DLS program post instruction today.       Plan: Continue per plan of care.      Precautions: allergies : pollen extract      Manuals 25            I eval             Muscle energy technique right hip ext and with left le adduction  1 set of 5 hold 10 sec 1 set of 5 hold 10 sec                                     Neuro Re-Ed             Prone on elbows 1min 1min           Prone pressups  1 set of 5 in crease to 1 set of 10 nv 2 sets of 5           Standing backbends 10 reps  10 reps            Squats to edge of hi lo table  23 inch height 3 sets of 10  3 x 10           Sitting situps 10 reps             Hamstring stretch in longsitting or supine with strap   1 min x 1 with strap            Prone  or sidelying quad stretch  Sidelying 1min x 1 with strap            Ther Ex             Nu step   15 min r 2           Hands on body mechanics lifting instruction             Supine dls                                                                              Ther Activity                                                                              Modalities

## 2025-04-15 ENCOUNTER — OFFICE VISIT (OUTPATIENT)
Dept: PHYSICAL THERAPY | Age: 75
End: 2025-04-15
Attending: NURSE PRACTITIONER
Payer: MEDICARE

## 2025-04-15 DIAGNOSIS — M54.16 LUMBAR RADICULOPATHY: ICD-10-CM

## 2025-04-15 DIAGNOSIS — M54.50 LUMBAR BACK PAIN: Primary | ICD-10-CM

## 2025-04-15 PROCEDURE — 97112 NEUROMUSCULAR REEDUCATION: CPT

## 2025-04-15 PROCEDURE — 97110 THERAPEUTIC EXERCISES: CPT

## 2025-04-15 NOTE — PROGRESS NOTES
"Daily Note     Today's date: 4/15/2025  Patient name: Carie Fernandez  : 1950  MRN: [id number]  Referring provider: Kelli López CRNP  Dx:   Encounter Diagnosis     ICD-10-CM    1. Lumbar back pain  M54.50       2. Lumbar radiculopathy  M54.16                      Subjective: \"My back pain is a 2 today it is better.\"      Objective: See treatment diary below      Assessment: Tolerated treatment well. Patient demonstrated fatigue post treatment prone dls added      Plan: Continue per plan of care.      Precautions: allergies : pollen extract      Manuals 4/8/25 4/11 4/15           I eval             Muscle energy technique right hip ext and with left le adduction  1 set of 5 hold 10 sec 1 set of 5 hold 10 sec                                     Neuro Re-Ed             Prone on elbows 1min 1min  1min          Prone pressups  1 set of 5 in crease to 1 set of 10 nv 2 sets of 5 2 sets of 5          Standing backbends 10 reps  10 reps  10          Squats to edge of hi lo table  23 inch height 3 sets of 10  3 x 10 3 x 10          Sitting situps 10 reps   2 set of 10          Hamstring stretch in longsitting or supine with strap   1 min x 1 with strap  1 min x 1          Prone  or sidelying quad stretch  Sidelying 1min x 1 with strap  Prone 1min x 1          Ther Ex             Nu step   15 min r 2 15 min r 4          Hands on body mechanics lifting instruction             Supine dls alt arm alt leg, alt arm/leg, leg lowering , bridging , partial and diagonal,    10 reps           Prone dls    5 reps                                                               Ther Activity                                                                              Modalities                                              "

## 2025-04-18 ENCOUNTER — OFFICE VISIT (OUTPATIENT)
Dept: PHYSICAL THERAPY | Age: 75
End: 2025-04-18
Payer: MEDICARE

## 2025-04-18 DIAGNOSIS — M54.16 LUMBAR RADICULOPATHY: ICD-10-CM

## 2025-04-18 DIAGNOSIS — M54.50 LUMBAR BACK PAIN: Primary | ICD-10-CM

## 2025-04-18 PROCEDURE — 97110 THERAPEUTIC EXERCISES: CPT

## 2025-04-18 PROCEDURE — 97530 THERAPEUTIC ACTIVITIES: CPT

## 2025-04-19 NOTE — PROGRESS NOTES
Daily Note     Today's date: 2025  Patient name: Carie Fernandez  : 1950  MRN: [id number]  Referring provider: Kelli López CRNP  Dx:   Encounter Diagnosis     ICD-10-CM    1. Lumbar back pain  M54.50       2. Lumbar radiculopathy  M54.16                      Subjective: no new c/o's       Objective: See treatment diary below      Assessment: Tolerated treatment well. Patient demonstrated fatigue post treatment.  Hands on lifting mechanics perform today with daily lifting activities      Plan: Continue per plan of care.      Precautions: allergies : pollen extract      Manuals 4/8/25 4/11 4/15 4/18          I eval             Muscle energy technique right hip ext and with left le adduction  1 set of 5 hold 10 sec 1 set of 5 hold 10 sec                                     Neuro Re-Ed             Prone on elbows 1min 1min  1min 1 min          Prone pressups  1 set of 5 in crease to 1 set of 10 nv 2 sets of 5 2 sets of 5 2 sets of 5         Standing backbends 10 reps  10 reps  10 10         Squats to edge of hi lo table  23 inch height 3 sets of 10  3 x 10 3 x 10 3 x 10         Sitting situps 10 reps   2 set of 10          Hamstring stretch in longsitting or supine with strap   1 min x 1 with strap  1 min x 1 1 min x 1         Prone  or sidelying quad stretch  Sidelying 1min x 1 with strap  Prone 1min x 1 Prone 1min x 1         Ther Ex             Nu step   15 min r 2 15 min r 4 15 min r 5         Hands on body mechanics lifting instruction push pull, stand pivot turn, golfer's lift, squat, diagonal squat    Perf there activity         Supine dls alt arm alt leg, alt arm/leg, leg lowering , bridging , partial and diagonal,    10 reps           Prone dls    5 reps  10 reps each                                                             Ther Activity                                                                              Modalities

## 2025-04-22 ENCOUNTER — HOSPITAL ENCOUNTER (OUTPATIENT)
Dept: NON INVASIVE DIAGNOSTICS | Facility: HOSPITAL | Age: 75
Discharge: HOME | End: 2025-04-22
Payer: MEDICARE

## 2025-04-22 ENCOUNTER — OFFICE VISIT (OUTPATIENT)
Dept: PHYSICAL THERAPY | Age: 75
End: 2025-04-22
Payer: MEDICARE

## 2025-04-22 VITALS
HEIGHT: 68 IN | DIASTOLIC BLOOD PRESSURE: 70 MMHG | HEART RATE: 82 BPM | SYSTOLIC BLOOD PRESSURE: 122 MMHG | WEIGHT: 200 LBS | BODY MASS INDEX: 30.31 KG/M2

## 2025-04-22 DIAGNOSIS — M54.16 LUMBAR RADICULOPATHY: ICD-10-CM

## 2025-04-22 DIAGNOSIS — C50.811 MALIGNANT NEOPLASM OF OVERLAPPING SITES OF RIGHT BREAST IN FEMALE, ESTROGEN RECEPTOR POSITIVE (HCC): ICD-10-CM

## 2025-04-22 DIAGNOSIS — M54.50 LUMBAR BACK PAIN: Primary | ICD-10-CM

## 2025-04-22 DIAGNOSIS — Z17.0 MALIGNANT NEOPLASM OF OVERLAPPING SITES OF RIGHT BREAST IN FEMALE, ESTROGEN RECEPTOR POSITIVE (HCC): ICD-10-CM

## 2025-04-22 DIAGNOSIS — R53.83 OTHER FATIGUE: ICD-10-CM

## 2025-04-22 PROCEDURE — 93306 TTE W/DOPPLER COMPLETE: CPT | Performed by: INTERNAL MEDICINE

## 2025-04-22 PROCEDURE — 97110 THERAPEUTIC EXERCISES: CPT

## 2025-04-22 PROCEDURE — 93306 TTE W/DOPPLER COMPLETE: CPT

## 2025-04-22 NOTE — PROGRESS NOTES
"Daily Note     Today's date: 2025  Patient name: Carie Fernandez  : 1950  MRN: [id number]  Referring provider: Kelli López CRNP  Dx:   Encounter Diagnosis     ICD-10-CM    1. Lumbar back pain  M54.50       2. Lumbar radiculopathy  M54.16       3. Malignant neoplasm of overlapping sites of right breast in female, estrogen receptor positive (HCC)  C50.811     Z17.0                      Subjective: [employer] MARTI PT supervising units utilized   \"I was in the kitchen all day and my back didn't hurt afterwards which was amazing.       Objective: See treatment diary below      Assessment: Tolerated treatment well. Patient would benefit from continued PT      Plan: Continue per plan of care.      Precautions: allergies : pollen extract      Manuals 4/8/25 4/11 4/15 4/18 4/22         I eval             Muscle energy technique right hip ext and with left le adduction  1 set of 5 hold 10 sec 1 set of 5 hold 10 sec   Not needed                                  Neuro Re-Ed             Prone on elbows 1min 1min  1min 1 min  1min        Prone pressups  1 set of 5 in crease to 1 set of 10 nv 2 sets of 5 2 sets of 5 2 sets of 5 1 set of 10         Standing backbends 10 reps  10 reps  10 10 10         Squats to edge of hi lo table  23 inch height 3 sets of 10  3 x 10 3 x 10 3 x 10 3 x 10        Sitting situps 10 reps   2 set of 10          Hamstring stretch in longsitting or supine with strap   1 min x 1 with strap  1 min x 1 1 min x 1         Prone  or sidelying quad stretch  Sidelying 1min x 1 with strap  Prone 1min x 1 Prone 1min x 1 1 min x 1        Ther Ex             Nu step   15 min r 2 15 min r 4 15 min r 5 15 min r 5        Hands on body mechanics lifting instruction push pull, stand pivot turn, golfer's lift, squat, diagonal squat    Perf there activity         Supine dls alt arm alt leg, alt arm/leg, leg lowering , bridging , partial and diagonal,    10 reps           Prone dls    5 reps  10 reps each         Leg " ext cybex     12# 2 sets of 10         Hip abd cybex     20# 2 sets of 10         Leg press     60# 2 sets of 10         Hamstring curls      30# 2 sets of 10         Ther Activity                                                                              Modalities

## 2025-04-23 LAB
AORTIC ROOT: 3.3 CM
ASCENDING AORTA: 3.5 CM
AV REGURGITATION PRESSURE HALF TIME: 570 MS
BSA FOR ECHO PROCEDURE: 2.04 M2
E WAVE DECELERATION TIME: 199 MS
E/A RATIO: 0.62
FRACTIONAL SHORTENING: 27 (ref 28–44)
INTERVENTRICULAR SEPTUM IN DIASTOLE (PARASTERNAL SHORT AXIS VIEW): 1 CM
INTERVENTRICULAR SEPTUM: 1 CM (ref 0.6–1.1)
LAAS-AP2: 16.6 CM2
LAAS-AP4: 10.7 CM2
LEFT ATRIUM SIZE: 3.5 CM
LEFT ATRIUM VOLUME (MOD BIPLANE): 34 ML
LEFT ATRIUM VOLUME INDEX (MOD BIPLANE): 16.7 ML/M2
LEFT INTERNAL DIMENSION IN SYSTOLE: 3.5 CM (ref 2.1–4)
LEFT VENTRICULAR INTERNAL DIMENSION IN DIASTOLE: 4.8 CM (ref 3.5–6)
LEFT VENTRICULAR POSTERIOR WALL IN END DIASTOLE: 0.9 CM
LEFT VENTRICULAR STROKE VOLUME: 60 ML
LV EF US.2D.A4C+ESTIMATED: 51 %
LVSV (TEICH): 60 ML
MV E'TISSUE VEL-LAT: 8 CM/S
MV E'TISSUE VEL-SEP: 7 CM/S
MV PEAK A VEL: 0.97 M/S
MV PEAK E VEL: 60 CM/S
MV STENOSIS PRESSURE HALF TIME: 58 MS
MV VALVE AREA P 1/2 METHOD: 3.79
RA PRESSURE ESTIMATED: 3 MMHG
RIGHT ATRIUM AREA SYSTOLE A4C: 10.7 CM2
RIGHT VENTRICLE ID DIMENSION: 3.6 CM
SL CV AV DECELERATION TIME RETROGRADE: 1965 MS
SL CV AV PEAK GRADIENT RETROGRADE: 55 MMHG
SL CV LEFT ATRIUM LENGTH A2C: 5.3 CM
SL CV LV EF: 55
SL CV PED ECHO LEFT VENTRICLE DIASTOLIC VOLUME (MOD BIPLANE) 2D: 110 ML
SL CV PED ECHO LEFT VENTRICLE SYSTOLIC VOLUME (MOD BIPLANE) 2D: 49 ML
TRICUSPID ANNULAR PLANE SYSTOLIC EXCURSION: 1.8 CM

## 2025-04-25 ENCOUNTER — OFFICE VISIT (OUTPATIENT)
Dept: PHYSICAL THERAPY | Age: 75
End: 2025-04-25
Payer: MEDICARE

## 2025-04-25 DIAGNOSIS — M54.50 LUMBAR BACK PAIN: Primary | ICD-10-CM

## 2025-04-25 DIAGNOSIS — C50.811 MALIGNANT NEOPLASM OF OVERLAPPING SITES OF RIGHT BREAST IN FEMALE, ESTROGEN RECEPTOR POSITIVE (HCC): ICD-10-CM

## 2025-04-25 DIAGNOSIS — Z17.0 MALIGNANT NEOPLASM OF OVERLAPPING SITES OF RIGHT BREAST IN FEMALE, ESTROGEN RECEPTOR POSITIVE (HCC): ICD-10-CM

## 2025-04-25 DIAGNOSIS — M54.16 LUMBAR RADICULOPATHY: ICD-10-CM

## 2025-04-25 PROCEDURE — 97110 THERAPEUTIC EXERCISES: CPT

## 2025-04-26 NOTE — PROGRESS NOTES
"Daily Note     Today's date: 2025  Patient name: Carie Fernandez  : 1950  MRN: [id number]  Referring provider: Kelli López CRNP  Dx:   Encounter Diagnosis     ICD-10-CM    1. Lumbar back pain  M54.50       2. Lumbar radiculopathy  M54.16       3. Malignant neoplasm of overlapping sites of right breast in female, estrogen receptor positive (HCC)  C50.811     Z17.0                      Subjective: \"I don't have any back pain and I am feeling very well.\"      Objective: See treatment diary below      Assessment: Tolerated treatment well. Patient to continue with home exer program set goals achieved.      Plan:  d/c of Pt continue with home exer program   Precautions: allergies : pollen extract      Manuals 4/8/25 4/11 4/15 4/18 4/22 4/25        I eval             Muscle energy technique right hip ext and with left le adduction  1 set of 5 hold 10 sec 1 set of 5 hold 10 sec   Not needed Not needed                                 Neuro Re-Ed             Prone on elbows 1min 1min  1min 1 min  1min 1min       Prone pressups  1 set of 5 in crease to 1 set of 10 nv 2 sets of 5 2 sets of 5 2 sets of 5 1 set of 10  1 set of 10        Standing backbends 10 reps  10 reps  10 10 10  10 reps        Squats to edge of hi lo table  23 inch height 3 sets of 10  3 x 10 3 x 10 3 x 10 3 x 10 3 x 10       Sitting situps 10 reps   2 set of 10          Hamstring stretch in longsitting or supine with strap   1 min x 1 with strap  1 min x 1 1 min x 1  1 min x 1       Prone  or sidelying quad stretch  Sidelying 1min x 1 with strap  Prone 1min x 1 Prone 1min x 1 1 min x 1 1 min x1       Ther Ex             Nu step   15 min r 2 15 min r 4 15 min r 5 15 min r 5 15 min r 5       Hands on body mechanics lifting instruction push pull, stand pivot turn, golfer's lift, squat, diagonal squat    Perf there activity         Supine dls alt arm alt leg, alt arm/leg, leg lowering , bridging , partial and diagonal,    10 reps           Prone " dls    5 reps  10 reps each         Leg ext cybex     12# 2 sets of 10         Hip abd cybex     20# 2 sets of 10         Leg press     60# 2 sets of 10         Hamstring curls      30# 2 sets of 10         Ther Activity                                                                              Modalities

## 2025-04-30 ENCOUNTER — OFFICE VISIT (OUTPATIENT)
Dept: OBGYN CLINIC | Facility: HOSPITAL | Age: 75
End: 2025-04-30
Payer: MEDICARE

## 2025-04-30 VITALS — BODY MASS INDEX: 30.31 KG/M2 | WEIGHT: 200 LBS | HEIGHT: 68 IN

## 2025-04-30 DIAGNOSIS — M18.11 OSTEOARTHRITIS OF RIGHT THUMB: Primary | ICD-10-CM

## 2025-04-30 DIAGNOSIS — G56.01 CARPAL TUNNEL SYNDROME ON RIGHT: ICD-10-CM

## 2025-04-30 DIAGNOSIS — M67.431 GANGLION CYST OF VOLAR ASPECT OF RIGHT WRIST: ICD-10-CM

## 2025-04-30 PROCEDURE — 99214 OFFICE O/P EST MOD 30 MIN: CPT | Performed by: ORTHOPAEDIC SURGERY

## 2025-04-30 RX ORDER — CEFAZOLIN SODIUM 2 G/50ML
2000 SOLUTION INTRAVENOUS ONCE
OUTPATIENT
Start: 2025-04-30 | End: 2025-04-30

## 2025-04-30 NOTE — ASSESSMENT & PLAN NOTE
Currently we will hold off on surgical intervention for her right thumb STT joint osteoarthritis  We did discuss the aspects of an interpositional arthroplasty to help with the pain and discomfort as well as the recovery that would be 3 to 4 months

## 2025-04-30 NOTE — PROGRESS NOTES
ORTHOPAEDIC HAND, WRIST, AND ELBOW OFFICE  VISIT     Name: Carie Fernandez      : 1950      MRN: [id number]  Encounter Provider: Enrique Ochoa MD  Encounter Date: 2025   Encounter department: [facility] [place]  :  Assessment & Plan  Osteoarthritis of right thumb STT  Currently we will hold off on surgical intervention for her right thumb STT joint osteoarthritis  We did discuss the aspects of an interpositional arthroplasty to help with the pain and discomfort as well as the recovery that would be 3 to 4 months       Carpal tunnel syndrome on right  Patient would like to proceed with surgical intervention at this time for her right carpal tunnel  Detailed consent was obtained for right endoscopic carpal tunnel release and right volar ganglion cyst excision.  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  She  she also has STT joint osteoarthritis which may cause pain and discomfort with function  2 left er surgery for suture removal   Orders:  •  Case request operating room: Right volar ganglion cyst excision, Right endoscopic carpal tunnel release; Standing    Ganglion cyst of volar aspect of right wrist  Patient would like to proceed with surgical intervention at this time for her right volar ganglion cyst  Detailed consent was obtained for right endoscopic carpal tunnel release and right volar ganglion cyst excision with general anesthesia  Risks and benefits of surgery were discussed  We discussed the aspects that 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  She  she also has STT joint osteoarthritis which may cause pain and discomfort with function,  Orders:  •  Case request operating room:  Right volar ganglion cyst excision, Right endoscopic carpal tunnel release; Standing          Operative Discussions:  Thanks, just does on his postop yes this is just a little stiffEndoscopic Carpal Tunnel Release:   The anatomy and physiology of carpal tunnel syndrome was discussed with the patient today.  Increase pressure localized under the transverse carpal ligament can cause pain, numbness, tingling, or dysesthesias within the median nerve distribution as well as feelings of fatigue, clumsiness, or awkwardness.  These symptoms typically occur at night and worse in the morning upon waking.  Eventually, untreated carpal tunnel syndrome can result in weakness and permanent loss of muscle within the thenar compartment of the hand.  Treatment options were discussed with the patient.  Conservative treatment includes nocturnal resting splints to keep the nerve in a neutral position, ergonomic changes within the work or home environment, activity modification, and tendon gliding exercises.  Steroid injections within the carpal canal can help a majority of patients, however this is often self-limited in a majority of patients.  Surgical intervention to divide the transverse carpal ligament typically results in a long-lasting relief of the patient's complaints, with the recurrence rate of less than 5%. The patient has elected to undergo an endoscopic carpal tunnel release.  The 2 incision technique was discussed with the patient, which results in approximately a two-week less recovery time, and less wound complications.  In the postoperative period, light activities are allowed immediately, driving is allowed when narcotic medication has stopped, and the bandages may be removed and incision may get wet after 5 days.  Heavy activities (lifting more than approximately 5 pounds) will be allowed after follow up appointment in 1-2 weeks.  While the pain and discomfort in the hands generally improves rapidly, the numbness and  tingling as well as the strength will slowly improve over weeks to months depending on the severity of the carpal tunnel syndrome.  Pillar pain was discussed with the patient, which is typically a common but self-limiting condition.  The risks of bleeding and infection from the surgery are less than 1%.  Risk of recurrence is less than 5%.  The risks of nerve injury or nerve damage or damage to the blood vessels is approximately 1 in 1000. The patient has an understanding of the above mentioned discussion.The risks and benefits of the procedure were explained to the patient, which include, but are not limited to: Bleeding, infection, recurrence, pain, scar, damage to tendons, damage to nerves, and damage to blood vessels, failure to give desired results and complications related to anesthesia.  These risks, along with alternative conservative treatment options, and postoperative protocols were voiced back and understood by the patient.  All questions were answered to the patient's satisfaction.  The patient agrees to comply with a standard postoperative protocol, and is willing to proceed.  Education was provided via written and auditory forms.  There were no barriers to learning. Written handouts regarding wound care, incision and scar care, and general preoperative information was provided to the patient.  Prior to surgery, the patient may be requested to stop all anti-inflammatory medications.  Prophylactic aspirin, Plavix, and Coumadin may be allowed to be continued.  Medications including vitamin E., ginkgo, and fish oil are requested to be stopped approximately one week prior to surgery.  Hypertensive medications and beta blockers, if taken, should be continued.  Ganglion Cyst Excision: The anatomy and physiology of the ganglion was discussed with the patient today in the office.  Fluid leaking out of the joint surface typically creates a small sac, which can enlarge and cause pain or limitation of motion.   Treatment options include observation, aspiration, or surgical incision were discussed with the patient today.  Observation typically leads to resolution in some patients, aspiration leads to resolution in approximately 50% of patients, and surgical excision leads to resolution in approximately 92-97% of patients.  After discussion with the patient today, the patient voiced understanding of all treatment options.The patient has elected excision of the ganglion.The risks and benefits of the procedure were explained to the patient, which include, but are not limited to: Bleeding, infection, recurrence, pain, scar, damage to tendons, damage to nerves, and damage to blood vessels, failure to give desired results and complications related to anesthesia.  These risks, along with alternative conservative treatment options, and postoperative protocols were voiced back and understood by the patient.  All questions were answered to the patient's satisfaction.  The patient agrees to comply with a standard postoperative protocol, and is willing to proceed.  Education was provided via written and auditory forms.  There were no barriers to learning. Written handouts regarding wound care, incision and scar care, and general preoperative information was provided to the patient.  Prior to surgery, the patient may be requested to stop all anti-inflammatory medications.  Prophylactic aspirin, Plavix, and Coumadin may be allowed to be continued.  Medications including vitamin E., ginkgo, and fish oil are requested to be stopped approximately one week prior to surgery.  Hypertensive medications and beta blockers, if taken, should be continued.    History of Present Illness   HPI  Chief Complaint   Patient presents with   • Right Wrist - Follow-up     CTS- injection 3/5/25  Cyst  Thumb STT Arthritis       Carie Fernandez is a 75 y.o. female who presents with for follow-up regarding right carpal tunnel syndrome, right volar ganglion  "cyst and right thumb STT osteoarthritis.  She states that the previous cortisone injection did not help with any of her pain and discomfort for her carpal tunnel.  She states she continues to have pain at the base of her thumb as well.  She also notes numbness and tingling into her fingers.  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      REVIEW OF SYSTEMS:  General: no fever, no chills  HEENT:  No loss of hearing or eyesight problems  Eyes:  No red eyes  Respiratory:  No coughing, shortness of breath or wheezing  Cardiovascular:  No chest pain, no palpitations  GI:  Abdomen soft nontender, denies nausea  Endocrine:  No muscle weakness, no frequent urination, no excessive thirst  Urinary:  No dysuria, no incontinence  Musculoskeletal: see HPI and PE  SKIN:  No skin rash, no dry skin  Neurological:  No headaches, no confusion  Psychiatric:  No suicide thoughts, no anxiety, no depression  Review of all other systems is negative    Objective   Ht 5' 8\" (1.727 m)   Wt 90.7 kg (200 lb)   LMP  (LMP Unknown)   BMI 30.41 kg/m²      General: well developed and well nourished, alert, oriented times 3, and appears comfortable  Psychiatric: Normal  HEENT: Trachea Midline, No torticollis  Cardiovascular: No discernable arrhythmia  Pulmonary: No wheezing or stridor  Abdomen: No rebound or guarding  Extremities: No peripheral edema  Skin: No masses, erythema, lacerations, fluctation, ulcerations  Neurovascular: Sensation Intact to the Median, Ulnar, Radial Nerve, Motor Intact to the Median, Ulnar, Radial Nerve, and Pulses Intact    Musculoskeletal exam:  Right Carpal Tunnel Exam:    Negative thenar atrophy. Negative phalen's test. Positive carpal tunnel compression. Positive tinels over median nerve at the wrist.  Opposition strength 5/5.  Abduction strength 5/5.      Right volar ganglion cyst measuring 1cm x 1cm which is tender      STUDIES REVIEWED:  Ultrasound was reviewed which demonstrated the ability to " rule in carpal tunnel.      PROCEDURES PERFORMED:  Procedures  No Procedures performed today

## 2025-05-02 ENCOUNTER — OFFICE VISIT (OUTPATIENT)
Dept: SURGICAL ONCOLOGY | Facility: CLINIC | Age: 75
End: 2025-05-02
Payer: MEDICARE

## 2025-05-02 ENCOUNTER — TELEPHONE (OUTPATIENT)
Dept: SURGICAL ONCOLOGY | Facility: CLINIC | Age: 75
End: 2025-05-02

## 2025-05-02 VITALS
TEMPERATURE: 97.3 F | HEART RATE: 90 BPM | WEIGHT: 202 LBS | HEIGHT: 68 IN | BODY MASS INDEX: 30.62 KG/M2 | OXYGEN SATURATION: 98 % | SYSTOLIC BLOOD PRESSURE: 142 MMHG | DIASTOLIC BLOOD PRESSURE: 80 MMHG

## 2025-05-02 DIAGNOSIS — Z12.31 VISIT FOR SCREENING MAMMOGRAM: ICD-10-CM

## 2025-05-02 DIAGNOSIS — C50.811 MALIGNANT NEOPLASM OF OVERLAPPING SITES OF RIGHT BREAST IN FEMALE, ESTROGEN RECEPTOR POSITIVE (HCC): Primary | ICD-10-CM

## 2025-05-02 DIAGNOSIS — Z17.0 MALIGNANT NEOPLASM OF OVERLAPPING SITES OF RIGHT BREAST IN FEMALE, ESTROGEN RECEPTOR POSITIVE (HCC): Primary | ICD-10-CM

## 2025-05-02 PROCEDURE — 99213 OFFICE O/P EST LOW 20 MIN: CPT

## 2025-05-02 PROCEDURE — G2211 COMPLEX E/M VISIT ADD ON: HCPCS

## 2025-05-02 NOTE — TELEPHONE ENCOUNTER
Called patient left voice message in regards to offer an earlier appointment 5/2/25 with HERMINIO Capps for 10:30. Provide surgical oncology number to return call confirming.

## 2025-05-02 NOTE — ASSESSMENT & PLAN NOTE
Patient is doing well and there is no evidence of disease recurrence on exam.  I will see her again in 7 months for another clinical exam following screening mammogram.

## 2025-05-02 NOTE — TELEPHONE ENCOUNTER
Patient wants to remain in her 11:30 appt. With Anne Marie Amato , she cannot make any earlier time today.  She appreciated the offer it just did not work for today.

## 2025-05-02 NOTE — PROGRESS NOTES
Name: Carie Fernandez      : 1950      MRN: [id number]  Encounter Provider: HERMINIO Mullen  Encounter Date: 2025   Encounter department: [facility] [place]  :  Assessment & Plan  Malignant neoplasm of overlapping sites of right breast in female, estrogen receptor positive (HCC)  Patient is doing well and there is no evidence of disease recurrence on exam.  I will see her again in 7 months for another clinical exam following screening mammogram.         Visit for screening mammogram    Orders:  •  Mammo screening left w 3d and cad; Future        History of Present Illness   Carie Fernandez is a 75 y.o. year old female who presents today in follow-up for her history of right breast cancer.  She is currently ASHLYN at 3 years and doing well. She denies any new lumps, skin changes, pain or tenderness. She has chronic arthritis but denies any new bone pain, headaches, dizziness or weight loss.  Left breast mammogram was performed on , BIRADS-2, category b density.      Oncology History   Cancer Staging   Malignant neoplasm of overlapping sites of right breast in female, estrogen receptor positive (HCC)  Staging form: Breast, AJCC 8th Edition  - Pathologic stage from 2022: ypT4d, pN3a, cM0, G1, ER+, MD+, HER2- - Signed by HERMINIO Mullen on 10/24/2024  Stage prefix: Post-therapy  Response to neoadjuvant therapy: No response  Method of lymph node assessment: Axillary lymph node dissection  Nuclear grade: G2  Histologic grading system: 3 grade system  - Clinical: Stage IIIB (cT4d, cN3a, cM0, G2, ER+, MD+, HER2-) - Signed by Sergio Vieyra MD on 3/5/2024  Histologic grading system: 3 grade system  Oncology History   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     12/30/2021 - 4/8/2022 Chemotherapy    DOXOrubicin (ADRIAMYCIN), 122 mg, Intravenous, Once, 4 of 4 cycles  Administration: 120 mg (12/30/2021), 122 mg (1/13/2022), 122 mg (1/27/2022), 122 mg (2/10/2022)  palonosetron (ALOXI), 0.25 mg, Intravenous, Once, 2 of 2 cycles  Administration: 0.25 mg (3/24/2022), 0.25 mg (4/7/2022)  pegfilgrastim (NEULASTA), 4 mg (100 % of original dose 4 mg), Subcutaneous, Once, 3 of 3 cycles  Dose modification: 4 mg (original dose 4 mg, Cycle 6)  Administration: 4 mg (3/11/2022), 4 mg (3/25/2022), 4 mg (4/8/2022)  pegfilgrastim (NEULASTA ONPRO), 6 mg, Subcutaneous, Once, 5 of 5 cycles  Administration: 6 mg (12/30/2021), 6 mg (1/13/2022), 6 mg (2/24/2022), 6 mg (1/27/2022), 6 mg (2/10/2022)  cyclophosphamide (CYTOXAN) IVPB, 600 mg/m2 = 1,218 mg, Intravenous, Once, 4 of 4 cycles  Administration: 1,218 mg (12/30/2021), 1,218 mg (1/13/2022), 1,218 mg (1/27/2022), 1,218 mg (2/10/2022)  fosaprepitant (EMEND) IVPB, 150 mg, Intravenous, Once, 4 of 4 cycles  Administration: 150 mg (12/30/2021), 150 mg (1/13/2022), 150 mg (1/27/2022), 150 mg (2/10/2022)  PACLItaxel (TAXOL) chemo IVPB, 175 mg/m2 = 355.2 mg, Intravenous, Once, 4 of 4 cycles  Administration: 355.2 mg (2/24/2022), 355.2 mg (3/10/2022), 355.2 mg (3/24/2022), 355.2 mg (4/7/2022)     4/26/2022 Surgery    Right breast modified radical mastectomy  Inflammatory breast cancer  Grade 1  1.5 cm (multiple foci involving all four quadrants)  Lymphovascular invasion present  Margins negative  14/19 Lymph nodes with macro-metastases  Anatomic Stage IIIC  Prognostic Stage IIIA     4/26/2022 -  Cancer Staged    Staging  "form: Breast, AJCC 8th Edition  - Pathologic stage from 4/26/2022: ypT4d, pN3a, cM0, G1, ER+, NV+, HER2- - Signed by HERMINIO Mullen on 10/24/2024  Stage prefix: Post-therapy  Response to neoadjuvant therapy: No response  Method of lymph node assessment: Axillary lymph node dissection  Nuclear grade: G2  Histologic grading system: 3 grade system       6/2022 -  Hormone Therapy    Anastrozole 1 mg daily  Dr. 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         Objective   /80   Pulse 90   Temp (!) 97.3 °F (36.3 °C)   Ht 5' 8\" (1.727 m)   Wt 91.6 kg (202 lb)   LMP  (LMP Unknown)   SpO2 98%   BMI 30.71 kg/m²     Pain Screening:  Pain Score: 0-No pain  ECOG    Physical Exam  Vitals reviewed.   Constitutional:       General: She is not in acute distress.     Appearance: Normal appearance. She is not ill-appearing or toxic-appearing.   HENT:      Head: Normocephalic and atraumatic.   Eyes:      General: No scleral icterus.  Cardiovascular:      Rate and Rhythm: Normal rate.   Pulmonary:      Effort: Pulmonary effort is normal.   Chest:   Breasts:     Right: Absent.      Left: Normal.          Comments: The right chest wall is even and smooth, with stable post-radiation changes. Left " breast is unremarkable. There are no dominant masses, nodules, skin changes or tenderness on exam. I do not appreciate any adenopathy.  Musculoskeletal:         General: Normal range of motion.      Cervical back: Normal range of motion and neck supple.   Lymphadenopathy:      Cervical: No cervical adenopathy.      Upper Body:      Right upper body: No supraclavicular, axillary or pectoral adenopathy.      Left upper body: No supraclavicular, axillary or pectoral adenopathy.   Skin:     General: Skin is warm and dry.   Neurological:      General: No focal deficit present.      Mental Status: She is alert and oriented to person, place, and time.   Psychiatric:         Mood and Affect: Mood normal.         Behavior: Behavior normal.         Thought Content: Thought content normal.         Judgment: Judgment normal.            Radiology Results Review: I have reviewed radiology reports from 12/12/2024 including: left screening mammogram.    Mammo screening left w 3d and cad    Narrative & Impression   DIAGNOSIS: Visit for screening mammogram      TECHNIQUE:  Digital screening mammography was performed. Computer Aided Detection (CAD) analyzed all applicable images.   COMPARISONS: Prior breast imaging dated: 10/19/2023, 12/12/2022, 04/26/2022, 04/20/2022, 12/13/2021, 12/02/2021, 12/02/2021, 11/05/2019, 04/18/2019, 04/18/2019, 04/18/2019, 04/18/2019, 04/15/2019, 03/07/2016, and 01/29/2016     RELEVANT HISTORY:   Family Breast Cancer History: No known family history of breast cancer.  Family Medical History: Family medical history includes ovarian cancer in mother.   Personal History: Hormone history includes other and other. Surgical history includes breast biopsy, mastectomy, hysterectomy, and oophorectomy. Medical history includes breast cancer.     The patient is scheduled in a reminder system for screening mammography.     8-10% of cancers will be missed on mammography. Management of a palpable abnormality must be  based on clinical grounds.  Patients will be notified of their results via letter from our facility. Accredited by [employer] and FDA.     RISK ASSESSMENT:   Tyrer-zick risk assessment reporting was suppressed due to the patient's history and/or demographic factors.     TISSUE DENSITY:   There are scattered areas of fibroglandular density.      INDICATION: Carie Fernandez is a 74 y.o. female presenting for screening mammography.     FINDINGS:   There are no suspicious masses, grouped microcalcifications or areas of unexplained architectural distortion. The skin and nipple areolar complex are unremarkable.       Biopsy clips throughout the breast noted.     IMPRESSION:  No mammographic evidence of malignancy.     ASSESSMENT/BI-RADS CATEGORY:  Left: 2 - Benign  Overall: 2 - Benign     RECOMMENDATION:       - Diagnostic mammogram in 1 year for the left breast.

## 2025-05-08 ENCOUNTER — TELEPHONE (OUTPATIENT)
Age: 75
End: 2025-05-08

## 2025-05-08 NOTE — TELEPHONE ENCOUNTER
Caller: Carie    Doctor: Dr. Ochoa    Reason for call: would like like to add the thumb SX with the CTR. She is also asking to move the date of the SX till after June.  Please advise  Thank you    Call back#: [phone]

## 2025-05-16 ENCOUNTER — OFFICE VISIT (OUTPATIENT)
Dept: OBGYN CLINIC | Facility: CLINIC | Age: 75
End: 2025-05-16
Payer: MEDICARE

## 2025-05-16 VITALS — BODY MASS INDEX: 30.31 KG/M2 | HEIGHT: 68 IN | WEIGHT: 200 LBS

## 2025-05-16 DIAGNOSIS — M17.11 PRIMARY OSTEOARTHRITIS OF RIGHT KNEE: Primary | ICD-10-CM

## 2025-05-16 PROCEDURE — 20610 DRAIN/INJ JOINT/BURSA W/O US: CPT | Performed by: STUDENT IN AN ORGANIZED HEALTH CARE EDUCATION/TRAINING PROGRAM

## 2025-05-16 PROCEDURE — 99214 OFFICE O/P EST MOD 30 MIN: CPT | Performed by: STUDENT IN AN ORGANIZED HEALTH CARE EDUCATION/TRAINING PROGRAM

## 2025-05-16 RX ADMIN — BETAMETHASONE SODIUM PHOSPHATE AND BETAMETHASONE ACETATE 3 MG: 3; 3 INJECTION, SUSPENSION INTRA-ARTICULAR; INTRALESIONAL; INTRAMUSCULAR; SOFT TISSUE at 10:45

## 2025-05-16 RX ADMIN — ROPIVACAINE HYDROCHLORIDE 2 ML: 2 INJECTION, SOLUTION EPIDURAL; INFILTRATION; PERINEURAL at 10:45

## 2025-05-19 RX ORDER — BETAMETHASONE SODIUM PHOSPHATE AND BETAMETHASONE ACETATE 3; 3 MG/ML; MG/ML
3 INJECTION, SUSPENSION INTRA-ARTICULAR; INTRALESIONAL; INTRAMUSCULAR; SOFT TISSUE
Status: COMPLETED | OUTPATIENT
Start: 2025-05-16 | End: 2025-05-16

## 2025-05-19 RX ORDER — ROPIVACAINE HYDROCHLORIDE 2 MG/ML
2 INJECTION, SOLUTION EPIDURAL; INFILTRATION; PERINEURAL
Status: COMPLETED | OUTPATIENT
Start: 2025-05-16 | End: 2025-05-16

## 2025-05-20 NOTE — PROGRESS NOTES
Date: 25  Carie Fernandez   MRN# [id number]  : 1950      Chief Complaint: Right Knee Pain    Assessment and Plan:  The patient verbalized understanding of exam findings and treatment plan. We engaged in the shared decision-making process and treatment options were discussed at length with the patient. Surgical and conservative management discussed today along with risks and benefits. Patient was agreeable with the plan and all questions were answered to satisfaction.     Primary osteoarthritis of right knee  -WBAT  -Activity modification to limit strain or impact on the joint  -Tylenol 1000mg up to three times daily as needed. Do not exceed 3000mg daily  -Home exercise program directed by PT  -Knee sleeve or brace for comfort  -Cane or walker recommended to offload joint  -Corticosteroid injection was offered, accepted, and administered.  Risk benefits and alternatives were discussed prior to injection.  Patient tolerated the procedure well.  -Patient may follow up prn for further evaluation and treatment             Subjective:   Carie Fernandez is a 75 y.o. female presenting for continued evaluation and treatment of right knee pain which has an established diagnosis of osteoarthritis. She states that she got several months of relief from her last CSI. She has been limiting impact activities and is occasionally taking Tylenol. No other injuries or complaints.       Prior treatment:  NSAIDs Yes    Bracing No   Physical Therapy No   Cortisone Injections Yes   Viscosupplementation No     Allergy:  Allergies   Allergen Reactions    Pollen Extract Nasal Congestion     Medications:  All current active meds have been reviewed   Past Medical History:  Past Medical History:   Diagnosis Date    Anxiety     Breast cancer (HCC) 2021    Cancer (HCC)     Right breast cancer    Depression     Fracture of radius, distal, closed     Last Assessed:  6/10/14    Thrombophlebitis     Vitamin D deficiency       Past Surgical History:  Past Surgical History:   Procedure Laterality Date    ANKLE SURGERY      ANKLE SURGERY      BREAST BIOPSY Left 2001    neg    BREAST BIOPSY Right 12/06/2021    punch bx- ca    GALLBLADDER SURGERY      HYSTERECTOMY  1996    IR PORT PLACEMENT  12/29/2021    IR PORT REMOVAL  02/21/2023    MASTECTOMY Right 04/26/2022    OOPHORECTOMY Bilateral 1996    CA MAST MODF RAD W/AX LYMPH NOD W/WO PECT/DALTON MIN Right 04/26/2022    Procedure: BREAST MODIFIED RADICAL MASTECTOMY WITH RIGHT LYMPHATIC MAPPING WITH BLUE AND RADIOACTIVE DYE (INJECT AT 0900 BY DR VASQUEZ IN THE OR);  Surgeon: Kevin Vasquez MD;  Location: [facility];  Service: Surgical Oncology    WISDOM TOOTH EXTRACTION       Family History:  Family History   Problem Relation Age of Onset    Ovarian cancer Mother 56    Heart disease Father     No Known Problems Daughter     No Known Problems Daughter     Cancer Maternal Grandmother         Unknown primary; mid 50's    Leukemia Brother 58    Heart disease Brother     Heart disease Brother     No Known Problems Son     No Known Problems Son     Stomach cancer Maternal Aunt         60's     Social History:  Social History     Substance and Sexual Activity   Alcohol Use Yes    Comment: rarely     Social History     Substance and Sexual Activity   Drug Use Yes    Types: Marijuana    Comment: not helping stopped     Social History     Tobacco Use   Smoking Status Never   Smokeless Tobacco Never           Review of Systems:  General- denies fever/chills  HEENT- denies hearing loss or sore throat  Eyes- denies eye pain or visual disturbances, denies red eyes  Respiratory- denies cough or SOB  Cardio- denies chest pain or palpitations  GI- denies abdominal pain  Endocrine- denies urinary frequency  Urinary- denies pain with urination  Musculoskeletal- Negative except noted above  Skin- denies rashes or wounds  Neurological- denies dizziness or headache  Psychiatric- denies anxiety or difficulty  "concentrating    Objective:   BP Readings from Last 1 Encounters:   05/02/25 142/80      Wt Readings from Last 1 Encounters:   05/16/25 90.7 kg (200 lb)      Pulse Readings from Last 1 Encounters:   05/02/25 90        BMI: Estimated body mass index is 30.41 kg/m² as calculated from the following:    Height as of this encounter: 5' 8\" (1.727 m).    Weight as of this encounter: 90.7 kg (200 lb).    Physical Exam  Ht 5' 8\" (1.727 m)   Wt 90.7 kg (200 lb)   LMP  (LMP Unknown)   BMI 30.41 kg/m²   General/Constitutional: No apparent distress: well-nourished and well developed.  Eyes: normal ocular motion  Cardio: RRR, Normal S1S2, No m/r/g.   Lymphatic: No appreciable lymphadenopathy  Respiratory: Non-labored breathing, CTA b/l no w/c/r  Vascular: No edema, swelling or tenderness, except as noted in detailed exam. Extremities well perfused. No LE edema  Integumentary: No impressive skin lesions present, except as noted in detailed exam.  Neuro: No ataxia or tremors noted  Psych: Normal mood and affect, oriented to person, place and time. Appropriate affect.  Musculoskeletal: Normal, except as noted in detailed exam and in HPI.    Gait and Station:   normal    Right knee:     Inspection:  normal color, temperature, turgor and moisture    Overall limb alignment: neutral    Effusion: no    ROM 3 to 120 with pain    Extensor Lag: Absent    Palpation: Medial and pes bursa joint line tenderness to palpation    Patellar crepitus     stable to AP translation at 90 deg    Coronal plane stable in full extension    Coronal plane stable in mid-flexion     Motor: 5/5 EHL/FHL/TA/GS/Qd/Hs    Vascular: Toes WWP with BCR    Sensory: SILT DP/SP/Rahul/Saph/Tib  Crepitation with motion of patella     Large joint arthrocentesis: R knee    Performed by: Tee Olvera MD  Authorized by: Tee Olvera MD    Universal Protocol:  Consent: Verbal consent obtained  Risks and benefits: risks, benefits and alternatives were " discussed  Consent given by: patient  Patient understanding: patient states understanding of the procedure being performed  Patient identity confirmed: verbally with patient  Supporting Documentation  Indications: pain and joint swelling     Is this a Visco injection? NoProcedure Details  Location: knee - R knee  Needle size: 22 G  Ultrasound guidance: no  Approach: lateral  Medications administered: 3 mg betamethasone acetate-betamethasone sodium phosphate 6 (3-3) mg/mL; 2 mL ropivacaine 0.2 %    Patient tolerance: patient tolerated the procedure well with no immediate complications  Dressing:  Sterile dressing applied           Images:  I personally reviewed relevant images in the PACS system and my interpretation is as follows:  X-rays of the right knee reveal moderate degenerative changes with subchondral sclerosis, joint space narrowing, and osteophyte formation      I have spent a total time of 30 minutes in caring for this patient on the day of the visit/encounter including Diagnostic results, Prognosis, Risks and benefits of tx options, Instructions for management, Patient and family education, Importance of tx compliance, Risk factor reductions, Impressions, Counseling / Coordination of care, Documenting in the medical record, and Obtaining or reviewing history  .        Tee Olvera MD  Adult Reconstruction Specialist   [facility]

## 2025-05-25 DIAGNOSIS — F32.A DEPRESSION, UNSPECIFIED DEPRESSION TYPE: ICD-10-CM

## 2025-05-27 RX ORDER — ESCITALOPRAM OXALATE 20 MG/1
20 TABLET ORAL DAILY
Qty: 90 TABLET | Refills: 1 | Status: SHIPPED | OUTPATIENT
Start: 2025-05-27

## 2025-06-02 ENCOUNTER — PREP FOR PROCEDURE (OUTPATIENT)
Dept: OBGYN CLINIC | Facility: CLINIC | Age: 75
End: 2025-06-02

## 2025-06-02 ENCOUNTER — OFFICE VISIT (OUTPATIENT)
Dept: OBGYN CLINIC | Facility: CLINIC | Age: 75
End: 2025-06-02
Payer: MEDICARE

## 2025-06-02 VITALS — HEIGHT: 68 IN | BODY MASS INDEX: 30.31 KG/M2 | WEIGHT: 200 LBS

## 2025-06-02 DIAGNOSIS — G56.01 CARPAL TUNNEL SYNDROME ON RIGHT: ICD-10-CM

## 2025-06-02 DIAGNOSIS — M18.11 OSTEOARTHRITIS OF RIGHT THUMB: Primary | ICD-10-CM

## 2025-06-02 DIAGNOSIS — M67.431 GANGLION CYST OF VOLAR ASPECT OF RIGHT WRIST: ICD-10-CM

## 2025-06-02 PROCEDURE — 99213 OFFICE O/P EST LOW 20 MIN: CPT | Performed by: ORTHOPAEDIC SURGERY

## 2025-06-02 RX ORDER — CHLORHEXIDINE GLUCONATE ORAL RINSE 1.2 MG/ML
15 SOLUTION DENTAL ONCE
OUTPATIENT
Start: 2025-06-02 | End: 2025-06-02

## 2025-06-02 NOTE — PROGRESS NOTES
ORTHOPAEDIC HAND, WRIST, AND ELBOW OFFICE  VISIT     Name: Carie Fernandez      : 1950      MRN: [id number]  Encounter Provider: Enrique Ochoa MD  Encounter Date: 2025   Encounter department: [facility]  :  Assessment & Plan  Osteoarthritis of right thumb STT  Conservative and operative treatments were discussed with the patient at length  She would like to proceed with surgical intervention  Detailed consent was obtained for right thumb CMC interpositional arthroplasty, right endoscopic carpal tunnel release and right volar ganglion cyst excision with regional anesthesia  Risk and benefits of surgery were discussed  She will follow-up after surgery for suture removal  Orders:    Case request operating room: Right thumb CMC interpositional arthroplasty Weilby, Right endoscopic carpal tunnel release, Right volar ganglion cyst excision; Standing    Carpal tunnel syndrome on right  Conservative and operative treatments were discussed with the patient at length  She would like to proceed with surgical intervention  Detailed consent was obtained for right thumb CMC interpositional arthroplasty, right endoscopic carpal tunnel release and right volar ganglion cyst excision with regional anesthesia  Risk and benefits of surgery were discussed  She will follow-up after surgery for suture removal  Orders:    Case request operating room: Right thumb CMC interpositional arthroplasty Weilby, Right endoscopic carpal tunnel release, Right volar ganglion cyst excision; Standing    Ganglion cyst of volar aspect of right wrist  Conservative and operative treatments were discussed with the patient at length  She would like to proceed with surgical intervention  Detailed consent was obtained for right thumb CMC interpositional arthroplasty, right endoscopic carpal tunnel release and right volar ganglion cyst excision with regional anesthesia  Risk and benefits of surgery were  discussed  She will follow-up after surgery for suture removal  Orders:    Case request operating room: Right thumb CMC interpositional arthroplasty Weilby, Right endoscopic carpal tunnel release, Right volar ganglion cyst excision; Standing          Operative Discussions:  Thanks, just does on his postop yes this is just a little stiffEndoscopic Carpal Tunnel Release:   The anatomy and physiology of carpal tunnel syndrome was discussed with the patient today.  Increase pressure localized under the transverse carpal ligament can cause pain, numbness, tingling, or dysesthesias within the median nerve distribution as well as feelings of fatigue, clumsiness, or awkwardness.  These symptoms typically occur at night and worse in the morning upon waking.  Eventually, untreated carpal tunnel syndrome can result in weakness and permanent loss of muscle within the thenar compartment of the hand.  Treatment options were discussed with the patient.  Conservative treatment includes nocturnal resting splints to keep the nerve in a neutral position, ergonomic changes within the work or home environment, activity modification, and tendon gliding exercises.  Steroid injections within the carpal canal can help a majority of patients, however this is often self-limited in a majority of patients.  Surgical intervention to divide the transverse carpal ligament typically results in a long-lasting relief of the patient's complaints, with the recurrence rate of less than 5%. The patient has elected to undergo an endoscopic carpal tunnel release.  The 2 incision technique was discussed with the patient, which results in approximately a two-week less recovery time, and less wound complications.  In the postoperative period, light activities are allowed immediately, driving is allowed when narcotic medication has stopped, and the bandages may be removed and incision may get wet after 5 days.  Heavy activities (lifting more than approximately  5 pounds) will be allowed after follow up appointment in 1-2 weeks.  While the pain and discomfort in the hands generally improves rapidly, the numbness and tingling as well as the strength will slowly improve over weeks to months depending on the severity of the carpal tunnel syndrome.  Pillar pain was discussed with the patient, which is typically a common but self-limiting condition.  The risks of bleeding and infection from the surgery are less than 1%.  Risk of recurrence is less than 5%.  The risks of nerve injury or nerve damage or damage to the blood vessels is approximately 1 in 1000. The patient has an understanding of the above mentioned discussion.The risks and benefits of the procedure were explained to the patient, which include, but are not limited to: Bleeding, infection, recurrence, pain, scar, damage to tendons, damage to nerves, and damage to blood vessels, failure to give desired results and complications related to anesthesia.  These risks, along with alternative conservative treatment options, and postoperative protocols were voiced back and understood by the patient.  All questions were answered to the patient's satisfaction.  The patient agrees to comply with a standard postoperative protocol, and is willing to proceed.  Education was provided via written and auditory forms.  There were no barriers to learning. Written handouts regarding wound care, incision and scar care, and general preoperative information was provided to the patient.  Prior to surgery, the patient may be requested to stop all anti-inflammatory medications.  Prophylactic aspirin, Plavix, and Coumadin may be allowed to be continued.  Medications including vitamin E., ginkgo, and fish oil are requested to be stopped approximately one week prior to surgery.  Hypertensive medications and beta blockers, if taken, should be continued.  Ganglion Cyst Excision: The anatomy and physiology of the ganglion was discussed with the  patient today in the office.  Fluid leaking out of the joint surface typically creates a small sac, which can enlarge and cause pain or limitation of motion.  Treatment options include observation, aspiration, or surgical incision were discussed with the patient today.  Observation typically leads to resolution in some patients, aspiration leads to resolution in approximately 50% of patients, and surgical excision leads to resolution in approximately 92-97% of patients.  After discussion with the patient today, the patient voiced understanding of all treatment options.The patient has elected excision of the ganglion.The risks and benefits of the procedure were explained to the patient, which include, but are not limited to: Bleeding, infection, recurrence, pain, scar, damage to tendons, damage to nerves, and damage to blood vessels, failure to give desired results and complications related to anesthesia.  These risks, along with alternative conservative treatment options, and postoperative protocols were voiced back and understood by the patient.  All questions were answered to the patient's satisfaction.  The patient agrees to comply with a standard postoperative protocol, and is willing to proceed.  Education was provided via written and auditory forms.  There were no barriers to learning. Written handouts regarding wound care, incision and scar care, and general preoperative information was provided to the patient.  Prior to surgery, the patient may be requested to stop all anti-inflammatory medications.  Prophylactic aspirin, Plavix, and Coumadin may be allowed to be continued.  Medications including vitamin E., ginkgo, and fish oil are requested to be stopped approximately one week prior to surgery.  Hypertensive medications and beta blockers, if taken, should be continued.    History of Present Illness   HPI  Chief Complaint   Patient presents with    Right Wrist - Follow-up     CTS- injection 3/5/25  "      Carie Fernandez is a 75 y.o. female who presents with for follow-up regarding right carpal tunnel syndrome, right volar ganglion cyst and right thumb STT osteoarthritis.  She states that the previous cortisone injection did not help with any of her pain and discomfort for her carpal tunnel.  She states she continues to have pain at the base of her thumb as well.  She also notes numbness and tingling into her fingers.  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      REVIEW OF SYSTEMS:  General: no fever, no chills  HEENT:  No loss of hearing or eyesight problems  Eyes:  No red eyes  Respiratory:  No coughing, shortness of breath or wheezing  Cardiovascular:  No chest pain, no palpitations  GI:  Abdomen soft nontender, denies nausea  Endocrine:  No muscle weakness, no frequent urination, no excessive thirst  Urinary:  No dysuria, no incontinence  Musculoskeletal: see HPI and PE  SKIN:  No skin rash, no dry skin  Neurological:  No headaches, no confusion  Psychiatric:  No suicide thoughts, no anxiety, no depression  Review of all other systems is negative    Objective   Ht 5' 8\" (1.727 m)   Wt 90.7 kg (200 lb)   LMP  (LMP Unknown)   BMI 30.41 kg/m²      General: well developed and well nourished, alert, oriented times 3, and appears comfortable  Psychiatric: Normal  HEENT: Trachea Midline, No torticollis  Cardiovascular: No discernable arrhythmia  Pulmonary: No wheezing or stridor  Abdomen: No rebound or guarding  Extremities: No peripheral edema  Skin: No masses, erythema, lacerations, fluctation, ulcerations  Neurovascular: Sensation Intact to the Median, Ulnar, Radial Nerve, Motor Intact to the Median, Ulnar, Radial Nerve, and Pulses Intact    Musculoskeletal exam:  Right Carpal Tunnel Exam:    Negative thenar atrophy. Negative phalen's test. Positive carpal tunnel compression. Positive tinels over median nerve at the wrist.  Opposition strength 5/5.  Abduction strength 5/5.      Right " volar ganglion cyst measuring 1cm x 1cm which is tender    right CMC Exam:  No adduction contracture  No hyperextension deformity of MCP joint  Positive localized tenderness over radial and dorsal aspect of thumb (CMC joint)  Grind test is Positive for pain and Positive for crepitus  No triggering or tenderness over the A1 pulley  No pain with Finkelstein’s maneuver             STUDIES REVIEWED:  Ultrasound was reviewed which demonstrated the ability to rule in carpal tunnel.      PROCEDURES PERFORMED:  Procedures  No Procedures performed today

## 2025-06-02 NOTE — ASSESSMENT & PLAN NOTE
Conservative and operative treatments were discussed with the patient at length  She would like to proceed with surgical intervention  Detailed consent was obtained for right thumb CMC interpositional arthroplasty, right endoscopic carpal tunnel release and right volar ganglion cyst excision with regional anesthesia  Risk and benefits of surgery were discussed  She will follow-up after surgery for suture removal  Orders:    Case request operating room: Right thumb CMC interpositional arthroplasty Weilby, Right endoscopic carpal tunnel release, Right volar ganglion cyst excision; Standing

## 2025-06-05 ENCOUNTER — RA CDI HCC (OUTPATIENT)
Dept: OTHER | Facility: HOSPITAL | Age: 75
End: 2025-06-05

## 2025-06-12 ENCOUNTER — OFFICE VISIT (OUTPATIENT)
Age: 75
End: 2025-06-12
Payer: MEDICARE

## 2025-06-12 VITALS
HEIGHT: 68 IN | TEMPERATURE: 97.5 F | BODY MASS INDEX: 30.65 KG/M2 | HEART RATE: 76 BPM | WEIGHT: 202.2 LBS | SYSTOLIC BLOOD PRESSURE: 124 MMHG | OXYGEN SATURATION: 96 % | DIASTOLIC BLOOD PRESSURE: 78 MMHG

## 2025-06-12 DIAGNOSIS — Z13.0 SCREENING FOR DEFICIENCY ANEMIA: ICD-10-CM

## 2025-06-12 DIAGNOSIS — R39.9 UTI SYMPTOMS: ICD-10-CM

## 2025-06-12 DIAGNOSIS — F32.A DEPRESSION, UNSPECIFIED DEPRESSION TYPE: Primary | ICD-10-CM

## 2025-06-12 DIAGNOSIS — Z13.1 SCREENING FOR DIABETES MELLITUS: ICD-10-CM

## 2025-06-12 DIAGNOSIS — C77.3 CARCINOMA OF RIGHT BREAST METASTATIC TO AXILLARY LYMPH NODE (HCC): ICD-10-CM

## 2025-06-12 DIAGNOSIS — C50.911 CARCINOMA OF RIGHT BREAST METASTATIC TO AXILLARY LYMPH NODE (HCC): ICD-10-CM

## 2025-06-12 DIAGNOSIS — E78.5 HYPERLIPIDEMIA, MILD: ICD-10-CM

## 2025-06-12 DIAGNOSIS — F41.9 ANXIETY: ICD-10-CM

## 2025-06-12 PROCEDURE — 99214 OFFICE O/P EST MOD 30 MIN: CPT | Performed by: INTERNAL MEDICINE

## 2025-06-12 PROCEDURE — G2211 COMPLEX E/M VISIT ADD ON: HCPCS | Performed by: INTERNAL MEDICINE

## 2025-06-12 RX ORDER — ESCITALOPRAM OXALATE 10 MG/1
10 TABLET ORAL DAILY
Qty: 90 TABLET | Refills: 2 | Status: SHIPPED | OUTPATIENT
Start: 2025-06-12

## 2025-06-12 NOTE — ASSESSMENT & PLAN NOTE
Anxiety symptoms currently quite stable patient request decrease in medication will decrease escitalopram from 20 mg a day down to 10 mg daily and monitor

## 2025-06-12 NOTE — ASSESSMENT & PLAN NOTE
Airway    Performed by: Celsa Matthews MD  Authorized by: Celsa Matthews MD    Final Airway Type:  Endotracheal airway  Final Endotracheal Airway*:  ETT  ETT Size (mm)*:  7.5  Cuff*:  Regular  Technique Used for Successful ETT Placement:  Direct laryngoscopy  Devices/Methods Used in Placement*:  Mask  Intubation Procedure*:  Preoxygenation, ETCO2, Atraumatic, Dentition Unchanged and Pharynx Clear  Insertion Site:  Oral  Blade Type*:  MAC  Blade Size*:  4  Placement Verified by: auscultation and capnometry    Glottic View*:  1 - full view of glottis  Attempts*:  1  Location:  OR  Urgency:  Elective  Difficult Airway: No    Indications for Airway Management:  Anesthesia  Mask Difficulty Assessment:  1 - vent by mask  Start Time: 7/31/2024 11:47 AM       Recent evaluation by surgical oncology shows no evidence of recurrent disease I have reviewed studies including CT scan of the chest abdomen and pelvis MRI of the spine and recent laboratory testing for tumor markers.  Patient is advised to continue anastrozole medication 1 mg daily

## 2025-06-12 NOTE — ASSESSMENT & PLAN NOTE
Depression symptoms currently quite stable patient request decrease in medication will decrease Lexapro from 20 mg a day down to 10 mg daily and monitor    Orders:    escitalopram (LEXAPRO) 10 mg tablet; Take 1 tablet (10 mg total) by mouth daily

## 2025-06-12 NOTE — PROGRESS NOTES
Name: Carie Fernandez      : 1950      MRN: [id number]  Encounter Provider: Yaron Patel MD  Encounter Date: 2025   Encounter department: [facility] INTERNAL MEDICINE    Assessment & Plan  Depression, unspecified depression type  Depression symptoms currently quite stable patient request decrease in medication will decrease Lexapro from 20 mg a day down to 10 mg daily and monitor    Orders:    escitalopram (LEXAPRO) 10 mg tablet; Take 1 tablet (10 mg total) by mouth daily    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)  Recent evaluation by surgical oncology shows no evidence of recurrent disease I have reviewed studies including CT scan of the chest abdomen and pelvis MRI of the spine and recent laboratory testing for tumor markers.  Patient is advised to continue anastrozole medication 1 mg daily         Anxiety  Anxiety symptoms currently quite stable patient request decrease in medication will decrease escitalopram from 20 mg a day down to 10 mg daily and monitor              History of Present Illness     This most pleasant 75-year-old female patient returns to our office today for routine follow-up assessment.  She has been seen by her oncologist recently and underwent testing for evaluation of her history of breast cancer.  Fortunately CT scanning of the chest abdomen pelvis laboratory testing for tumor markers and MRI scanning of the spine all appear to be clear with no evidence of recurrent disease at present time.    Patient is experiencing issues with her right hand including carpal tunnel syndrome along with arthritis of the thumb and a ganglionic cyst on the wrist.  She has been into see [facility] orthopedic surgery Dr. Stephens and is anticipating a surgery to correct these issues later this summer.    Blood work indicates that the patient's vitamin D level is deficient and I have recommended that she increase her vitamin D supplementation to 3000  "units daily.    Patient has a history of both anxiety and depression she indicates she feels quite good at this time and would like to try decreasing her Lexapro from 20 mg a day down to 10 mg daily.  I indicated this is a reasonable request and indicated if she has any relapse of her symptoms to let me know.      Review of Systems   Musculoskeletal:  Positive for arthralgias.   All other systems reviewed and are negative.    Past Medical History[1]  Past Surgical History[2]  Family History[3]  Social History[4]  Medications[5]  Allergies   Allergen Reactions    Pollen Extract Nasal Congestion     Immunization History   Administered Date(s) Administered    COVID-19 MODERNA VACC 0.5 ML IM 01/25/2021, 01/25/2021, 02/22/2021, 02/22/2021, 08/01/2022    COVID-19 Moderna mRNA Vaccine 12 Yr+ 50 mcg/0.5 mL (Spikevax) 11/01/2023    INFLUENZA 10/22/2018, 10/27/2021    Influenza Split High Dose Preservative Free IM 12/18/2015, 10/17/2017, 11/10/2018    Influenza, high dose seasonal 0.7 mL 11/18/2019, 10/26/2020    Pneumococcal Conjugate 13-Valent 02/11/2019    Pneumococcal Polysaccharide PPV23 07/30/2020    Tdap 05/18/2008, 01/18/2018    Zoster Vaccine Recombinant 02/24/2020, 06/29/2020     Objective   /78   Pulse 76   Temp 97.5 °F (36.4 °C) (Tympanic)   Ht 5' 8\" (1.727 m)   Wt 91.7 kg (202 lb 3.2 oz)   LMP  (LMP Unknown)   SpO2 96%   BMI 30.74 kg/m²     Physical Exam  Vitals and nursing note reviewed.   Constitutional:       General: She is not in acute distress.     Appearance: She is well-developed.   HENT:      Head: Normocephalic and atraumatic.     Eyes:      Conjunctiva/sclera: Conjunctivae normal.       Cardiovascular:      Rate and Rhythm: Normal rate and regular rhythm.      Heart sounds: Normal heart sounds. No murmur heard.  Pulmonary:      Effort: Pulmonary effort is normal. No respiratory distress.      Breath sounds: Normal breath sounds. No wheezing, rhonchi or rales.   Abdominal:      Palpations: " Abdomen is soft.     Musculoskeletal:      Cervical back: Neck supple.     Skin:     General: Skin is warm and dry.      Capillary Refill: Capillary refill takes less than 2 seconds.     Neurological:      Mental Status: She is alert.     Psychiatric:         Mood and Affect: Mood normal.              [1]   Past Medical History:  Diagnosis Date    Anxiety     Breast cancer (HCC) 12/06/2021    Cancer (HCC)     Right breast cancer    Depression     Fracture of radius, distal, closed     Last Assessed:  6/10/14    History of chemotherapy     Low back pain     Peripheral neuropathy     Thrombophlebitis     Vitamin D deficiency    [2]   Past Surgical History:  Procedure Laterality Date    ANKLE SURGERY      ANKLE SURGERY      BREAST BIOPSY Left 2001    neg    BREAST BIOPSY Right 12/06/2021    punch bx- ca    BREAST CYST EXCISION      GALLBLADDER SURGERY      HYSTERECTOMY  1996    IR PORT PLACEMENT  12/29/2021    IR PORT REMOVAL  02/21/2023    MASTECTOMY Right 04/26/2022    OOPHORECTOMY Bilateral 1996    [place] MAST MODF RAD W/AX LYMPH NOD W/WO PECT/DALTON MIN Right 04/26/2022    Procedure: BREAST MODIFIED RADICAL MASTECTOMY WITH RIGHT LYMPHATIC MAPPING WITH BLUE AND RADIOACTIVE DYE (INJECT AT 0900 BY DR VASQUEZ IN THE OR);  Surgeon: Kevin Vasquez MD;  Location: [facility];  Service: Surgical Oncology    VASCULAR SURGERY      WISDOM TOOTH EXTRACTION     [3]   Family History  Problem Relation Name Age of Onset    Ovarian cancer Mother Ofelia Rodriguez 56    Heart disease Father      No Known Problems Daughter      No Known Problems Daughter      Cancer Maternal Grandmother          Unknown primary; mid 50's    Leukemia Brother  58    Heart disease Brother      Heart disease Brother      No Known Problems Son      No Known Problems Son      Stomach cancer Maternal Aunt          60's   [4]   Social History  Tobacco Use    Smoking status: Never    Smokeless tobacco: Never   Vaping Use    Vaping status: Never Used   Substance and Sexual  Activity    Alcohol use: Yes     Comment: rarely    Drug use: Yes     Types: Marijuana     Comment: not helping stopped    Sexual activity: Not Currently     Partners: Male     Birth control/protection: Female Sterilization   [5]   Current Outpatient Medications on File Prior to Visit   Medication Sig    ALPRAZolam (XANAX) 0.25 mg tablet Take 1/2 to 1 pill Q 8 hours p.r.n. For anxiety    anastrozole (ARIMIDEX) 1 mg tablet Take 1 tablet (1 mg total) by mouth daily    Calcium 250 MG CAPS Take 250 mg by mouth in the morning    cholecalciferol (VITAMIN D3) 1,000 units tablet Take 2 capsules by mouth in the morning.    ciclopirox (LOPROX) 0.77 % cream     Fish Oil-Krill Oil (KRILL OIL PLUS) CAPS Take by mouth in the morning    meclizine (ANTIVERT) 25 mg tablet Take 1 tablet (25 mg total) by mouth every 8 (eight) hours    [DISCONTINUED] escitalopram (LEXAPRO) 20 mg tablet TAKE 1 TABLET BY MOUTH EVERY DAY

## 2025-06-30 DIAGNOSIS — Z17.0 MALIGNANT NEOPLASM OF OVERLAPPING SITES OF RIGHT BREAST IN FEMALE, ESTROGEN RECEPTOR POSITIVE (HCC): ICD-10-CM

## 2025-06-30 DIAGNOSIS — C50.811 MALIGNANT NEOPLASM OF OVERLAPPING SITES OF RIGHT BREAST IN FEMALE, ESTROGEN RECEPTOR POSITIVE (HCC): ICD-10-CM

## 2025-06-30 NOTE — TELEPHONE ENCOUNTER
Reason for call:   [x] Refill   [] Prior Auth  [] Other:     Office:   [] PCP/Provider -   [x] Specialty/Provider - H[facility] ONC ELVIRA MITTAL     Medication:  anastrozole (ARIMIDEX) 1 mg tablet    Dose/Frequency: Take 1 tablet (1 mg total) by mouth daily,     Quantity: 90    Pharmacy: [facility]/pharmacy #[id number] - [place], [place] - [address] [place] [address]      Local Pharmacy   Does the patient have enough for 3 days?   [x] Yes   [] No - Send as HP to POD    Mail Away Pharmacy   Does the patient have enough for 10 days?   [] Yes   [] No - Send as HP to POD

## 2025-07-01 RX ORDER — ANASTROZOLE 1 MG/1
1 TABLET ORAL DAILY
Qty: 90 TABLET | Refills: 1 | Status: SHIPPED | OUTPATIENT
Start: 2025-07-01

## 2025-07-07 ENCOUNTER — ANESTHESIA EVENT (OUTPATIENT)
Age: 75
End: 2025-07-07

## 2025-07-07 ENCOUNTER — ANESTHESIA (OUTPATIENT)
Age: 75
End: 2025-07-07

## 2025-07-09 ENCOUNTER — TELEPHONE (OUTPATIENT)
Dept: HEMATOLOGY ONCOLOGY | Facility: CLINIC | Age: 75
End: 2025-07-09

## 2025-07-10 ENCOUNTER — TELEPHONE (OUTPATIENT)
Dept: HEMATOLOGY ONCOLOGY | Facility: CLINIC | Age: 75
End: 2025-07-10

## 2025-07-10 ENCOUNTER — TELEPHONE (OUTPATIENT)
Dept: SURGICAL ONCOLOGY | Facility: CLINIC | Age: 75
End: 2025-07-10

## 2025-07-10 NOTE — TELEPHONE ENCOUNTER
LVM indicating best if patient R/S until after 7/22 CT as 3mo f/u is to review CT results.   Provided [facility] number, and indicated one of our staff members would call tomorrow if no response was heard.

## 2025-07-10 NOTE — TELEPHONE ENCOUNTER
Called pt to discuss r/s CT scan / LEFT VOICEMAIL    Left Central Sched number - explained there is a questionnaire to be completed     Also left [facility] if needed

## 2025-07-11 ENCOUNTER — ANESTHESIA EVENT (OUTPATIENT)
Age: 75
End: 2025-07-11
Payer: MEDICARE

## 2025-07-11 ENCOUNTER — TELEPHONE (OUTPATIENT)
Dept: HEMATOLOGY ONCOLOGY | Facility: CLINIC | Age: 75
End: 2025-07-11

## 2025-07-14 ENCOUNTER — TELEPHONE (OUTPATIENT)
Dept: HEMATOLOGY ONCOLOGY | Facility: CLINIC | Age: 75
End: 2025-07-14

## 2025-07-14 NOTE — TELEPHONE ENCOUNTER
Called pt and left vm to inform that follow up appointment with Brooke has been rescheduled to after CT scan is performed    Left voicemail with appt details and [facility] if needed

## 2025-07-17 RX ORDER — IBUPROFEN 200 MG
200 TABLET ORAL EVERY 6 HOURS PRN
COMMUNITY

## 2025-07-17 NOTE — PRE-PROCEDURE INSTRUCTIONS
Pre-Surgery Instructions:   Medication Instructions    ALPRAZolam (XANAX) 0.25 mg tablet Uses PRN- OK to take day of surgery    anastrozole (ARIMIDEX) 1 mg tablet Take night before surgery    Calcium 250 MG CAPS Hold day of surgery.    cholecalciferol (VITAMIN D3) 1,000 units tablet Hold day of surgery.    ciclopirox (LOPROX) 0.77 % cream Uses PRN- DO NOT take day of surgery    escitalopram (LEXAPRO) 10 mg tablet Take day of surgery.    Fish Oil-Krill Oil (KRILL OIL PLUS) CAPS Stop taking 5 days prior to surgery.    ibuprofen (MOTRIN) 200 mg tablet Stop taking 3 days prior to surgery.    meclizine (ANTIVERT) 25 mg tablet Uses PRN- OK to take day of surgery   Medication instructions for day of surgery reviewed. Patient verbalized understanding and agrees with the plan.  Please take all instructed medications with only a sip of water. Please do not take any over the counter (non-prescribed) vitamins or supplements for one week prior to date of surgery.      You will receive a call one business day prior to surgery with an arrival time and hospital directions. If your surgery is scheduled on a Monday, the hospital will be calling you on the Friday prior to your surgery. If you have not heard from anyone by 8pm, please call the hospital supervisor through the hospital  at [phone]. ([place] [phone] or [place] [phone]).    Do not eat or drink anything after midnight the night before your surgery, including candy, mints, lifesavers, or chewing gum. Do not drink alcohol 24hrs before your surgery. Try not to smoke at least 24hrs before your surgery.       Follow the pre surgery showering instructions as listed in the “My Surgical Experience Booklet” or otherwise provided by your surgeon's office. Do not use a blade to shave the surgical area 1 week before surgery. It is okay to use a clean electric clippers up to 24 hours before surgery. Do not apply any lotions, creams, including makeup, cologne,  deodorant, or perfumes after showering on the day of your surgery. Do not use dry shampoo, hair spray, hair gel, or any type of hair products.     No contact lenses, eye make-up, or artificial eyelashes. Remove nail polish, including gel polish, and any artificial, gel, or acrylic nails if possible. Remove all jewelry including rings and body piercing jewelry.     Wear causal clothing that is easy to take on and off. Consider your type of surgery.    Keep any valuables, jewelry, piercings at home. Please bring any specially ordered equipment (sling, braces) if indicated.    Arrange for a responsible person to drive you to and from the hospital on the day of your surgery. Please confirm the visitor policy for the day of your procedure when you receive your phone call with an arrival time.     Call the surgeon's office with any new illnesses, exposures, or additional questions prior to surgery.    Please reference your “My Surgical Experience Booklet” for additional information to prepare for your upcoming surgery.

## 2025-07-18 ENCOUNTER — HOSPITAL ENCOUNTER (OUTPATIENT)
Dept: RADIOLOGY | Facility: HOSPITAL | Age: 75
Discharge: HOME | End: 2025-07-18
Attending: INTERNAL MEDICINE
Payer: MEDICARE

## 2025-07-18 DIAGNOSIS — R91.8 PULMONARY NODULES/LESIONS, MULTIPLE: ICD-10-CM

## 2025-07-18 PROCEDURE — 71250 CT THORAX DX C-: CPT

## 2025-07-21 NOTE — ANESTHESIA PREPROCEDURE EVALUATION
Procedure:  Right thumb CMC interpositional arthroplasty [place] (Right: Finger)  Right endoscopic carpal tunnel release (Right: Wrist)  Right volar ganglion cyst excision (Right: Finger)    Relevant Problems   CARDIO   (+) Hyperlipidemia, mild   (+) Varicose vein of leg      GYN   (+) Malignant neoplasm of overlapping sites of right breast in female, estrogen receptor positive (HCC)      MUSCULOSKELETAL   (+) Arthritis   (+) Osteoarthritis of right thumb   (+) Primary osteoarthritis of right knee      NEURO/PSYCH   (+) Anxiety   (+) Depression      Other   (+) 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)      TTE Dec 2021 reviewed, unremarkable  Physical Exam    Airway     Mallampati score: II  TM Distance: >3 FB  Neck ROM: full  Mouth opening: >= 4 cm      Cardiovascular  Rhythm: regular, Rate: normal, Pulse is strong.     Dental   No notable dental hx     Pulmonary   Breath sounds clear to auscultation    Neurological    She appears alert and oriented x3.      Other Findings      post-pubertal.            Anesthesia Plan  ASA Score- 2     Anesthesia Type- regional with ASA Monitors.         Additional Monitors:     Airway Plan: natural airway.    Comment: Patient seen and examined.  History reviewed.  Patient to be done under TIVA and routine monitors.  Supraclavicular block to be performed preoperatively for post-op pain.  Risks discussed with the patient. Consent obtained..       Plan Factors-Exercise tolerance (METS): >4 METS.    Chart reviewed. EKG reviewed.  Existing labs reviewed. Patient summary reviewed.                  Induction- intravenous.    Postoperative Plan- Plan for postoperative opioid use.   Monitoring Plan - Monitoring plan - standard ASA monitoring  Post Operative Pain Plan - non-opiod analgesics, plan for postoperative opioid use, peripheral nerve block and multimodal analgesia    Perioperative Resuscitation Plan - Level 1 - Full Code.       Informed Consent- Anesthetic  plan and risks discussed with patient.  I personally reviewed this patient with the CRNA. Discussed and agreed on the Anesthesia Plan with the CRNA..

## 2025-07-22 ENCOUNTER — HOSPITAL ENCOUNTER (OUTPATIENT)
Age: 75
Setting detail: OUTPATIENT SURGERY
Discharge: HOME | End: 2025-07-22
Attending: ORTHOPAEDIC SURGERY | Admitting: ORTHOPAEDIC SURGERY
Payer: MEDICARE

## 2025-07-22 ENCOUNTER — ANESTHESIA (OUTPATIENT)
Age: 75
End: 2025-07-22
Payer: MEDICARE

## 2025-07-22 VITALS
WEIGHT: 197 LBS | BODY MASS INDEX: 29.86 KG/M2 | HEART RATE: 53 BPM | HEIGHT: 68 IN | OXYGEN SATURATION: 97 % | SYSTOLIC BLOOD PRESSURE: 174 MMHG | TEMPERATURE: 97.5 F | DIASTOLIC BLOOD PRESSURE: 75 MMHG | RESPIRATION RATE: 18 BRPM

## 2025-07-22 DIAGNOSIS — M18.11 OSTEOARTHRITIS OF RIGHT THUMB: ICD-10-CM

## 2025-07-22 DIAGNOSIS — M18.11 ARTHRITIS OF CARPOMETACARPAL (CMC) JOINT OF RIGHT THUMB: Primary | ICD-10-CM

## 2025-07-22 DIAGNOSIS — G56.01 RIGHT CARPAL TUNNEL SYNDROME: ICD-10-CM

## 2025-07-22 DIAGNOSIS — M67.431 GANGLION CYST OF VOLAR ASPECT OF RIGHT WRIST: ICD-10-CM

## 2025-07-22 DIAGNOSIS — G56.01 CARPAL TUNNEL SYNDROME ON RIGHT: ICD-10-CM

## 2025-07-22 PROCEDURE — 29848 WRIST ENDOSCOPY/SURGERY: CPT | Performed by: ORTHOPAEDIC SURGERY

## 2025-07-22 PROCEDURE — 29848 WRIST ENDOSCOPY/SURGERY: CPT | Performed by: PHYSICIAN ASSISTANT

## 2025-07-22 PROCEDURE — 25448 ARTHRP NTRCRPL/CRP/MTCRP SSP: CPT | Performed by: ORTHOPAEDIC SURGERY

## 2025-07-22 PROCEDURE — NC001 PR NO CHARGE: Performed by: ORTHOPAEDIC SURGERY

## 2025-07-22 PROCEDURE — 25111 REMOVE WRIST TENDON LESION: CPT | Performed by: PHYSICIAN ASSISTANT

## 2025-07-22 PROCEDURE — C1713 ANCHOR/SCREW BN/BN,TIS/BN: HCPCS | Performed by: ORTHOPAEDIC SURGERY

## 2025-07-22 PROCEDURE — 88304 TISSUE EXAM BY PATHOLOGIST: CPT | Performed by: PATHOLOGY

## 2025-07-22 PROCEDURE — 25111 REMOVE WRIST TENDON LESION: CPT | Performed by: ORTHOPAEDIC SURGERY

## 2025-07-22 PROCEDURE — 25448 ARTHRP NTRCRPL/CRP/MTCRP SSP: CPT | Performed by: PHYSICIAN ASSISTANT

## 2025-07-22 RX ORDER — MIDAZOLAM HYDROCHLORIDE 2 MG/2ML
INJECTION, SOLUTION INTRAMUSCULAR; INTRAVENOUS AS NEEDED
Status: DISCONTINUED | OUTPATIENT
Start: 2025-07-22 | End: 2025-07-22

## 2025-07-22 RX ORDER — CEFAZOLIN SODIUM 2 G/50ML
2000 SOLUTION INTRAVENOUS ONCE
Status: COMPLETED | OUTPATIENT
Start: 2025-07-22 | End: 2025-07-22

## 2025-07-22 RX ORDER — ROPIVACAINE HYDROCHLORIDE 5 MG/ML
INJECTION, SOLUTION EPIDURAL; INFILTRATION; PERINEURAL
Status: COMPLETED | OUTPATIENT
Start: 2025-07-22 | End: 2025-07-22

## 2025-07-22 RX ORDER — ACETAMINOPHEN 325 MG/1
650 TABLET ORAL EVERY 6 HOURS PRN
Status: DISCONTINUED | OUTPATIENT
Start: 2025-07-22 | End: 2025-07-22 | Stop reason: HOSPADM

## 2025-07-22 RX ORDER — ALBUTEROL SULFATE 0.83 MG/ML
2.5 SOLUTION RESPIRATORY (INHALATION) ONCE AS NEEDED
Status: DISCONTINUED | OUTPATIENT
Start: 2025-07-22 | End: 2025-07-22 | Stop reason: HOSPADM

## 2025-07-22 RX ORDER — PROMETHAZINE HYDROCHLORIDE 25 MG/ML
12.5 INJECTION, SOLUTION INTRAMUSCULAR; INTRAVENOUS ONCE AS NEEDED
Status: DISCONTINUED | OUTPATIENT
Start: 2025-07-22 | End: 2025-07-22 | Stop reason: HOSPADM

## 2025-07-22 RX ORDER — LABETALOL HYDROCHLORIDE 5 MG/ML
5 INJECTION, SOLUTION INTRAVENOUS
Status: DISCONTINUED | OUTPATIENT
Start: 2025-07-22 | End: 2025-07-22 | Stop reason: HOSPADM

## 2025-07-22 RX ORDER — ONDANSETRON 2 MG/ML
4 INJECTION INTRAMUSCULAR; INTRAVENOUS EVERY 6 HOURS PRN
Status: DISCONTINUED | OUTPATIENT
Start: 2025-07-22 | End: 2025-07-22 | Stop reason: HOSPADM

## 2025-07-22 RX ORDER — FENTANYL CITRATE/PF 50 MCG/ML
25 SYRINGE (ML) INJECTION
Status: DISCONTINUED | OUTPATIENT
Start: 2025-07-22 | End: 2025-07-22 | Stop reason: HOSPADM

## 2025-07-22 RX ORDER — TRAMADOL HYDROCHLORIDE 50 MG/1
50 TABLET ORAL EVERY 6 HOURS PRN
Status: DISCONTINUED | OUTPATIENT
Start: 2025-07-22 | End: 2025-07-22 | Stop reason: HOSPADM

## 2025-07-22 RX ORDER — COVID-19 ANTIGEN TEST
220 KIT MISCELLANEOUS 2 TIMES DAILY
Qty: 60 CAPSULE | Refills: 0 | Status: SHIPPED | OUTPATIENT
Start: 2025-07-22 | End: 2025-08-21

## 2025-07-22 RX ORDER — MAGNESIUM HYDROXIDE 1200 MG/15ML
LIQUID ORAL AS NEEDED
Status: DISCONTINUED | OUTPATIENT
Start: 2025-07-22 | End: 2025-07-22 | Stop reason: HOSPADM

## 2025-07-22 RX ORDER — ACETAMINOPHEN 325 MG/1
975 TABLET ORAL ONCE
Status: COMPLETED | OUTPATIENT
Start: 2025-07-22 | End: 2025-07-22

## 2025-07-22 RX ORDER — SODIUM CHLORIDE, SODIUM LACTATE, POTASSIUM CHLORIDE, CALCIUM CHLORIDE 600; 310; 30; 20 MG/100ML; MG/100ML; MG/100ML; MG/100ML
125 INJECTION, SOLUTION INTRAVENOUS CONTINUOUS
Status: CANCELLED | OUTPATIENT
Start: 2025-07-22

## 2025-07-22 RX ORDER — SODIUM CHLORIDE, SODIUM LACTATE, POTASSIUM CHLORIDE, CALCIUM CHLORIDE 600; 310; 30; 20 MG/100ML; MG/100ML; MG/100ML; MG/100ML
125 INJECTION, SOLUTION INTRAVENOUS CONTINUOUS
Status: DISCONTINUED | OUTPATIENT
Start: 2025-07-22 | End: 2025-07-22 | Stop reason: HOSPADM

## 2025-07-22 RX ORDER — ONDANSETRON 2 MG/ML
4 INJECTION INTRAMUSCULAR; INTRAVENOUS ONCE AS NEEDED
Status: DISCONTINUED | OUTPATIENT
Start: 2025-07-22 | End: 2025-07-22 | Stop reason: HOSPADM

## 2025-07-22 RX ORDER — PROPOFOL 10 MG/ML
INJECTION, EMULSION INTRAVENOUS CONTINUOUS PRN
Status: DISCONTINUED | OUTPATIENT
Start: 2025-07-22 | End: 2025-07-22

## 2025-07-22 RX ORDER — CHLORHEXIDINE GLUCONATE ORAL RINSE 1.2 MG/ML
15 SOLUTION DENTAL ONCE
Status: COMPLETED | OUTPATIENT
Start: 2025-07-22 | End: 2025-07-22

## 2025-07-22 RX ORDER — HYDROMORPHONE HCL/PF 1 MG/ML
0.5 SYRINGE (ML) INJECTION
Status: DISCONTINUED | OUTPATIENT
Start: 2025-07-22 | End: 2025-07-22 | Stop reason: HOSPADM

## 2025-07-22 RX ORDER — SENNOSIDES 8.6 MG
650 CAPSULE ORAL EVERY 8 HOURS PRN
Qty: 30 TABLET | Refills: 0 | Status: SHIPPED | OUTPATIENT
Start: 2025-07-22

## 2025-07-22 RX ORDER — TRAMADOL HYDROCHLORIDE 50 MG/1
50 TABLET ORAL EVERY 6 HOURS PRN
Qty: 12 TABLET | Refills: 0 | Status: SHIPPED | OUTPATIENT
Start: 2025-07-22

## 2025-07-22 RX ADMIN — CEFAZOLIN SODIUM 2000 MG: 2 SOLUTION INTRAVENOUS at 07:58

## 2025-07-22 RX ADMIN — SODIUM CHLORIDE, SODIUM LACTATE, POTASSIUM CHLORIDE, AND CALCIUM CHLORIDE 125 ML/HR: .6; .31; .03; .02 INJECTION, SOLUTION INTRAVENOUS at 06:47

## 2025-07-22 RX ADMIN — PROPOFOL 100 MCG/KG/MIN: 10 INJECTION, EMULSION INTRAVENOUS at 08:00

## 2025-07-22 RX ADMIN — ACETAMINOPHEN 975 MG: 325 TABLET ORAL at 06:45

## 2025-07-22 RX ADMIN — MIDAZOLAM 2 MG: 1 INJECTION INTRAMUSCULAR; INTRAVENOUS at 07:25

## 2025-07-22 RX ADMIN — CHLORHEXIDINE GLUCONATE 15 ML: 1.2 SOLUTION ORAL at 06:45

## 2025-07-22 RX ADMIN — ROPIVACAINE HYDROCHLORIDE 25 ML: 5 INJECTION EPIDURAL; INFILTRATION; PERINEURAL at 07:29

## 2025-07-22 NOTE — H&P
"H&P Exam - Orthopedics   Carie Fernandez 75 y.o. female MRN: [id number]  Unit/Bed#: [facility]U 03    Assessment/Plan   Assessment:  Right thumb CMC arthritis, right carpal tunnel syndrome, right volar ganglion cyst    Plan:  Right thumb CMC interpositional arthroplasty, right endoscopic carpal tunnel release, right volar ganglion cyst excision with regional anesthesia    History of Present Illness   HPI:  Carie Fernandez is a 75 y.o. female who presents with right hand numbness and tingling and pain at the base of her thumb.  She also notes a palpable bump over the volar aspect of her wrist.  She states that the numbness and tingling will have pain and discomfort with it into the median nerve distribution.  She has tried conservative treatment for her right thumb CMC arthritis with cortisone injections without relief of her symptoms.  She would like to proceed with surgical intervention at this time.    Historical Information  Review Of Systems:   Skin: Normal  Neuro: See HPI  Musculoskeletal: See HPI  14 point review of systems negative except as stated above     Past Medical History:   Past Medical History[1]    Past Surgical History:   Past Surgical History[2]    Family History:  Family history reviewed and non-contributory  Family History[3]    Social History:  Social History[4]    Allergies:   Allergies[5]        Labs:  0   Lab Value Date/Time    HCT 44.3 03/05/2025 0803    HCT 43.1 06/13/2024 1040    HCT 42.1 11/27/2023 0935    HCT 42.6 07/03/2015 1004    HGB 14.5 03/05/2025 0803    HGB 14.0 06/13/2024 1040    HGB 13.6 11/27/2023 0935    HGB 14.6 07/03/2015 1004    WBC 3.83 (L) 03/05/2025 0803    WBC 4.03 (L) 06/13/2024 1040    WBC 3.34 (L) 11/27/2023 0935    WBC 3.58 (L) 07/03/2015 1004    ESR 11 07/03/2015 1004       Meds:  Current Medications[6]    Blood Culture:   No results found for: \"BLOODCX\"    Wound Culture:   No results found for: \"WOUNDCULT\"    Ins and Outs:  No intake/output data " "recorded.            Physical Exam  /74   Pulse 65   Temp 97.8 °F (36.6 °C)   Resp 16   Ht 5' 8\" (1.727 m)   Wt 89.4 kg (197 lb)   LMP  (LMP Unknown)   SpO2 98%   BMI 29.95 kg/m²   /74   Pulse 65   Temp 97.8 °F (36.6 °C)   Resp 16   Ht 5' 8\" (1.727 m)   Wt 89.4 kg (197 lb)   LMP  (LMP Unknown)   SpO2 98%   BMI 29.95 kg/m²   Gen: No acute distress, resting comfortably in bed  HEENT: Eyes clear, moist mucus membranes, hearing intact  Respiratory: No audible wheezing or stridor  Cardiovascular: Well Perfused peripherally, 2+ distal pulse  Abdomen: nondistended, no peritoneal signs  Ortho Exam: right CMC Exam:  No adduction contracture  No hyperextension deformity of MCP joint  Positive localized tenderness over radial and dorsal aspect of thumb (CMC joint)  Grind test is Positive for pain and Positive for crepitus  No triggering or tenderness over the A1 pulley  No pain with Finkelstein’s maneuver       Right Carpal Tunnel Exam:    Negative thenar atrophy. Negative phalen's test. Positive carpal tunnel compression. Positive tinels over median nerve at the wrist.  Opposition strength 5/5.  Abduction strength 5/5.      Right volar ganglion cyst measuring 1 cm x 1 cm which is tender on palpation      Neuro Exam: Patient is neurovascularly intact from a median, radial and ulnar nerve distribution. Capillary refill less than 2 seconds.       Lab Results: Reviewed  Imaging: Reviewed         [1]   Past Medical History:  Diagnosis Date    Anxiety     Breast cancer (HCC) 12/06/2021    Cancer (HCC)     Right breast cancer    Depression     Fracture of radius, distal, closed     Last Assessed:  6/10/14    History of chemotherapy     Low back pain     Peripheral neuropathy     Thrombophlebitis     Vitamin D deficiency    [2]   Past Surgical History:  Procedure Laterality Date    ANKLE SURGERY Right     torn tendon- lengthen achilles    BREAST BIOPSY Left 2001    neg    BREAST BIOPSY Right 12/06/2021    " punch bx- ca    BREAST CYST EXCISION      GALLBLADDER SURGERY      HYSTERECTOMY  1996    IR PORT PLACEMENT  12/29/2021    IR PORT REMOVAL  02/21/2023    MASTECTOMY Right 04/26/2022    OOPHORECTOMY Bilateral 1996    ND MAST MODF RAD W/AX LYMPH NOD W/WO PECT/DALTON MIN Right 04/26/2022    Procedure: BREAST MODIFIED RADICAL MASTECTOMY WITH RIGHT LYMPHATIC MAPPING WITH BLUE AND RADIOACTIVE DYE (INJECT AT 0900 BY DR VASQUEZ IN THE OR);  Surgeon: Kevin Vasquez MD;  [facility];  Service: Surgical Oncology    VASCULAR SURGERY      WISDOM TOOTH EXTRACTION     [3]   Family History  Problem Relation Name Age of Onset    Ovarian cancer Mother Ofelia Rodriguez 56    Heart disease Father      No Known Problems Daughter      No Known Problems Daughter      Cancer Maternal Grandmother          Unknown primary; mid 50's    Leukemia Brother  58    Heart disease Brother      Heart disease Brother      No Known Problems Son      No Known Problems Son      Stomach cancer Maternal Aunt          60's   [4]   Social History  Socioeconomic History    Marital status: /Civil Union   Tobacco Use    Smoking status: Never    Smokeless tobacco: Never   Vaping Use    Vaping status: Never Used   Substance and Sexual Activity    Alcohol use: Yes     Comment: rarely    Drug use: Not Currently     Types: Marijuana     Comment: not helping stopped    Sexual activity: Not Currently     Partners: Male     Birth control/protection: Female Sterilization   Social History Narrative     to [place].     Social Drivers of Health     Financial Resource Strain: Low Risk  (10/14/2022)    Overall Financial Resource Strain (CARDIA)     Difficulty of Paying Living Expenses: Not hard at all   Food Insecurity: No Food Insecurity (7/22/2025)    Nursing - Inadequate Food Risk Classification     Worried About Running Out of Food in the Last Year: Never true     Ran Out of Food in the Last Year: Never true     Ran Out of Food in the Last Year: Never true    Transportation Needs: No Transportation Needs (7/22/2025)    Nursing - Transportation Risk Classification     Lack of Transportation: No   Intimate Partner Violence: Unknown (7/22/2025)    Nursing IPS     Physically Hurt by Someone: No     Hurt or Threatened by Someone: No   Housing Stability: Unknown (7/22/2025)    Nursing: Inadequate Housing Risk Classification     Unable to Pay for Housing in the Last Year: No     Has Housing: No   [5]   Allergies  Allergen Reactions    Pollen Extract Nasal Congestion   [6]   Current Facility-Administered Medications:     bupivacaine liposomal (EXPAREL) 1.3 % injection 20 mL, 20 mL, Infiltration, Once, Kevan Shaffer MD    ceFAZolin (ANCEF) IVPB (premix in dextrose) 2,000 mg 50 mL, 2,000 mg, Intravenous, Once, Jim Guy PA-C    lactated ringers infusion, 125 mL/hr, Intravenous, Continuous, Enrique Ochoa MD, Last Rate: 125 mL/hr at 07/22/25 0647, 125 mL/hr at 07/22/25 0647

## 2025-07-22 NOTE — ANESTHESIA POSTPROCEDURE EVALUATION
Post-Op Assessment Note    CV Status:  Stable  Pain Score: 0    Pain management: adequate       Mental Status:  Alert and awake   Hydration Status:  Euvolemic   PONV Controlled:  Controlled   Airway Patency:  Patent     Post Op Vitals Reviewed: Yes    No anethesia notable event occurred.    Staff: CRNA           Last Filed PACU Vitals:  Vitals Value Taken Time   Temp     Pulse 54 07/22/25 09:29   /79 07/22/25 09:26   Resp 12 07/22/25 09:29   SpO2 97 % 07/22/25 09:29   Vitals shown include unfiled device data.

## 2025-07-22 NOTE — OP NOTE
OPERATIVE REPORT  PATIENT NAME: Carie Fernandez  :  1950  MRN: [id number]  Pt Location: [facility]    SURGERY DATE: 25    Surgeons and Role:     * Enrique Ochoa MD - Primary     * Jim Guy PA-C - Assisting    Pre-Op Diagnosis:  Osteoarthritis of right thumb [M18.11]  Carpal tunnel syndrome on right [G56.01]  Ganglion cyst of volar aspect of right wrist [M67.431]    Post-Op Diagnosis:  Osteoarthritis of right thumb [M18.11]  Carpal tunnel syndrome on right [G56.01]  Ganglion cyst of volar aspect of right wrist [M67.431]    Procedure(s) (LRB):  Right thumb CMC interpositional arthroplasty with FCR tendon transfer (Right)  Right endoscopic carpal tunnel release (Right)  Right volar ganglion cyst excision (Right)  Application of short arm thumb spica splint (Right)    Specimen(s):  Order Name Source Comment Collection Info Order Time   TISSUE EXAM Soft Tissue, Other  Collected By: Enrique Ochoa MD 2025  8:26 AM     Release to patient through Mychart   Immediate            Estimated Blood Loss:   Minimal      Anesthesia Type:   Regional with Sedation    Operative Indications:  The patient has a history of right thumb CMC joint arthritis, carpal tunnel, and a volar radial wrist ganglion that was recalcitrant to conservative management.  The decision was made to bring the patient to the operating room for right endoscopic carpal tunnel release, volar wrist ganglion cyst excision, and thumb interpositional arthroplasty.  Risks of the procedure were explained which include, but are not limited to bleeding; infection; damage to nerves, arteries,veins, tendons; scar; pain; need for reoperation; failure to give desired result; and risks of anaesthesia.  All questions were answered to satisfaction and they were willing to proceed.         Operative Findings:  Right thumb CMC arthritis stage III  Right carpal tunnel  Right volar wrist ganglion of the flexor carpi radialis tendon  sheath    Complications:   None    Procedure and Technique:  After the patient, site, and procedure were identified, the patient was brought into the operating room in a supine position.  Regional anaesthesia and sedation were provided.  A well padded tourniquet was applied to the extremity, set at 250 mmHg.  The  right upper extremity was then prepped and drapped in a normal, sterile, orthopedic fashion.    After reconfirmation of the patient, site, and surgical procedure, which was agreed upon by the entire surgical team, attention was turned to the right wrist.  The sites of the proximal and distal incisions were marked.  The danya of the proximal incision was placed horizontally at the midline of the wrist.  The distal incision danya was longitudinal extending distally from the point of intersection of the line between the long finger and ring finger and the line along the distal border of the fully abducted thumb.  The proximal incision was performed.  Subcutaneous tissues were dissected.  Then the transverse volar antebrachial fascia was perforated with a scalpel.  The edges of the skin incision where retracted and the forearm fascia was incised for approximately 1.5 cm proximally with care taken to identify and protect the median nerve.  Retractors were used to inspect the transverse carpal ligament distally.  A curved Venegas dissector was used to glide under the transverse carpal ligament and superficial to the median nerve with confirmation via the washboard feeling.  Then the curved Venegas was pushed into the palm toward the distal incision site.  When the location of the distal skin danya was adequate, the distal incision was made.  Then with retraction of the skin, further dissection and perforation of the palmar fascia was performed with the use of tenotomy scissors.  The curved Venegas was guided from proximal to distal out the distal incisions without any twisting to allow for dilation of the tract.  The curved  Venegas was removed, and the cannula for the camera was inserted along the same tract, making sure to keep the alignment post on the cannula perpendicular to the plane of the hand without twisting.  Then while keeping the wrist in extension, and holding the cannula of the camera in place, the wrist was placed on the hyperextension board.  The scope was inserted distally, and a cotton-tip applicator was used proximally to clean the tract as well as the scope.  A curved cutting knife was introduced from proximal to distal while keeping visualization with the use of the camera.  Without twisting of the canula, the knife was used to cut the transverse carpal ligament completely, making sure there were no remnant fibers.  Then after this was accomplished, the hand was removed from the extension block.  Three maneuvers were used to confirm the full release of the transverse carpal ligament.  First, the ease of twisting the trocar of the camera confirmed the release of the ligament.  Second, the curved Venegas was introduced to make sure there were no remnant fibers that could be felt palmarly.  Third, the scope was introduced again to visualize that the whole ligament was released proximally to distally.  Additional confirmation of full release included retraction and inspection in the distal and proximal incisions to make sure there were no remnant fibers distally or proximally respectively.     After the patient, site, and procedure were once again identified, attention was turned to the right thumb.  A curvilinear incision was made at the junction of the glaborous and nonglaborous skin extending toward and proximal up the the flexor carpi radialis tendon.  Care was taken to protect the superficial sensory branch of the radial nerve, the radial artery, the median nerve, the palmar cutaneous branch of the median nerve, the flexor carpi radialis tendon, the abductor pollicis longus tendon and the extensor pollicis brevis  tendon.  A ganglion cyst of the flexor carpi radialis tendon was identified.  This was circumferentially dissected while protecting the radial artery and superficial sensory branch of the radial nerve as well as the deeper FCR tendon.  Ganglion cyst was then excised.  The cyst was found to be invading into and around the flexor carpi radialis with some FCR degeneration.  Cyst was sent and sent for routine pathologic evaluation.  This measured approximately 8 mm x 5 mm x 1 cm.  The thenar muscles were elevated off of the thumb metacarpal and an arthrotomy was done at the trapeziometacarpal and scaphotrapezial joint.  The trapezium was removed in its entirety.  Inspection revealed stage 4 (full thickiness loss of the articular cartilage with exposed subchondral bone) arthritis at the level of the metacarpal base, stage 3 (partial thickness loss of the articular cartilage) arthritis at the distal pole of the scaphoid, and inspection of the scaphotrapezoid joint revealed stage 1 (minimal fraying of the articular surface) arthritis.  Through a second incision, using a YOGASMOGA tendon retriever, the FCR tendon was harvested toward its insertion.  We harvested the entirety of the flexor carpi radialis tendon given the slight degeneration distally as we felt that harvesting half would lead to compromise of both the suspension as well as the remnant of the FCR tendon.  The thumb was held in a reduced position and the tendon was woven through the APL at its insertion into the thumb metacarpal and secured with FiberWire suture.  The tendon was then wrapped around the remainder of the FCR tendon, and then a small anchovy was placed within the trapezial space.  FiberWire sutures were placed.  Gelfoam was placed in the remainder of the hole.  There was excellent stability noted with compression testing, no pistoning noted.      At the completion of the procedure, hemostasis was obtained with cautery and direct pressure.  The wounds  were copiously irrigated with sterile solution.  The wounds were closed with Vicryl and Prolene.  Sterile dressings were applied, including Xeroform, gauze, tweeners, webril, ACE and Thumb Spica Splint.  Please note, all sponge, needle, and instrument counts were correct prior to closure.  Loupe magnification was utilized.   The patient tolerated the procedure well.     I was present for all critical portions of the procedure., A qualified resident physician was not available., and A physician assistant was required during the procedure for retraction, tissue handling, dissection and suturing.    Patient Disposition:  PACU  and hemodynamically stable    SIGNATURE: Enrique Ochoa MD  DATE: 07/22/25  TIME: 9:06 AM

## 2025-07-22 NOTE — ANESTHESIA PROCEDURE NOTES
Peripheral Block    Patient location during procedure: holding area  Start time: 7/22/2025 7:20 AM  Reason for block: at surgeon's request and post-op pain management  Staffing  Performed by: Jim Bryan MD  Authorized by: Jim Bryan MD    Preanesthetic Checklist  Completed: patient identified, IV checked, site marked, risks and benefits discussed, surgical consent, monitors and equipment checked, pre-op evaluation and timeout performed  Peripheral Block  Patient position: sitting  Prep: ChloraPrep  Patient monitoring: frequent blood pressure checks, continuous pulse oximetry and heart rate  Block type: Supraclavicular  Laterality: right  Injection technique: single-shot  Procedures: ultrasound guided, Ultrasound guidance required for the procedure to increase accuracy and safety of medication placement and decrease risk of complications.  Ultrasound permanent image saved  ropivacaine (NAROPIN) 0.5 % injection 20 mL - Perineural   25 mL - 7/22/2025 7:29:00 AM  Needle  Needle type: Stimuplex   Needle gauge: 20 G  Needle length: 4 in  Needle localization: anatomical landmarks and ultrasound guidance  Needle insertion depth: 4 cm  Assessment  Injection assessment: incremental injection, frequent aspiration, injected with ease, negative aspiration, negative for heart rate change, no paresthesia on injection, no symptoms of intraneural/intravenous injection and needle tip visualized at all times  Paresthesia pain: none  Post-procedure:  site cleaned  patient tolerated the procedure well with no immediate complications

## 2025-07-22 NOTE — PROGRESS NOTES
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     April 7, 2022 status post neoadjuvant therapy with 4 cycles paclitaxel preceded by 4 cycles of doxorubicin plus cyclophosphamide    04/26/2022 S 22 [id number] S/p Right Breast Mastectomy with axillary dissection      Final  Extensive residual invasive breast carcinoma with 2 well-defined foci and multiple smaller \"incidental\" foci    Biomarkers estrogen receptor +%, progesterone receptor +71-80% and HER2/lory low 2+ by IHC yet nonamplified by FISH    Negative margins    14 of 19 dissected lymph nodes positive for metastatic carcinoma    ypT4d, pN3a with 14 of 19 lymph nodes positive for involvement and extranodal extension      July 25, 2022 S/p whole breast and alvin radiation rad rx      June 2022 to present Anastrozole 1 mg/day     March 7, 2025 CT scan chest abdomen pelvis with contrast  No convincing evidence of metastatic disease in the chest abdomen or pelvis         Osteopenia, unspecified location        Lumbar back pain        March 5, 2025 nuclear medicine whole-body bone scan with no evidence of increased uptake in the lumbar spine suggestive of metastatic disease.    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)     See above     Other fatigue   multifactorial  Orders:    Echo complete w/ contrast if indicated; Future              Clinical/Pathologic Stage  Cancer Staging   Malignant neoplasm of overlapping sites of right breast in " female, estrogen receptor positive (HCC)  Staging form: Breast, AJCC 8th Edition  - Pathologic stage from 4/26/2022: ypT4d, pN3a, cM0, G1, ER+, WY+, HER2- - Signed by HERMINIO Mullen on 10/24/2024  Stage prefix: Post-therapy  Response to neoadjuvant therapy: No response  Method of lymph node assessment: Axillary lymph node dissection  Nuclear grade: G2  Histologic grading system: 3 grade system  - Clinical: Stage IIIB (cT4d, cN3a, cM0, G2, ER+, WY+, HER2-) - Signed by Sergio Vieyra MD on 3/5/2024  Histologic grading system: 3 grade system        Current Therapy  Anastrozole 1 mg per day since June 2022     Discussion     Today represents my third visit with this delightful couple from March of 2025.       As charted the patient suffered a diagnosis of clinically high risk, multifocal inflammatory carcinoma of the right breast established by biopsy in December 2021.      The patient completed preoperative neoadjuvant chemotherapy with doxorubicin plus cyclophosphamide x 4 cycles followed by paclitaxel x 4 cycles ending in April 2022.  She went on to have total mastectomy surgery with axillary lymph node dissection on April 26, 2022 with significant residual breast involvement and alvin involvement.  She completed postoperative whole breast and alvin radiation therapy in July 2022.  The patient remains on oral anastrozole 1 mg/day since that time    Gratefully, the patient has no evidence of metastatic recurrent disease on clinical review as well as comprehensive imaging.        The patient has considerable degenerative change in the lumbar spine without evidence of metastatic involvement and has been seen by multidisciplinary team members with gratitude. I have discussed physical therapy, consistent strengthening, weight loss     The patient will be continued on oral aromatase inhibitor therapy with the agent anastrozole for the indefinite future.  The patient clearly has significant risk for potential  "relapse in my view.    The patient has calmed down considerably yet remains fearful around her risk of relapse.  Close monitor is a concern for her.    I discussed active research around the use of circulating tumor DNA as an aid to early diagnosis of breast cancer and other malignancies in relapse.  At this point,the presence of circulating DNA may suggest a higher risk of future relapse and diminished survival. Yet,understanding the prognostic and therapeutic implications of a positive result is unclear.  I emphasized to the patient and her  that the use of this tool remains experimental and is of unclear actionability.    The patient and her  are interested in further exploration around the opportunity for clinical trials.  I encouraged her and suggested some leads.              Special Studies  01/14/2022 [employer]  Negative     No results found for: \"GENETIC\", \"INTERP\", \"GENES\"        History of Present Illness        Postmenopausal female with diagnosis of locally advanced multifocal inflammatory carcinoma of the right breast established in December 2021.     The patient was treated with neoadjuvant systemic chemotherapy with the agents doxorubicin plus cyclophosphamide x 4 cycles followed by paclitaxel x 4 cycles ending in April 2022.     The patient went on to undergo a right sided modified radical mastectomy in April 2022 which revealed significant multifocal tumor in residua.  YPT4 yN3 with 14 of 19 lymph nodes involved by metastatic spread of disease along with extranodal deposition.     The patient completed external beam radiation therapy long course on July 25, 2022     She has been managed with the oral aromatase inhibitor agent anastrozole from June 2022 by chart review     She comes for ongoing medical oncology follow-up and care     Pertinent Interval History from March 05, 2024  The patient endorses severe low back pain both with standing and with sitting and with " movement  While she has had pain in the low back for 5 years it has escalated in recent months and at times is unbearable  Patient endorses a sense of fatigue and diminished activity over recent years  Patient denies other complaints on full review of systems        Cancer Screening and Prevention  Mammography: 12/12/2024   GI/Colonoscopy: 03/19/2025  GYN: 11/30/2021   Bone Density: 12/18/2024   Covid 19 Vaccination Status: Moderna Series times 2 Feb 2021, Moderna boosters 08/22, 11/2023     Oncology Therapeutic Summary:        June 2022-present anastrozole 1 mg/day     July 25, 2022 status post external beam radiation therapy to the right chest wall, draining lymph nodes and right supraclavicular alvin basin x 25 fractions     April 26, 2022 status post right modified radical mastectomy with lymph node review     April 8, 2022 from December 30, 2021 status post neoadjuvant systemic chemotherapy with doxorubicin plus cyclophosphamide x 4 cycles plus paclitaxel x 4 cycles     Oncology History   Cancer Staging   Malignant neoplasm of overlapping sites of right breast in female, estrogen receptor positive (HCC)  Staging form: Breast, AJCC 8th Edition  - Pathologic stage from 4/26/2022: ypT4d, pN3a, cM0, G1, ER+, ID+, HER2- - Signed by HERMINIO Mullen on 10/24/2024  Stage prefix: Post-therapy  Response to neoadjuvant therapy: No response  Method of lymph node assessment: Axillary lymph node dissection  Nuclear grade: G2  Histologic grading system: 3 grade system  - Clinical: Stage IIIB (cT4d, cN3a, cM0, G2, ER+, ID+, HER2-) - Signed by Sergio Vieyra MD on 3/5/2024  Histologic grading system: 3 grade system        Oncology History   Malignant neoplasm of overlapping sites of right breast in female, estrogen receptor positive (HCC)   12/6/2021 Biopsy     Punch biopsy of right breast  Dermal lymphovascular invasion, immunoprofile consistent with breast primary  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      12/30/2021 - 4/8/2022 Chemotherapy     DOXOrubicin (ADRIAMYCIN), 122 mg, Intravenous, Once, 4 of 4 cycles  Administration: 120 mg (12/30/2021), 122 mg (1/13/2022), 122 mg (1/27/2022), 122 mg (2/10/2022)  palonosetron (ALOXI), 0.25 mg, Intravenous, Once, 2 of 2 cycles  Administration: 0.25 mg (3/24/2022), 0.25 mg (4/7/2022)  pegfilgrastim (NEULASTA), 4 mg (100 % of original dose 4 mg), Subcutaneous, Once, 3 of 3 cycles  Dose modification: 4 mg (original dose 4 mg, Cycle 6)  Administration: 4 mg (3/11/2022), 4 mg (3/25/2022), 4 mg (4/8/2022)  pegfilgrastim (NEULASTA ONPRO), 6 mg, Subcutaneous, Once, 5 of 5 cycles  Administration: 6 mg (12/30/2021), 6 mg (1/13/2022), 6 mg (2/24/2022), 6 mg (1/27/2022), 6 mg (2/10/2022)  cyclophosphamide (CYTOXAN) IVPB, 600 mg/m2 = 1,218 mg, Intravenous, Once, 4 of 4 cycles  Administration: 1,218 mg (12/30/2021), 1,218 mg (1/13/2022), 1,218 mg (1/27/2022), 1,218 mg (2/10/2022)  fosaprepitant (EMEND) IVPB, 150 mg, Intravenous, Once, 4 of 4 cycles  Administration: 150 mg (12/30/2021), 150 mg (1/13/2022), 150 mg (1/27/2022), 150 mg (2/10/2022)  PACLItaxel (TAXOL) chemo IVPB, 175 mg/m2 = 355.2 mg, Intravenous, Once, 4 of 4 cycles  Administration: 355.2 mg (2/24/2022), 355.2 mg (3/10/2022), 355.2 mg (3/24/2022), 355.2 mg (4/7/2022)      4/26/2022 Surgery     Right breast modified radical mastectomy  Inflammatory breast cancer  Grade 1  1.5 cm (multiple foci involving all four quadrants)  Lymphovascular invasion present  Margins negative  14/19 Lymph nodes with macro-metastases  Anatomic Stage IIIC  Prognostic Stage IIIA      4/26/2022 -  Cancer Staged     Staging form: Breast, AJCC 8th Edition  - Pathologic  stage from 4/26/2022: ypT4d, pN3a, cM0, G1, ER+, DE+, HER2- - Signed by HERMINIO Mullen on 10/24/2024  Stage prefix: Post-therapy  Response to neoadjuvant therapy: No response  Method of lymph node assessment: Axillary lymph node dissection  Nuclear grade: G2  Histologic grading system: 3 grade system         6/2022 -  Hormone Therapy     Anastrozole 1 mg daily  Dr. [place]      6/20/2022 - 7/25/2022 Radiation     Plan ID Energy Fractions Dose per Fraction (cGy) Dose Correction (cGy) Total Dose Delivered (cGy) Elapsed Days   R CW 6X 25 / 25 200 0 5,000 35   R PAB 6X 25 / 25 51 0 1,275 35   R Sclav 6X 25 / 25 200 0 5,000 35    Dr Evangelista      3/5/2024 -  Cancer Staged     Staging form: Breast, AJCC 8th Edition  - Clinical: Stage IIIB (cT4d, cN3a, cM0, G2, ER+, DE+, HER2-) - Signed by Sergio Vieyra MD on 3/5/2024  Histologic grading system: 3 grade system         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)   4/21/2023 Initial Diagnosis     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)         Review of Systems   Constitutional:  Positive for activity change and fatigue.   Musculoskeletal:  Positive for back pain.      Medical History Reviewed by provider this encounter:     .     Medications Ordered Prior to Encounter               Current Outpatient Medications on File Prior to Visit   Medication Sig Dispense Refill    ALPRAZolam (XANAX) 0.25 mg tablet Take 1/2 to 1 pill Q 8 hours p.r.n. For anxiety 30 tablet 0    anastrozole (ARIMIDEX) 1 mg tablet Take 1 tablet (1 mg total) by mouth daily 90 tablet 0    Calcium 250 MG CAPS Take 250 mg by mouth in the morning          cholecalciferol (VITAMIN D3) 1,000 units tablet Take 2 capsules by mouth daily        ciclopirox (LOPROX) 0.77 % cream          escitalopram (LEXAPRO) 20 mg tablet Take 1 tablet (20 mg total) by mouth daily 86 tablet 1    Fish Oil-Krill Oil (KRILL OIL PLUS) CAPS Take by mouth in the morning           "fluocinonide (LIDEX) 0.05 % external solution APPLY TO SCALP ONCE DAILY AS NEEDED FOR ITCH   2    ketoconazole (NIZORAL) 2 % shampoo Apply 1 application topically if needed     5    meclizine (ANTIVERT) 25 mg tablet Take 1 tablet (25 mg total) by mouth every 8 (eight) hours 30 tablet 0      No current facility-administered medications on file prior to visit.         Social History                   Tobacco Use    Smoking status: Never    Smokeless tobacco: Never   Vaping Use    Vaping status: Never Used   Substance and Sexual Activity    Alcohol use: Yes       Comment: rarely    Drug use: Yes       Types: Marijuana       Comment: not helping stopped    Sexual activity: Not Currently       Partners: Male       Birth control/protection: Female Sterilization             Objective     /92 (BP Location: Right arm, Patient Position: Sitting, Cuff Size: Adult)   Pulse 97   Temp 98.2 °F (36.8 °C) (Temporal)   Resp 16   Ht 5' 8\" (1.727 m)   Wt 90.3 kg (199 lb)   LMP  (LMP Unknown)   SpO2 99%   BMI 30.26 kg/m²      Pain Screening:  Pain Score:   9  ECOG   2  Physical Exam  Exam conducted with a chaperone present.   Constitutional:       General: She is in acute distress.      Appearance: She is obese.   Neck:      Thyroid: No thyroid mass.      Trachea: Trachea normal.   Cardiovascular:      Rate and Rhythm: Normal rate and regular rhythm.      Heart sounds: No murmur heard.     No systolic murmur is present.      No diastolic murmur is present.      No friction rub. No gallop. No S3 or S4 sounds.   Pulmonary:      Breath sounds: Normal breath sounds. No decreased breath sounds, wheezing or rhonchi.      Comments: On examination low back pain is exacerbated by deep inspiration  Chest:      Chest wall: No mass. There is no dullness to percussion.   Breasts:     Right: Absent. No mass, skin change or tenderness.      Left: Normal. No mass, skin change or tenderness.   Abdominal:      General: Abdomen is " protuberant. Bowel sounds are normal.      Palpations: Abdomen is soft. There is no hepatomegaly, splenomegaly or mass.      Tenderness: There is no abdominal tenderness. There is no right CVA tenderness or left CVA tenderness.   Musculoskeletal:      Cervical back: No rigidity or bony tenderness. No pain with movement, spinous process tenderness or muscular tenderness.      Thoracic back: No tenderness or bony tenderness.      Lumbar back: Tenderness and bony tenderness present.      Right lower leg: No edema.      Left lower leg: No edema.   Lymphadenopathy:      Cervical: No cervical adenopathy.      Right cervical: No superficial, deep or posterior cervical adenopathy.     Left cervical: No superficial, deep or posterior cervical adenopathy.   Neurological:      Mental Status: She is alert.            Labs: I have reviewed the following labs:            Lab Results   Component Value Date/Time     WBC 4.03 (L) 06/13/2024 10:40 AM     RBC 4.36 06/13/2024 10:40 AM     Hemoglobin 14.0 06/13/2024 10:40 AM     Hematocrit 43.1 06/13/2024 10:40 AM     MCV 99 (H) 06/13/2024 10:40 AM     MCH 32.1 06/13/2024 10:40 AM     RDW 11.6 06/13/2024 10:40 AM     Platelets 169 06/13/2024 10:40 AM     Segmented % 45 06/13/2024 10:40 AM     Lymphocytes % 38 06/13/2024 10:40 AM     Monocytes % 11 06/13/2024 10:40 AM     Eosinophils Relative 4 06/13/2024 10:40 AM     Basophils Relative 2 (H) 06/13/2024 10:40 AM     Immature Grans % 0 06/13/2024 10:40 AM     Absolute Neutrophils 1.83 (L) 06/13/2024 10:40 AM                Lab Results   Component Value Date/Time     Potassium 4.1 06/13/2024 10:40 AM     Chloride 103 06/13/2024 10:40 AM     CO2 29 06/13/2024 10:40 AM     BUN 15 06/13/2024 10:40 AM     Creatinine 0.71 06/13/2024 10:40 AM     Glucose, Fasting 96 06/13/2024 10:40 AM     Calcium 9.2 06/13/2024 10:40 AM     AST 18 06/13/2024 10:40 AM     ALT 12 06/13/2024 10:40 AM     Alkaline Phosphatase 66 06/13/2024 10:40 AM     Total  Protein 6.8 06/13/2024 10:40 AM     Albumin 4.0 06/13/2024 10:40 AM     Total Bilirubin 0.88 06/13/2024 10:40 AM     eGFR 83 06/13/2024 10:40 AM            Pathology:   April 26, 2022 S 21 [id number] status post right breast mastectomy with axillary contents         Final Diagnosis   A. Breast, Right, right breast mastectomy and axillary contents , suture marks short superior, medium medial, long lateral: -Status post neoadjuvant chemotherapy for inflammatory breast carcinoma.   -Extensive residual invasive breast carcinoma , presenting as two well defined tumor foci -  The first focus of tumor measuring 0.7 x 0.6 x 0.6 x 0.7 cm, located in the central upper outer/upper inner quadrant, 1.0cmawayfrom the deep margin -  The second tumor focus measures 1.0 x0.9 x 0.5 cm , lower inner quadrant , The tumor is located 1.3 cm fromthe inferior margin-  Multiple separate tumor foci, range from 2-4 mm seen in bridging tissue between the main 2 masses -  Two separate tumor foci measuring 9 mm and 6 mm grossly presented as fibrous foci.   -Multiple incidental foci of tumor seen in grossly unremarkable breast tissue in all four breast quadrants as follows: -     Few incidental foci of tumor seen in upper outer quadrant ranging from 2-4 mm -Two incidental foci of tumor seen in upper inner quadrant , ranging from 4-5 mm -Few incidental foci of tumor seen in the lower outer quadrant ranging from 5-15 mm -Multiple incidental foci of tumor seen in the the lower inner quadrant , ranging from 3-14 mm -Extensive LVI seen seen in all four breast quadrants -Dermal LVI seen, multiple sections -Dermal Satellite focus of tumor 3 mm -Dermal scar -Rare foci of atypical lobular hyperplasia [id number] Carie Fernandez         December 6, 2021 S 21 [id number] status post punch biopsy skin lesion right breast  Final Diagnosis   A. Skin lesion, Breast, Right, punch biopsy: - Dermal lymphovascular invasion, immunoprofile consistent with breast primary, present  at tissue edges.      Biomarkers  Estrogen receptor +90-95% positive  Progesterone receptor 70-80% positive  HER2/lory low 2+ by IHC yet nonamplified or negative by FISH analysis        Imaging:   July 18, 2025 CT of the chest without contrast   IMPRESSION:     1.  Unchanged pulmonary nodules. The stability since at least 2021 suggests benign etiologies.        March 7, 2025 CT chest abdomen pelvis with contrast      FINDINGS:     CHEST     LUNGS: A few scattered 1-2 mm pulmonary nodules (identified on series 303), some of which are not definitely seen on CT 12/11/2021 which may be secondary to differences in slice thickness. Few scattered small perifissural nodules, likely intrapulmonary   lymph nodes (including a 3 mm nodule in the right lower lobe which is unchanged since 2021 on series 303/194). No suspicious pulmonary nodules. Mild right greater than left biapical pleural-parenchymal scarring. Central airways are patent. No   consolidation.     PLEURA: Unremarkable.     HEART/GREAT VESSELS: Heart is normal size. Mild coronary artery atherosclerotic calcifications. No thoracic aortic aneurysm.     MEDIASTINUM AND RAHEL: Unremarkable.     CHEST WALL AND LOWER NECK: Postsurgical changes of right mastectomy and right axillary lymph node dissection. No lymphadenopathy.     ABDOMEN     LIVER/BILIARY TREE: Stable 1.8 cm right posterior hepatic lobe cyst. Additional punctate hypoattenuating lesions, too small to characterize, but statistically likely benign, which do not require follow-up (ACR White paper 2017). No suspicious hepatic   mass. Normal hepatic contours. No biliary dilation.     GALLBLADDER: Post cholecystectomy.     SPLEEN: Unremarkable.     PANCREAS: Unremarkable.     ADRENAL GLANDS: Unremarkable.     KIDNEYS/URETERS: Stable left renal cortical scarring. No hydronephrosis or urinary tract calculi. Subcentimeter hypoattenuating renal lesion(s), too small to characterize but statistically likely benign, which  do not warrant follow-up (Radiology June 2019).     STOMACH AND BOWEL: Few scattered colonic diverticula without evidence of acute diverticulitis.     APPENDIX: Normal.     ABDOMINOPELVIC CAVITY: No ascites. No pneumoperitoneum. No lymphadenopathy.     VESSELS: Mild atherosclerosis without abdominal aortic aneurysm.     PELVIS     REPRODUCTIVE ORGANS: Post hysterectomy.     URINARY BLADDER: Under distended, somewhat limiting evaluation. Grossly unremarkable.     ABDOMINAL WALL/INGUINAL REGIONS: Unremarkable.     BONES: No acute fracture or suspicious osseous lesion. Spinal degenerative changes.     IMPRESSION:     1. No convincing evidence of metastatic disease in the chest, abdomen, or pelvis.     2. Few scattered 1-2 mm pulmonary micronodules, some of which are not definitely seen on CT 12/11/2021, which may be secondary to differences in slice thickness. Although these are not particularly suspicious, patients with a history of malignancy are at   increased risk of metastasis and should receive initial three-month follow-up chest CT to establish stability.     March 5, 2025 nuclear medicine whole-body bone scan  FINDINGS:     Foci of increased radiotracer uptake in the bilateral shoulders, cervical spine, left sternomanubrial joint, lower thoracic spine, bilateral wrists, feet, and knees are likely arthritic given distribution.     New focus of increased uptake in the distal right leg near the ankle joint.     Symmetric renal uptake. Mildly prominent renal calyces. No hydronephrosis seen on MRI lumbar spine 3/4/2025.     IMPRESSION:     1. No foci of increased uptake in the lumbar spine when compared to 12/14/2021. No findings suspicious for metastatic disease     2. New focus of increased uptake in the distal right leg near the ankle joint, possibly degenerative or post-traumatic. Recommend right ankle radiograph for further evaluation as clinically indicated.         March 4, 2025 MRI lumbar spine with and  without contrast      IMPRESSION:     No evidence for metastatic disease to the lumbar spine. No abnormal enhancement within the spinal canal.     Degenerative changes of the lumbar spine, as described above.     Multifactorial disease results in moderate mass effect on the thecal sac at L4-L5.          December 18, 2024 bone density DEXA scan  Impression  1. Low bone mass (osteopenia).        2.  Since a DXA study from 11/21/2023, there has been:  A  STATISTICALLY SIGNIFICANT DECREASE in bone mineral density of 0.034 g/cm2 (4.3%) in the lumbar spine.                 December 12, 2024 screening mammogram left breast     FINDINGS:   There are no suspicious masses, grouped microcalcifications or areas of unexplained architectural distortion. The skin and nipple areolar complex are unremarkable.       Biopsy clips throughout the breast noted.     IMPRESSION:  No mammographic evidence of malignancy.           ASSESSMENT/BI-RADS CATEGORY:  Left: 2 - Benign  Overall: 2 - Benign     RECOMMENDATION:       - Diagnostic mammogram in 1 year for the left breast.     December 18, 2024 bone density DEXA scan     IMPRESSION:     1. Low bone mass (osteopenia).        2.  Since a DXA study from 11/21/2023, there has been:  A  STATISTICALLY SIGNIFICANT DECREASE in bone mineral density of 0.034 g/cm2 (4.3%) in the lumbar spine.        April 20, 2022 bilateral breast MRI with and without contrast      FINDINGS:   RIGHT  B) MASS: Again noted is enhancing mass in the 12 o'clock position of the right breast, 8 cm from the nipple.  This has decreased in size.  It previously measured 10 mm and currently measures 6 mm. Series 401, image 105/224.      C) MASS:  Previously noted 9 mm mass in the 12 o'clock position of the right breast, 6 cm from the nipple has markedly decreased in size/no longer definitely seen; tiny focus of enhancement is noted in this region.  Series 401, image 100/224.     D) MASS:  Previously noted 6 mm enhancing mass in  the 8 o'clock position of the breast has markedly decreased in size/no longer definitely seen; tiny focus of enhancement is noted in this region. Series 401, image 156/224.     E) NON-MASS ENHANCEMENT:  Previously noted area of non mass enhancement in the 6 o'clock position of the breast has markedly decreased; previously was a 28 x 17 mm area of enhancement and currently 1.1 mm linear area of enhancement. Series 401, image 177/224.     F) NON-MASS ENHANCEMENT:  Again noted is an area of non mass enhancement in the 11 o'clock position of the right breast.  The amount of enhancement within this region has decreased; however, the span is similar.  Series 401, image 108/[id number].     Diffuse right breast skin thickening again noted.  Catheter from port noted.  No right axillary axillary or internal mammary adenopathy.     Left  There are no suspicious enhancing masses or suspicious areas of non-mass enhancement. There is no axillary or internal mammary adenopathy.      IMPRESSION:  Partial response to neoadjuvant chemotherapy.     ASSESSMENT/BI-RADS CATEGORY:  Left: 2 - Benign  Right: 6 - Known Biopsy-Proven Malignancy  Overall: 6 - Known Biopsy-Proven Malignancy     RECOMMENDATION:       - Surgical consultation for the right breast.       - Routine screening mammogram in 1 year for the left breast.        December 14, 2021 nuclear medicine whole-body bone scan  IMPRESSION:     1.  No scintigraphic evidence of osseous metastasis.     December 11, 2021 CT scan chest abdomen pelvis with contrast     IMPRESSION:     No definite evidence of metastatic disease in the chest, abdomen, or pelvis.     Nonspecific 5 mm pulmonary nodule in the right lower lobe, likely infectious/inflammatory.  Recommend continued surveillance according to the patient's oncologic imaging protocol.     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     Workstation performed: [id number]    "                     Instructions           Instructions      Return in about 17 weeks (around 8/6/2025) for Office Visit.  After Visit Summary (English View)   (Snapshot)   - Automatic Snapshot taken 4/9/2025  Additional Documentation    Vitals: /70 (BP Location: Left arm, Patient Position: Sitting, Cuff Size: Adult)     Pulse 83     Temp 97.6 °F (36.4 °C) (Temporal)     Resp 18     Ht 5' 8\" (1.727 m)     Wt 90.7 kg (200 lb)     LMP  (LMP Unknown)     SpO2 96%     BMI 30.41 kg/m²     BSA 2.04 m²     Pain Sc 0-No pain   Flowsheets: Vitals Reassessment   SmartForms:       "

## 2025-07-23 NOTE — ANESTHESIA POSTPROCEDURE EVALUATION
Post-Op Assessment Note    CV Status:  Stable    Pain management: adequate       Mental Status:  Alert and awake   Hydration Status:  Euvolemic   PONV Controlled:  Controlled   Airway Patency:  Patent     Post Op Vitals Reviewed: Yes    No anethesia notable event occurred.    Staff: Anesthesiologist           Last Filed PACU Vitals:  Vitals Value Taken Time   Temp 97.5 °F (36.4 °C) 07/22/25 09:55   Pulse 61 07/22/25 09:55   /74 07/22/25 09:55   Resp 15 07/22/25 09:55   SpO2 94 % 07/22/25 09:55       Modified Robin:     Vitals Value Taken Time   Activity 1 07/22/25 09:55   Respiration 2 07/22/25 09:55   Circulation 2 07/22/25 09:55   Consciousness 2 07/22/25 09:55   Oxygen Saturation 2 07/22/25 09:55     Modified Robin Score: 9

## 2025-07-24 ENCOUNTER — OFFICE VISIT (OUTPATIENT)
Dept: HEMATOLOGY ONCOLOGY | Facility: CLINIC | Age: 75
End: 2025-07-24
Payer: MEDICARE

## 2025-07-24 VITALS
SYSTOLIC BLOOD PRESSURE: 130 MMHG | WEIGHT: 199 LBS | DIASTOLIC BLOOD PRESSURE: 86 MMHG | BODY MASS INDEX: 30.16 KG/M2 | TEMPERATURE: 97.2 F | RESPIRATION RATE: 16 BRPM | HEART RATE: 97 BPM | OXYGEN SATURATION: 96 % | HEIGHT: 68 IN

## 2025-07-24 DIAGNOSIS — Z17.0 MALIGNANT NEOPLASM OF OVERLAPPING SITES OF RIGHT BREAST IN FEMALE, ESTROGEN RECEPTOR POSITIVE (HCC): Primary | ICD-10-CM

## 2025-07-24 DIAGNOSIS — C50.811 MALIGNANT NEOPLASM OF OVERLAPPING SITES OF RIGHT BREAST IN FEMALE, ESTROGEN RECEPTOR POSITIVE (HCC): Primary | ICD-10-CM

## 2025-07-24 PROCEDURE — 99215 OFFICE O/P EST HI 40 MIN: CPT | Performed by: INTERNAL MEDICINE

## 2025-07-28 PROCEDURE — 88304 TISSUE EXAM BY PATHOLOGIST: CPT | Performed by: PATHOLOGY

## 2025-08-01 ENCOUNTER — OFFICE VISIT (OUTPATIENT)
Dept: OBGYN CLINIC | Facility: HOSPITAL | Age: 75
End: 2025-08-01

## 2025-08-01 VITALS — HEIGHT: 68 IN | BODY MASS INDEX: 30.16 KG/M2 | WEIGHT: 199 LBS

## 2025-08-01 DIAGNOSIS — M18.11 ARTHRITIS OF CARPOMETACARPAL (CMC) JOINT OF RIGHT THUMB: ICD-10-CM

## 2025-08-01 DIAGNOSIS — M67.431 GANGLION CYST OF VOLAR ASPECT OF RIGHT WRIST: ICD-10-CM

## 2025-08-01 DIAGNOSIS — G56.01 CARPAL TUNNEL SYNDROME ON RIGHT: Primary | ICD-10-CM

## 2025-08-01 PROCEDURE — 99024 POSTOP FOLLOW-UP VISIT: CPT | Performed by: PHYSICIAN ASSISTANT

## 2025-08-06 ENCOUNTER — TELEPHONE (OUTPATIENT)
Age: 75
End: 2025-08-06

## 2025-08-12 ENCOUNTER — EVALUATION (OUTPATIENT)
Dept: OCCUPATIONAL THERAPY | Age: 75
End: 2025-08-12
Payer: MEDICARE

## 2025-08-14 ENCOUNTER — OFFICE VISIT (OUTPATIENT)
Dept: OCCUPATIONAL THERAPY | Age: 75
End: 2025-08-14
Attending: PHYSICIAN ASSISTANT
Payer: MEDICARE

## 2025-08-19 ENCOUNTER — OFFICE VISIT (OUTPATIENT)
Dept: OCCUPATIONAL THERAPY | Age: 75
End: 2025-08-19
Attending: PHYSICIAN ASSISTANT
Payer: MEDICARE

## 2025-08-19 DIAGNOSIS — G56.01 CARPAL TUNNEL SYNDROME ON RIGHT: Primary | ICD-10-CM

## 2025-08-19 DIAGNOSIS — M67.431 GANGLION CYST OF VOLAR ASPECT OF RIGHT WRIST: ICD-10-CM

## 2025-08-19 DIAGNOSIS — M18.11 ARTHRITIS OF CARPOMETACARPAL (CMC) JOINT OF RIGHT THUMB: ICD-10-CM

## 2025-08-19 PROCEDURE — 97140 MANUAL THERAPY 1/> REGIONS: CPT

## 2025-08-19 PROCEDURE — 97110 THERAPEUTIC EXERCISES: CPT

## 2025-08-19 PROCEDURE — 97530 THERAPEUTIC ACTIVITIES: CPT

## 2025-08-21 ENCOUNTER — OFFICE VISIT (OUTPATIENT)
Dept: OCCUPATIONAL THERAPY | Age: 75
End: 2025-08-21
Attending: PHYSICIAN ASSISTANT
Payer: MEDICARE

## 2025-08-21 DIAGNOSIS — G56.01 CARPAL TUNNEL SYNDROME ON RIGHT: Primary | ICD-10-CM

## 2025-08-21 DIAGNOSIS — M67.431 GANGLION CYST OF VOLAR ASPECT OF RIGHT WRIST: ICD-10-CM

## 2025-08-21 DIAGNOSIS — M18.11 ARTHRITIS OF CARPOMETACARPAL (CMC) JOINT OF RIGHT THUMB: ICD-10-CM

## 2025-08-21 PROCEDURE — 97530 THERAPEUTIC ACTIVITIES: CPT

## 2025-08-21 PROCEDURE — 97140 MANUAL THERAPY 1/> REGIONS: CPT

## 2025-08-21 PROCEDURE — 97110 THERAPEUTIC EXERCISES: CPT

## (undated) DEVICE — Device

## (undated) DEVICE — 3M™ STERI-STRIP™ REINFORCED ADHESIVE SKIN CLOSURES, R1547, 1/2 IN X 4 IN (12 MM X 100 MM), 6 STRIPS/ENVELOPE: Brand: 3M™ STERI-STRIP™

## (undated) DEVICE — INTENDED FOR TISSUE SEPARATION, AND OTHER PROCEDURES THAT REQUIRE A SHARP SURGICAL BLADE TO PUNCTURE OR CUT.: Brand: BARD-PARKER SAFETY BLADES SIZE 15, STERILE

## (undated) DEVICE — VIAL DECANTER

## (undated) DEVICE — STRL COTTON TIP APPLCTR 6IN PK: Brand: CARDINAL HEALTH

## (undated) DEVICE — PREP PAD BNS: Brand: CONVERTORS

## (undated) DEVICE — RETROGRADE KNIFE BOX OF 6: Brand: ECTRA

## (undated) DEVICE — NEEDLE 25G X 1 1/2

## (undated) DEVICE — SUT VICRYL 2-0 REEL 54 IN J286G

## (undated) DEVICE — BETHLEHEM UNIVERSAL BREAST PK: Brand: CARDINAL HEALTH

## (undated) DEVICE — ELECTRODE BLADE MOD  E-Z CLEAN 6.5IN -0014M

## (undated) DEVICE — GAUZE SPONGES,16 PLY: Brand: CURITY

## (undated) DEVICE — ABDOMINAL PAD: Brand: DERMACEA

## (undated) DEVICE — CHLORAPREP HI-LITE 26ML ORANGE

## (undated) DEVICE — PENCIL ELECTROSURG E-Z CLEAN -0035H

## (undated) DEVICE — SUT MONOCRYL 4-0 PS-2 18 IN Y496G

## (undated) DEVICE — SUT SILK 2-0 SH 30 IN K833H

## (undated) DEVICE — WET SKIN PREP TRAY: Brand: MEDLINE INDUSTRIES, INC.

## (undated) DEVICE — STERILE BETHLEHEM PLASTIC HAND: Brand: CARDINAL HEALTH

## (undated) DEVICE — SUT VICRYL 2-0 SH 27 IN UNDYED J417H

## (undated) DEVICE — C-WIRE PAK DOUBLE ENDED ORTHOPAEDIC WIRE, SPADE, .062" (1.57 MM)
Type: IMPLANTABLE DEVICE | Site: FINGER | Status: NON-FUNCTIONAL
Brand: C-WIRE
Removed: 2025-07-22

## (undated) DEVICE — SMOKE EVACUATION TUBING WITH 8 IN INTEGRAL WAND AND SPONGE GUARD: Brand: BUFFALO FILTER

## (undated) DEVICE — INTENDED FOR TISSUE SEPARATION, AND OTHER PROCEDURES THAT REQUIRE A SHARP SURGICAL BLADE TO PUNCTURE OR CUT.: Brand: BARD-PARKER ® CARBON RIB-BACK BLADES

## (undated) DEVICE — GLOVE SRG BIOGEL 7

## (undated) DEVICE — DRAPE EQUIPMENT RF WAND

## (undated) DEVICE — SUT VICRYL 3-0 SH 27 IN J416H

## (undated) DEVICE — CHEST/BREAST DRAPE: Brand: CONVERTORS

## (undated) DEVICE — 3M™ STERI-STRIP™ REINFORCED ADHESIVE SKIN CLOSURES, R1542, 1/4 IN X 1-1/2 IN (6 MM X 38 MM), 6 STRIPS/ENVELOPE: Brand: 3M™ STERI-STRIP™